# Patient Record
Sex: MALE | Race: WHITE | NOT HISPANIC OR LATINO | URBAN - METROPOLITAN AREA
[De-identification: names, ages, dates, MRNs, and addresses within clinical notes are randomized per-mention and may not be internally consistent; named-entity substitution may affect disease eponyms.]

---

## 2017-01-19 ENCOUNTER — INPATIENT (INPATIENT)
Facility: HOSPITAL | Age: 82
LOS: 0 days | Discharge: HOME | DRG: 304 | End: 2017-01-20
Attending: INTERNAL MEDICINE | Admitting: INTERNAL MEDICINE
Payer: MEDICARE

## 2017-01-19 ENCOUNTER — APPOINTMENT (OUTPATIENT)
Dept: GASTROENTEROLOGY | Facility: HOSPITAL | Age: 82
End: 2017-01-19

## 2017-01-19 VITALS
SYSTOLIC BLOOD PRESSURE: 199 MMHG | OXYGEN SATURATION: 95 % | DIASTOLIC BLOOD PRESSURE: 86 MMHG | TEMPERATURE: 97 F | HEART RATE: 78 BPM | RESPIRATION RATE: 16 BRPM

## 2017-01-19 DIAGNOSIS — I25.10 ATHEROSCLEROTIC HEART DISEASE OF NATIVE CORONARY ARTERY WITHOUT ANGINA PECTORIS: ICD-10-CM

## 2017-01-19 DIAGNOSIS — K85.90 ACUTE PANCREATITIS WITHOUT NECROSIS OR INFECTION, UNSPECIFIED: ICD-10-CM

## 2017-01-19 DIAGNOSIS — N18.6 END STAGE RENAL DISEASE: ICD-10-CM

## 2017-01-19 DIAGNOSIS — R63.8 OTHER SYMPTOMS AND SIGNS CONCERNING FOOD AND FLUID INTAKE: ICD-10-CM

## 2017-01-19 DIAGNOSIS — I16.0 HYPERTENSIVE URGENCY: ICD-10-CM

## 2017-01-19 DIAGNOSIS — Z95.2 PRESENCE OF PROSTHETIC HEART VALVE: Chronic | ICD-10-CM

## 2017-01-19 DIAGNOSIS — Z29.9 ENCOUNTER FOR PROPHYLACTIC MEASURES, UNSPECIFIED: ICD-10-CM

## 2017-01-19 DIAGNOSIS — I50.9 HEART FAILURE, UNSPECIFIED: ICD-10-CM

## 2017-01-19 DIAGNOSIS — N18.9 CHRONIC KIDNEY DISEASE, UNSPECIFIED: ICD-10-CM

## 2017-01-19 LAB
ALBUMIN SERPL ELPH-MCNC: 3.9 G/DL — SIGNIFICANT CHANGE UP (ref 3.4–5)
ALP SERPL-CCNC: 110 U/L — SIGNIFICANT CHANGE UP (ref 40–120)
ALT FLD-CCNC: 41 U/L — SIGNIFICANT CHANGE UP (ref 12–42)
ANION GAP SERPL CALC-SCNC: 12 MMOL/L — SIGNIFICANT CHANGE UP (ref 9–16)
APTT BLD: 30.8 SEC — SIGNIFICANT CHANGE UP (ref 27.5–37.4)
AST SERPL-CCNC: 50 U/L — HIGH (ref 15–37)
BASE EXCESS BLDV CALC-SCNC: 6.8 MMOL/L — SIGNIFICANT CHANGE UP
BILIRUB SERPL-MCNC: 1.2 MG/DL — SIGNIFICANT CHANGE UP (ref 0.2–1.2)
BUN SERPL-MCNC: 38 MG/DL — HIGH (ref 7–23)
CA-I SERPL-SCNC: 1.09 MMOL/L — LOW (ref 1.1–1.3)
CALCIUM SERPL-MCNC: 8.9 MG/DL — SIGNIFICANT CHANGE UP (ref 8.5–10.5)
CHLORIDE SERPL-SCNC: 100 MMOL/L — SIGNIFICANT CHANGE UP (ref 96–108)
CK MB CFR SERPL CALC: 1.9 NG/ML — SIGNIFICANT CHANGE UP (ref 0.5–3.6)
CK SERPL-CCNC: 35 U/L — LOW (ref 39–308)
CO2 SERPL-SCNC: 29 MMOL/L — SIGNIFICANT CHANGE UP (ref 22–31)
CREAT SERPL-MCNC: 4.7 MG/DL — HIGH (ref 0.5–1.3)
GAS PNL BLDV: 138 MMOL/L — SIGNIFICANT CHANGE UP (ref 138–146)
GAS PNL BLDV: SIGNIFICANT CHANGE UP
GAS PNL BLDV: SIGNIFICANT CHANGE UP
GLUCOSE SERPL-MCNC: 106 MG/DL — HIGH (ref 70–99)
HCO3 BLDV-SCNC: 31 MMOL/L — HIGH (ref 20–27)
HCT VFR BLD CALC: 43 % — SIGNIFICANT CHANGE UP (ref 39–50)
HGB BLD-MCNC: 13.8 G/DL — SIGNIFICANT CHANGE UP (ref 13–17)
INR BLD: 1.17 — HIGH (ref 0.88–1.16)
LDH SERPL L TO P-CCNC: 345 U/L — HIGH (ref 87–241)
LIDOCAIN IGE QN: 3516 U/L — HIGH (ref 73–393)
MAGNESIUM SERPL-MCNC: 2.4 MG/DL — SIGNIFICANT CHANGE UP (ref 1.6–2.4)
MCHC RBC-ENTMCNC: 30.7 PG — SIGNIFICANT CHANGE UP (ref 27–34)
MCHC RBC-ENTMCNC: 32.1 G/DL — SIGNIFICANT CHANGE UP (ref 32–36)
MCV RBC AUTO: 95.8 FL — SIGNIFICANT CHANGE UP (ref 80–100)
PCO2 BLDV: 44 MMHG — SIGNIFICANT CHANGE UP (ref 41–51)
PH BLDV: 7.47 — HIGH (ref 7.32–7.43)
PHOSPHATE SERPL-MCNC: 3.3 MG/DL — SIGNIFICANT CHANGE UP (ref 2.5–4.5)
PLATELET # BLD AUTO: 161 K/UL — SIGNIFICANT CHANGE UP (ref 150–400)
PO2 BLDV: 31 MMHG — SIGNIFICANT CHANGE UP
POTASSIUM BLDV-SCNC: 4.3 MMOL/L — SIGNIFICANT CHANGE UP (ref 3.5–4.9)
POTASSIUM SERPL-MCNC: 4.2 MMOL/L — SIGNIFICANT CHANGE UP (ref 3.5–5.3)
POTASSIUM SERPL-SCNC: 4.2 MMOL/L — SIGNIFICANT CHANGE UP (ref 3.5–5.3)
PROT SERPL-MCNC: 7.3 G/DL — SIGNIFICANT CHANGE UP (ref 6.4–8.2)
PROTHROM AB SERPL-ACNC: 13 SEC — SIGNIFICANT CHANGE UP (ref 10–13.1)
RBC # BLD: 4.49 M/UL — SIGNIFICANT CHANGE UP (ref 4.2–5.8)
RBC # FLD: 16.8 % — SIGNIFICANT CHANGE UP (ref 10.3–16.9)
SAO2 % BLDV: 60 % — SIGNIFICANT CHANGE UP
SODIUM SERPL-SCNC: 141 MMOL/L — SIGNIFICANT CHANGE UP (ref 135–145)
TROPONIN I SERPL-MCNC: 0.03 NG/ML — SIGNIFICANT CHANGE UP (ref 0.01–0.04)
WBC # BLD: 10.7 K/UL — HIGH (ref 3.8–10.5)
WBC # FLD AUTO: 10.7 K/UL — HIGH (ref 3.8–10.5)

## 2017-01-19 PROCEDURE — 74020: CPT | Mod: 26

## 2017-01-19 PROCEDURE — 43274 ERCP DUCT STENT PLACEMENT: CPT

## 2017-01-19 PROCEDURE — 99223 1ST HOSP IP/OBS HIGH 75: CPT | Mod: GC

## 2017-01-19 PROCEDURE — 93010 ELECTROCARDIOGRAM REPORT: CPT

## 2017-01-19 RX ORDER — MORPHINE SULFATE 50 MG/1
4 CAPSULE, EXTENDED RELEASE ORAL
Qty: 0 | Refills: 0 | Status: DISCONTINUED | OUTPATIENT
Start: 2017-01-19 | End: 2017-01-19

## 2017-01-19 RX ORDER — SODIUM CHLORIDE 9 MG/ML
1000 INJECTION INTRAMUSCULAR; INTRAVENOUS; SUBCUTANEOUS
Qty: 0 | Refills: 0 | Status: DISCONTINUED | OUTPATIENT
Start: 2017-01-19 | End: 2017-01-20

## 2017-01-19 RX ORDER — SEVELAMER CARBONATE 2400 MG/1
800 POWDER, FOR SUSPENSION ORAL
Qty: 0 | Refills: 0 | Status: DISCONTINUED | OUTPATIENT
Start: 2017-01-19 | End: 2017-01-20

## 2017-01-19 RX ORDER — MORPHINE SULFATE 50 MG/1
2 CAPSULE, EXTENDED RELEASE ORAL
Qty: 0 | Refills: 0 | Status: DISCONTINUED | OUTPATIENT
Start: 2017-01-19 | End: 2017-01-19

## 2017-01-19 RX ORDER — HYDROMORPHONE HYDROCHLORIDE 2 MG/ML
2 INJECTION INTRAMUSCULAR; INTRAVENOUS; SUBCUTANEOUS
Qty: 0 | Refills: 0 | Status: DISCONTINUED | OUTPATIENT
Start: 2017-01-19 | End: 2017-01-20

## 2017-01-19 RX ORDER — TICAGRELOR 90 MG/1
90 TABLET ORAL
Qty: 0 | Refills: 0 | Status: DISCONTINUED | OUTPATIENT
Start: 2017-01-20 | End: 2017-01-20

## 2017-01-19 RX ORDER — ONDANSETRON 8 MG/1
4 TABLET, FILM COATED ORAL ONCE
Qty: 0 | Refills: 0 | Status: COMPLETED | OUTPATIENT
Start: 2017-01-19 | End: 2017-01-19

## 2017-01-19 RX ORDER — ASPIRIN/CALCIUM CARB/MAGNESIUM 324 MG
81 TABLET ORAL DAILY
Qty: 0 | Refills: 0 | Status: DISCONTINUED | OUTPATIENT
Start: 2017-01-19 | End: 2017-01-20

## 2017-01-19 RX ORDER — HEPARIN SODIUM 5000 [USP'U]/ML
5000 INJECTION INTRAVENOUS; SUBCUTANEOUS EVERY 8 HOURS
Qty: 0 | Refills: 0 | Status: DISCONTINUED | OUTPATIENT
Start: 2017-01-19 | End: 2017-01-20

## 2017-01-19 RX ORDER — FAMOTIDINE 10 MG/ML
20 INJECTION INTRAVENOUS ONCE
Qty: 0 | Refills: 0 | Status: DISCONTINUED | OUTPATIENT
Start: 2017-01-19 | End: 2017-01-20

## 2017-01-19 RX ORDER — SIMVASTATIN 20 MG/1
20 TABLET, FILM COATED ORAL AT BEDTIME
Qty: 0 | Refills: 0 | Status: DISCONTINUED | OUTPATIENT
Start: 2017-01-19 | End: 2017-01-19

## 2017-01-19 RX ORDER — SIMVASTATIN 20 MG/1
20 TABLET, FILM COATED ORAL AT BEDTIME
Qty: 0 | Refills: 0 | Status: DISCONTINUED | OUTPATIENT
Start: 2017-01-20 | End: 2017-01-20

## 2017-01-19 RX ORDER — PANTOPRAZOLE SODIUM 20 MG/1
20 TABLET, DELAYED RELEASE ORAL DAILY
Qty: 0 | Refills: 0 | Status: DISCONTINUED | OUTPATIENT
Start: 2017-01-19 | End: 2017-01-20

## 2017-01-19 RX ORDER — LOSARTAN POTASSIUM 100 MG/1
50 TABLET, FILM COATED ORAL DAILY
Qty: 0 | Refills: 0 | Status: DISCONTINUED | OUTPATIENT
Start: 2017-01-19 | End: 2017-01-20

## 2017-01-19 RX ORDER — TICAGRELOR 90 MG/1
90 TABLET ORAL
Qty: 0 | Refills: 0 | Status: DISCONTINUED | OUTPATIENT
Start: 2017-01-19 | End: 2017-01-19

## 2017-01-19 RX ADMIN — PANTOPRAZOLE SODIUM 20 MILLIGRAM(S): 20 TABLET, DELAYED RELEASE ORAL at 18:58

## 2017-01-19 RX ADMIN — ONDANSETRON 4 MILLIGRAM(S): 8 TABLET, FILM COATED ORAL at 18:56

## 2017-01-19 RX ADMIN — HEPARIN SODIUM 5000 UNIT(S): 5000 INJECTION INTRAVENOUS; SUBCUTANEOUS at 22:59

## 2017-01-19 RX ADMIN — HYDROMORPHONE HYDROCHLORIDE 2 MILLIGRAM(S): 2 INJECTION INTRAMUSCULAR; INTRAVENOUS; SUBCUTANEOUS at 18:56

## 2017-01-19 RX ADMIN — HYDROMORPHONE HYDROCHLORIDE 2 MILLIGRAM(S): 2 INJECTION INTRAMUSCULAR; INTRAVENOUS; SUBCUTANEOUS at 19:40

## 2017-01-19 RX ADMIN — SODIUM CHLORIDE 40 MILLILITER(S): 9 INJECTION INTRAMUSCULAR; INTRAVENOUS; SUBCUTANEOUS at 19:01

## 2017-01-19 NOTE — H&P ADULT. - PMH
Aortic stenosis, severe    Chronic renal failure  on HD  Congestive heart failure    Gout    Hyperlipidemia    Hypertension    Myocardial infarction

## 2017-01-19 NOTE — CONSULT NOTE ADULT - SUBJECTIVE AND OBJECTIVE BOX
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  Renal consulted to resume hemodialysis schedule.  Patient was given 300cc of IVF during procedure.  Last dialysis was on 1/18/17.  Post procedure pt awoke with sharp intense, non-radiating abdominal pain located primarily in RUQ and accompanied with nausea, no vomiting. Nursing noted elevated BP to 217/85, HR 65, RR 12, POx 99 RA. Pt denies CP, palpitations, new SOB, blurry vision, HA, change in mental status, fevers or chills. Pt was given zofran, famotidine and labetalol 20mg IVP and morphine 4mg IVP. Stat CXR and abdominal xray were ordered and reviewed by GI fellow with no free air in peritoneum or mediastinum and no pulmonary congestion. Repeat BP was 191/78 and pt was given ativan 0.5mg and hydralazine 10mg IVP with improvement of pressure to 171/69, HR 69, RR 10, Pox 98 RA. (19 Jan 2017 17:06)    Patient seen and examined by me.  He denied cp.  C/o minimal sob.  (+) diffuse abdominal sharp pain.  No n/v/d.  Stated he gets his dialysis [facility] ([address] [place] [address])      PAST MEDICAL & SURGICAL HISTORY:  Gout  Chronic renal failure: on HD  Aortic stenosis, severe  Myocardial infarction  Hypertension  Hyperlipidemia  Congestive heart failure  S/P TAVR (transcatheter aortic valve replacement)      MEDICATIONS:  famotidine Injectable 20milliGRAM(s) IV Push Once  heparin  Injectable 5000Unit(s) SubCutaneous every 8 hours  aspirin enteric coated 81milliGRAM(s) Oral daily  losartan 50milliGRAM(s) Oral daily  sevelamer hydrochloride 800milliGRAM(s) Oral three times a day with meals  morphine  - Injectable 4milliGRAM(s) IV Push every 3 hours PRN  ondansetron Injectable 4milliGRAM(s) IV Push once PRN  pantoprazole  Injectable 20milliGRAM(s) IV Push daily  sodium chloride 0.9%. 1000milliLiter(s) IV Continuous <Continuous>      No pertinent family history in first degree relatives      REVIEW OF SYSTEMS:  As above      PHYSICAL EXAM:    Constitutional: T(C): 36.2, Max: 36.2 (01-19 @ 18:10)  HR: 78 (78 - 78)  BP: 199/86 (199/86 - 199/86)  RR: 16 (16 - 16)  SpO2: 95% (95% - 95%)  Wt(kg): --  General: AAO x 3. NAD but c/o abd pain.  HEENT - Dry MM. No cyanosis  CV - S1, S2. RRR. (+) systolic murmur  Lungs - CTA b/l  Abdomen - soft. (+) epigastric and RUQ tenderness.  No rebound, no guarding.  Ext - No LE edema  Access: RUE AVF (+) bruit    DATA:  141    |  100    |  38<H>  ----------------------------<  106<H>  Ca:8.9   (19 Jan 2017 17:04)  4.2     |  29     |  4.70<H>      eGFR if Non : 10 <L>  eGFR if : 12 <L>    TPro  7.3 g/dL  /  Alb  3.9 g/dL  /  TBili  1.2 mg/dL  /  DBili  x      /  AST  50 U/L<H>  /  ALT  41 U/L  /  AlkPhos  110 U/L  19 Jan 2017 17:04                        13.8   10.7<H> )-----------( 161      ( 19 Jan 2017 17:08 )             43.0

## 2017-01-19 NOTE — H&P ADULT. - PROBLEM SELECTOR PLAN 4
no CP. neg trops. c/w aspirin, plavix, statin and BB. no CP. neg trops. c/w aspirin, plavix, statin and ?BB.

## 2017-01-19 NOTE — H&P ADULT. - PROBLEM SELECTOR PLAN 5
echo done 12/20/2016 showing EF of 55-60% with mild concentric hypertrophy, moderately elevated LA pressure. On exam pt with mildly elevated RH pressure. will c/w home losartan and beta blocker.

## 2017-01-19 NOTE — H&P ADULT. - PROBLEM SELECTOR PLAN 2
Likely 2/2 to pain so should improve with pain control. No signs or symptoms of end organ damage. On home losartan 50mg hs. Will treat BP overnight with pushes of hydralazine 10mg. Goal SBP < 150 overnight. Likely 2/2 to pain so should improve with pain control. No signs or symptoms of end organ damage. On home losartan 50mg hs. Renal recommends not being overly aggressive with BP management overnight. SBP < 180 acceptable. First try treating pain.

## 2017-01-19 NOTE — H&P ADULT. - GASTROINTESTINAL COMMENTS
See HPI hypoactive bs. soft abdomen with guarding. tenderness in upper quadrants R > L. No hepatomegaly. No splenomegaly.

## 2017-01-19 NOTE — H&P ADULT. - ATTENDING COMMENTS
S/p ERCP today now w/ hypertensive urgency likely due to pain from acute pancreatitis post ERCP. Allow for higher SBP readings at this time, keep NPO, dilaudid prn-adjust as tolerated, gentle hydration given ESRD status, c/w home BP meds, f/up GI recs. Apprec Renal recs, plan for HD tomorrow.  Rest as above, d/w family at bedside. PT eval.

## 2017-01-19 NOTE — CONSULT NOTE ADULT - PROBLEM SELECTOR RECOMMENDATION 9
- electrolytes acceptable  - volume status acceptable  - BP elevated currently - suspect likely 2/2 pain. Patient reports much lower bp on off dialysis days.  May have small volume expansion component, though unclear as patient with Hb of 13.8 and 3 weeks ago was 9.6. Possibly heme concentrated.    - Recommend pain control at this time.    - No indication for HD at the moment.  - If patient is going to be NPO, can give gentle hydration with NS @ 40-50 cc/hr.  - Plan for HD on 1/20/17.

## 2017-01-19 NOTE — H&P ADULT. - PROBLEM SELECTOR PLAN 1
post ERCP pancreatitis with epigastric pain and lipase 3500. Halle's criteria <= 2. Consulting renal for recs on fluid resuscitation. Will treat pain with morphine 4mg q2 hrs prn. Keep NPO except meds for overnight. Will advance diet in morning post ERCP pancreatitis with epigastric pain and lipase 3500. Halle's criteria <= 2. Consulting renal for recs on fluid resuscitation. They recommend gentle hydration. Will treat pain with dilaudid 2mg q3 hrs prn. Keep NPO except meds for overnight. Will advance diet in morning

## 2017-01-19 NOTE — H&P ADULT. - RADIOLOGY RESULTS AND INTERPRETATION
CXR (wet read): No pulmonary congestion. No free air under diaphragm or in mediastinum.   Abdominal xray (wet read): CBD stent visible and appearing in correction location.

## 2017-01-19 NOTE — PROCEDURE NOTE - ADDITIONAL PROCEDURE DETAILS
ERCP with stent placement into the CHD.  [id number] pigtail stent left inplace to decompress dilated GD and obviate [id number]

## 2017-01-19 NOTE — H&P ADULT. - HISTORY OF PRESENT ILLNESS
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 The ERCP went without complications. He was given 300cc of IVF. Last dialysis was yesterday. Post procedure pt awoke with sharp intense, non-radiating abdominal pain located primarily in RUQ and accompanied with nausea, no vomiting. Nursing noted elevated BP to 217/85, HR 65, RR 12, POx 99 RA. Pt denies CP, palpitations, new SOB, blurry vision, HA, change in mental status, fevers or chills. Pt was given zofran, famotidine and labetalol 20mg IVP and morphine 4mg IVP. Stat CXR and abdominal xray were ordered and reviewed by GI fellow with no free air in peritoneum or mediastinum and no pulmonary congestion. Repeat BP was 191/78 and pt was given ativan 0.5mg and hydralazine 10mg IVP with improvement of pressure to 171/69, HR 69, RR 10, Pox 98 RA.

## 2017-01-19 NOTE — H&P ADULT. - ASSESSMENT
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

## 2017-01-19 NOTE — H&P ADULT. - PROBLEM SELECTOR PLAN 3
On HD M-W-F. Last dialysis yesterday. Electrolytes WNL. Pt relatively euvolemic on exam. Consulted renal for recs. Will continue home phosphate binder.

## 2017-01-20 ENCOUNTER — TRANSCRIPTION ENCOUNTER (OUTPATIENT)
Age: 82
End: 2017-01-20

## 2017-01-20 VITALS — DIASTOLIC BLOOD PRESSURE: 59 MMHG | SYSTOLIC BLOOD PRESSURE: 125 MMHG

## 2017-01-20 DIAGNOSIS — I10 ESSENTIAL (PRIMARY) HYPERTENSION: ICD-10-CM

## 2017-01-20 DIAGNOSIS — D72.829 ELEVATED WHITE BLOOD CELL COUNT, UNSPECIFIED: ICD-10-CM

## 2017-01-20 DIAGNOSIS — N18.9 CHRONIC KIDNEY DISEASE, UNSPECIFIED: ICD-10-CM

## 2017-01-20 LAB
ALBUMIN SERPL ELPH-MCNC: 3.4 G/DL — SIGNIFICANT CHANGE UP (ref 3.4–5)
ALP SERPL-CCNC: 168 U/L — HIGH (ref 40–120)
ALT FLD-CCNC: 138 U/L — HIGH (ref 12–42)
ANION GAP SERPL CALC-SCNC: 12 MMOL/L — SIGNIFICANT CHANGE UP (ref 9–16)
AST SERPL-CCNC: 149 U/L — HIGH (ref 15–37)
BASOPHILS NFR BLD AUTO: 1 % — SIGNIFICANT CHANGE UP (ref 0–2)
BILIRUB SERPL-MCNC: 1.9 MG/DL — HIGH (ref 0.2–1.2)
BUN SERPL-MCNC: 44 MG/DL — HIGH (ref 7–23)
CALCIUM SERPL-MCNC: 8.4 MG/DL — LOW (ref 8.5–10.5)
CHLORIDE SERPL-SCNC: 105 MMOL/L — SIGNIFICANT CHANGE UP (ref 96–108)
CO2 SERPL-SCNC: 26 MMOL/L — SIGNIFICANT CHANGE UP (ref 22–31)
CREAT SERPL-MCNC: 5.3 MG/DL — HIGH (ref 0.5–1.3)
GLUCOSE SERPL-MCNC: 137 MG/DL — HIGH (ref 70–99)
HBV SURFACE AG SER-ACNC: SIGNIFICANT CHANGE UP
HCT VFR BLD CALC: 42 % — SIGNIFICANT CHANGE UP (ref 39–50)
HCV AB S/CO SERPL IA: 0.13 S/CO — SIGNIFICANT CHANGE UP
HCV AB SERPL-IMP: SIGNIFICANT CHANGE UP
HGB BLD-MCNC: 13.3 G/DL — SIGNIFICANT CHANGE UP (ref 13–17)
LIDOCAIN IGE QN: 345 U/L — SIGNIFICANT CHANGE UP (ref 73–393)
LYMPHOCYTES # BLD AUTO: 5 % — LOW (ref 13–44)
MAGNESIUM SERPL-MCNC: 2.1 MG/DL — SIGNIFICANT CHANGE UP (ref 1.6–2.4)
MANUAL DIF COMMENT BLD-IMP: SIGNIFICANT CHANGE UP
MANUAL SMEAR VERIFICATION: SIGNIFICANT CHANGE UP
MCHC RBC-ENTMCNC: 30.4 PG — SIGNIFICANT CHANGE UP (ref 27–34)
MCHC RBC-ENTMCNC: 31.7 G/DL — LOW (ref 32–36)
MCV RBC AUTO: 96.1 FL — SIGNIFICANT CHANGE UP (ref 80–100)
MONOCYTES NFR BLD AUTO: 5 % — SIGNIFICANT CHANGE UP (ref 2–14)
NEUTROPHILS NFR BLD AUTO: 81 % — HIGH (ref 43–77)
NEUTS BAND # BLD: 8 % — SIGNIFICANT CHANGE UP
NEUTS HYPERSEG # BLD: PRESENT — SIGNIFICANT CHANGE UP
OVALOCYTES BLD QL SMEAR: SLIGHT — SIGNIFICANT CHANGE UP
PHOSPHATE SERPL-MCNC: 3.1 MG/DL — SIGNIFICANT CHANGE UP (ref 2.5–4.5)
PLAT MORPH BLD: (no result)
PLATELET # BLD AUTO: 149 K/UL — LOW (ref 150–400)
PLATELET CLUMP BLD QL SMEAR: PRESENT
POIKILOCYTOSIS BLD QL AUTO: SLIGHT — SIGNIFICANT CHANGE UP
POLYCHROMASIA BLD QL SMEAR: SLIGHT — SIGNIFICANT CHANGE UP
POTASSIUM SERPL-MCNC: 4.1 MMOL/L — SIGNIFICANT CHANGE UP (ref 3.5–5.3)
POTASSIUM SERPL-SCNC: 4.1 MMOL/L — SIGNIFICANT CHANGE UP (ref 3.5–5.3)
PROT SERPL-MCNC: 6.5 G/DL — SIGNIFICANT CHANGE UP (ref 6.4–8.2)
RBC # BLD: 4.37 M/UL — SIGNIFICANT CHANGE UP (ref 4.2–5.8)
RBC # FLD: 17.3 % — HIGH (ref 10.3–16.9)
RBC BLD AUTO: (no result)
SODIUM SERPL-SCNC: 143 MMOL/L — SIGNIFICANT CHANGE UP (ref 135–145)
WBC # BLD: 20.4 K/UL — HIGH (ref 3.8–10.5)
WBC # FLD AUTO: 20.4 K/UL — HIGH (ref 3.8–10.5)

## 2017-01-20 PROCEDURE — 85730 THROMBOPLASTIN TIME PARTIAL: CPT

## 2017-01-20 PROCEDURE — 82553 CREATINE MB FRACTION: CPT

## 2017-01-20 PROCEDURE — 82550 ASSAY OF CK (CPK): CPT

## 2017-01-20 PROCEDURE — 83615 LACTATE (LD) (LDH) ENZYME: CPT

## 2017-01-20 PROCEDURE — 71045 X-RAY EXAM CHEST 1 VIEW: CPT

## 2017-01-20 PROCEDURE — 84132 ASSAY OF SERUM POTASSIUM: CPT

## 2017-01-20 PROCEDURE — C1876: CPT

## 2017-01-20 PROCEDURE — 86803 HEPATITIS C AB TEST: CPT

## 2017-01-20 PROCEDURE — 85610 PROTHROMBIN TIME: CPT

## 2017-01-20 PROCEDURE — 80053 COMPREHEN METABOLIC PANEL: CPT

## 2017-01-20 PROCEDURE — 84295 ASSAY OF SERUM SODIUM: CPT

## 2017-01-20 PROCEDURE — 84100 ASSAY OF PHOSPHORUS: CPT

## 2017-01-20 PROCEDURE — 87340 HEPATITIS B SURFACE AG IA: CPT

## 2017-01-20 PROCEDURE — 90935 HEMODIALYSIS ONE EVALUATION: CPT

## 2017-01-20 PROCEDURE — C1769: CPT

## 2017-01-20 PROCEDURE — 83690 ASSAY OF LIPASE: CPT

## 2017-01-20 PROCEDURE — 85027 COMPLETE CBC AUTOMATED: CPT

## 2017-01-20 PROCEDURE — 90935 HEMODIALYSIS ONE EVALUATION: CPT | Mod: GC

## 2017-01-20 PROCEDURE — 84484 ASSAY OF TROPONIN QUANT: CPT

## 2017-01-20 PROCEDURE — 82330 ASSAY OF CALCIUM: CPT

## 2017-01-20 PROCEDURE — 74019 RADEX ABDOMEN 2 VIEWS: CPT

## 2017-01-20 PROCEDURE — 99238 HOSP IP/OBS DSCHRG MGMT 30/<: CPT

## 2017-01-20 PROCEDURE — 93005 ELECTROCARDIOGRAM TRACING: CPT

## 2017-01-20 PROCEDURE — 82803 BLOOD GASES ANY COMBINATION: CPT

## 2017-01-20 PROCEDURE — 36415 COLL VENOUS BLD VENIPUNCTURE: CPT

## 2017-01-20 PROCEDURE — 83735 ASSAY OF MAGNESIUM: CPT

## 2017-01-20 PROCEDURE — 71010: CPT | Mod: 26

## 2017-01-20 PROCEDURE — 85025 COMPLETE CBC W/AUTO DIFF WBC: CPT

## 2017-01-20 PROCEDURE — 86706 HEP B SURFACE ANTIBODY: CPT

## 2017-01-20 RX ORDER — PANTOPRAZOLE SODIUM 20 MG/1
40 TABLET, DELAYED RELEASE ORAL
Qty: 0 | Refills: 0 | Status: DISCONTINUED | OUTPATIENT
Start: 2017-01-20 | End: 2017-01-20

## 2017-01-20 RX ORDER — OXYCODONE HYDROCHLORIDE 5 MG/1
1 TABLET ORAL
Qty: 20 | Refills: 0 | OUTPATIENT
Start: 2017-01-20 | End: 2017-01-25

## 2017-01-20 RX ORDER — ONDANSETRON 8 MG/1
1 TABLET, FILM COATED ORAL
Qty: 6 | Refills: 0 | OUTPATIENT
Start: 2017-01-20 | End: 2017-01-22

## 2017-01-20 RX ADMIN — PANTOPRAZOLE SODIUM 40 MILLIGRAM(S): 20 TABLET, DELAYED RELEASE ORAL at 10:32

## 2017-01-20 RX ADMIN — SEVELAMER CARBONATE 800 MILLIGRAM(S): 2400 POWDER, FOR SUSPENSION ORAL at 15:46

## 2017-01-20 RX ADMIN — HEPARIN SODIUM 5000 UNIT(S): 5000 INJECTION INTRAVENOUS; SUBCUTANEOUS at 15:46

## 2017-01-20 RX ADMIN — SEVELAMER CARBONATE 800 MILLIGRAM(S): 2400 POWDER, FOR SUSPENSION ORAL at 10:32

## 2017-01-20 RX ADMIN — HEPARIN SODIUM 5000 UNIT(S): 5000 INJECTION INTRAVENOUS; SUBCUTANEOUS at 06:47

## 2017-01-20 RX ADMIN — Medication 81 MILLIGRAM(S): at 15:46

## 2017-01-20 NOTE — PROGRESS NOTE ADULT - SUBJECTIVE AND OBJECTIVE BOX
INTERVAL HPI/OVERNIGHT EVENTS: RACHEL. Pt spiked low grade temp overnight with tachycardia that quickly resolved after pain meds. No Bcx drawn. Pt pain improving. Pt with appetite this morning.     VITAL SIGNS:  Vital Signs Last 24 Hrs  T(C): 37, Max: 38.2 (01-20 @ 01:02)  T(F): 98.6, Max: 100.7 (01-20 @ 01:02)  HR: 91 (78 - 110)  BP: 124/58 (124/58 - 199/86)  BP(mean): --  RR: 18 (16 - 20)  SpO2: 95% (93% - 98%) RA    PHYSICAL EXAM:    Constitutional: elderly man, well built, resting in bed, non toxic appearing, NAD, A&O x3  ENMT: MMM  Neck: JVP to mandibular angle  Respiratory: bibasilar crackles  Cardiovascular: RRR. +S1, S2. Systolic murmur loudest of RUSB  Gastrointestinal: +BS. Soft. Tender in R upper quadrants.   Extremities: No edema. WWP    MEDICATIONS  (STANDING):  famotidine Injectable 20milliGRAM(s) IV Push Once  heparin  Injectable 5000Unit(s) SubCutaneous every 8 hours  aspirin enteric coated 81milliGRAM(s) Oral daily  losartan 50milliGRAM(s) Oral daily  sevelamer hydrochloride 800milliGRAM(s) Oral three times a day with meals  simvastatin 20milliGRAM(s) Oral at bedtime  ticagrelor 90milliGRAM(s) Oral two times a day  pantoprazole  Injectable 20milliGRAM(s) IV Push daily  sodium chloride 0.9%. 1000milliLiter(s) IV Continuous <Continuous>    MEDICATIONS  (PRN):  HYDROmorphone  Injectable 2milliGRAM(s) IV Push every 3 hours PRN Severe Pain (7 - 10)      Allergies    Biaxin (Unknown)  Ceftin (Unknown)  Plavix (Unknown)  Vitamin D (Unknown)    Intolerances        LABS:                        13.3   20.4  )-----------( 149      ( 20 Jan 2017 06:46 )             42.0     20 Jan 2017 06:46    143    |  105    |  44     ----------------------------<  137    4.1     |  26     |  5.30     Ca    8.4        20 Jan 2017 06:46  Phos  3.1       20 Jan 2017 06:46  Mg     2.1       20 Jan 2017 06:46    TPro  6.5    /  Alb  3.4    /  TBili  1.9    /  DBili  x      /  AST  149    /  ALT  138    /  AlkPhos  168    20 Jan 2017 06:46    PT/INR - ( 19 Jan 2017 17:04 )   PT: 13.0 sec;   INR: 1.17          PTT - ( 19 Jan 2017 17:04 )  PTT:30.8 sec      RADIOLOGY & ADDITIONAL TESTS: CXR (wet read) - no significant pulmonary congestion or infiltrates.

## 2017-01-20 NOTE — DISCHARGE NOTE ADULT - PLAN OF CARE
you were admitted to the hospital for treatment of pancreatitis after ERCP we treated your pain and nausea with medication. we also gave you gentle IV fluids. your symptoms improved by the next day. we are sending you home with prescriptions for medicines to be used when you have pain or nausea. please make an appointment to follow up with Dr. Jones in 1 week. If your symptoms worsen (e.g. abdominal pain, nausea, vomiting) then please see your doctor as soon as possible or go to an emergency room. we consulted renal doctors while you were in the hospital. you are receiving dialysis today and should continue your regularly scheduled dialysis sessions [id number]. Also please follow up with your kidney doctor for your regularly scheduled appointment. You have a history of having a stent placed in your coronary artery. In order to prevent a future heart attack, please continue to take your home medications. Also please follow up with your cardiologist for your regularly scheduled appointment. please follow up with your cardiologist for your regularly scheduled appointemnt. You had a period of elevated blood pressures yesterday because of abdominal pain. You blood pressures improved after we controlled your pain. Please continue to take your home medications and follow up with your doctors for your regularly scheduled appointments. You had an echocardiogram done recently. This is an imaging study of your heart function. Your heart has good pumping action. However, you do have higher than normal pressures in the left side of your heart which can result in water in your legs. Taking your medications and going to dialysis will help prevent this from happening. Please follow up with your cardiologist and kidney doctor for your regularly scheduled appointments.

## 2017-01-20 NOTE — PROGRESS NOTE ADULT - PROBLEM SELECTOR PLAN 6
NPO overnight. Advance diet as tolerated. echo done 12/20/2016 showing EF of 55-60% with mild concentric hypertrophy, moderately elevated LA pressure. On exam pt with mildly elevated RH pressure. will c/w home losartan and beta blocker.

## 2017-01-20 NOTE — PROGRESS NOTE ADULT - SUBJECTIVE AND OBJECTIVE BOX
Patient seen and examined at bedside.   Patient's last HD was on 1/18/2017     His HD history     (note incomplete)    famotidine Injectable 20milliGRAM(s) Once  heparin  Injectable 5000Unit(s) every 8 hours  aspirin enteric coated 81milliGRAM(s) daily  losartan 50milliGRAM(s) daily  sevelamer hydrochloride 800milliGRAM(s) three times a day with meals  simvastatin 20milliGRAM(s) at bedtime  ticagrelor 90milliGRAM(s) two times a day  pantoprazole  Injectable 20milliGRAM(s) daily  sodium chloride 0.9%. 1000milliLiter(s) <Continuous>  HYDROmorphone  Injectable 2milliGRAM(s) every 3 hours PRN      Allergies    Biaxin (Unknown)  Ceftin (Unknown)  Plavix (Unknown)  Vitamin D (Unknown)    Intolerances        T(C): , Max: 38.2 (01-20 @ 01:02)  T(F): , Max: 100.7 (01-20 @ 01:02)  HR: 91  BP: 124/58  BP(mean): --  RR: 18  SpO2: 95%  Wt(kg): --    I & Os for current day (as of 01-20 @ 09:12)  =============================================  IN:    sodium chloride 0.9%.: 490 ml    Total IN: 490 ml  ---------------------------------------------  OUT:    Total OUT: 0 ml  ---------------------------------------------  Total NET: 490 ml          LABS:                        13.3   20.4  )-----------( 149      ( 20 Jan 2017 06:46 )             42.0     20 Jan 2017 06:46    143    |  105    |  44     ----------------------------<  137    4.1     |  26     |  5.30     Ca    8.4        20 Jan 2017 06:46  Phos  3.1       20 Jan 2017 06:46  Mg     2.1       20 Jan 2017 06:46    TPro  6.5    /  Alb  3.4    /  TBili  1.9    /  DBili  x      /  AST  149    /  ALT  138    /  AlkPhos  168    20 Jan 2017 06:46      PT/INR - ( 19 Jan 2017 17:04 )   PT: 13.0 sec;   INR: 1.17          PTT - ( 19 Jan 2017 17:04 )  PTT:30.8 sec          RADIOLOGY & ADDITIONAL STUDIES: Patient seen and examined at bedside.     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 Started on gentle fluid hydration NS @40cc/hr since the previous evening.     Outputs not documented  Has received     Patient's last HD was on 1/18/2017     (incomplete)     His HD history:  Center: [facility] center [address] [place] [address] in [place]  Access: Right AVF (placed 5/2016  Vintage: 5/2016  Etiology: presumed chronic cardiorenal syndrome  EDW: Patient not certain; will verify with flowsheets       famotidine Injectable 20milliGRAM(s) Once  heparin  Injectable 5000Unit(s) every 8 hours  aspirin enteric coated 81milliGRAM(s) daily  losartan 50milliGRAM(s) daily  sevelamer hydrochloride 800milliGRAM(s) three times a day with meals  simvastatin 20milliGRAM(s) at bedtime  ticagrelor 90milliGRAM(s) two times a day  pantoprazole  Injectable 20milliGRAM(s) daily  sodium chloride 0.9%. 1000milliLiter(s) <Continuous>  HYDROmorphone  Injectable 2milliGRAM(s) every 3 hours PRN      Allergies    Biaxin (Unknown)  Ceftin (Unknown)  Plavix (Unknown)  Vitamin D (Unknown)    Intolerances        T(C): , Max: 38.2 (01-20 @ 01:02)  T(F): , Max: 100.7 (01-20 @ 01:02)  HR: 91  BP: 124/58  BP(mean): --  RR: 18  SpO2: 95%  Wt(kg): --    I & Os for current day (as of 01-20 @ 09:12)  =============================================  IN:    sodium chloride 0.9%.: 490 ml    Total IN: 490 ml  ---------------------------------------------  OUT:    Total OUT: 0 ml  ---------------------------------------------  Total NET: 490 ml          LABS:                        13.3   20.4  )-----------( 149      ( 20 Jan 2017 06:46 )             42.0     20 Jan 2017 06:46    143    |  105    |  44     ----------------------------<  137    4.1     |  26     |  5.30     Ca    8.4        20 Jan 2017 06:46  Phos  3.1       20 Jan 2017 06:46  Mg     2.1       20 Jan 2017 06:46    TPro  6.5    /  Alb  3.4    /  TBili  1.9    /  DBili  x      /  AST  149    /  ALT  138    /  AlkPhos  168    20 Jan 2017 06:46      PT/INR - ( 19 Jan 2017 17:04 )   PT: 13.0 sec;   INR: 1.17          PTT - ( 19 Jan 2017 17:04 )  PTT:30.8 sec          RADIOLOGY & ADDITIONAL STUDIES: Patient seen and examined at bedside.     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 Started on gentle fluid hydration NS @40cc/hr since the previous evening.    He feels well since last evening. His abdominal pain is reduced.   Despite being placed onto NS @40cc/hr, he has not developed any new leg edema or dyspnea or orthopnea at present  He reports he has residual renal function.   He does not know his prescription or his dry weight.      Outputs not documented  Has received about 500cc of NS up until 7AM time period     His HD history:  Center: [facility] Clinic center [address] [place] [address] in [place]  Access: Right AVF (placed 5/2016  Vintage: 5/2016  Etiology: presumed chronic cardiorenal syndrome  EDW: Patient not certain; will verify with flowsheets       famotidine Injectable 20milliGRAM(s) Once  heparin  Injectable 5000Unit(s) every 8 hours  aspirin enteric coated 81milliGRAM(s) daily  losartan 50milliGRAM(s) daily  sevelamer hydrochloride 800milliGRAM(s) three times a day with meals  simvastatin 20milliGRAM(s) at bedtime  ticagrelor 90milliGRAM(s) two times a day  pantoprazole  Injectable 20milliGRAM(s) daily  sodium chloride 0.9%. 1000milliLiter(s) <Continuous>  HYDROmorphone  Injectable 2milliGRAM(s) every 3 hours PRN      Allergies    Biaxin (Unknown)  Ceftin (Unknown)  Plavix (Unknown)  Vitamin D (Unknown)    Intolerances        T(C): , Max: 38.2 (01-20 @ 01:02)  T(F): , Max: 100.7 (01-20 @ 01:02)  HR: 91  BP: 124/58  BP(mean): --  RR: 18  SpO2: 95%  Wt(kg): --    I & Os for current day (as of 01-20 @ 09:12)  =============================================  IN:    sodium chloride 0.9%.: 490 ml    Total IN: 490 ml  ---------------------------------------------  OUT:    Total OUT: 0 ml  ---------------------------------------------  Total NET: 490 ml          LABS:                        13.3   20.4  )-----------( 149      ( 20 Jan 2017 06:46 )             42.0     20 Jan 2017 06:46    143    |  105    |  44     ----------------------------<  137    4.1     |  26     |  5.30     Ca    8.4        20 Jan 2017 06:46  Phos  3.1       20 Jan 2017 06:46  Mg     2.1       20 Jan 2017 06:46    TPro  6.5    /  Alb  3.4    /  TBili  1.9    /  DBili  x      /  AST  149    /  ALT  138    /  AlkPhos  168    20 Jan 2017 06:46      PT/INR - ( 19 Jan 2017 17:04 )   PT: 13.0 sec;   INR: 1.17          PTT - ( 19 Jan 2017 17:04 )  PTT:30.8 sec          RADIOLOGY & ADDITIONAL STUDIES:

## 2017-01-20 NOTE — PROGRESS NOTE ADULT - ASSESSMENT
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    #choledocholithiasis - s/p ERCP removing trapped stone, and placing stent in CHD with stent left in GB as well.   Patient is clinically well with resolving pain and no nausea. Bili and AP and transaminase elevation likely from intra-procedure manipulation and should resolve without further intervention. Patient is scheduled for HD today and will likely be discharged after as BP is well controlled  -advance diet  -F/u Dr. Jones outpatient for evaluation of symptoms and stent removal planning
87 y/o male with ESRD on HD s/p GI procedure.

## 2017-01-20 NOTE — PROGRESS NOTE ADULT - SUBJECTIVE AND OBJECTIVE BOX
Patient was seen and evaluated on dialysis.   Patient is tolerating the procedure well.   HR: 79  BP: 155/76  Wt(kg): 66.1kg bedscale  Continue dialysis:   Dialyzer: F180         QB:  400      QD: 500  Goal UF 1kg over 3 Hours

## 2017-01-20 NOTE — PROGRESS NOTE ADULT - PROBLEM SELECTOR PLAN 3
Currently above his previous known hemoglobin possbily as hemoconcentration from pancreatitis  Regardless, no indication for Epogen at present

## 2017-01-20 NOTE — PROGRESS NOTE ADULT - VASCULAR DETAILS
right AVF positive thrill and bruit   small pseudoaneurysm and site of stenoses noted but does not appear significant to compromise dialysis access

## 2017-01-20 NOTE — PROGRESS NOTE ADULT - PROBLEM SELECTOR PLAN 5
echo done 12/20/2016 showing EF of 55-60% with mild concentric hypertrophy, moderately elevated LA pressure. On exam pt with mildly elevated RH pressure. will c/w home losartan and beta blocker. no CP. neg trops. c/w aspirin, plavix, statin, BB.

## 2017-01-20 NOTE — DISCHARGE NOTE ADULT - MEDICATION SUMMARY - MEDICATIONS TO TAKE
I will START or STAY ON the medications listed below when I get home from the hospital:    aspirin 81 mg oral delayed release tablet  -- 1 tab(s) by mouth once a day  -- Indication: For Heart attack prevention    Percocet 10/325 oral tablet  -- 1 tab(s) by mouth every 6 hours, As Needed  -- Indication: For For temporary pain relief from pancreatitis    losartan 50 mg oral tablet  -- 1 tab(s) by mouth once a day  -- Indication: For For heart health and blood pressure control    ondansetron 4 mg oral tablet  -- 1 tab(s) by mouth every 8 hours  -- Indication: For For temporary treatment of nausea    simvastatin 20 mg oral tablet  -- 1 tab(s) by mouth once a day (at bedtime)  -- Indication: For For heart attack prevention    ticagrelor 90 mg oral tablet  -- 1 tab(s) by mouth 2 times a day  -- Indication: For For heart attack prevention    sevelamer hydrochloride 800 mg oral tablet  -- 1 tab(s) by mouth 3 times a day (with meals)  -- Indication: For To keep your phosphate level from being elevated    pantoprazole 40 mg oral delayed release tablet  -- 1 tab(s) by mouth 2 times a day (before meals)  -- Indication: For To reduce stomach acid secretion

## 2017-01-20 NOTE — DISCHARGE NOTE ADULT - SECONDARY DIAGNOSIS.
ESRD (end stage renal disease) CAD (coronary artery disease) Aortic stenosis, severe Hypertension Congestive heart failure

## 2017-01-20 NOTE — PROGRESS NOTE ADULT - PROBLEM SELECTOR PLAN 3
On HD M-W-F. Last dialysis yesterday. Electrolytes WNL. Pt mildly hypervolemic on exam. scheduled for HD today. Will continue to follow renal for recs. Will continue home phosphate binder. likely 2/2 to stress response. infection possible given transient low grade fever, man diff with 8% bands, but lower suspicion for infection so holding abx for now. Will continue to monitor. If pt condition worsens then will start abx for enteric coverage.

## 2017-01-20 NOTE — PROGRESS NOTE ADULT - SUBJECTIVE AND OBJECTIVE BOX
INTERVAL HPI/OVERNIGHT EVENTS:    VITAL SIGNS:  Vital Signs Last 24 Hrs  T(C): 37, Max: 38.2 (01-20 @ 01:02)  T(F): 98.6, Max: 100.7 (01-20 @ 01:02)  HR: 91 (78 - 110)  BP: 124/58 (124/58 - 199/86)  BP(mean): --  RR: 18 (16 - 20)  SpO2: 95% (93% - 98%)    PHYSICAL EXAM:    Constitutional:  Eyes:  ENMT:  Neck:  Respiratory:  Cardiovascular:  Gastrointestinal:  Extremities:  Vascular:  Neurological:  Musculoskeletal:    MEDICATIONS  (STANDING):  famotidine Injectable 20milliGRAM(s) IV Push Once  heparin  Injectable 5000Unit(s) SubCutaneous every 8 hours  aspirin enteric coated 81milliGRAM(s) Oral daily  losartan 50milliGRAM(s) Oral daily  sevelamer hydrochloride 800milliGRAM(s) Oral three times a day with meals  simvastatin 20milliGRAM(s) Oral at bedtime  ticagrelor 90milliGRAM(s) Oral two times a day  pantoprazole  Injectable 20milliGRAM(s) IV Push daily  sodium chloride 0.9%. 1000milliLiter(s) IV Continuous <Continuous>    MEDICATIONS  (PRN):  HYDROmorphone  Injectable 2milliGRAM(s) IV Push every 3 hours PRN Severe Pain (7 - 10)      Allergies    Biaxin (Unknown)  Ceftin (Unknown)  Plavix (Unknown)  Vitamin D (Unknown)    Intolerances        LABS:                        13.3   20.4  )-----------( 149      ( 20 Jan 2017 06:46 )             42.0     20 Jan 2017 06:46    143    |  105    |  44     ----------------------------<  137    4.1     |  26     |  5.30     Ca    8.4        20 Jan 2017 06:46  Phos  3.1       20 Jan 2017 06:46  Mg     2.1       20 Jan 2017 06:46    TPro  6.5    /  Alb  3.4    /  TBili  1.9    /  DBili  x      /  AST  149    /  ALT  138    /  AlkPhos  168    20 Jan 2017 06:46    PT/INR - ( 19 Jan 2017 17:04 )   PT: 13.0 sec;   INR: 1.17          PTT - ( 19 Jan 2017 17:04 )  PTT:30.8 sec      RADIOLOGY & ADDITIONAL TESTS:

## 2017-01-20 NOTE — PROGRESS NOTE ADULT - PROBLEM SELECTOR PLAN 2
2/2 to pain and improved with analgesia. Renal recommends not being overly aggressive with BP management. SBP < 180 acceptable in case of worsening third spacing. Will continue to closely monitor. Held morning BP meds. May consider restarting later today.
c/w Losartan 50mg POQD as dosed

## 2017-01-20 NOTE — DISCHARGE NOTE ADULT - MEDICATION SUMMARY - MEDICATIONS TO STOP TAKING
I will STOP taking the medications listed below when I get home from the hospital:    atenolol 25 mg oral tablet  -- 0.5 tab(s) by mouth once a day  -- Do not take this drug if you are pregnant.  It is very important that you take or use this exactly as directed.  Do not skip doses or discontinue unless directed by your doctor.  May cause drowsiness.  Alcohol may intensify this effect.  Use care when operating dangerous machinery.  Some non-prescription drugs may aggravate your condition.  Read all labels carefully.  If a warning appears, check with your doctor before taking.    Dialyvite 800 oral tablet  -- 1 tab(s) by mouth once a day

## 2017-01-20 NOTE — PROGRESS NOTE ADULT - ATTENDING COMMENTS
for dialysis today for UF and clearances
Seen and examined by me this morning prior to HD. Clinically improved, abdominal pain is better 4/10, no nausea and vomiting, tolerated clear liquid diet this morning, advanced diet in afternoon. BP improved as well. GI f/up appreciated, slight elevation on LFT's today, likely due to recent manipulation and expected to come down. Lipase has normalized today. Renal follow up apprec, tolerated HD well. Rest as above. Medically stable for discharge home today with PMD and GI follow up as outpatient (in 1 week). C/w Losartan for HTN and rest of home meds. Leukocytosis likely reactive to recent intervention. D/w patient and wife at bedside.

## 2017-01-20 NOTE — DISCHARGE NOTE ADULT - PATIENT PORTAL LINK FT
“You can access the [facility] Patient Portal, offered by [facility], by registering with the following website: [email or web address]”

## 2017-01-20 NOTE — DISCHARGE NOTE ADULT - CARE PLAN
Principal Discharge DX:	Pancreatitis  Secondary Diagnosis:	ESRD (end stage renal disease)  Secondary Diagnosis:	CAD (coronary artery disease)  Secondary Diagnosis:	Aortic stenosis, severe  Secondary Diagnosis:	Hypertension  Secondary Diagnosis:	Congestive heart failure Principal Discharge DX:	Pancreatitis  Goal:	you were admitted to the hospital for treatment of pancreatitis after ERCP  Instructions for follow-up, activity and diet:	we treated your pain and nausea with medication. we also gave you gentle IV fluids. your symptoms improved by the next day. we are sending you home with prescriptions for medicines to be used when you have pain or nausea. please make an appointment to follow up with Dr. Jones in 1 week. If your symptoms worsen (e.g. abdominal pain, nausea, vomiting) then please see your doctor as soon as possible or go to an emergency room.  Secondary Diagnosis:	ESRD (end stage renal disease)  Instructions for follow-up, activity and diet:	we consulted renal doctors while you were in the hospital. you are receiving dialysis today and should continue your regularly scheduled dialysis sessions [id number]. Also please follow up with your kidney doctor for your regularly scheduled appointment.  Secondary Diagnosis:	CAD (coronary artery disease)  Instructions for follow-up, activity and diet:	You have a history of having a stent placed in your coronary artery. In order to prevent a future heart attack, please continue to take your home medications. Also please follow up with your cardiologist for your regularly scheduled appointment.  Secondary Diagnosis:	Aortic stenosis, severe  Instructions for follow-up, activity and diet:	please follow up with your cardiologist for your regularly scheduled appointemnt.  Secondary Diagnosis:	Hypertension  Instructions for follow-up, activity and diet:	You had a period of elevated blood pressures yesterday because of abdominal pain. You blood pressures improved after we controlled your pain. Please continue to take your home medications and follow up with your doctors for your regularly scheduled appointments.  Secondary Diagnosis:	Congestive heart failure  Instructions for follow-up, activity and diet:	You had an echocardiogram done recently. This is an imaging study of your heart function. Your heart has good pumping action. However, you do have higher than normal pressures in the left side of your heart which can result in water in your legs. Taking your medications and going to dialysis will help prevent this from happening. Please follow up with your cardiologist and kidney doctor for your regularly scheduled appointments. Principal Discharge DX:	Pancreatitis  Goal:	you were admitted to the hospital for treatment of pancreatitis after ERCP  Instructions for follow-up, activity and diet:	we treated your pain and nausea with medication. we also gave you gentle IV fluids. your symptoms improved by the next day. we are sending you home with prescriptions for medicines to be used when you have pain or nausea. please make an appointment to follow up with Dr. Jones in 1 week. If your symptoms worsen (e.g. abdominal pain, nausea, vomiting) then please see your doctor as soon as possible or go to an emergency room.  Secondary Diagnosis:	ESRD (end stage renal disease)  Instructions for follow-up, activity and diet:	we consulted renal doctors while you were in the hospital. you are receiving dialysis today and should continue your regularly scheduled dialysis sessions [id number]. Also please follow up with your kidney doctor for your regularly scheduled appointment.  Secondary Diagnosis:	CAD (coronary artery disease)  Instructions for follow-up, activity and diet:	You have a history of having a stent placed in your coronary artery. In order to prevent a future heart attack, please continue to take your home medications. Also please follow up with your cardiologist for your regularly scheduled appointment.  Secondary Diagnosis:	Aortic stenosis, severe  Instructions for follow-up, activity and diet:	please follow up with your cardiologist for your regularly scheduled appointemnt.  Secondary Diagnosis:	Hypertension  Instructions for follow-up, activity and diet:	You had a period of elevated blood pressures yesterday because of abdominal pain. You blood pressures improved after we controlled your pain. Please continue to take your home medications and follow up with your doctors for your regularly scheduled appointments.  Secondary Diagnosis:	Congestive heart failure  Instructions for follow-up, activity and diet:	You had an echocardiogram done recently. This is an imaging study of your heart function. Your heart has good pumping action. However, you do have higher than normal pressures in the left side of your heart which can result in water in your legs. Taking your medications and going to dialysis will help prevent this from happening. Please follow up with your cardiologist and kidney doctor for your regularly scheduled appointments. Principal Discharge DX:	Pancreatitis  Goal:	you were admitted to the hospital for treatment of pancreatitis after ERCP  Instructions for follow-up, activity and diet:	we treated your pain and nausea with medication. we also gave you gentle IV fluids. your symptoms improved by the next day. we are sending you home with prescriptions for medicines to be used when you have pain or nausea. please make an appointment to follow up with Dr. Jones in 1 week. If your symptoms worsen (e.g. abdominal pain, nausea, vomiting) then please see your doctor as soon as possible or go to an emergency room.  Secondary Diagnosis:	ESRD (end stage renal disease)  Instructions for follow-up, activity and diet:	we consulted renal doctors while you were in the hospital. you are receiving dialysis today and should continue your regularly scheduled dialysis sessions [id number]. Also please follow up with your kidney doctor for your regularly scheduled appointment.  Secondary Diagnosis:	CAD (coronary artery disease)  Instructions for follow-up, activity and diet:	You have a history of having a stent placed in your coronary artery. In order to prevent a future heart attack, please continue to take your home medications. Also please follow up with your cardiologist for your regularly scheduled appointment.  Secondary Diagnosis:	Aortic stenosis, severe  Instructions for follow-up, activity and diet:	please follow up with your cardiologist for your regularly scheduled appointemnt.  Secondary Diagnosis:	Hypertension  Instructions for follow-up, activity and diet:	You had a period of elevated blood pressures yesterday because of abdominal pain. You blood pressures improved after we controlled your pain. Please continue to take your home medications and follow up with your doctors for your regularly scheduled appointments.  Secondary Diagnosis:	Congestive heart failure  Instructions for follow-up, activity and diet:	You had an echocardiogram done recently. This is an imaging study of your heart function. Your heart has good pumping action. However, you do have higher than normal pressures in the left side of your heart which can result in water in your legs. Taking your medications and going to dialysis will help prevent this from happening. Please follow up with your cardiologist and kidney doctor for your regularly scheduled appointments. Principal Discharge DX:	Pancreatitis  Goal:	you were admitted to the hospital for treatment of pancreatitis after ERCP  Instructions for follow-up, activity and diet:	we treated your pain and nausea with medication. we also gave you gentle IV fluids. your symptoms improved by the next day. we are sending you home with prescriptions for medicines to be used when you have pain or nausea. please make an appointment to follow up with Dr. Jones in 1 week. If your symptoms worsen (e.g. abdominal pain, nausea, vomiting) then please see your doctor as soon as possible or go to an emergency room.  Secondary Diagnosis:	ESRD (end stage renal disease)  Instructions for follow-up, activity and diet:	we consulted renal doctors while you were in the hospital. you are receiving dialysis today and should continue your regularly scheduled dialysis sessions [id number]. Also please follow up with your kidney doctor for your regularly scheduled appointment.  Secondary Diagnosis:	CAD (coronary artery disease)  Instructions for follow-up, activity and diet:	You have a history of having a stent placed in your coronary artery. In order to prevent a future heart attack, please continue to take your home medications. Also please follow up with your cardiologist for your regularly scheduled appointment.  Secondary Diagnosis:	Aortic stenosis, severe  Instructions for follow-up, activity and diet:	please follow up with your cardiologist for your regularly scheduled appointemnt.  Secondary Diagnosis:	Hypertension  Instructions for follow-up, activity and diet:	You had a period of elevated blood pressures yesterday because of abdominal pain. You blood pressures improved after we controlled your pain. Please continue to take your home medications and follow up with your doctors for your regularly scheduled appointments.  Secondary Diagnosis:	Congestive heart failure  Instructions for follow-up, activity and diet:	You had an echocardiogram done recently. This is an imaging study of your heart function. Your heart has good pumping action. However, you do have higher than normal pressures in the left side of your heart which can result in water in your legs. Taking your medications and going to dialysis will help prevent this from happening. Please follow up with your cardiologist and kidney doctor for your regularly scheduled appointments.

## 2017-01-20 NOTE — PROGRESS NOTE ADULT - SUBJECTIVE AND OBJECTIVE BOX
Pt seen and examined. pt feeling well, nausea resolved since last night. Some mild abdominal pain that is much improved.   Overnight BP well controlled    REVIEW OF SYSTEMS:  Constitutional: No fever, weight loss or fatigue  Cardiovascular: No chest pain, palpitations, dizziness or leg swelling  Gastrointestinal: . No nausea, vomiting or hematemesis; No diarrhea or constipation. No melena or hematochezia.  Skin: No itching, burning, rashes or lesions       MEDICATIONS:  MEDICATIONS  (STANDING):  heparin  Injectable 5000Unit(s) SubCutaneous every 8 hours  aspirin enteric coated 81milliGRAM(s) Oral daily  losartan 50milliGRAM(s) Oral daily  sevelamer hydrochloride 800milliGRAM(s) Oral three times a day with meals  ticagrelor 90milliGRAM(s) Oral two times a day  pantoprazole    Tablet 40milliGRAM(s) Oral before breakfast    MEDICATIONS  (PRN):  HYDROmorphone  Injectable 2milliGRAM(s) IV Push every 3 hours PRN Severe Pain (7 - 10)      Allergies    Biaxin (Unknown)  Ceftin (Unknown)  Plavix (Unknown)  Vitamin D (Unknown)    Intolerances        Vital Signs Last 24 Hrs  T(C): 35.6, Max: 38.2 (01-20 @ 01:02)  T(F): 96.1, Max: 100.7 (01-20 @ 01:02)  HR: 91 (78 - 110)  BP: 162/72 (124/58 - 199/86)  BP(mean): --  RR: 19 (16 - 20)  SpO2: 95% (93% - 98%)    I & Os for current day (as of 01-20 @ 11:25)  =============================================  IN: 490 ml / OUT: 0 ml / NET: 490 ml      PHYSICAL EXAM:    General: Well developed; well nourished; in no acute distress  HEENT: MMM, conjunctiva and sclera clear  Gastrointestinal: Soft mild epigastric and RUQ tenderness non-distended; Normal bowel sounds; No hepatosplenomegaly. No rebound or guarding    Skin: Warm and dry. No obvious rash    LABS:  CBC Full  -  ( 20 Jan 2017 06:46 )  WBC Count : 20.4 K/uL  Hemoglobin : 13.3 g/dL  Hematocrit : 42.0 %  Platelet Count - Automated : 149 K/uL  Mean Cell Volume : 96.1 fL  Mean Cell Hemoglobin : 30.4 pg  Mean Cell Hemoglobin Concentration : 31.7 g/dL  Auto Neutrophil # : x  Auto Lymphocyte # : x  Auto Monocyte # : x  Auto Eosinophil # : x  Auto Basophil # : x  Auto Neutrophil % : 81.0 %  Auto Lymphocyte % : 5.0 %  Auto Monocyte % : 5.0 %  Auto Eosinophil % : x  Auto Basophil % : 1.0 %    20 Jan 2017 06:46    143    |  105    |  44     ----------------------------<  137    4.1     |  26     |  5.30     Ca    8.4        20 Jan 2017 06:46  Phos  3.1       20 Jan 2017 06:46  Mg     2.1       20 Jan 2017 06:46    TPro  6.5    /  Alb  3.4    /  TBili  1.9    /  DBili  x      /  AST  149    /  ALT  138    /  AlkPhos  168    20 Jan 2017 06:46    PT/INR - ( 19 Jan 2017 17:04 )   PT: 13.0 sec;   INR: 1.17          PTT - ( 19 Jan 2017 17:04 )  PTT:30.8 sec                RADIOLOGY & ADDITIONAL STUDIES:

## 2017-01-20 NOTE — DISCHARGE NOTE ADULT - CARE PROVIDER_API CALL
Ernesto Jones), Gastroenterology  [address] [address]  Phone: [phone]  Fax: [phone]    Julius Dodge), Cardiovascular Disease; Internal Medicine  [address] [place] [address]  Phone: [phone]  Fax: [phone]    Yahir Garland), Internal Medicine; Nephrology  [address] [place] [address]  Phone: [phone]  Fax: [phone]

## 2017-01-20 NOTE — PROGRESS NOTE ADULT - PROBLEM SELECTOR PLAN 4
no CP. neg trops. c/w aspirin, plavix, statin, BB. On HD M-W-F. Last dialysis yesterday. Electrolytes WNL. Pt mildly hypervolemic on exam. scheduled for HD today. Will continue to follow renal for recs. Will continue home phosphate binder.

## 2017-01-20 NOTE — PROGRESS NOTE ADULT - PROBLEM SELECTOR PLAN 1
post ERCP pancreatitis. clinically improving (reduced pain and nausea). No significant fluid sequestration. No significant drop in HCT or Ca. Good sat on RA. Will continue to gentle hydration and pain management. Advancing diet as tolerated. Scheduled for dialysis today. Will continue to monitor and f/u
Will perform HD today   Will attain net ultrafiltration of 500cc to 1000cc per his hemodynamics to keep him net neutral from the administration of the NS     Patient to resume dialysis at his outpatient center next Monday upon discharge

## 2017-01-20 NOTE — DISCHARGE NOTE ADULT - HOSPITAL COURSE
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 Post procedure pt awoke with sharp intense, non-radiating abdominal pain located primarily in RUQ and accompanied with nausea, no vomiting. Nursing noted elevated BP to 217/85, HR 65, RR 12, POx 99 RA. Pt denies CP, palpitations, new SOB, blurry vision, HA, change in mental status, fevers or chills. Pt was given zofran, famotidine and labetalol 20mg IVP and morphine 4mg IVP. Stat CXR and abdominal xray were ordered and no free air in peritoneum or mediastinum and no pulmonary congestion. Repeat BP was 191/78 and pt was given ativan 0.5mg and hydralazine 10mg IVP with improvement of pressure to 171/69, HR 69, RR 10, Pox 98 RA.  Stat labs ordered and pt with lipase of 3000. Pt was admitted to general medicine service. Nephro was consulted for recs on treating pancreatitis in ESRD pt. Pt was started on ns 40cc/hr. Pain was controlled with prn dilaudid IVP. BP was WNL after pain was controlled. By the following day the pt was clinically improved and was tolerating PO. His morning lipase was 300, but labs showed a leukocytosis (likely reactive) and mild transaminitis (likely 2/2 ERCP with stent). He received his regularly scheduled HD as inpatient. He is being discharged in stable condition with Rx for analgesics and anti-emetics and with instructions to follow up with GI in 1 week, dialysis on Monday, and with his nephrologist and cardiologist for his regularly scheduled appointments.

## 2017-01-20 NOTE — PROGRESS NOTE ADULT - SUBJECTIVE AND OBJECTIVE BOX
Patient was seen and evaluated on dialysis.   Patient is tolerating the procedure well.   HR: 79  BP: 155/76  Wt(kg): --  Qb- 400  target UF 1-1.5  tolerating treatment well.  c/w full treatment as prescibed

## 2017-01-20 NOTE — PHYSICAL THERAPY INITIAL EVALUATION ADULT - PERTINENT HX OF CURRENT PROBLEM, REHAB EVAL
86 year old male returned for elective ERCP to remove floating stone identified in CBD during admission in 12/2016 presenting with pain and hypertensive urgency. The ERCP went without complications. He was given 300cc of IVF. Last dialysis was yesterday. Post procedure pt awoke with sharp intense, non-radiating abdominal pain located primarily in RUQ and accompanied with nausea. Patient with elevated BP post-op.

## 2017-01-21 ENCOUNTER — INPATIENT (INPATIENT)
Facility: HOSPITAL | Age: 82
LOS: 2 days | Discharge: HOME | DRG: 871 | End: 2017-01-24
Attending: INTERNAL MEDICINE | Admitting: INTERNAL MEDICINE
Payer: MEDICARE

## 2017-01-21 VITALS
OXYGEN SATURATION: 93 % | RESPIRATION RATE: 18 BRPM | DIASTOLIC BLOOD PRESSURE: 74 MMHG | TEMPERATURE: 101 F | WEIGHT: 149.25 LBS | SYSTOLIC BLOOD PRESSURE: 135 MMHG | HEART RATE: 107 BPM

## 2017-01-21 DIAGNOSIS — Z95.2 PRESENCE OF PROSTHETIC HEART VALVE: Chronic | ICD-10-CM

## 2017-01-21 DIAGNOSIS — K82.9 DISEASE OF GALLBLADDER, UNSPECIFIED: Chronic | ICD-10-CM

## 2017-01-21 LAB
ALBUMIN SERPL ELPH-MCNC: 3.6 G/DL — SIGNIFICANT CHANGE UP (ref 3.4–5)
ALP SERPL-CCNC: 163 U/L — HIGH (ref 40–120)
ALT FLD-CCNC: 94 U/L — HIGH (ref 12–42)
ANION GAP SERPL CALC-SCNC: 12 MMOL/L — SIGNIFICANT CHANGE UP (ref 9–16)
APTT BLD: 28.3 SEC — SIGNIFICANT CHANGE UP (ref 27.5–37.4)
AST SERPL-CCNC: 54 U/L — HIGH (ref 15–37)
BASE EXCESS BLDV CALC-SCNC: 2 MMOL/L — SIGNIFICANT CHANGE UP
BASOPHILS NFR BLD AUTO: 0.1 % — SIGNIFICANT CHANGE UP (ref 0–2)
BILIRUB SERPL-MCNC: 2.1 MG/DL — HIGH (ref 0.2–1.2)
BUN SERPL-MCNC: 43 MG/DL — HIGH (ref 7–23)
CA-I SERPL-SCNC: 1.06 MMOL/L — LOW (ref 1.1–1.3)
CALCIUM SERPL-MCNC: 8.8 MG/DL — SIGNIFICANT CHANGE UP (ref 8.5–10.5)
CHLORIDE SERPL-SCNC: 94 MMOL/L — LOW (ref 96–108)
CO2 SERPL-SCNC: 28 MMOL/L — SIGNIFICANT CHANGE UP (ref 22–31)
CREAT SERPL-MCNC: 5.53 MG/DL — HIGH (ref 0.5–1.3)
EOSINOPHIL NFR BLD AUTO: 0.1 % — SIGNIFICANT CHANGE UP (ref 0–6)
EXTRA SST TUBE: SIGNIFICANT CHANGE UP
GAS PNL BLDV: 132 MMOL/L — LOW (ref 138–146)
GAS PNL BLDV: SIGNIFICANT CHANGE UP
GAS PNL BLDV: SIGNIFICANT CHANGE UP
GLUCOSE SERPL-MCNC: 133 MG/DL — HIGH (ref 70–99)
HBV SURFACE AB SER-ACNC: SIGNIFICANT CHANGE UP
HCO3 BLDV-SCNC: 26 MMOL/L — SIGNIFICANT CHANGE UP (ref 20–27)
HCT VFR BLD CALC: 42.6 % — SIGNIFICANT CHANGE UP (ref 39–50)
HGB BLD-MCNC: 14 G/DL — SIGNIFICANT CHANGE UP (ref 13–17)
INR BLD: 1.26 — HIGH (ref 0.88–1.16)
LACTATE SERPL-SCNC: 2.7 MMOL/L — HIGH (ref 0.5–2)
LIDOCAIN IGE QN: 105 U/L — SIGNIFICANT CHANGE UP (ref 73–393)
LYMPHOCYTES # BLD AUTO: 1.9 % — LOW (ref 13–44)
MCHC RBC-ENTMCNC: 30.4 PG — SIGNIFICANT CHANGE UP (ref 27–34)
MCHC RBC-ENTMCNC: 32.9 G/DL — SIGNIFICANT CHANGE UP (ref 32–36)
MCV RBC AUTO: 92.6 FL — SIGNIFICANT CHANGE UP (ref 80–100)
MONOCYTES NFR BLD AUTO: 6.7 % — SIGNIFICANT CHANGE UP (ref 2–14)
NEUTROPHILS NFR BLD AUTO: 91.2 % — HIGH (ref 43–77)
PCO2 BLDV: 40 MMHG — LOW (ref 41–51)
PH BLDV: 7.44 — HIGH (ref 7.32–7.43)
PLATELET # BLD AUTO: 85 K/UL — LOW (ref 150–400)
PO2 BLDV: 48 MMHG — SIGNIFICANT CHANGE UP
POTASSIUM BLDV-SCNC: 4.1 MMOL/L — SIGNIFICANT CHANGE UP (ref 3.5–4.9)
POTASSIUM SERPL-MCNC: 4 MMOL/L — SIGNIFICANT CHANGE UP (ref 3.5–5.3)
POTASSIUM SERPL-SCNC: 4 MMOL/L — SIGNIFICANT CHANGE UP (ref 3.5–5.3)
PROT SERPL-MCNC: 7.3 G/DL — SIGNIFICANT CHANGE UP (ref 6.4–8.2)
PROTHROM AB SERPL-ACNC: 14 SEC — HIGH (ref 10–13.1)
RBC # BLD: 4.6 M/UL — SIGNIFICANT CHANGE UP (ref 4.2–5.8)
RBC # FLD: 17 % — HIGH (ref 10.3–16.9)
SAO2 % BLDV: 81 % — SIGNIFICANT CHANGE UP
SODIUM SERPL-SCNC: 134 MMOL/L — LOW (ref 135–145)
WBC # BLD: 14.6 K/UL — HIGH (ref 3.8–10.5)
WBC # FLD AUTO: 14.6 K/UL — HIGH (ref 3.8–10.5)

## 2017-01-21 PROCEDURE — 99291 CRITICAL CARE FIRST HOUR: CPT | Mod: 25

## 2017-01-21 PROCEDURE — 93010 ELECTROCARDIOGRAM REPORT: CPT

## 2017-01-21 RX ORDER — CIPROFLOXACIN LACTATE 400MG/40ML
200 VIAL (ML) INTRAVENOUS ONCE
Qty: 0 | Refills: 0 | Status: DISCONTINUED | OUTPATIENT
Start: 2017-01-21 | End: 2017-01-21

## 2017-01-21 RX ORDER — VANCOMYCIN HCL 1 G
1000 VIAL (EA) INTRAVENOUS ONCE
Qty: 0 | Refills: 0 | Status: COMPLETED | OUTPATIENT
Start: 2017-01-21 | End: 2017-01-21

## 2017-01-21 RX ORDER — METRONIDAZOLE 500 MG
500 TABLET ORAL ONCE
Qty: 0 | Refills: 0 | Status: COMPLETED | OUTPATIENT
Start: 2017-01-21 | End: 2017-01-21

## 2017-01-21 RX ORDER — SODIUM CHLORIDE 9 MG/ML
2100 INJECTION INTRAMUSCULAR; INTRAVENOUS; SUBCUTANEOUS ONCE
Qty: 0 | Refills: 0 | Status: COMPLETED | OUTPATIENT
Start: 2017-01-21 | End: 2017-01-21

## 2017-01-21 RX ORDER — ACETAMINOPHEN 500 MG
975 TABLET ORAL ONCE
Qty: 0 | Refills: 0 | Status: COMPLETED | OUTPATIENT
Start: 2017-01-21 | End: 2017-01-21

## 2017-01-21 RX ADMIN — Medication 975 MILLIGRAM(S): at 23:25

## 2017-01-21 RX ADMIN — Medication 250 MILLIGRAM(S): at 23:15

## 2017-01-21 RX ADMIN — Medication 975 MILLIGRAM(S): at 22:55

## 2017-01-21 RX ADMIN — SODIUM CHLORIDE 2100 MILLILITER(S): 9 INJECTION INTRAMUSCULAR; INTRAVENOUS; SUBCUTANEOUS at 22:39

## 2017-01-21 RX ADMIN — Medication 100 MILLIGRAM(S): at 22:55

## 2017-01-21 NOTE — ED ADULT NURSE NOTE - OBJECTIVE STATEMENT
patient received to ED A+Ox3 with equal/unlabored breathing and Temp 100.9 upon triage. patient is S/P Gallbladder stent placement x2 days ago at [facility] and after procedure developed Pancreatitis and was admitted to hospital overnight and discharged yesterday. patient C/O Abd Pain and developed fever/chills today. patient denies any N/V/D. patient has allergy to Biaxin, Ceftin, Plavix  and Vit D. patient has Hx of HF, HTN, Kidney Failure on Dialysis (last done yesterday), and TIA. patient has Shunt to RUE. patient attached to cardiac monitor and EKG Done.

## 2017-01-21 NOTE — ED ADULT NURSE NOTE - CHIEF COMPLAINT QUOTE
Discharged yesterday from [facility] where he was being treated for pancreatitis.  Now has developed fever and chills.

## 2017-01-22 DIAGNOSIS — N18.6 END STAGE RENAL DISEASE: ICD-10-CM

## 2017-01-22 DIAGNOSIS — I35.0 NONRHEUMATIC AORTIC (VALVE) STENOSIS: ICD-10-CM

## 2017-01-22 DIAGNOSIS — J18.9 PNEUMONIA, UNSPECIFIED ORGANISM: ICD-10-CM

## 2017-01-22 DIAGNOSIS — K66.1 HEMOPERITONEUM: ICD-10-CM

## 2017-01-22 DIAGNOSIS — I25.10 ATHEROSCLEROTIC HEART DISEASE OF NATIVE CORONARY ARTERY WITHOUT ANGINA PECTORIS: ICD-10-CM

## 2017-01-22 DIAGNOSIS — Z41.8 ENCOUNTER FOR OTHER PROCEDURES FOR PURPOSES OTHER THAN REMEDYING HEALTH STATE: ICD-10-CM

## 2017-01-22 DIAGNOSIS — R63.8 OTHER SYMPTOMS AND SIGNS CONCERNING FOOD AND FLUID INTAKE: ICD-10-CM

## 2017-01-22 DIAGNOSIS — I10 ESSENTIAL (PRIMARY) HYPERTENSION: ICD-10-CM

## 2017-01-22 DIAGNOSIS — K85.90 ACUTE PANCREATITIS WITHOUT NECROSIS OR INFECTION, UNSPECIFIED: ICD-10-CM

## 2017-01-22 DIAGNOSIS — I50.9 HEART FAILURE, UNSPECIFIED: ICD-10-CM

## 2017-01-22 LAB
ALBUMIN SERPL ELPH-MCNC: 3 G/DL — LOW (ref 3.4–5)
ALP SERPL-CCNC: 134 U/L — HIGH (ref 40–120)
ALT FLD-CCNC: 71 U/L — HIGH (ref 12–42)
ANION GAP SERPL CALC-SCNC: 11 MMOL/L — SIGNIFICANT CHANGE UP (ref 9–16)
AST SERPL-CCNC: 44 U/L — HIGH (ref 15–37)
BILIRUB SERPL-MCNC: 1.7 MG/DL — HIGH (ref 0.2–1.2)
BUN SERPL-MCNC: 48 MG/DL — HIGH (ref 7–23)
CALCIUM SERPL-MCNC: 8.2 MG/DL — LOW (ref 8.5–10.5)
CHLORIDE SERPL-SCNC: 96 MMOL/L — SIGNIFICANT CHANGE UP (ref 96–108)
CO2 SERPL-SCNC: 26 MMOL/L — SIGNIFICANT CHANGE UP (ref 22–31)
CREAT SERPL-MCNC: 5.47 MG/DL — HIGH (ref 0.5–1.3)
GLUCOSE SERPL-MCNC: 106 MG/DL — HIGH (ref 70–99)
HCT VFR BLD CALC: 40.1 % — SIGNIFICANT CHANGE UP (ref 39–50)
HGB BLD-MCNC: 12.8 G/DL — LOW (ref 13–17)
LACTATE SERPL-SCNC: 1.1 MMOL/L — SIGNIFICANT CHANGE UP (ref 0.5–2)
MAGNESIUM SERPL-MCNC: 2.2 MG/DL — SIGNIFICANT CHANGE UP (ref 1.6–2.4)
MCHC RBC-ENTMCNC: 30.3 PG — SIGNIFICANT CHANGE UP (ref 27–34)
MCHC RBC-ENTMCNC: 31.9 G/DL — LOW (ref 32–36)
MCV RBC AUTO: 95 FL — SIGNIFICANT CHANGE UP (ref 80–100)
PHOSPHATE SERPL-MCNC: 3.3 MG/DL — SIGNIFICANT CHANGE UP (ref 2.5–4.5)
PLATELET # BLD AUTO: 71 K/UL — LOW (ref 150–400)
POTASSIUM SERPL-MCNC: 4.4 MMOL/L — SIGNIFICANT CHANGE UP (ref 3.5–5.3)
POTASSIUM SERPL-SCNC: 4.4 MMOL/L — SIGNIFICANT CHANGE UP (ref 3.5–5.3)
PROT SERPL-MCNC: 6.2 G/DL — LOW (ref 6.4–8.2)
RAPID RVP RESULT: SIGNIFICANT CHANGE UP
RBC # BLD: 4.22 M/UL — SIGNIFICANT CHANGE UP (ref 4.2–5.8)
RBC # FLD: 17.3 % — HIGH (ref 10.3–16.9)
SODIUM SERPL-SCNC: 133 MMOL/L — LOW (ref 135–145)
WBC # BLD: 10.8 K/UL — HIGH (ref 3.8–10.5)
WBC # FLD AUTO: 10.8 K/UL — HIGH (ref 3.8–10.5)

## 2017-01-22 PROCEDURE — 99223 1ST HOSP IP/OBS HIGH 75: CPT | Mod: GC

## 2017-01-22 PROCEDURE — 71250 CT THORAX DX C-: CPT | Mod: 26

## 2017-01-22 PROCEDURE — 74176 CT ABD & PELVIS W/O CONTRAST: CPT | Mod: 26

## 2017-01-22 RX ORDER — PIPERACILLIN AND TAZOBACTAM 4; .5 G/20ML; G/20ML
2.25 INJECTION, POWDER, LYOPHILIZED, FOR SOLUTION INTRAVENOUS EVERY 8 HOURS
Qty: 0 | Refills: 0 | Status: DISCONTINUED | OUTPATIENT
Start: 2017-01-22 | End: 2017-01-24

## 2017-01-22 RX ORDER — SEVELAMER CARBONATE 2400 MG/1
800 POWDER, FOR SUSPENSION ORAL
Qty: 0 | Refills: 0 | Status: DISCONTINUED | OUTPATIENT
Start: 2017-01-22 | End: 2017-01-24

## 2017-01-22 RX ORDER — SIMVASTATIN 20 MG/1
20 TABLET, FILM COATED ORAL AT BEDTIME
Qty: 0 | Refills: 0 | Status: DISCONTINUED | OUTPATIENT
Start: 2017-01-22 | End: 2017-01-24

## 2017-01-22 RX ORDER — IOHEXOL 300 MG/ML
50 INJECTION, SOLUTION INTRAVENOUS ONCE
Qty: 0 | Refills: 0 | Status: COMPLETED | OUTPATIENT
Start: 2017-01-22 | End: 2017-01-22

## 2017-01-22 RX ORDER — PIPERACILLIN AND TAZOBACTAM 4; .5 G/20ML; G/20ML
2.25 INJECTION, POWDER, LYOPHILIZED, FOR SOLUTION INTRAVENOUS ONCE
Qty: 0 | Refills: 0 | Status: COMPLETED | OUTPATIENT
Start: 2017-01-22 | End: 2017-01-22

## 2017-01-22 RX ORDER — TICAGRELOR 90 MG/1
90 TABLET ORAL
Qty: 0 | Refills: 0 | Status: DISCONTINUED | OUTPATIENT
Start: 2017-01-22 | End: 2017-01-24

## 2017-01-22 RX ORDER — PIPERACILLIN AND TAZOBACTAM 4; .5 G/20ML; G/20ML
INJECTION, POWDER, LYOPHILIZED, FOR SOLUTION INTRAVENOUS
Qty: 0 | Refills: 0 | Status: DISCONTINUED | OUTPATIENT
Start: 2017-01-22 | End: 2017-01-24

## 2017-01-22 RX ORDER — ONDANSETRON 8 MG/1
4 TABLET, FILM COATED ORAL EVERY 8 HOURS
Qty: 0 | Refills: 0 | Status: DISCONTINUED | OUTPATIENT
Start: 2017-01-22 | End: 2017-01-24

## 2017-01-22 RX ORDER — DOCUSATE SODIUM 100 MG
100 CAPSULE ORAL THREE TIMES A DAY
Qty: 0 | Refills: 0 | Status: DISCONTINUED | OUTPATIENT
Start: 2017-01-22 | End: 2017-01-24

## 2017-01-22 RX ORDER — HEPARIN SODIUM 5000 [USP'U]/ML
5000 INJECTION INTRAVENOUS; SUBCUTANEOUS EVERY 12 HOURS
Qty: 0 | Refills: 0 | Status: DISCONTINUED | OUTPATIENT
Start: 2017-01-22 | End: 2017-01-22

## 2017-01-22 RX ORDER — POLYETHYLENE GLYCOL 3350 17 G/17G
17 POWDER, FOR SOLUTION ORAL DAILY
Qty: 0 | Refills: 0 | Status: DISCONTINUED | OUTPATIENT
Start: 2017-01-22 | End: 2017-01-24

## 2017-01-22 RX ORDER — ASPIRIN/CALCIUM CARB/MAGNESIUM 324 MG
81 TABLET ORAL DAILY
Qty: 0 | Refills: 0 | Status: DISCONTINUED | OUTPATIENT
Start: 2017-01-22 | End: 2017-01-24

## 2017-01-22 RX ORDER — PANTOPRAZOLE SODIUM 20 MG/1
40 TABLET, DELAYED RELEASE ORAL
Qty: 0 | Refills: 0 | Status: DISCONTINUED | OUTPATIENT
Start: 2017-01-22 | End: 2017-01-24

## 2017-01-22 RX ADMIN — SIMVASTATIN 20 MILLIGRAM(S): 20 TABLET, FILM COATED ORAL at 22:00

## 2017-01-22 RX ADMIN — SEVELAMER CARBONATE 800 MILLIGRAM(S): 2400 POWDER, FOR SUSPENSION ORAL at 11:30

## 2017-01-22 RX ADMIN — Medication 100 MILLIGRAM(S): at 22:00

## 2017-01-22 RX ADMIN — SEVELAMER CARBONATE 800 MILLIGRAM(S): 2400 POWDER, FOR SUSPENSION ORAL at 17:37

## 2017-01-22 RX ADMIN — TICAGRELOR 90 MILLIGRAM(S): 90 TABLET ORAL at 17:37

## 2017-01-22 RX ADMIN — TICAGRELOR 90 MILLIGRAM(S): 90 TABLET ORAL at 07:18

## 2017-01-22 RX ADMIN — PANTOPRAZOLE SODIUM 40 MILLIGRAM(S): 20 TABLET, DELAYED RELEASE ORAL at 17:37

## 2017-01-22 RX ADMIN — PIPERACILLIN AND TAZOBACTAM 200 GRAM(S): 4; .5 INJECTION, POWDER, LYOPHILIZED, FOR SOLUTION INTRAVENOUS at 14:05

## 2017-01-22 RX ADMIN — PIPERACILLIN AND TAZOBACTAM 200 GRAM(S): 4; .5 INJECTION, POWDER, LYOPHILIZED, FOR SOLUTION INTRAVENOUS at 22:56

## 2017-01-22 RX ADMIN — SEVELAMER CARBONATE 800 MILLIGRAM(S): 2400 POWDER, FOR SUSPENSION ORAL at 09:41

## 2017-01-22 RX ADMIN — PANTOPRAZOLE SODIUM 40 MILLIGRAM(S): 20 TABLET, DELAYED RELEASE ORAL at 07:17

## 2017-01-22 RX ADMIN — POLYETHYLENE GLYCOL 3350 17 GRAM(S): 17 POWDER, FOR SOLUTION ORAL at 17:37

## 2017-01-22 RX ADMIN — IOHEXOL 50 MILLILITER(S): 300 INJECTION, SOLUTION INTRAVENOUS at 01:10

## 2017-01-22 RX ADMIN — Medication 81 MILLIGRAM(S): at 11:13

## 2017-01-22 RX ADMIN — PIPERACILLIN AND TAZOBACTAM 200 GRAM(S): 4; .5 INJECTION, POWDER, LYOPHILIZED, FOR SOLUTION INTRAVENOUS at 07:46

## 2017-01-22 NOTE — PATIENT PROFILE ADULT. - TEACHING/LEARNING LEARNING PREFERENCES
pictorial/skill demonstration/group instruction/written material/video/audio/verbal instruction/individual instruction

## 2017-01-22 NOTE — H&P ADULT. - NEUROLOGICAL DETAILS
responds to pain/sensation intact/alert and oriented x 3/responds to verbal commands/normal strength

## 2017-01-22 NOTE — H&P ADULT. - PROBLEM SELECTOR PLAN 5
diastolic CHF (EF 55-60%, elevated LA pressure), satting well on RA, no JVD or pulmonary edema. atenolol DC'ed on prior admission.   - Cont' to monitor fluid status, holding ACE-I

## 2017-01-22 NOTE — CONSULT NOTE ADULT - ASSESSMENT
86 year old male with history above presenting with fever, rigors, SOB found to have new L lobe consolidation on CT consistent with pneumonia.  Post-ERCP pancreatitis resolved with no further abdominal pain, nausea, vomiting and tolerating full diet.  LFTs continue to downtrend since ERCP.  - pneumonia mgmt as per primary team  - trend LFTs  - reconsult if any acute GI issues arise    Brigitte Oliva  GI Fellow
ESRD on HD

## 2017-01-22 NOTE — H&P ADULT. - PROBLEM SELECTOR PLAN 4
recent hypertensive urgency on prior admission, though secondary to pain. DC'ed on Lisinopril.   - Hold home lisinopril while septic, will add as BP improves

## 2017-01-22 NOTE — H&P ADULT. - ASSESSMENT
86 year old male with history above presenting with fever, rigors, SOB and new left upper lobe consolidation on CT chest

## 2017-01-22 NOTE — H&P ADULT. - PROBLEM SELECTOR PLAN 2
Patient on HD MWF, still making urine; clinically euvolemic, without evidence of pulmonary edema; satting well on RA  - Renal consult for dialysis, appreciate recs  - Con't sevelamer  - strict Is and Os

## 2017-01-22 NOTE — H&P ADULT. - PROBLEM SELECTOR PLAN 8
Pt with hemoperitoneum as a complication of ERCP with sphincterotomy on 12/22/16. Hb stable without evidence of anemia, with resolving hematoma on CT abdomen/pelvis.    - Con't to monitor, will hold HSQ in setting of prior bleed; patient must continue to take Brilinta and aspirin

## 2017-01-22 NOTE — ED PROVIDER NOTE - MEDICAL DECISION MAKING DETAILS
85 yo with new FEVER after recent d/c w endoscopy w biliary stent and pancreatitis, Source unclear, no abd tenderness although abd pain complaint, cough w no apparent pna on xray, UA pending however pt rarely urinates due to ESRD, no severe C.P. doubt esophageal rupture. Pt w significant improvement after fluids and initial antibiotics, CT ordered to further evaluate for fever of unknown origin, also would consider endocarditis if no source identified on CT. if pna would broaden pseudomonas coverage, will readmit to medicine unless surgical pathology identified.

## 2017-01-22 NOTE — CONSULT NOTE ADULT - SUBJECTIVE AND OBJECTIVE BOX
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 He went home on 1/20 and felt well. Jan 21 he began feeling shaky and developed a fever. +SOB and new cough.   No abdominal pain, nausea, vomiting. Tolerating full diet, just had chicken for dinner.     Allergies/Medications:   Allergies:        Allergies:  	Plavix: Drug, Unknown  	Biaxin: Drug, Unknown  	Ceftin: Drug, Unknown  	Vitamin D: Drug, Unknown    Home Medications:   * Patient Currently Takes Medications as of 22-Jan-2017 05:59 documented in Prescription Writer  · 	acetaminophen-oxyCODONE 325 mg-5 mg oral tablet: Last Dose Taken:  , 1 tab(s) orally every 6 hours, As Needed MDD:4  · 	ondansetron 4 mg oral tablet: Last Dose Taken:  , 1 tab(s) orally every 8 hours  · 	ticagrelor 90 mg oral tablet: 1 tab(s) orally 2 times a day  · 	pantoprazole 40 mg oral delayed release tablet: Last Dose Taken:  , 1 tab(s) orally 2 times a day (before meals)  · 	simvastatin 20 mg oral tablet: Last Dose Taken:  , 1 tab(s) orally once a day (at bedtime)  · 	aspirin 81 mg oral delayed release tablet: Last Dose Taken:  , 1 tab(s) orally once a day  · 	sevelamer hydrochloride 800 mg oral tablet: Last Dose Taken:  , 1 tab(s) orally 3 times a day (with meals)  · 	losartan 50 mg oral tablet: Last Dose Taken:  , 1 tab(s) orally once a day    . .    PMH/PSH/FH/SH:   Past Medical History:  Aortic stenosis, severe    Chronic renal failure  on HD  Congestive heart failure    Gout    Hyperlipidemia    Hypertension    Myocardial infarction.    Past Surgical History:  Gallbladder disorder  stent placed on 1/19/17  S/P TAVR (transcatheter aortic valve replacement).    Family History:  No pertinent family history in first degree relatives.    Vitals: 97.7 HR 72, 129/60, RR 18  GEN: Well appearing  M NAD  HEENT: Anicteric  CV/RESP: no murmurs; congestion in L upper lobe  ABD: Soft NT/ND +BS  EXT: no edema    Labs  wbc 10.8  hgb 12.8  plt 71  creat 5.47  TB 1.7    AST 44  ALT 71    CT chest/abd/pelvis 1/22/17:  NTERPRETATION:  CLINICAL STATEMENT: Fever of unknown origin. Cough,   rigors, and abdominal pain. History of biliary stent with resulting   pancreatitis.    TECHNIQUE: CT of the chest, abdomen, and pelvis with oral contrast.   Intravenous contrast was not administered. Axial, sagittal, and coronal   images were obtained and reviewed.    COMPARISON: CT abdomen/pelvis from December 23, 2016.   Cardiac/abdomen/pelvis CTA from September 8, 2016.    FINDINGS:    ---CHEST---    Lower Neck: Visualized thyroid unremarkable. No lower cervical   lymphadenopathy.    Lungs/Pleura/Airways: Patchy subpleural opacity in the posterior inferior   left upper lobe. Right lower lobe linear atelectasis. No pulmonary mass.   Stable tiny scattered nodules. Stable mild bilateral centrilobular   emphysema. Stable biapical pleural/pedicle calcifications. No pleural   effusion or pneumothorax. Stable mild bilateral bronchiectasis. Patent   central airways.    Mediastinum: Heart is normal in size. No pericardial effusion. Aortic   valve replacement. Extensive coronary arterial and mitral annular   calcifications. No aortic aneurysm. Moderate to severe aortic   calcifications. Stable enlargement of main pulmonary artery measuring   approximately 3.3 cm in diameter which can be seen with pulmonary   hypertension.    Lymph nodes: No lymphadenopathy, although evaluation of the bilateral   mamadou is limited without intravenous contrast.    Bones/Soft tissues: Stable degenerative changes of the spine.      ---ABDOMEN and PELVIS---    Evaluation of the abdominopelvic viscera is limited without intravenous   contrast.    Liver: Smooth in contour. No focal mass.     Biliary system: New self-expanding common bile duct stent. Pigtail common   duct stent is within the new stent. New pneumobilia reflecting stent   patency. No intrahepatic biliary ductal dilatation. Gallbladder is   contracted around calcified gallstones.     Pancreas: Diffusely atrophic as before. No main duct dilatation.    Spleen: Unremarkable.    Adrenal glands: Unremarkable.    Kidneys: No hydronephrosis. No renal or ureteral calculus.   Redemonstration of multiple bilateral hypodense and hyperdense cysts.  Urinary Bladder: Unremarkable.    Reproductive organs: Stable prostatomegaly.     Bowel/Peritoneum/Retroperitoneum: Decrease in size of right   retroperitoneal hematoma adjacent to the medial aspect of the second   duodenal segment which currently measures approximately 4 x 5 x 7 cm (ap   x trv x cc), previously 5 x 8 x 9 cm. Resolution of hemorrhage around the   liver, spleen, kidneys, and in the pelvis. Normal bowel caliber without   evidence of obstruction. No appreciable wall thickening. Extensive   descending and sigmoid diverticulosis as before. No ascites or   extraluminal gas.     Lymph nodes: No lymphadenopathy.    Aorta/IVC: Normal caliber. Severe aortoiliac calcifications as before.    Abdominal wall: Stable small fluid containing right inguinal hernia.   Stable small fat-containing umbilical hernia.    Bones/Soft tissues: Redemonstration of left total hip arthroplasty.   Stable degenerative osseous changes.      IMPRESSION:     Patchy subpleural opacity in the posterior left upper lobe is suspicious   for infection or inflammation.    New self-expanding common bile duct stent. Redemonstration of pigtail   biliary stent.    Mild decrease in size of right retroperitoneal hematoma since December 23, 2016 which is likely from the duodenum. Resolution of hemorrhage   around the liver, spleen, kidneys, and in the pelvis.            "Thank you for the opportunity to participate in the care of this   patient."        LINDA GARCÍA M.D., ATTENDING RADIOLOGIST  This document has been electronically signed. Jan 22 2017  4:22AM
86 year old male, esrd mwf, d/c on 1/20 after undergoing ERCP and stent placement for post- ERCP pancereastis by GI, presents to the ED today due  fever, rigors, SOB and new left upper lobe consolidation on CT chest. pt being treated for a pneumoonia.     [facility] [address] [place] [address] in [place], via  Right AVF, since 5./2016 likely  secondary to chronic cardiorenal syndrome    pt reports feeling better after abx treatment this am.   denies sob or chest pain  aware scheduled for hd on 1/23             PAST MEDICAL & SURGICAL HISTORY:  Gout  Chronic renal failure: on HD  Aortic stenosis, severe  Myocardial infarction  Hypertension  Hyperlipidemia  Congestive heart failure  Gallbladder disorder: stent placed on 1/19/17  S/P TAVR (transcatheter aortic valve replacement)      MEDICATIONS  (STANDING):  aspirin enteric coated 81milliGRAM(s) Oral daily  simvastatin 20milliGRAM(s) Oral at bedtime  ticagrelor 90milliGRAM(s) Oral two times a day  sevelamer hydrochloride 800milliGRAM(s) Oral three times a day with meals  pantoprazole    Tablet 40milliGRAM(s) Oral two times a day before meals  piperacillin/tazobactam IVPB.  IV Intermittent   piperacillin/tazobactam IVPB. 2.25Gram(s) IV Intermittent every 8 hours    MEDICATIONS  (PRN):  oxyCODONE  5 mG/acetaminophen 325 mG 1Tablet(s) Oral every 6 hours PRN Severe Pain (7 - 10)  ondansetron    Tablet 4milliGRAM(s) Oral every 8 hours PRN Nausea and/or Vomiting      Allergies    Biaxin (Unknown)  Ceftin (Unknown)  Plavix (Unknown)  Vitamin D (Unknown)    Intolerances        SOCIAL HISTORY:    FAMILY HISTORY:  No pertinent family history in first degree relatives      T(C): , Max: 38.3 (01-21 @ 22:11)  T(F): , Max: 100.9 (01-21 @ 22:11)  HR: 79  BP: 150/69  BP(mean): --  RR: 17  SpO2: 97%  Wt(kg): --      Weight (kg): 67.7 (01-21 @ 22:14)    PHYSICAL EXAM:  Constitutional: Well appearning.  No acute distress  ENMT: Moist mucous membrane.  No cyanosis.  Neck: Supple. No JVD.  Back: No CVA tenderness  Respiratory: decreased breath sounds b/p  Cardiovascular: S1, S2.  Regular rate and rhythm.    Gastrointestinal: soft, non-tender, non-distended, normal bowel sounds  Extremities: Warm.  No lower extremity edema.    Neurological: No focal deficits.  Skin: Warm. Dry.    Lymph Nodes:  No cervical lymph nodes.    Psychiatric: Normal affect.    ACCESS: r avf    LABS:                        12.8   10.8  )-----------( 71       ( 22 Jan 2017 10:49 )             40.1     22 Jan 2017 10:49    133    |  96     |  48     ----------------------------<  106    4.4     |  26     |  5.47     Ca    8.2        22 Jan 2017 10:49  Phos  3.3       22 Jan 2017 10:49  Mg     2.2       22 Jan 2017 10:49    TPro  6.2    /  Alb  3.0    /  TBili  1.7    /  DBili  x      /  AST  44     /  ALT  71     /  AlkPhos  134    22 Jan 2017 10:49      PT/INR - ( 21 Jan 2017 22:41 )   PT: 14.0 sec;   INR: 1.26          PTT - ( 21 Jan 2017 22:41 )  PTT:28.3 sec          RADIOLOGY & ADDITIONAL STUDIES:

## 2017-01-22 NOTE — ED PROVIDER NOTE - OBJECTIVE STATEMENT
87 yo, one day of fever , chills and rigors, started this afternoon. mild dry cough new today, Patient discharged from the hospital recently after endoscopy w biliary stent procedure which was complicated by post procedure pancreatitis. pt reports he has been having abdominal pain mostly across upper abdomen but no worse than when in hospital, no exaccerbating factors and no radiation of pain, no diarrhea, + constipation, no urinary symptoms. on Dialysis, last dialysis yesterday, ho of prior bovine valve replacement. denies ho of endocarditis,

## 2017-01-22 NOTE — H&P ADULT. - GASTROINTESTINAL DETAILS
no rigidity/no rebound tenderness/no organomegaly/soft/bowel sounds normal/nontender/no distention/no guarding/no masses palpable

## 2017-01-22 NOTE — H&P ADULT. - LYMPHATIC
anterior cervical R/supraclavicular R/posterior cervical L/posterior cervical R/anterior cervical L/axillary L/supraclavicular L

## 2017-01-22 NOTE — H&P ADULT. - PROBLEM SELECTOR PLAN 6
s/p MARBIN prior to TAVR, on ASA and Brilinta. Patient with ST .   - Continue aspirin, brilinta, and simvastatin

## 2017-01-22 NOTE — H&P ADULT. - PROBLEM SELECTOR PLAN 1
nosocomial, based on recent admission and ESRD. Initially meeting sepsis criteria based on fever, WBC, tachycardia now improved after Vancomycin, Levaquin, and 2L NS. Lactate initially 2.7 now downtrended to 1.1.    - Admit to [facility]   - Start Zosyn, renally dosed   - Dose Vancomycin by level prior to HD   - will hold additional fluids given ESRD status, with small boluses for clinical dehydration   - hold home antihypertensives nosocomial, based on recent admission and ESRD. Initially meeting sepsis criteria based on fever, WBC, tachycardia now improved after Vancomycin, Levaquin, and 2L NS. Lactate initially 2.7 now downtrended to 1.1.    - Admit to RMF   - Start Zosyn, renally dosed   - Dose Vancomycin by level prior to HD   - will hold additional fluids given ESRD status, with small boluses for clinical dehydration   - hold home antihypertensives   - f/u RVP, blood cultures, urine cultures

## 2017-01-22 NOTE — CONSULT NOTE ADULT - PROBLEM SELECTOR RECOMMENDATION 9
ESRD on hd - pt volume status / electrolytes acceptable.   nezxt hd on 1/23  dose abx for ESRD  c/w sevelamer to prevent hyperphosphatemia

## 2017-01-22 NOTE — H&P ADULT. - PROBLEM SELECTOR PLAN 3
AS s/p TAVR (10/27/16) by Dr. Luque; most recent Echo on 12/20 with  The peak pressure gradient is 32 mmHg. The mean pressure gradient is 18mmHg   - Con't to monitor, f/u blood cultures

## 2017-01-22 NOTE — H&P ADULT. - PROBLEM SELECTOR PLAN 7
Patient with resolving pancreatitis as a complication of ERCP on 01/19. Denies any additional pain. LFTs continuing to downtrend.   - Cont' to monitor, with PRN Zofran and percocet

## 2017-01-22 NOTE — ED PROVIDER NOTE - CRITICAL CARE PROVIDED
additional history taking/interpretation of diagnostic studies/documentation/consult w/ pt's family directly relating to pts condition/direct patient care (not related to procedure)

## 2017-01-22 NOTE — ED PROVIDER NOTE - RESPIRATORY, MLM
Breath sounds w minimal rhonchi heart at right mid lung, faint , no wheezes or crackles, no tachypnea

## 2017-01-22 NOTE — H&P ADULT. - HISTORY OF PRESENT ILLNESS
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 He was DC'ed home on 01/20 after HD and felt well. The following evening, he states he began "shaking like a leaf", which has happened before when he has developed fevers. 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 He was NC'ed home on 01/20 after HD and felt well. The following evening, he states he began "shaking like a leaf", which has happened before when he has developed fevers. He reports feeling SOB during this episode, and has developed a cough since arriving in the ED. He reports his abdominal pain has completely resolved. He denies nausea, vomiting, HA, confusion, dizziness, chest pain, dysuria, diarrhea, or rash.

## 2017-01-22 NOTE — H&P ADULT. - ATTENDING COMMENTS
Pt seen later in the afternoon.  Clinically appearing much better than reported.  Pt feels remarkably better.  Denies SOB at the moment.  Minimal cough.  Good po intake.  No abd pain.   C/w Abx for HAP as above.

## 2017-01-23 ENCOUNTER — TRANSCRIPTION ENCOUNTER (OUTPATIENT)
Age: 82
End: 2017-01-23

## 2017-01-23 DIAGNOSIS — D69.6 THROMBOCYTOPENIA, UNSPECIFIED: ICD-10-CM

## 2017-01-23 DIAGNOSIS — Z29.9 ENCOUNTER FOR PROPHYLACTIC MEASURES, UNSPECIFIED: ICD-10-CM

## 2017-01-23 DIAGNOSIS — K82.9 DISEASE OF GALLBLADDER, UNSPECIFIED: ICD-10-CM

## 2017-01-23 LAB
24R-OH-CALCIDIOL SERPL-MCNC: 16.6 NG/ML — LOW (ref 30–100)
ALBUMIN SERPL ELPH-MCNC: 2.6 G/DL — LOW (ref 3.4–5)
ALP SERPL-CCNC: 120 U/L — SIGNIFICANT CHANGE UP (ref 40–120)
ALT FLD-CCNC: 46 U/L — HIGH (ref 12–42)
ANION GAP SERPL CALC-SCNC: 11 MMOL/L — SIGNIFICANT CHANGE UP (ref 9–16)
AST SERPL-CCNC: 25 U/L — SIGNIFICANT CHANGE UP (ref 15–37)
BASOPHILS NFR BLD AUTO: 0.3 % — SIGNIFICANT CHANGE UP (ref 0–2)
BILIRUB SERPL-MCNC: 1.4 MG/DL — HIGH (ref 0.2–1.2)
BUN SERPL-MCNC: 55 MG/DL — HIGH (ref 7–23)
CALCIUM SERPL-MCNC: 8.1 MG/DL — LOW (ref 8.4–10.5)
CALCIUM SERPL-MCNC: 8.3 MG/DL — LOW (ref 8.5–10.5)
CHLORIDE SERPL-SCNC: 96 MMOL/L — SIGNIFICANT CHANGE UP (ref 96–108)
CLOSURE TME COLL+EPINEP BLD: 78 K/UL — LOW (ref 150–400)
CO2 SERPL-SCNC: 25 MMOL/L — SIGNIFICANT CHANGE UP (ref 22–31)
CREAT SERPL-MCNC: 5.61 MG/DL — HIGH (ref 0.5–1.3)
EOSINOPHIL NFR BLD AUTO: 1.1 % — SIGNIFICANT CHANGE UP (ref 0–6)
FERRITIN SERPL-MCNC: 2525.1 NG/ML — HIGH (ref 26–388)
GLUCOSE SERPL-MCNC: 105 MG/DL — HIGH (ref 70–99)
HCT VFR BLD CALC: 35.4 % — LOW (ref 39–50)
HGB BLD-MCNC: 11.5 G/DL — LOW (ref 13–17)
IRON SATN MFR SERPL: 15 % — LOW (ref 26–39)
IRON SATN MFR SERPL: 17 UG/DL — LOW (ref 65–175)
LYMPHOCYTES # BLD AUTO: 6 % — LOW (ref 13–44)
MAGNESIUM SERPL-MCNC: 2.4 MG/DL — SIGNIFICANT CHANGE UP (ref 1.6–2.4)
MCHC RBC-ENTMCNC: 30.3 PG — SIGNIFICANT CHANGE UP (ref 27–34)
MCHC RBC-ENTMCNC: 32.5 G/DL — SIGNIFICANT CHANGE UP (ref 32–36)
MCV RBC AUTO: 93.2 FL — SIGNIFICANT CHANGE UP (ref 80–100)
MONOCYTES NFR BLD AUTO: 12.7 % — SIGNIFICANT CHANGE UP (ref 2–14)
NEUTROPHILS NFR BLD AUTO: 79.9 % — HIGH (ref 43–77)
PHOSPHATE SERPL-MCNC: 3.2 MG/DL — SIGNIFICANT CHANGE UP (ref 2.5–4.5)
PLATELET # BLD AUTO: 92 K/UL — LOW (ref 150–400)
POTASSIUM SERPL-MCNC: 4.3 MMOL/L — SIGNIFICANT CHANGE UP (ref 3.5–5.3)
POTASSIUM SERPL-SCNC: 4.3 MMOL/L — SIGNIFICANT CHANGE UP (ref 3.5–5.3)
PROT SERPL-MCNC: 5.8 G/DL — LOW (ref 6.4–8.2)
PTH-INTACT FLD-MCNC: 171 PG/ML — HIGH (ref 15–65)
RBC # BLD: 3.8 M/UL — LOW (ref 4.2–5.8)
RBC # FLD: 17 % — HIGH (ref 10.3–16.9)
SODIUM SERPL-SCNC: 132 MMOL/L — LOW (ref 135–145)
TIBC SERPL-MCNC: 116 UG/DL — LOW (ref 250–450)
VANCOMYCIN TROUGH SERPL-MCNC: 8.6 UG/ML — LOW (ref 10–20)
VIT D25+D1,25 OH+D1,25 PNL SERPL-MCNC: 9.6 PG/ML — LOW (ref 19.9–79.3)
WBC # BLD: 10.8 K/UL — HIGH (ref 3.8–10.5)
WBC # FLD AUTO: 10.8 K/UL — HIGH (ref 3.8–10.5)

## 2017-01-23 PROCEDURE — 90935 HEMODIALYSIS ONE EVALUATION: CPT | Mod: GC

## 2017-01-23 PROCEDURE — 71010: CPT | Mod: 26

## 2017-01-23 PROCEDURE — 99233 SBSQ HOSP IP/OBS HIGH 50: CPT | Mod: GC

## 2017-01-23 RX ORDER — LOSARTAN POTASSIUM 100 MG/1
50 TABLET, FILM COATED ORAL DAILY
Qty: 0 | Refills: 0 | Status: DISCONTINUED | OUTPATIENT
Start: 2017-01-23 | End: 2017-01-23

## 2017-01-23 RX ORDER — IRON SUCROSE 20 MG/ML
100 INJECTION, SOLUTION INTRAVENOUS ONCE
Qty: 0 | Refills: 0 | Status: COMPLETED | OUTPATIENT
Start: 2017-01-23 | End: 2017-01-23

## 2017-01-23 RX ORDER — LOSARTAN POTASSIUM 100 MG/1
50 TABLET, FILM COATED ORAL DAILY
Qty: 0 | Refills: 0 | Status: DISCONTINUED | OUTPATIENT
Start: 2017-01-23 | End: 2017-01-24

## 2017-01-23 RX ADMIN — Medication 100 MILLIGRAM(S): at 05:37

## 2017-01-23 RX ADMIN — SEVELAMER CARBONATE 800 MILLIGRAM(S): 2400 POWDER, FOR SUSPENSION ORAL at 14:40

## 2017-01-23 RX ADMIN — PIPERACILLIN AND TAZOBACTAM 200 GRAM(S): 4; .5 INJECTION, POWDER, LYOPHILIZED, FOR SOLUTION INTRAVENOUS at 21:23

## 2017-01-23 RX ADMIN — Medication 100 MILLIGRAM(S): at 21:23

## 2017-01-23 RX ADMIN — PANTOPRAZOLE SODIUM 40 MILLIGRAM(S): 20 TABLET, DELAYED RELEASE ORAL at 16:41

## 2017-01-23 RX ADMIN — Medication 100 MILLIGRAM(S): at 18:00

## 2017-01-23 RX ADMIN — IRON SUCROSE 210 MILLIGRAM(S): 20 INJECTION, SOLUTION INTRAVENOUS at 13:07

## 2017-01-23 RX ADMIN — SEVELAMER CARBONATE 800 MILLIGRAM(S): 2400 POWDER, FOR SUSPENSION ORAL at 07:32

## 2017-01-23 RX ADMIN — PIPERACILLIN AND TAZOBACTAM 200 GRAM(S): 4; .5 INJECTION, POWDER, LYOPHILIZED, FOR SOLUTION INTRAVENOUS at 05:37

## 2017-01-23 RX ADMIN — Medication 81 MILLIGRAM(S): at 14:40

## 2017-01-23 RX ADMIN — LOSARTAN POTASSIUM 50 MILLIGRAM(S): 100 TABLET, FILM COATED ORAL at 14:40

## 2017-01-23 RX ADMIN — TICAGRELOR 90 MILLIGRAM(S): 90 TABLET ORAL at 18:00

## 2017-01-23 RX ADMIN — Medication 100 MILLIGRAM(S): at 14:40

## 2017-01-23 RX ADMIN — SIMVASTATIN 20 MILLIGRAM(S): 20 TABLET, FILM COATED ORAL at 21:23

## 2017-01-23 RX ADMIN — SEVELAMER CARBONATE 800 MILLIGRAM(S): 2400 POWDER, FOR SUSPENSION ORAL at 18:00

## 2017-01-23 RX ADMIN — PANTOPRAZOLE SODIUM 40 MILLIGRAM(S): 20 TABLET, DELAYED RELEASE ORAL at 06:34

## 2017-01-23 RX ADMIN — TICAGRELOR 90 MILLIGRAM(S): 90 TABLET ORAL at 05:37

## 2017-01-23 RX ADMIN — PIPERACILLIN AND TAZOBACTAM 200 GRAM(S): 4; .5 INJECTION, POWDER, LYOPHILIZED, FOR SOLUTION INTRAVENOUS at 14:40

## 2017-01-23 NOTE — DISCHARGE NOTE ADULT - SECONDARY DIAGNOSIS.
ESRD (end stage renal disease) on dialysis CAD (coronary artery disease) Congestive heart failure Gallbladder disorder Hypertension Aortic stenosis, severe

## 2017-01-23 NOTE — DIETITIAN INITIAL EVALUATION ADULT. - OTHER INFO
Pt admitted w/ PNA; was able to tolerated solids w/ good appetite last night, this am po intake 100%; denies N/V/D/C;  w/NKFA. Pt follows renal restrictions; wife cooks accordingly at home; skin intact w/ no edema

## 2017-01-23 NOTE — DISCHARGE NOTE ADULT - HOSPITAL COURSE
86 M pmh ESRD (HD MWF), CAD s/p PCI, TAVR (10/2016), TIA (2008), HTN, HLD, dCHF (EF 55-60%), pancreatitis 2/2 ERCP x 2 presented with fever, chills and cough x 1 day and admitted for pna. 86 M pmh ESRD (HD MWF), CAD s/p PCI, TAVR (10/2016), TIA (2008), HTN, HLD, dCHF (EF 55-60%), biliary tract disease 2/2 ERCP x 2 with stents in gallblader, cystic duct, and CBD, presented with fever, chills and cough x 1 day and admitted for pna 2 days after discharge for post ERCP pancreatitis. ED vitals T 97.7, HR 76, /59, RR 18, POX 98 RA. WBC 14.6, lactate 2.7. Given pt's recent GI hx, pt was pan scanned and a JACINTA infiltrate was found on CT. He was given 2L IV ns bolus and started on Vanc Zosyn for hospital acquired pna. Pt clinically improved over course of day. Vanc was dc'd due to mild presentation of pna and low suspicion for MSSA/MRSA. GI and renal were consulted. Pt received HD while inpatient. He is being discharged in stable condition with Rx for levaquin to complete 5 day course and instructions to follow up with PMD, HD, GI, renal, and cardiology. 86 M pmh ESRD (HD MWF), CAD s/p PCI, TAVR (10/2016), TIA (2008), HTN, HLD, dCHF (EF 55-60%), biliary tract disease 2/2 ERCP x 2 with stents in gallblader, cystic duct, and CBD, presented with fever, chills and cough x 1 day and admitted for pna 2 days after discharge for post ERCP pancreatitis. ED vitals T 97.7, HR 76, /59, RR 18, POX 98 RA. WBC 14.6, lactate 2.7. Given pt's recent GI hx, pt was pan scanned and a JACINTA infiltrate was found on CT. He was given 2L IV ns bolus and started on Vanc Zosyn for hospital acquired pna. Pt clinically improved over course of day. Vanc was dc'd due to mild presentation of pna and low suspicion for MSSA/MRSA. GI and renal were consulted. Of note pt had drop in platelets (min 78k). tanner < 50% drop, 4 days from starting heparin, no thrombosis. Low suspicion for HIT. Previous h/o of similar drops and no h/o of thrombosis. Pt received HD while inpatient. He is being discharged in stable condition with Rx for levaquin dosed for ESRD to complete 5 day course and instructions to follow up with PMD, HD, GI, renal, and cardiology.

## 2017-01-23 NOTE — PHYSICAL THERAPY INITIAL EVALUATION ADULT - ADDITIONAL COMMENTS
Patient lives in one story house with 5 stairs to enter with bilateral railings. Patient claims not to ambulate with AD and has a tolerance of 5 city blocks. Patient was recommended a rollator at the end of the session and agreed.

## 2017-01-23 NOTE — PHYSICAL THERAPY INITIAL EVALUATION ADULT - PERTINENT HX OF CURRENT PROBLEM, REHAB EVAL
recently admitted for elective repeat ERCP for choledocholithiasis with CBD stent placement complicated by post ERCP pancreatitis with HTN urgency 2/2 pain. He was DC'ed home on 01/20 after HD and felt well. The following evening, he states he began "shaking like a leaf", which has happened before when he has developed fevers. He reports feeling SOB during this episode, and has developed a cough since arriving in the ED. He reports his abdominal pain has completely resolved.

## 2017-01-23 NOTE — DISCHARGE NOTE ADULT - CARE PLAN
Principal Discharge DX:	Pneumonia  Secondary Diagnosis:	ESRD (end stage renal disease) on dialysis  Secondary Diagnosis:	CAD (coronary artery disease)  Secondary Diagnosis:	Congestive heart failure  Secondary Diagnosis:	Gallbladder disorder  Secondary Diagnosis:	Hypertension  Secondary Diagnosis:	Aortic stenosis, severe Principal Discharge DX:	Pneumonia  Goal:	to treat your pneumonia  Instructions for follow-up, activity and diet:	you came to hospital with a pneumonia. we believe you first got the pneumonia while you were in the hospital for the ERCP and then treatment of post ERCP pancreatitis. we treated you with antibiotics and your symptoms improved quickly. we are sending you home with a prescription for oral antibiotics. please take according to the instructions. please follow up with your primary care doctor within 1 week. if your symptoms worsen (cough, fever, difficulty breathing) then please see your primary care doctor as soon as possible or go to an emergency room.  Secondary Diagnosis:	ESRD (end stage renal disease) on dialysis  Instructions for follow-up, activity and diet:	you received dialysis while admitted. please follow up with your regularly scheduled dialysis on Wednesday. please follow up with your kidney doctor for your regularly scheduled appointment.  Secondary Diagnosis:	CAD (coronary artery disease)  Instructions for follow-up, activity and diet:	please continue to take your home medications and to follow up with your cardiologist for your regularly scheduled appointment.  Secondary Diagnosis:	Congestive heart failure  Instructions for follow-up, activity and diet:	you had an echocardiogram (heart ultrasound) that showed good pump action but elevated pressures inside the heart. this means that you are at risk for fluid to back up into your lungs. please follow up regularly for dialysis and with your cardiologist in order to prevent any exacerbations.  Secondary Diagnosis:	Gallbladder disorder  Instructions for follow-up, activity and diet:	you have had a number of issues with your gallbladder and bile duct system requiring multiple ERCPs. you now have a stent placed in the common bile duct which is a large pipe that brings bile from the liver to the bowel. please follow up with your regularly scheduled appointment with your GI doctor.  Secondary Diagnosis:	Hypertension  Instructions for follow-up, activity and diet:	please continue to take your home medications and follow up regularly with your kidney doctor and cardiologist.  Secondary Diagnosis:	Aortic stenosis, severe  Instructions for follow-up, activity and diet:	you had severe aortic stenosis that required a new valve to be put in. please follow up regularly with your cardiologist.

## 2017-01-23 NOTE — DISCHARGE NOTE ADULT - MEDICATION SUMMARY - MEDICATIONS TO STOP TAKING
I will STOP taking the medications listed below when I get home from the hospital:    ondansetron 4 mg oral tablet  -- 1 tab(s) by mouth every 8 hours    acetaminophen-oxyCODONE 325 mg-5 mg oral tablet  -- 1 tab(s) by mouth every 6 hours, As Needed MDD:4  -- Caution federal law prohibits the transfer of this drug to any person other  than the person for whom it was prescribed.  May cause drowsiness.  Alcohol may intensify this effect.  Use care when operating dangerous machinery.  This prescription cannot be refilled.  This product contains acetaminophen.  Do not use  with any other product containing acetaminophen to prevent possible liver damage.  Using more of this medication than prescribed may cause serious breathing problems.

## 2017-01-23 NOTE — PROGRESS NOTE ADULT - SUBJECTIVE AND OBJECTIVE BOX
INTERVAL HPI/OVERNIGHT EVENTS:    VITAL SIGNS:  Vital Signs Last 24 Hrs  T(C): 36.8, Max: 37 (01-22 @ 14:01)  T(F): 98.3, Max: 98.6 (01-22 @ 14:01)  HR: 80 (73 - 82)  BP: 147/72 (134/64 - 186/84)  BP(mean): --  RR: 17 (16 - 20)  SpO2: 96% (96% - 99%)    PHYSICAL EXAM:    Constitutional:  Eyes:  ENMT:  Neck:  Respiratory:  Cardiovascular:  Gastrointestinal:  Extremities:  Vascular:  Neurological:  Musculoskeletal:    MEDICATIONS  (STANDING):  aspirin enteric coated 81milliGRAM(s) Oral daily  simvastatin 20milliGRAM(s) Oral at bedtime  ticagrelor 90milliGRAM(s) Oral two times a day  sevelamer hydrochloride 800milliGRAM(s) Oral three times a day with meals  pantoprazole    Tablet 40milliGRAM(s) Oral two times a day before meals  piperacillin/tazobactam IVPB.  IV Intermittent   piperacillin/tazobactam IVPB. 2.25Gram(s) IV Intermittent every 8 hours  docusate sodium 100milliGRAM(s) Oral three times a day    MEDICATIONS  (PRN):  oxyCODONE  5 mG/acetaminophen 325 mG 1Tablet(s) Oral every 6 hours PRN Severe Pain (7 - 10)  ondansetron    Tablet 4milliGRAM(s) Oral every 8 hours PRN Nausea and/or Vomiting  polyethylene glycol 3350 17Gram(s) Oral daily PRN Constipation      Allergies    Biaxin (Unknown)  Ceftin (Unknown)  Plavix (Unknown)  Vitamin D (Unknown)    Intolerances        LABS:                        11.5   10.8  )-----------( 92       ( 23 Jan 2017 06:38 )             35.4     23 Jan 2017 06:38    132    |  96     |  55     ----------------------------<  105    4.3     |  25     |  5.61     Ca    8.3        23 Jan 2017 06:38  Phos  3.2       23 Jan 2017 06:38  Mg     2.4       23 Jan 2017 06:38    TPro  5.8    /  Alb  2.6    /  TBili  1.4    /  DBili  x      /  AST  25     /  ALT  46     /  AlkPhos  120    23 Jan 2017 06:38    PT/INR - ( 21 Jan 2017 22:41 )   PT: 14.0 sec;   INR: 1.26          PTT - ( 21 Jan 2017 22:41 )  PTT:28.3 sec      RADIOLOGY & ADDITIONAL TESTS: INTERVAL HPI/OVERNIGHT EVENTS: RACHEL. Pt feeling better. Still with productive cough. No fevers/chills/SOB/CP    VITAL SIGNS:  Vital Signs Last 24 Hrs  T(C): 36.8, Max: 37 (01-22 @ 14:01)  T(F): 98.3, Max: 98.6 (01-22 @ 14:01)  HR: 80 (73 - 82)  BP: 147/72 (134/64 - 186/84)  BP(mean): --  RR: 17 (16 - 20)  SpO2: 96% (96% - 99%) RA    PHYSICAL EXAM:    Constitutional: Well appearing, older man, A&O x3, comfortable.   Neck: No JVD while sitting in chair.   Respiratory: mildly reduced bs in JACINTA.   Cardiovascular: RRR. +S1, S2. No murmurs or gallops.   Gastrointestinal: soft, NTND  Extremities: no edema. WWP  :    MEDICATIONS  (STANDING):  aspirin enteric coated 81milliGRAM(s) Oral daily  simvastatin 20milliGRAM(s) Oral at bedtime  ticagrelor 90milliGRAM(s) Oral two times a day  sevelamer hydrochloride 800milliGRAM(s) Oral three times a day with meals  pantoprazole    Tablet 40milliGRAM(s) Oral two times a day before meals  piperacillin/tazobactam IVPB.  IV Intermittent   piperacillin/tazobactam IVPB. 2.25Gram(s) IV Intermittent every 8 hours  docusate sodium 100milliGRAM(s) Oral three times a day    MEDICATIONS  (PRN):  oxyCODONE  5 mG/acetaminophen 325 mG 1Tablet(s) Oral every 6 hours PRN Severe Pain (7 - 10)  ondansetron    Tablet 4milliGRAM(s) Oral every 8 hours PRN Nausea and/or Vomiting  polyethylene glycol 3350 17Gram(s) Oral daily PRN Constipation      Allergies    Biaxin (Unknown)  Ceftin (Unknown)  Plavix (Unknown)  Vitamin D (Unknown)    Intolerances        LABS:                        11.5   10.8  )-----------( 92       ( 23 Jan 2017 06:38 )             35.4     23 Jan 2017 06:38    132    |  96     |  55     ----------------------------<  105    4.3     |  25     |  5.61     Ca    8.3        23 Jan 2017 06:38  Phos  3.2       23 Jan 2017 06:38  Mg     2.4       23 Jan 2017 06:38    TPro  5.8    /  Alb  2.6    /  TBili  1.4    /  DBili  x      /  AST  25     /  ALT  46     /  AlkPhos  120    23 Jan 2017 06:38    PT/INR - ( 21 Jan 2017 22:41 )   PT: 14.0 sec;   INR: 1.26          PTT - ( 21 Jan 2017 22:41 )  PTT:28.3 sec      RADIOLOGY & ADDITIONAL TESTS:  CXR AP: No gross infiltrates or consolidations.

## 2017-01-23 NOTE — DISCHARGE NOTE ADULT - MEDICATION SUMMARY - MEDICATIONS TO TAKE
I will START or STAY ON the medications listed below when I get home from the hospital:    aspirin 81 mg oral delayed release tablet  -- 1 tab(s) by mouth once a day  -- Indication: For To prevent future heart attack and stroke    losartan 50 mg oral tablet  -- 1 tab(s) by mouth once a day  -- Indication: For To keep your heart healthy and help to reduce high blood pressure    simvastatin 20 mg oral tablet  -- 1 tab(s) by mouth once a day (at bedtime)  -- Indication: For To prevent future heart attack and stroke    ticagrelor 90 mg oral tablet  -- 1 tab(s) by mouth 2 times a day  -- Indication: For To prevent future heart attack and stroke     sevelamer hydrochloride 800 mg oral tablet  -- 1 tab(s) by mouth 3 times a day (with meals)  -- Indication: For To prevent high levels of phosphorus in your blood    pantoprazole 40 mg oral delayed release tablet  -- 1 tab(s) by mouth 2 times a day (before meals)  -- Indication: For To prevent elevated stomach acid secretions    Levaquin 500 mg oral tablet  -- Take 1 tab the morning of January 26th.   -- Avoid prolonged or excessive exposure to direct and/or artificial sunlight while taking this medication.  Do not take dairy products, antacids, or iron preparations within one hour of this medication.  Finish all this medication unless otherwise directed by prescriber.  May cause drowsiness or dizziness.  Medication should be taken with plenty of water.    -- Indication: For Pneumonia

## 2017-01-23 NOTE — PROGRESS NOTE ADULT - PROBLEM SELECTOR PLAN 3
likely 2/2 lab artifact from platelet clumping. manual diff shows platelet clumping. will f/u on blue top platelet results. likely 2/2 lab artifact from platelet clumping. manual diff shows platelet clumping. will f/u on blue top platelet results. low probability of HIT (not > 50% drop, not more than 5 days since started, no new thrombosis)

## 2017-01-23 NOTE — DISCHARGE NOTE ADULT - PLAN OF CARE
to treat your pneumonia you came to hospital with a pneumonia. we believe you first got the pneumonia while you were in the hospital for the ERCP and then treatment of post ERCP pancreatitis. we treated you with antibiotics and your symptoms improved quickly. we are sending you home with a prescription for oral antibiotics. please take according to the instructions. please follow up with your primary care doctor within 1 week. if your symptoms worsen (cough, fever, difficulty breathing) then please see your primary care doctor as soon as possible or go to an emergency room. you received dialysis while admitted. please follow up with your regularly scheduled dialysis on Wednesday. please follow up with your kidney doctor for your regularly scheduled appointment. please continue to take your home medications and to follow up with your cardiologist for your regularly scheduled appointment. you had an echocardiogram (heart ultrasound) that showed good pump action but elevated pressures inside the heart. this means that you are at risk for fluid to back up into your lungs. please follow up regularly for dialysis and with your cardiologist in order to prevent any exacerbations. you have had a number of issues with your gallbladder and bile duct system requiring multiple ERCPs. you now have a stent placed in the common bile duct which is a large pipe that brings bile from the liver to the bowel. please follow up with your regularly scheduled appointment with your GI doctor. please continue to take your home medications and follow up regularly with your kidney doctor and cardiologist. you had severe aortic stenosis that required a new valve to be put in. please follow up regularly with your cardiologist.

## 2017-01-23 NOTE — PHYSICAL THERAPY INITIAL EVALUATION ADULT - IMPAIRMENTS FOUND, PT EVAL
muscle strength/gait, locomotion, and balance/ergonomics and body mechanics/aerobic capacity/endurance

## 2017-01-23 NOTE — PROGRESS NOTE ADULT - SUBJECTIVE AND OBJECTIVE BOX
Patient was seen and evaluated on dialysis.   Patient is tolerating the procedure well.   HR: 68  BP: 129/65  Wt(kg): Pre HD weight is 69.1kg standing    Continue dialysis:   Dialyzer:  F180        QB:   400     QD: 500  Goal UF 2.5 kg over 3 Hours

## 2017-01-23 NOTE — DIETITIAN INITIAL EVALUATION ADULT. - PROBLEM SELECTOR PLAN 1
nosocomial, based on recent admission and ESRD. Initially meeting sepsis criteria based on fever, WBC, tachycardia now improved after Vancomycin, Levaquin, and 2L NS. Lactate initially 2.7 now downtrended to 1.1.    - Admit to [facility]   - Start Zosyn, renally dosed   - Dose Vancomycin by level prior to HD   - will hold additional fluids given ESRD status, with small boluses for clinical dehydration   - hold home antihypertensives   - f/u RVP, blood cultures, urine cultures

## 2017-01-23 NOTE — DISCHARGE NOTE ADULT - CARE PROVIDER_API CALL
Julius Dodge), Cardiovascular Disease; Internal Medicine  [address]  [place] [address]  Phone: [phone]  Fax: [phone]    Yahir Garland), Internal Medicine; Nephrology  [address] [place] [address]  Phone: [phone]  Fax: [phone]    Ernesto Jones), Gastroenterology  [address] [place] [address]  Phone: [phone]  Fax: [phone]

## 2017-01-23 NOTE — PROGRESS NOTE ADULT - PROBLEM SELECTOR PLAN 3
Currently above his previous known hemoglobin possibly as hemoconcentration from pancreatitis that may be resolving  No indication for IV fluids at present   Regardless, no indication for Epogen at present

## 2017-01-23 NOTE — DIETITIAN INITIAL EVALUATION ADULT. - ENERGY NEEDS
IBW: 67.3kg %IBW: 101% BMI: 23.5; ABW utilized for needs 2/2 pt within % of IBW; Needs per age and HD; fluid needs: 1000ml  +UOP

## 2017-01-23 NOTE — PROGRESS NOTE ADULT - SUBJECTIVE AND OBJECTIVE BOX
Patient seen and examined at bedside.   Reviewed his admission note  Patient was last dialyzed here on 1/20/2017   Was dialyzed from 66.1kg standing to 64.7kg standing   BP was 155/76 to 152/68    Presently states that since his last discharge, he had been feeling warm with occassional diaphoresis.  Had been having coughing, but no pleuritic chest pain, no sputum production, no hemoptysis.   Overall feeling somewhat improved since his admissino here for IV antibiotics    Currently no dyspnea, no leg swelling, no metallic taste at present     Currently medical plan is to receive further IV antibiotics for a presumptive pneumonia  CXR appears equivocal for pneumonia and appears clear at present     oxyCODONE  5 mG/acetaminophen 325 mG 1Tablet(s) every 6 hours PRN  aspirin enteric coated 81milliGRAM(s) daily  ondansetron    Tablet 4milliGRAM(s) every 8 hours PRN  simvastatin 20milliGRAM(s) at bedtime  ticagrelor 90milliGRAM(s) two times a day  sevelamer hydrochloride 800milliGRAM(s) three times a day with meals  pantoprazole    Tablet 40milliGRAM(s) two times a day before meals  piperacillin/tazobactam IVPB.    piperacillin/tazobactam IVPB. 2.25Gram(s) every 8 hours  polyethylene glycol 3350 17Gram(s) daily PRN  docusate sodium 100milliGRAM(s) three times a day      Allergies    Biaxin (Unknown)  Ceftin (Unknown)  Plavix (Unknown)  Vitamin D (Unknown)    Intolerances        T(C): , Max: 37 (01-22 @ 14:01)  T(F): , Max: 98.6 (01-22 @ 14:01)  HR: 80  BP: 184/74  BP(mean): --  RR: 16  SpO2: 97%  Wt(kg): --    I & Os for current day (as of 01-23 @ 09:22)  =============================================  IN:    Oral Fluid: 600 ml    Solution: 100 ml    Total IN: 700 ml  ---------------------------------------------  OUT:    Voided: 1300 ml    Total OUT: 1300 ml  ---------------------------------------------  Total NET: -600 ml          LABS:                        11.5   10.8  )-----------( 92       ( 23 Jan 2017 06:38 )             35.4     23 Jan 2017 06:38    132    |  96     |  55     ----------------------------<  105    4.3     |  25     |  5.61     Ca    8.3        23 Jan 2017 06:38  Phos  3.2       23 Jan 2017 06:38  Mg     2.4       23 Jan 2017 06:38    TPro  5.8    /  Alb  2.6    /  TBili  1.4    /  DBili  x      /  AST  25     /  ALT  46     /  AlkPhos  120    23 Jan 2017 06:38      PT/INR - ( 21 Jan 2017 22:41 )   PT: 14.0 sec;   INR: 1.26          PTT - ( 21 Jan 2017 22:41 )  PTT:28.3 sec          RADIOLOGY & ADDITIONAL STUDIES:

## 2017-01-23 NOTE — DISCHARGE NOTE ADULT - PATIENT PORTAL LINK FT
“You can access the [facility] Patient Portal, offered by [facility], by registering with the following website: [email or web address]”

## 2017-01-24 VITALS
SYSTOLIC BLOOD PRESSURE: 156 MMHG | HEART RATE: 73 BPM | DIASTOLIC BLOOD PRESSURE: 69 MMHG | RESPIRATION RATE: 18 BRPM | TEMPERATURE: 99 F | OXYGEN SATURATION: 94 %

## 2017-01-24 DIAGNOSIS — J18.9 PNEUMONIA, UNSPECIFIED ORGANISM: ICD-10-CM

## 2017-01-24 DIAGNOSIS — N18.9 CHRONIC KIDNEY DISEASE, UNSPECIFIED: ICD-10-CM

## 2017-01-24 DIAGNOSIS — D63.1 ANEMIA IN CHRONIC KIDNEY DISEASE: ICD-10-CM

## 2017-01-24 DIAGNOSIS — I10 ESSENTIAL (PRIMARY) HYPERTENSION: ICD-10-CM

## 2017-01-24 DIAGNOSIS — I25.10 ATHEROSCLEROTIC HEART DISEASE OF NATIVE CORONARY ARTERY WITHOUT ANGINA PECTORIS: ICD-10-CM

## 2017-01-24 DIAGNOSIS — I50.32 CHRONIC DIASTOLIC (CONGESTIVE) HEART FAILURE: ICD-10-CM

## 2017-01-24 LAB
ALBUMIN SERPL ELPH-MCNC: 2.5 G/DL — LOW (ref 3.4–5)
ALP SERPL-CCNC: 155 U/L — HIGH (ref 40–120)
ALT FLD-CCNC: 35 U/L — SIGNIFICANT CHANGE UP (ref 12–42)
ANION GAP SERPL CALC-SCNC: 8 MMOL/L — LOW (ref 9–16)
AST SERPL-CCNC: 22 U/L — SIGNIFICANT CHANGE UP (ref 15–37)
BILIRUB SERPL-MCNC: 1.4 MG/DL — HIGH (ref 0.2–1.2)
BUN SERPL-MCNC: 35 MG/DL — HIGH (ref 7–23)
CALCIUM SERPL-MCNC: 8.3 MG/DL — LOW (ref 8.5–10.5)
CHLORIDE SERPL-SCNC: 100 MMOL/L — SIGNIFICANT CHANGE UP (ref 96–108)
CO2 SERPL-SCNC: 30 MMOL/L — SIGNIFICANT CHANGE UP (ref 22–31)
CREAT SERPL-MCNC: 4.64 MG/DL — HIGH (ref 0.5–1.3)
GLUCOSE SERPL-MCNC: 124 MG/DL — HIGH (ref 70–99)
HCT VFR BLD CALC: 35.7 % — LOW (ref 39–50)
HGB BLD-MCNC: 11.7 G/DL — LOW (ref 13–17)
MAGNESIUM SERPL-MCNC: 2.3 MG/DL — SIGNIFICANT CHANGE UP (ref 1.6–2.4)
MCHC RBC-ENTMCNC: 30.6 PG — SIGNIFICANT CHANGE UP (ref 27–34)
MCHC RBC-ENTMCNC: 32.8 G/DL — SIGNIFICANT CHANGE UP (ref 32–36)
MCV RBC AUTO: 93.5 FL — SIGNIFICANT CHANGE UP (ref 80–100)
PHOSPHATE SERPL-MCNC: 2.5 MG/DL — SIGNIFICANT CHANGE UP (ref 2.5–4.5)
PLATELET # BLD AUTO: 92 K/UL — LOW (ref 150–400)
POTASSIUM SERPL-MCNC: 3.8 MMOL/L — SIGNIFICANT CHANGE UP (ref 3.5–5.3)
POTASSIUM SERPL-SCNC: 3.8 MMOL/L — SIGNIFICANT CHANGE UP (ref 3.5–5.3)
PROT SERPL-MCNC: 5.8 G/DL — LOW (ref 6.4–8.2)
RBC # BLD: 3.82 M/UL — LOW (ref 4.2–5.8)
RBC # FLD: 16.8 % — SIGNIFICANT CHANGE UP (ref 10.3–16.9)
SODIUM SERPL-SCNC: 138 MMOL/L — SIGNIFICANT CHANGE UP (ref 135–145)
WBC # BLD: 9.2 K/UL — SIGNIFICANT CHANGE UP (ref 3.8–10.5)
WBC # FLD AUTO: 9.2 K/UL — SIGNIFICANT CHANGE UP (ref 3.8–10.5)

## 2017-01-24 PROCEDURE — 90935 HEMODIALYSIS ONE EVALUATION: CPT

## 2017-01-24 PROCEDURE — 82728 ASSAY OF FERRITIN: CPT

## 2017-01-24 PROCEDURE — 82652 VIT D 1 25-DIHYDROXY: CPT

## 2017-01-24 PROCEDURE — 83735 ASSAY OF MAGNESIUM: CPT

## 2017-01-24 PROCEDURE — 93005 ELECTROCARDIOGRAM TRACING: CPT

## 2017-01-24 PROCEDURE — 87581 M.PNEUMON DNA AMP PROBE: CPT

## 2017-01-24 PROCEDURE — 84100 ASSAY OF PHOSPHORUS: CPT

## 2017-01-24 PROCEDURE — 85610 PROTHROMBIN TIME: CPT

## 2017-01-24 PROCEDURE — 74176 CT ABD & PELVIS W/O CONTRAST: CPT

## 2017-01-24 PROCEDURE — 99231 SBSQ HOSP IP/OBS SF/LOW 25: CPT | Mod: GC

## 2017-01-24 PROCEDURE — 84295 ASSAY OF SERUM SODIUM: CPT

## 2017-01-24 PROCEDURE — 87040 BLOOD CULTURE FOR BACTERIA: CPT

## 2017-01-24 PROCEDURE — 80202 ASSAY OF VANCOMYCIN: CPT

## 2017-01-24 PROCEDURE — 83550 IRON BINDING TEST: CPT

## 2017-01-24 PROCEDURE — 97001: CPT

## 2017-01-24 PROCEDURE — 87486 CHLMYD PNEUM DNA AMP PROBE: CPT

## 2017-01-24 PROCEDURE — 71045 X-RAY EXAM CHEST 1 VIEW: CPT

## 2017-01-24 PROCEDURE — 83970 ASSAY OF PARATHORMONE: CPT

## 2017-01-24 PROCEDURE — 82310 ASSAY OF CALCIUM: CPT

## 2017-01-24 PROCEDURE — 96375 TX/PRO/DX INJ NEW DRUG ADDON: CPT

## 2017-01-24 PROCEDURE — 87798 DETECT AGENT NOS DNA AMP: CPT

## 2017-01-24 PROCEDURE — 87633 RESP VIRUS 12-25 TARGETS: CPT

## 2017-01-24 PROCEDURE — 83605 ASSAY OF LACTIC ACID: CPT

## 2017-01-24 PROCEDURE — 96374 THER/PROPH/DIAG INJ IV PUSH: CPT

## 2017-01-24 PROCEDURE — 85730 THROMBOPLASTIN TIME PARTIAL: CPT

## 2017-01-24 PROCEDURE — 99285 EMERGENCY DEPT VISIT HI MDM: CPT | Mod: 25

## 2017-01-24 PROCEDURE — 84132 ASSAY OF SERUM POTASSIUM: CPT

## 2017-01-24 PROCEDURE — 36415 COLL VENOUS BLD VENIPUNCTURE: CPT

## 2017-01-24 PROCEDURE — 82306 VITAMIN D 25 HYDROXY: CPT

## 2017-01-24 PROCEDURE — 83690 ASSAY OF LIPASE: CPT

## 2017-01-24 PROCEDURE — 82330 ASSAY OF CALCIUM: CPT

## 2017-01-24 PROCEDURE — 82803 BLOOD GASES ANY COMBINATION: CPT

## 2017-01-24 PROCEDURE — 71250 CT THORAX DX C-: CPT

## 2017-01-24 PROCEDURE — 99238 HOSP IP/OBS DSCHRG MGMT 30/<: CPT

## 2017-01-24 PROCEDURE — 85027 COMPLETE CBC AUTOMATED: CPT

## 2017-01-24 PROCEDURE — 85025 COMPLETE CBC W/AUTO DIFF WBC: CPT

## 2017-01-24 PROCEDURE — 80053 COMPREHEN METABOLIC PANEL: CPT

## 2017-01-24 RX ORDER — CIPROFLOXACIN LACTATE 400MG/40ML
1 VIAL (ML) INTRAVENOUS
Qty: 1 | Refills: 0 | OUTPATIENT
Start: 2017-01-24 | End: 2017-01-25

## 2017-01-24 RX ADMIN — SEVELAMER CARBONATE 800 MILLIGRAM(S): 2400 POWDER, FOR SUSPENSION ORAL at 07:38

## 2017-01-24 RX ADMIN — LOSARTAN POTASSIUM 50 MILLIGRAM(S): 100 TABLET, FILM COATED ORAL at 06:04

## 2017-01-24 RX ADMIN — SEVELAMER CARBONATE 800 MILLIGRAM(S): 2400 POWDER, FOR SUSPENSION ORAL at 11:57

## 2017-01-24 RX ADMIN — PANTOPRAZOLE SODIUM 40 MILLIGRAM(S): 20 TABLET, DELAYED RELEASE ORAL at 07:37

## 2017-01-24 RX ADMIN — PIPERACILLIN AND TAZOBACTAM 200 GRAM(S): 4; .5 INJECTION, POWDER, LYOPHILIZED, FOR SOLUTION INTRAVENOUS at 06:04

## 2017-01-24 RX ADMIN — TICAGRELOR 90 MILLIGRAM(S): 90 TABLET ORAL at 06:04

## 2017-01-24 RX ADMIN — Medication 81 MILLIGRAM(S): at 11:57

## 2017-01-24 RX ADMIN — Medication 100 MILLIGRAM(S): at 06:04

## 2017-01-24 NOTE — PROGRESS NOTE ADULT - RS GEN PE MLT RESP DETAILS PC
clear to auscultation bilaterally/no rhonchi/airway patent/normal/no rales/no wheezes
clear to auscultation bilaterally/no rales/normal/airway patent/no rhonchi/no wheezes

## 2017-01-24 NOTE — PROGRESS NOTE ADULT - SUBJECTIVE AND OBJECTIVE BOX
Patient seen and examined at bedside.     Patient's last HD was on 1/23    Patient feels well at present  Did not have cramping, dyspnea, orthopnea, leg cramping at HD  Presently still has a cough but no other symptoms of fluid overload at present     Last Dialysis:  UF attained:  3.1kg       EDW: attained 66kg standing; set as EDW at present   Dialyzer:    F180         QB:  400        QD: 500          Bath: 2K  Epogen: no  Venofer: no   Access: Right AVF           Weight (kg): 67.7 (01-21 @ 22:14)      T(C): , Max: 36.7 (01-24 @ 06:06)  T(F): , Max: 98.1 (01-24 @ 06:06)  HR: 81  BP: 117/67  BP(mean): --  RR: 18  SpO2: 98%  Wt(kg): --    oxyCODONE  5 mG/acetaminophen 325 mG 1Tablet(s) every 6 hours PRN  aspirin enteric coated 81milliGRAM(s) daily  ondansetron    Tablet 4milliGRAM(s) every 8 hours PRN  simvastatin 20milliGRAM(s) at bedtime  ticagrelor 90milliGRAM(s) two times a day  sevelamer hydrochloride 800milliGRAM(s) three times a day with meals  pantoprazole    Tablet 40milliGRAM(s) two times a day before meals  piperacillin/tazobactam IVPB.    piperacillin/tazobactam IVPB. 2.25Gram(s) every 8 hours  polyethylene glycol 3350 17Gram(s) daily PRN  docusate sodium 100milliGRAM(s) three times a day  guaiFENesin    Syrup 100milliGRAM(s) every 6 hours PRN  losartan 50milliGRAM(s) daily      Allergies    Biaxin (Unknown)  Ceftin (Unknown)  Plavix (Unknown)  Vitamin D (Unknown)    Intolerances        T(C): , Max: 36.7 (01-24 @ 06:06)  T(F): , Max: 98.1 (01-24 @ 06:06)  HR: 81  BP: 117/67  BP(mean): --  RR: 18  SpO2: 98%  Wt(kg): --    I & Os for current day (as of 01-24 @ 08:55)  =============================================  IN:    Oral Fluid: 480 ml    Solution: 100 ml    Total IN: 580 ml  ---------------------------------------------  OUT:    Other: 2900 ml    Voided: 250 ml    Total OUT: 3150 ml  ---------------------------------------------  Total NET: -2570 ml          LABS:                        11.7   9.2   )-----------( 92       ( 24 Jan 2017 07:30 )             35.7     24 Jan 2017 07:30    138    |  100    |  35     ----------------------------<  124    3.8     |  30     |  4.64     Ca    8.3        24 Jan 2017 07:30  Phos  2.5       24 Jan 2017 07:30  Mg     2.3       24 Jan 2017 07:30    TPro  5.8    /  Alb  2.5    /  TBili  1.4    /  DBili  x      /  AST  22     /  ALT  35     /  AlkPhos  155    24 Jan 2017 07:30                RADIOLOGY & ADDITIONAL STUDIES:

## 2017-01-24 NOTE — PROGRESS NOTE ADULT - SUBJECTIVE AND OBJECTIVE BOX
Patient is a 86y old  Male who presents with a chief complaint of fever (2017 14:04)      INTERVAL HPI/OVERNIGHT EVENTS:    Pt. c/o improving cough  No F/C or SOB    Review of Systems: 12 point review of systems otherwise negative    MEDICATIONS  (STANDING):  aspirin enteric coated 81milliGRAM(s) Oral daily  simvastatin 20milliGRAM(s) Oral at bedtime  ticagrelor 90milliGRAM(s) Oral two times a day  sevelamer hydrochloride 800milliGRAM(s) Oral three times a day with meals  pantoprazole    Tablet 40milliGRAM(s) Oral two times a day before meals  docusate sodium 100milliGRAM(s) Oral three times a day  losartan 50milliGRAM(s) Oral daily    MEDICATIONS  (PRN):  oxyCODONE  5 mG/acetaminophen 325 mG 1Tablet(s) Oral every 6 hours PRN Severe Pain (7 - 10)  ondansetron    Tablet 4milliGRAM(s) Oral every 8 hours PRN Nausea and/or Vomiting  polyethylene glycol 3350 17Gram(s) Oral daily PRN Constipation  guaiFENesin    Syrup 100milliGRAM(s) Oral every 6 hours PRN Cough      Allergies    Biaxin (Unknown)  Ceftin (Unknown)  Plavix (Unknown)  Vitamin D (Unknown)    Intolerances          Vital Signs Last 24 Hrs  T(C): 37.2, Max: 37.2 ( @ 09:34)  T(F): 98.9, Max: 98.9 ( @ 09:34)  HR: 73 (73 - 82)  BP: 156/69 (103/52 - 156/69)  BP(mean): --  RR: 18 (17 - 18)  SpO2: 94% (94% - 99%)  CAPILLARY BLOOD GLUCOSE  122 (2017 20:25)    I & Os for 24h ending  @ 07:00  =============================================  IN: 580 ml / OUT: 3150 ml / NET: -2570 ml    I & Os for current day (as of  @ 12:52)  =============================================  IN: 360 ml / OUT: 200 ml / NET: 160 ml      Physical Exam:    Daily     Daily Weight in k.7 (2017 14:28)  General:  Well appearing, non-toxic  HEENT:  MMM  Skin:  Warm and dry  Neuro:  AAOx3    LABS:                        11.7   9.2   )-----------( 92       ( 2017 07:30 )             35.7     2017 07:30    138    |  100    |  35     ----------------------------<  124    3.8     |  30     |  4.64     Ca    8.3        2017 07:30  Phos  2.5       2017 07:30  Mg     2.3       2017 07:30    TPro  5.8    /  Alb  2.5    /  TBili  1.4    /  DBili  x      /  AST  22     /  ALT  35     /  AlkPhos  155    2017 07:30            RADIOLOGY & ADDITIONAL TESTS:    ---------------------------------------------------------------------------  I personally reviewed: [  ]EKG   [  ]CXR    [  ] CT    [  ]Other  ---------------------------------------------------------------------------  PLEASE CHECK WHEN PRESENT:     [  ]Heart Failure     [  ] Acute     [  ] Acute on Chronic     [  ] Chronic  -------------------------------------------------------------------     [  ]Diastolic [HFpEF]     [  ]Systolic [HFrEF]     [  ]Combined [HFpEF & HFrEF]     [  ]Other:  -------------------------------------------------------------------  [  ]ALEXA     [  ]ATN     [  ]Reneal Medullary Necrosis     [  ]Renal Cortical Necrosis     [  ]Other Pathological Lesions:    [  ]CKD 1  [  ]CKD 2  [  ]CKD 3  [  ]CKD 4  [  ]CKD 5  [  ]Other  -------------------------------------------------------------------  [  ]Other/Unspecified:    --------------------------------------------------------------------    Abdominal Nutritional Status  [  ]Malnutrition: See Nutrition Note  [  ]Cachexia  [  ]Other:   [  ]Supplement Ordered:  [  ]Morbid Obesity (BMI >=40]

## 2017-01-24 NOTE — PROGRESS NOTE ADULT - ASSESSMENT
85 y/o M w/
86 M pmh ESRD (HD MWF), CAD s/p PCI, TAVR (10/2016), TIA (2008), HTN, HLD, dCHF (EF 55-60%), pancreatitis 2/2 ERCP x 2 presented with fever, chills and cough x 1 day and admitted for pna.
85 y/o male with ESRD on HD MWF with recent admission for GI procedure for biliary stenting for biliary sepsis c/c/b postERCP pancreatitis resolved. Presently returned with fever undergoing infectious workup. Requiring maintenance HD
87 y/o male with ESRD on HD MWF with recent admission for GI procedure for biliary stenting for biliary sepsis c/c/b postERCP pancreatitis resolved. Presently returned with fever undergoing infectious workup. Requiring maintenance HD

## 2017-01-24 NOTE — PROGRESS NOTE ADULT - PROBLEM SELECTOR PROBLEM 3
Coronary artery disease, angina presence unspecified, unspecified vessel or lesion type, unspecified whether native or transplanted heart
Anemia due to chronic kidney disease
Anemia due to chronic kidney disease
Thrombocytopenia

## 2017-01-24 NOTE — PROGRESS NOTE ADULT - PROBLEM SELECTOR PLAN 2
Renal following; cont. HD, Renagel, renal diet, renally-dose abx
As BP is mostly appropriate, agree with holding all agents now.  Resumed onto losartan as a home medication.   This will likely preclude patient from attaining previous dry weight of 64.7kg  Will montior at next HD if still admitted    Otherwise continue with home losartan at present
As BP is mostly appropriate, agree with holding all agents now.
h/o gall stones and biliary stones s/p ERCP stenting. currently stable. GI following and pt will follow up with GI as outpatient.

## 2017-01-24 NOTE — PROGRESS NOTE ADULT - PROBLEM SELECTOR PLAN 4
monitor volume status on dialysis
, Vit D 25 16.6, 1,25 Vit D 9.6  Hold oral agents  Will use IV calcitriol at HD
recent h/o PCI. will c/w aspirin, Brilinta, statin, and ARB

## 2017-01-24 NOTE — PROGRESS NOTE ADULT - PROBLEM SELECTOR PROBLEM 4
Chronic diastolic (congestive) heart failure
Chronic kidney disease-mineral and bone disorder
CAD (coronary artery disease)

## 2017-01-24 NOTE — PROGRESS NOTE ADULT - ATTENDING COMMENTS
seen and examined while on dialysis.  tolerating procedure well  continue with present prescription
Case discussed with med team  for dialysis this AM
Dispo: d/c home today
tolerated dialysis well yesterday.  Nephrologically stable  Next HD 1/25
Seen this morning in HD, feels well, some minimal cough productive of yellow sputum, no SOB, minimal epigastric pain. No other complaints, tolerated HD well. VSS, afebrile. Clinically better. Lungs w/ overall decreased BS. C/w IV zosyn for now for HAP, d/c vancomycin, BCx neg to date, leukocytosis improving. Transition to PO levofloxacin tomorrow to complete total of 5 days, add robitussin prn. Resume losartan for HTN given recent episode of hypertensive urgency on last admission and recent elevated BP's. Transaminitis improving, c/w percocet prn. F/up hyponatremia and thrombocytopenia. C/w rest as above. PT --> HPT. Anticipate discharge home tmrw w/ HPT, GI and PMD follow up.

## 2017-01-24 NOTE — PROGRESS NOTE ADULT - PROBLEM SELECTOR PLAN 5
cont. ARB, low-salt diet
followed by renal. received HD today. will continue to monitor electrolytes. c/w phos binder.

## 2017-01-26 DIAGNOSIS — I97.89 OTHER POSTPROCEDURAL COMPLICATIONS AND DISORDERS OF THE CIRCULATORY SYSTEM, NOT ELSEWHERE CLASSIFIED: ICD-10-CM

## 2017-01-26 DIAGNOSIS — K85.90 ACUTE PANCREATITIS WITHOUT NECROSIS OR INFECTION, UNSPECIFIED: ICD-10-CM

## 2017-01-26 DIAGNOSIS — K82.8 OTHER SPECIFIED DISEASES OF GALLBLADDER: ICD-10-CM

## 2017-01-26 DIAGNOSIS — N18.6 END STAGE RENAL DISEASE: ICD-10-CM

## 2017-01-26 DIAGNOSIS — D63.1 ANEMIA IN CHRONIC KIDNEY DISEASE: ICD-10-CM

## 2017-01-26 DIAGNOSIS — Z95.3 PRESENCE OF XENOGENIC HEART VALVE: ICD-10-CM
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
## 2017-01-26 DIAGNOSIS — I16.0 HYPERTENSIVE URGENCY: ICD-10-CM

## 2017-01-26 DIAGNOSIS — I35.0 NONRHEUMATIC AORTIC (VALVE) STENOSIS: ICD-10-CM

## 2017-01-26 DIAGNOSIS — I13.2 HYPERTENSIVE HEART AND CHRONIC KIDNEY DISEASE WITH HEART FAILURE AND WITH STAGE 5 CHRONIC KIDNEY DISEASE, OR END STAGE RENAL DISEASE: ICD-10-CM

## 2017-01-26 DIAGNOSIS — E78.5 HYPERLIPIDEMIA, UNSPECIFIED: ICD-10-CM

## 2017-01-26 DIAGNOSIS — Z87.891 PERSONAL HISTORY OF NICOTINE DEPENDENCE: ICD-10-CM

## 2017-01-26 DIAGNOSIS — Z98.61 CORONARY ANGIOPLASTY STATUS: ICD-10-CM

## 2017-01-26 DIAGNOSIS — Z88.8 ALLERGY STATUS TO OTHER DRUGS, MEDICAMENTS AND BIOLOGICAL SUBSTANCES STATUS: ICD-10-CM
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
## 2017-01-26 DIAGNOSIS — Z88.3 ALLERGY STATUS TO OTHER ANTI-INFECTIVE AGENTS: ICD-10-CM

## 2017-01-26 DIAGNOSIS — Z99.2 DEPENDENCE ON RENAL DIALYSIS: ICD-10-CM

## 2017-01-26 DIAGNOSIS — I25.10 ATHEROSCLEROTIC HEART DISEASE OF NATIVE CORONARY ARTERY WITHOUT ANGINA PECTORIS: ICD-10-CM

## 2017-01-26 DIAGNOSIS — Z88.1 ALLERGY STATUS TO OTHER ANTIBIOTIC AGENTS STATUS: ICD-10-CM

## 2017-01-26 DIAGNOSIS — Z86.73 PERSONAL HISTORY OF TRANSIENT ISCHEMIC ATTACK (TIA), AND CEREBRAL INFARCTION WITHOUT RESIDUAL DEFICITS: ICD-10-CM

## 2017-01-26 DIAGNOSIS — R00.0 TACHYCARDIA, UNSPECIFIED: ICD-10-CM

## 2017-01-26 DIAGNOSIS — I50.32 CHRONIC DIASTOLIC (CONGESTIVE) HEART FAILURE: ICD-10-CM

## 2017-01-26 DIAGNOSIS — Z96.642 PRESENCE OF LEFT ARTIFICIAL HIP JOINT: ICD-10-CM

## 2017-01-26 DIAGNOSIS — M10.9 GOUT, UNSPECIFIED: ICD-10-CM

## 2017-01-26 DIAGNOSIS — D72.829 ELEVATED WHITE BLOOD CELL COUNT, UNSPECIFIED: ICD-10-CM

## 2017-01-26 DIAGNOSIS — I25.2 OLD MYOCARDIAL INFARCTION: ICD-10-CM

## 2017-01-26 DIAGNOSIS — Z79.82 LONG TERM (CURRENT) USE OF ASPIRIN: ICD-10-CM

## 2017-01-26 DIAGNOSIS — R50.9 FEVER, UNSPECIFIED: ICD-10-CM

## 2017-01-27 DIAGNOSIS — E87.1 HYPO-OSMOLALITY AND HYPONATREMIA: ICD-10-CM
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
## 2017-01-27 DIAGNOSIS — A41.9 SEPSIS, UNSPECIFIED ORGANISM: ICD-10-CM

## 2017-01-27 DIAGNOSIS — Z86.73 PERSONAL HISTORY OF TRANSIENT ISCHEMIC ATTACK (TIA), AND CEREBRAL INFARCTION WITHOUT RESIDUAL DEFICITS: ICD-10-CM

## 2017-01-27 DIAGNOSIS — D63.1 ANEMIA IN CHRONIC KIDNEY DISEASE: ICD-10-CM

## 2017-01-27 DIAGNOSIS — Z95.2 PRESENCE OF PROSTHETIC HEART VALVE: ICD-10-CM

## 2017-01-27 DIAGNOSIS — I50.32 CHRONIC DIASTOLIC (CONGESTIVE) HEART FAILURE: ICD-10-CM

## 2017-01-27 DIAGNOSIS — I25.2 OLD MYOCARDIAL INFARCTION: ICD-10-CM

## 2017-01-27 DIAGNOSIS — E78.5 HYPERLIPIDEMIA, UNSPECIFIED: ICD-10-CM
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
## 2017-01-27 DIAGNOSIS — Z99.2 DEPENDENCE ON RENAL DIALYSIS: ICD-10-CM

## 2017-01-27 DIAGNOSIS — K91.870 POSTPROCEDURAL HEMATOMA OF A DIGESTIVE SYSTEM ORGAN OR STRUCTURE FOLLOWING A DIGESTIVE SYSTEM PROCEDURE: ICD-10-CM

## 2017-01-27 DIAGNOSIS — M10.9 GOUT, UNSPECIFIED: ICD-10-CM

## 2017-01-27 DIAGNOSIS — R50.9 FEVER, UNSPECIFIED: ICD-10-CM

## 2017-01-27 DIAGNOSIS — I25.10 ATHEROSCLEROTIC HEART DISEASE OF NATIVE CORONARY ARTERY WITHOUT ANGINA PECTORIS: ICD-10-CM

## 2017-01-27 DIAGNOSIS — D69.6 THROMBOCYTOPENIA, UNSPECIFIED: ICD-10-CM

## 2017-01-27 DIAGNOSIS — N18.6 END STAGE RENAL DISEASE: ICD-10-CM

## 2017-01-27 DIAGNOSIS — J18.9 PNEUMONIA, UNSPECIFIED ORGANISM: ICD-10-CM

## 2017-01-27 DIAGNOSIS — I13.2 HYPERTENSIVE HEART AND CHRONIC KIDNEY DISEASE WITH HEART FAILURE AND WITH STAGE 5 CHRONIC KIDNEY DISEASE, OR END STAGE RENAL DISEASE: ICD-10-CM

## 2017-01-27 DIAGNOSIS — Z79.82 LONG TERM (CURRENT) USE OF ASPIRIN: ICD-10-CM

## 2017-01-27 LAB
CULTURE RESULTS: SIGNIFICANT CHANGE UP
CULTURE RESULTS: SIGNIFICANT CHANGE UP
SPECIMEN SOURCE: SIGNIFICANT CHANGE UP
SPECIMEN SOURCE: SIGNIFICANT CHANGE UP

## 2017-02-14 ENCOUNTER — APPOINTMENT (OUTPATIENT)
Dept: GASTROENTEROLOGY | Facility: CLINIC | Age: 82
End: 2017-02-14

## 2017-03-14 ENCOUNTER — APPOINTMENT (OUTPATIENT)
Dept: GASTROENTEROLOGY | Facility: CLINIC | Age: 82
End: 2017-03-14

## 2017-03-21 ENCOUNTER — APPOINTMENT (OUTPATIENT)
Dept: GASTROENTEROLOGY | Facility: CLINIC | Age: 82
End: 2017-03-21

## 2017-03-21 VITALS
BODY MASS INDEX: 23.07 KG/M2 | DIASTOLIC BLOOD PRESSURE: 60 MMHG | HEIGHT: 67 IN | WEIGHT: 147 LBS | SYSTOLIC BLOOD PRESSURE: 160 MMHG

## 2017-03-21 DIAGNOSIS — Z86.39 PERSONAL HISTORY OF OTHER ENDOCRINE, NUTRITIONAL AND METABOLIC DISEASE: ICD-10-CM

## 2017-03-21 DIAGNOSIS — I35.0 NONRHEUMATIC AORTIC (VALVE) STENOSIS: ICD-10-CM

## 2017-03-21 DIAGNOSIS — K21.9 GASTRO-ESOPHAGEAL REFLUX DISEASE W/OUT ESOPHAGITIS: ICD-10-CM

## 2017-03-21 DIAGNOSIS — Z86.79 PERSONAL HISTORY OF OTHER DISEASES OF THE CIRCULATORY SYSTEM: ICD-10-CM

## 2017-03-21 DIAGNOSIS — I48.91 UNSPECIFIED ATRIAL FIBRILLATION: ICD-10-CM

## 2017-03-21 DIAGNOSIS — Z82.3 FAMILY HISTORY OF STROKE: ICD-10-CM

## 2017-08-31 ENCOUNTER — CLINICAL ADVICE (OUTPATIENT)
Age: 82
End: 2017-08-31

## 2017-08-31 RX ORDER — PANTOPRAZOLE 40 MG/1
40 TABLET, DELAYED RELEASE ORAL DAILY
Qty: 30 | Refills: 3 | Status: ACTIVE | COMMUNITY
Start: 2017-01-31 | End: 1900-01-01

## 2017-09-07 ENCOUNTER — EMERGENCY (EMERGENCY)
Facility: HOSPITAL | Age: 82
LOS: 0 days | Discharge: HOME | End: 2017-09-07
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
## 2017-09-07 DIAGNOSIS — Z79.82 LONG TERM (CURRENT) USE OF ASPIRIN: ICD-10-CM

## 2017-09-07 DIAGNOSIS — Z96.649 PRESENCE OF UNSPECIFIED ARTIFICIAL HIP JOINT: ICD-10-CM

## 2017-09-07 DIAGNOSIS — K82.9 DISEASE OF GALLBLADDER, UNSPECIFIED: Chronic | ICD-10-CM

## 2017-09-07 DIAGNOSIS — N18.9 CHRONIC KIDNEY DISEASE, UNSPECIFIED: ICD-10-CM

## 2017-09-07 DIAGNOSIS — Z79.899 OTHER LONG TERM (CURRENT) DRUG THERAPY: ICD-10-CM

## 2017-09-07 DIAGNOSIS — N18.6 END STAGE RENAL DISEASE: ICD-10-CM

## 2017-09-07 DIAGNOSIS — Z99.2 DEPENDENCE ON RENAL DIALYSIS: ICD-10-CM

## 2017-09-07 DIAGNOSIS — T82.838A HEMORRHAGE DUE TO VASCULAR PROSTHETIC DEVICES, IMPLANTS AND GRAFTS, INITIAL ENCOUNTER: ICD-10-CM

## 2017-09-07 DIAGNOSIS — Z88.8 ALLERGY STATUS TO OTHER DRUGS, MEDICAMENTS AND BIOLOGICAL SUBSTANCES: ICD-10-CM

## 2017-09-07 DIAGNOSIS — Z95.2 PRESENCE OF PROSTHETIC HEART VALVE: ICD-10-CM

## 2017-09-07 DIAGNOSIS — I12.0 HYPERTENSIVE CHRONIC KIDNEY DISEASE WITH STAGE 5 CHRONIC KIDNEY DISEASE OR END STAGE RENAL DISEASE: ICD-10-CM

## 2017-09-07 DIAGNOSIS — Z95.2 PRESENCE OF PROSTHETIC HEART VALVE: Chronic | ICD-10-CM

## 2017-09-07 DIAGNOSIS — I50.9 HEART FAILURE, UNSPECIFIED: ICD-10-CM

## 2017-12-26 RX ORDER — LOSARTAN POTASSIUM 100 MG/1
1 TABLET, FILM COATED ORAL
Qty: 0 | Refills: 0 | COMMUNITY

## 2017-12-27 ENCOUNTER — APPOINTMENT (OUTPATIENT)
Dept: GASTROENTEROLOGY | Facility: HOSPITAL | Age: 82
End: 2017-12-27

## 2017-12-27 ENCOUNTER — INPATIENT (INPATIENT)
Facility: HOSPITAL | Age: 82
LOS: 1 days | Discharge: HOME | DRG: 408 | End: 2017-12-29
Attending: SURGERY | Admitting: SURGERY
Payer: MEDICARE

## 2017-12-27 ENCOUNTER — RESULT REVIEW (OUTPATIENT)
Age: 82
End: 2017-12-27

## 2017-12-27 ENCOUNTER — OUTPATIENT (OUTPATIENT)
Dept: OUTPATIENT SERVICES | Facility: HOSPITAL | Age: 82
LOS: 1 days | Discharge: HOME | End: 2017-12-27
Payer: MEDICARE

## 2017-12-27 VITALS
RESPIRATION RATE: 16 BRPM | DIASTOLIC BLOOD PRESSURE: 79 MMHG | OXYGEN SATURATION: 100 % | HEIGHT: 67 IN | HEART RATE: 64 BPM | WEIGHT: 149.91 LBS | TEMPERATURE: 97 F | SYSTOLIC BLOOD PRESSURE: 178 MMHG

## 2017-12-27 DIAGNOSIS — H26.9 UNSPECIFIED CATARACT: Chronic | ICD-10-CM

## 2017-12-27 DIAGNOSIS — Z46.6 ENCOUNTER FOR FITTING AND ADJUSTMENT OF URINARY DEVICE: ICD-10-CM

## 2017-12-27 DIAGNOSIS — Z95.2 PRESENCE OF PROSTHETIC HEART VALVE: ICD-10-CM

## 2017-12-27 DIAGNOSIS — K80.50 CALCULUS OF BILE DUCT WITHOUT CHOLANGITIS OR CHOLECYSTITIS WITHOUT OBSTRUCTION: ICD-10-CM

## 2017-12-27 DIAGNOSIS — K80.12 CALCULUS OF GALLBLADDER WITH ACUTE AND CHRONIC CHOLECYSTITIS WITHOUT OBSTRUCTION: ICD-10-CM

## 2017-12-27 DIAGNOSIS — E78.5 HYPERLIPIDEMIA, UNSPECIFIED: ICD-10-CM

## 2017-12-27 DIAGNOSIS — I35.0 NONRHEUMATIC AORTIC (VALVE) STENOSIS: ICD-10-CM

## 2017-12-27 DIAGNOSIS — Z98.41 CATARACT EXTRACTION STATUS, RIGHT EYE: ICD-10-CM

## 2017-12-27 DIAGNOSIS — Z98.42 CATARACT EXTRACTION STATUS, LEFT EYE: ICD-10-CM

## 2017-12-27 DIAGNOSIS — Z95.2 PRESENCE OF PROSTHETIC HEART VALVE: Chronic | ICD-10-CM

## 2017-12-27 DIAGNOSIS — Z86.73 PERSONAL HISTORY OF TRANSIENT ISCHEMIC ATTACK (TIA), AND CEREBRAL INFARCTION WITHOUT RESIDUAL DEFICITS: ICD-10-CM

## 2017-12-27 DIAGNOSIS — I10 ESSENTIAL (PRIMARY) HYPERTENSION: ICD-10-CM

## 2017-12-27 DIAGNOSIS — K82.9 DISEASE OF GALLBLADDER, UNSPECIFIED: Chronic | ICD-10-CM

## 2017-12-27 DIAGNOSIS — N18.6 END STAGE RENAL DISEASE: ICD-10-CM

## 2017-12-27 DIAGNOSIS — M10.9 GOUT, UNSPECIFIED: ICD-10-CM

## 2017-12-27 DIAGNOSIS — I25.10 ATHEROSCLEROTIC HEART DISEASE OF NATIVE CORONARY ARTERY WITHOUT ANGINA PECTORIS: ICD-10-CM

## 2017-12-27 DIAGNOSIS — I50.9 HEART FAILURE, UNSPECIFIED: ICD-10-CM

## 2017-12-27 DIAGNOSIS — K81.1 CHRONIC CHOLECYSTITIS: ICD-10-CM

## 2017-12-27 DIAGNOSIS — Z88.1 ALLERGY STATUS TO OTHER ANTIBIOTIC AGENTS STATUS: ICD-10-CM

## 2017-12-27 DIAGNOSIS — Z90.49 ACQUIRED ABSENCE OF OTHER SPECIFIED PARTS OF DIGESTIVE TRACT: Chronic | ICD-10-CM
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
## 2017-12-27 DIAGNOSIS — I77.0 ARTERIOVENOUS FISTULA, ACQUIRED: Chronic | ICD-10-CM

## 2017-12-27 DIAGNOSIS — I13.2 HYPERTENSIVE HEART AND CHRONIC KIDNEY DISEASE WITH HEART FAILURE AND WITH STAGE 5 CHRONIC KIDNEY DISEASE, OR END STAGE RENAL DISEASE: ICD-10-CM

## 2017-12-27 DIAGNOSIS — Z90.49 ACQUIRED ABSENCE OF OTHER SPECIFIED PARTS OF DIGESTIVE TRACT: ICD-10-CM

## 2017-12-27 DIAGNOSIS — Z88.8 ALLERGY STATUS TO OTHER DRUGS, MEDICAMENTS AND BIOLOGICAL SUBSTANCES STATUS: ICD-10-CM

## 2017-12-27 DIAGNOSIS — Z96.642 PRESENCE OF LEFT ARTIFICIAL HIP JOINT: ICD-10-CM

## 2017-12-27 DIAGNOSIS — Z99.2 DEPENDENCE ON RENAL DIALYSIS: ICD-10-CM

## 2017-12-27 DIAGNOSIS — Z96.642 PRESENCE OF LEFT ARTIFICIAL HIP JOINT: Chronic | ICD-10-CM

## 2017-12-27 DIAGNOSIS — Z79.82 LONG TERM (CURRENT) USE OF ASPIRIN: ICD-10-CM

## 2017-12-27 DIAGNOSIS — N18.9 CHRONIC KIDNEY DISEASE, UNSPECIFIED: ICD-10-CM

## 2017-12-27 LAB
ALBUMIN SERPL ELPH-MCNC: 3.4 G/DL — SIGNIFICANT CHANGE UP (ref 3.3–5)
ALP SERPL-CCNC: 54 U/L — SIGNIFICANT CHANGE UP (ref 40–120)
ALT FLD-CCNC: 42 U/L — SIGNIFICANT CHANGE UP (ref 10–45)
ANION GAP SERPL CALC-SCNC: 16 MMOL/L — SIGNIFICANT CHANGE UP (ref 5–17)
AST SERPL-CCNC: 53 U/L — HIGH (ref 10–40)
BILIRUB SERPL-MCNC: 0.6 MG/DL — SIGNIFICANT CHANGE UP (ref 0.2–1.2)
BUN SERPL-MCNC: 84 MG/DL — HIGH (ref 7–23)
CALCIUM SERPL-MCNC: 8.4 MG/DL — SIGNIFICANT CHANGE UP (ref 8.4–10.5)
CHLORIDE SERPL-SCNC: 102 MMOL/L — SIGNIFICANT CHANGE UP (ref 96–108)
CO2 SERPL-SCNC: 24 MMOL/L — SIGNIFICANT CHANGE UP (ref 22–31)
CREAT SERPL-MCNC: 5.96 MG/DL — HIGH (ref 0.5–1.3)
GLUCOSE SERPL-MCNC: 136 MG/DL — HIGH (ref 70–99)
HBV SURFACE AG SER-ACNC: SIGNIFICANT CHANGE UP
HCT VFR BLD CALC: 27.3 % — LOW (ref 39–50)
HCV AB S/CO SERPL IA: 0.16 S/CO — SIGNIFICANT CHANGE UP
HCV AB SERPL-IMP: SIGNIFICANT CHANGE UP
HGB BLD-MCNC: 9.3 G/DL — LOW (ref 13–17)
MAGNESIUM SERPL-MCNC: 2.2 MG/DL — SIGNIFICANT CHANGE UP (ref 1.6–2.6)
MCHC RBC-ENTMCNC: 32 PG — SIGNIFICANT CHANGE UP (ref 27–34)
MCHC RBC-ENTMCNC: 34.1 G/DL — SIGNIFICANT CHANGE UP (ref 32–36)
MCV RBC AUTO: 93.8 FL — SIGNIFICANT CHANGE UP (ref 80–100)
PHOSPHATE SERPL-MCNC: 5.6 MG/DL — HIGH (ref 2.5–4.5)
PLATELET # BLD AUTO: 122 K/UL — LOW (ref 150–400)
POTASSIUM BLDV-SCNC: 4.3 MMOL/L — SIGNIFICANT CHANGE UP (ref 3.5–4.9)
POTASSIUM SERPL-MCNC: 4.2 MMOL/L — SIGNIFICANT CHANGE UP (ref 3.5–5.3)
POTASSIUM SERPL-SCNC: 4.2 MMOL/L — SIGNIFICANT CHANGE UP (ref 3.5–5.3)
PROT SERPL-MCNC: 5.6 G/DL — LOW (ref 6–8.3)
RBC # BLD: 2.91 M/UL — LOW (ref 4.2–5.8)
RBC # FLD: 14.1 % — SIGNIFICANT CHANGE UP (ref 10.3–16.9)
SODIUM SERPL-SCNC: 142 MMOL/L — SIGNIFICANT CHANGE UP (ref 135–145)
WBC # BLD: 6.9 K/UL — SIGNIFICANT CHANGE UP (ref 3.8–10.5)
WBC # FLD AUTO: 6.9 K/UL — SIGNIFICANT CHANGE UP (ref 3.8–10.5)

## 2017-12-27 PROCEDURE — 43264 ERCP REMOVE DUCT CALCULI: CPT

## 2017-12-27 PROCEDURE — 99223 1ST HOSP IP/OBS HIGH 75: CPT | Mod: 25,GC

## 2017-12-27 PROCEDURE — 90935 HEMODIALYSIS ONE EVALUATION: CPT | Mod: GC

## 2017-12-27 PROCEDURE — 43275 ERCP REMOVE FORGN BODY DUCT: CPT

## 2017-12-27 PROCEDURE — 76000 FLUOROSCOPY <1 HR PHYS/QHP: CPT

## 2017-12-27 RX ORDER — METOCLOPRAMIDE HCL 10 MG
10 TABLET ORAL ONCE
Qty: 0 | Refills: 0 | Status: DISCONTINUED | OUTPATIENT
Start: 2017-12-27 | End: 2017-12-29

## 2017-12-27 RX ORDER — OXYCODONE AND ACETAMINOPHEN 5; 325 MG/1; MG/1
1 TABLET ORAL EVERY 4 HOURS
Qty: 0 | Refills: 0 | Status: DISCONTINUED | OUTPATIENT
Start: 2017-12-27 | End: 2017-12-29

## 2017-12-27 RX ORDER — DOCUSATE SODIUM 100 MG
100 CAPSULE ORAL
Qty: 0 | Refills: 0 | Status: DISCONTINUED | OUTPATIENT
Start: 2017-12-27 | End: 2017-12-29

## 2017-12-27 RX ORDER — HYDROMORPHONE HYDROCHLORIDE 2 MG/ML
0.5 INJECTION INTRAMUSCULAR; INTRAVENOUS; SUBCUTANEOUS EVERY 4 HOURS
Qty: 0 | Refills: 0 | Status: DISCONTINUED | OUTPATIENT
Start: 2017-12-27 | End: 2017-12-29

## 2017-12-27 RX ORDER — OXYCODONE AND ACETAMINOPHEN 5; 325 MG/1; MG/1
2 TABLET ORAL EVERY 6 HOURS
Qty: 0 | Refills: 0 | Status: DISCONTINUED | OUTPATIENT
Start: 2017-12-27 | End: 2017-12-29

## 2017-12-27 RX ORDER — HYDROMORPHONE HYDROCHLORIDE 2 MG/ML
1 INJECTION INTRAMUSCULAR; INTRAVENOUS; SUBCUTANEOUS ONCE
Qty: 0 | Refills: 0 | Status: DISCONTINUED | OUTPATIENT
Start: 2017-12-27 | End: 2017-12-27

## 2017-12-27 RX ORDER — ONDANSETRON 8 MG/1
4 TABLET, FILM COATED ORAL EVERY 6 HOURS
Qty: 0 | Refills: 0 | Status: DISCONTINUED | OUTPATIENT
Start: 2017-12-27 | End: 2017-12-29

## 2017-12-27 RX ORDER — SEVELAMER CARBONATE 2400 MG/1
800 POWDER, FOR SUSPENSION ORAL
Qty: 0 | Refills: 0 | Status: DISCONTINUED | OUTPATIENT
Start: 2017-12-27 | End: 2017-12-29

## 2017-12-27 RX ORDER — SIMVASTATIN 20 MG/1
20 TABLET, FILM COATED ORAL AT BEDTIME
Qty: 0 | Refills: 0 | Status: DISCONTINUED | OUTPATIENT
Start: 2017-12-27 | End: 2017-12-29

## 2017-12-27 RX ORDER — LOSARTAN POTASSIUM 100 MG/1
25 TABLET, FILM COATED ORAL DAILY
Qty: 0 | Refills: 0 | Status: DISCONTINUED | OUTPATIENT
Start: 2017-12-27 | End: 2017-12-29

## 2017-12-27 RX ORDER — HEPARIN SODIUM 5000 [USP'U]/ML
5000 INJECTION INTRAVENOUS; SUBCUTANEOUS EVERY 8 HOURS
Qty: 0 | Refills: 0 | Status: DISCONTINUED | OUTPATIENT
Start: 2017-12-27 | End: 2017-12-29

## 2017-12-27 RX ORDER — ERYTHROPOIETIN 10000 [IU]/ML
7000 INJECTION, SOLUTION INTRAVENOUS; SUBCUTANEOUS ONCE
Qty: 0 | Refills: 0 | Status: COMPLETED | OUTPATIENT
Start: 2017-12-27 | End: 2017-12-27

## 2017-12-27 RX ORDER — PANTOPRAZOLE SODIUM 20 MG/1
40 TABLET, DELAYED RELEASE ORAL
Qty: 0 | Refills: 0 | Status: DISCONTINUED | OUTPATIENT
Start: 2017-12-27 | End: 2017-12-29

## 2017-12-27 RX ADMIN — SIMVASTATIN 20 MILLIGRAM(S): 20 TABLET, FILM COATED ORAL at 23:19

## 2017-12-27 RX ADMIN — Medication 100 MILLIGRAM(S): at 23:19

## 2017-12-27 RX ADMIN — HYDROMORPHONE HYDROCHLORIDE 1 MILLIGRAM(S): 2 INJECTION INTRAMUSCULAR; INTRAVENOUS; SUBCUTANEOUS at 17:30

## 2017-12-27 RX ADMIN — HEPARIN SODIUM 5000 UNIT(S): 5000 INJECTION INTRAVENOUS; SUBCUTANEOUS at 23:20

## 2017-12-27 RX ADMIN — OXYCODONE AND ACETAMINOPHEN 2 TABLET(S): 5; 325 TABLET ORAL at 21:10

## 2017-12-27 RX ADMIN — HYDROMORPHONE HYDROCHLORIDE 0.5 MILLIGRAM(S): 2 INJECTION INTRAMUSCULAR; INTRAVENOUS; SUBCUTANEOUS at 21:35

## 2017-12-27 RX ADMIN — HYDROMORPHONE HYDROCHLORIDE 0.5 MILLIGRAM(S): 2 INJECTION INTRAMUSCULAR; INTRAVENOUS; SUBCUTANEOUS at 21:50

## 2017-12-27 RX ADMIN — ERYTHROPOIETIN 7000 UNIT(S): 10000 INJECTION, SOLUTION INTRAVENOUS; SUBCUTANEOUS at 20:26

## 2017-12-27 RX ADMIN — HYDROMORPHONE HYDROCHLORIDE 1 MILLIGRAM(S): 2 INJECTION INTRAMUSCULAR; INTRAVENOUS; SUBCUTANEOUS at 17:05

## 2017-12-27 RX ADMIN — OXYCODONE AND ACETAMINOPHEN 2 TABLET(S): 5; 325 TABLET ORAL at 20:07

## 2017-12-27 NOTE — H&P ADULT - ASSESSMENT
87M w/ chronic cholecystitis s/p CBD stent placement presents for elective stent removal (by Dr. Jones, performed earlier today, 12/27) and laparoscopic cholecystectomy.     Plan    1. s/p cholecystectomy  - low fat diet / HLIV  - pain control: percocet  - OOB/A    2. ESRD  - HD MWF but will get session 12/28 prior to discharge  - continue home sevelamer    3. CAD s/p MARBIN 12/27/16 and two additional stents (unknown type)   - holding ticagrelor until 12/29  - holding ASA 81 until 12/29    4. s/p TIA 7/2012  - stable    5. mod/severe AS s/p TAVR 10/27/16  - stable    6. HLD  - on home simvastatin    7. HTN   - on home losartan    8. dispo  - home 12/28 w/ home HD resumption on 12/29

## 2017-12-27 NOTE — CONSULT NOTE ADULT - NEGATIVE CARDIOVASCULAR SYMPTOMS
no chest pain/no paroxysmal nocturnal dyspnea/no peripheral edema/no orthopnea/no dyspnea on exertion/no palpitations

## 2017-12-27 NOTE — PROGRESS NOTE ADULT - SUBJECTIVE AND OBJECTIVE BOX
Patient was seen and evaluated on dialysis.   Patient is tolerating the procedure well.   HR: 70 (12-27-17 @ 19:05)  BP: 192/84 (12-27-17 @ 19:05) pusle 65, /67  Continue dialysis:   Dialyzer:  Revaclear 300        QB:   300     QD: 400  Goal UF 2.5 over 3 Hours

## 2017-12-27 NOTE — PROGRESS NOTE ADULT - SUBJECTIVE AND OBJECTIVE BOX
Procedure: laparoscopic cholecystectomy  Surgeon: Dr. Marianne Toney    S: Mr. Landers reports good pain control with medication. He denies CP, SOB or dyspnea. Surgical incisions are clean, dry, and intact.       O:  T(C): 36.3 (12-27-17 @ 17:46), Max: 36.3 (12-27-17 @ 17:46)  T(F): 97.4 (12-27-17 @ 17:46), Max: 97.4 (12-27-17 @ 17:46)  HR: 54 (12-27-17 @ 18:47) (54 - 62)  BP: 166/65 (12-27-17 @ 18:47) (153/58 - 183/69)  RR: 15 (12-27-17 @ 18:47) (14 - 19)  SpO2: 100% (12-27-17 @ 18:47) (98% - 100%)  Wt(kg): --                        9.3    6.9   )-----------( 122      ( 27 Dec 2017 16:44 )             27.3     12-27    142  |  102  |  84<H>  ----------------------------<  136<H>  4.2   |  24  |  5.96<H>    Ca    8.4      27 Dec 2017 16:44  Phos  5.6     12-27  Mg     2.2     12-27    TPro  5.6<L>  /  Alb  3.4  /  TBili  0.6  /  DBili  x   /  AST  53<H>  /  ALT  42  /  AlkPhos  54  12-27      Gen: NAD, resting comfortably in bed  C/V: NSR  Pulm: Nonlabored breathing, no respiratory distress  Abd: soft, NT/ND Incisions are clean, dry, and intact.  Extrem: WWP, no calf edema, SCDs in place      A/P: 87yMale s/p above procedure  Diet: Renal and low fat  IVF: none  Pain/nausea control: percocet and zofran  DVT ppx: SQH  Dispo plan: hemodialysis and home in AM

## 2017-12-27 NOTE — CONSULT NOTE ADULT - ASSESSMENT
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 He successfully underwent cholecystectomy earlier today. Renal consulted for maintenance HD.

## 2017-12-27 NOTE — ASU PATIENT PROFILE, ADULT - PSH
AV fistula  right upper arm  Bilateral cataracts    Gallbladder disorder  stent placed on 1/19/17  History of colon resection  secondary to colon perforation  History of hip replacement, total, left    S/P TAVR (transcatheter aortic valve replacement)

## 2017-12-27 NOTE — CONSULT NOTE ADULT - SUBJECTIVE AND OBJECTIVE BOX
Chief Complaint: Need for elective cholecystectomy, need for dialysis.    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 He successfully underwent cholecystectomy earlier today. Review of the operative note shows no intraoperative hypotension and also had NS infusion of 700mL.     HD History:  Center: [address]  Access: Right brachiocephalic AVF   Vintage: 5/2016  Etiology: presumed chronic cardiorenal syndrome; he still reports urine output  EDW: 68.0kg (per the flowsheets received from his HD unit)    His last HD was this past Sunday 12/24/2017 (done in lieu of the Monday session which was Tonya)  The flowsheets for that date showe preHD: /66 and 70.2kg to postHD: /73 and 68.7kg.   His outpatient prescription is Dialyzer: Elisio-15H, Duration 3 hours, 2K bath, Temperature 35.5C, BFR 470BFR, .  His recent monthly labs outpatient on 12/6/2017 show: .9, Ca 9, Pohos 4.8, KT/V 1.82 , URR 79.4%, albumin 4.1, glucose 101, ferritin 1320, TSat 42%.     Seen in PACU.  The patient and wife were at bedside. He reports he feels fatigued and has pain at the incision site. Otherwise no dyspnea, orthopnea, leg swelling, complications with his right AVF. Since his TAVR, he reports he has not been experiencing any significant issues with leg edema or pulmonary edema. However, there have been no significant inmproved changes in his urine output.     PAST MEDICAL & SURGICAL HISTORY:  Colon perforation: 2011  TIA (transient ischemic attack): 2008  Gout  Chronic renal failure: on HD  Aortic stenosis, severe  Myocardial infarction  Hypertension  Hyperlipidemia  Congestive heart failure  Bilateral cataracts  History of hip replacement, total, left  History of colon resection: secondary to colon perforation  AV fistula: right upper arm  Gallbladder disorder: stent placed on 1/19/17  S/P TAVR (transcatheter aortic valve replacement)      MEDICATIONS  (STANDING):  docusate sodium 100 milliGRAM(s) Oral two times a day  heparin  Injectable 5000 Unit(s) SubCutaneous every 8 hours  losartan 25 milliGRAM(s) Oral daily  multivitamin 1 Tablet(s) Oral daily  pantoprazole    Tablet 40 milliGRAM(s) Oral before breakfast  sevelamer hydrochloride 800 milliGRAM(s) Oral three times a day with meals  simvastatin 20 milliGRAM(s) Oral at bedtime    MEDICATIONS  (PRN):  metoclopramide Injectable 10 milliGRAM(s) IV Push once PRN Nausea and/or Vomiting  ondansetron Injectable 4 milliGRAM(s) IV Push every 6 hours PRN Nausea  oxyCODONE    5 mG/acetaminophen 325 mG 1 Tablet(s) Oral every 4 hours PRN Moderate Pain  oxyCODONE    5 mG/acetaminophen 325 mG 2 Tablet(s) Oral every 6 hours PRN Severe Pain      Allergies    Biaxin (Unknown)  Ceftin (Unknown)  Plavix (Unknown)  Vitamin D (Unknown)    Intolerances        SOCIAL HISTORY:    FAMILY HISTORY:  No pertinent family history in first degree relatives      T(C): , Max: 36.2 (12-27-17 @ 11:25)  T(F): , Max: 97.1 (12-27-17 @ 11:25)  HR: 54 (12-27-17 @ 17:29)  BP: 156/55 (12-27-17 @ 17:29)  BP(mean): 95 (12-27-17 @ 17:16)  RR: 14 (12-27-17 @ 17:29)  SpO2: 98% (12-27-17 @ 17:29)  Wt(kg): --    Height (cm): 170.18 (12-27 @ 11:43)  Weight (kg): 68 (12-27 @ 11:43)  BMI (kg/m2): 23.5 (12-27 @ 11:43)  BSA (m2): 1.79 (12-27 @ 11:43)      LABS:                        9.3    6.9   )-----------( 122      ( 27 Dec 2017 16:44 )             27.3     12-27    142  |  102  |  84<H>  ----------------------------<  136<H>  4.2   |  24  |  5.96<H>    Ca    8.4      27 Dec 2017 16:44  Phos  5.6     12-27  Mg     2.2     12-27    TPro  5.6<L>  /  Alb  3.4  /  TBili  0.6  /  DBili  x   /  AST  53<H>  /  ALT  42  /  AlkPhos  54  12-27                RADIOLOGY & ADDITIONAL STUDIES:

## 2017-12-27 NOTE — CONSULT NOTE ADULT - PROBLEM SELECTOR RECOMMENDATION 5
Postsurgical care per primary team  Will not use heparin on dialysis in the postoperative time period.

## 2017-12-27 NOTE — BRIEF OPERATIVE NOTE - OPERATION/FINDINGS
Tata Brown presents today at the clinic for     Chief Complaint   Patient presents with   [employer] Establish Care    Hiatal Hernia    Back Pain        Wt Readings from Last 3 Encounters:   07/17/17 205 lb (93 kg)     Temp Readings from Last 3 Encounters:   07/17/17 97.2 °F (36.2 °C) (Oral)     BP Readings from Last 3 Encounters:   07/17/17 129/74     Pulse Readings from Last 3 Encounters:   07/17/17 (!) 47       There are no preventive care reminders to display for this patient. Preoperative Diagnosis: chronic cholecystitis  Postoperative Diagnosis: chronic cholecystitis  Procedure: laparoscopic cholecystectomy  Findings: chronic cholecystitis; nonperforated gallbladder

## 2017-12-27 NOTE — CONSULT NOTE ADULT - PROBLEM SELECTOR RECOMMENDATION 2
Due for HD and will improve his BP postHD  As for his antihypertensives, continue with losartan 25mg POQD

## 2017-12-27 NOTE — H&P ADULT - HISTORY OF PRESENT ILLNESS
87M w/ chronic cholecystitis s/p CBD stent placement presents for elective stent removal (by Dr. Jones, performed earlier today, 12/27) and laparoscopic cholecystectomy.  He has multiple comorbidities and is an intermediate risk patient undergoing a low risk procedure.  His last HD was 12/24 and he will get HD on 12/28 prior to discharge.    PHYSICAL EXAM:    Constitutional: no acute distress, NC/AT  Respiratory: CTA/B  Cardiovascular: RRR, no M/R/G  Gastrointestinal: soft, NT/ND  Genitourinary: no CVA TTP

## 2017-12-27 NOTE — CONSULT NOTE ADULT - PROBLEM SELECTOR RECOMMENDATION 9
M/W/F schedule  Chemistries and volume appropriate right now.  Will proceed with HD  Revaclear 300 /2K bath /  / / 3 hour duration / Target UF to 68.0kg EDW    However, if the patient is unable to get a standing weight due to pain/fatigue, will re-estimate at the time of HD based on a bed scale weight and BP.

## 2017-12-27 NOTE — ASU PATIENT PROFILE, ADULT - PMH
Aortic stenosis, severe    Chronic renal failure  on HD  Colon perforation  2011  Congestive heart failure    Gout    Hyperlipidemia    Hypertension    Myocardial infarction    TIA (transient ischemic attack)  2008

## 2017-12-28 ENCOUNTER — TRANSCRIPTION ENCOUNTER (OUTPATIENT)
Age: 82
End: 2017-12-28

## 2017-12-28 LAB
ALBUMIN SERPL ELPH-MCNC: 3.4 G/DL — SIGNIFICANT CHANGE UP (ref 3.3–5)
ALP SERPL-CCNC: 55 U/L — SIGNIFICANT CHANGE UP (ref 40–120)
ALT FLD-CCNC: 48 U/L — HIGH (ref 10–45)
ANION GAP SERPL CALC-SCNC: 14 MMOL/L — SIGNIFICANT CHANGE UP (ref 5–17)
AST SERPL-CCNC: 54 U/L — HIGH (ref 10–40)
BILIRUB SERPL-MCNC: 1 MG/DL — SIGNIFICANT CHANGE UP (ref 0.2–1.2)
BUN SERPL-MCNC: 41 MG/DL — HIGH (ref 7–23)
CALCIUM SERPL-MCNC: 8.6 MG/DL — SIGNIFICANT CHANGE UP (ref 8.4–10.5)
CHLORIDE SERPL-SCNC: 96 MMOL/L — SIGNIFICANT CHANGE UP (ref 96–108)
CO2 SERPL-SCNC: 27 MMOL/L — SIGNIFICANT CHANGE UP (ref 22–31)
CREAT SERPL-MCNC: 3.72 MG/DL — HIGH (ref 0.5–1.3)
GLUCOSE SERPL-MCNC: 117 MG/DL — HIGH (ref 70–99)
HBV CORE IGM SER-ACNC: SIGNIFICANT CHANGE UP
HBV SURFACE AB SER-ACNC: REACTIVE — SIGNIFICANT CHANGE UP
HCT VFR BLD CALC: 29.6 % — LOW (ref 39–50)
HGB BLD-MCNC: 9.8 G/DL — LOW (ref 13–17)
MAGNESIUM SERPL-MCNC: 2.1 MG/DL — SIGNIFICANT CHANGE UP (ref 1.6–2.6)
MCHC RBC-ENTMCNC: 31.5 PG — SIGNIFICANT CHANGE UP (ref 27–34)
MCHC RBC-ENTMCNC: 33.1 G/DL — SIGNIFICANT CHANGE UP (ref 32–36)
MCV RBC AUTO: 95.2 FL — SIGNIFICANT CHANGE UP (ref 80–100)
PHOSPHATE SERPL-MCNC: 5.3 MG/DL — HIGH (ref 2.5–4.5)
PLATELET # BLD AUTO: 115 K/UL — LOW (ref 150–400)
POTASSIUM SERPL-MCNC: 3.8 MMOL/L — SIGNIFICANT CHANGE UP (ref 3.5–5.3)
POTASSIUM SERPL-SCNC: 3.8 MMOL/L — SIGNIFICANT CHANGE UP (ref 3.5–5.3)
PROT SERPL-MCNC: 5.9 G/DL — LOW (ref 6–8.3)
RBC # BLD: 3.11 M/UL — LOW (ref 4.2–5.8)
RBC # FLD: 14.2 % — SIGNIFICANT CHANGE UP (ref 10.3–16.9)
SODIUM SERPL-SCNC: 137 MMOL/L — SIGNIFICANT CHANGE UP (ref 135–145)
WBC # BLD: 13 K/UL — HIGH (ref 3.8–10.5)
WBC # FLD AUTO: 13 K/UL — HIGH (ref 3.8–10.5)

## 2017-12-28 PROCEDURE — 99232 SBSQ HOSP IP/OBS MODERATE 35: CPT | Mod: GC

## 2017-12-28 RX ORDER — ERYTHROPOIETIN 10000 [IU]/ML
7000 INJECTION, SOLUTION INTRAVENOUS; SUBCUTANEOUS ONCE
Qty: 0 | Refills: 0 | Status: COMPLETED | OUTPATIENT
Start: 2017-12-29 | End: 2017-12-29

## 2017-12-28 RX ADMIN — HEPARIN SODIUM 5000 UNIT(S): 5000 INJECTION INTRAVENOUS; SUBCUTANEOUS at 14:06

## 2017-12-28 RX ADMIN — HEPARIN SODIUM 5000 UNIT(S): 5000 INJECTION INTRAVENOUS; SUBCUTANEOUS at 21:12

## 2017-12-28 RX ADMIN — OXYCODONE AND ACETAMINOPHEN 2 TABLET(S): 5; 325 TABLET ORAL at 13:20

## 2017-12-28 RX ADMIN — SEVELAMER CARBONATE 800 MILLIGRAM(S): 2400 POWDER, FOR SUSPENSION ORAL at 17:21

## 2017-12-28 RX ADMIN — SEVELAMER CARBONATE 800 MILLIGRAM(S): 2400 POWDER, FOR SUSPENSION ORAL at 12:40

## 2017-12-28 RX ADMIN — Medication 1 TABLET(S): at 12:40

## 2017-12-28 RX ADMIN — OXYCODONE AND ACETAMINOPHEN 2 TABLET(S): 5; 325 TABLET ORAL at 12:49

## 2017-12-28 RX ADMIN — SIMVASTATIN 20 MILLIGRAM(S): 20 TABLET, FILM COATED ORAL at 21:12

## 2017-12-28 RX ADMIN — Medication 100 MILLIGRAM(S): at 06:09

## 2017-12-28 RX ADMIN — HEPARIN SODIUM 5000 UNIT(S): 5000 INJECTION INTRAVENOUS; SUBCUTANEOUS at 06:10

## 2017-12-28 RX ADMIN — Medication 100 MILLIGRAM(S): at 17:21

## 2017-12-28 RX ADMIN — SEVELAMER CARBONATE 800 MILLIGRAM(S): 2400 POWDER, FOR SUSPENSION ORAL at 08:34

## 2017-12-28 RX ADMIN — PANTOPRAZOLE SODIUM 40 MILLIGRAM(S): 20 TABLET, DELAYED RELEASE ORAL at 06:09

## 2017-12-28 RX ADMIN — LOSARTAN POTASSIUM 25 MILLIGRAM(S): 100 TABLET, FILM COATED ORAL at 06:09

## 2017-12-28 NOTE — PATIENT PROFILE ADULT. - SPIRITUAL CULTURAL, RELIGIOUS PRACTICES/VALUES, PROFILE
Surgery Progress Note    Subjective/Interval History:  Adequate UOP. Still on dopamine. Paralytic is discontinued. Bladder pressures in the teens. Stool recorded out of the ostomy.     Objective:  Temp:  [97  F (36.1  C)-99.5  F (37.5  C)] 99.5  F (37.5  C)  Heart Rate:  [118-172] 144  Resp:  [45-50] 45  MAP:  [45 mmHg-63 mmHg] 52 mmHg  Arterial Line BP: (77-98)/(31-41) 88/35  FiO2 (%):  [35 %] 35 %  SpO2:  [94 %-100 %] 98 %    General appearance: Anasarca, sedated, and intubated.   CV: Requires pressor support.    Pulm: intubated and mechanically ventilated.  Abd: distended, incision intact, stomas viable covered with Xeroform.  Extremities: moderate edema  Skin: warm and well-perfused.     Assessment/Plan:   Cliff Bradley is a 2 month old male with a past medical history of duodenal atresia as well as right inguinal hernia and left hydrocele s/p repair on 1/20 who is admitted for bilious vomiting that started 3/7. Now s/p exploratory laparotomy, SBR, end-ileostomy and mucus fistula creation (end ileostomy to the left; mucus fistula to the right) 3/12.      - Monitoring abdominal distention.   - Please avoid diuresis  - NG to LIS  - Keep xeroform on ostomies. Expect drainage from incision and dusky appearance to ostomies.   - Continue abx   - Appreciate critical cares by PICU.    Staff: Dr. Elizabeth covering for Dr. Rafa Brown, PGY2  Pediatric Surgery  [phone]    -----    Attending Attestation:  March 15, 2017    Cliff Bradley was seen and examined with team. I agree with note and plan as discussed.    Doing ok, critical in PICU.      Jeremi Elizabeth MD, PhD  Division of Pediatric Surgery, [facility]  pgr [phone]   no

## 2017-12-28 NOTE — DISCHARGE NOTE ADULT - MEDICATION SUMMARY - MEDICATIONS TO TAKE
I will START or STAY ON the medications listed below when I get home from the hospital:    aspirin 81 mg oral delayed release tablet  -- 1 tab(s) by mouth once a day  -- Indication: For Home medication    Percocet 5/325 oral tablet  -- 1 tab(s) by mouth every 6 hours MDD:4  -- Caution federal law prohibits the transfer of this drug to any person other  than the person for whom it was prescribed.  May cause drowsiness.  Alcohol may intensify this effect.  Use care when operating dangerous machinery.  This prescription cannot be refilled.  This product contains acetaminophen.  Do not use  with any other product containing acetaminophen to prevent possible liver damage.  Using more of this medication than prescribed may cause serious breathing problems.    -- Indication: For Post operative pain     losartan  -- 12.5 milligram(s) by mouth 2 times a day  -- Indication: For Home medication    simvastatin 20 mg oral tablet  -- 1 tab(s) by mouth once a day (at bedtime)  -- Indication: For Home medication    Brilinta (ticagrelor) 60 mg oral tablet  -- 1 tab(s) by mouth 2 times a day  -- Indication: For Home medication    Colace  -- 200 milligram(s) by mouth once a day, As Needed  -- Indication: For Home medication    Renvela 800 mg oral tablet  -- 1 tab(s) by mouth 3 times a day (with meals)  -- Indication: For Home medication    pantoprazole 40 mg oral delayed release tablet  -- 1 tab(s) by mouth 2 times a day (before meals)  -- Indication: For Home medication    Dialyvite 800 oral tablet  -- 1 tab(s) by mouth once a day  -- Indication: For Home medication

## 2017-12-28 NOTE — PROGRESS NOTE ADULT - SUBJECTIVE AND OBJECTIVE BOX
Patient seen and examined at bedside.     Tolerated HD well last night  Net UF 1.5kg  Bedscale weight 73kg to 71.5kg. The patient was having too much postsurgical discomfort to stand for a weight.  The patient had no cramping intradialysis  Review of HD flowsheet shows that his tanner BP was 101/52 and he the UF goal was decreased to 1.5kg by Dr. Kenyon over night.     This morning he is eating breakfast. He reports he has no dyspnea, no leg swelling, no nausea. No new issues with his right brachiocephalic AVF. He also reports he feels some mild incision pain but is improving.     docusate sodium 100 milliGRAM(s) two times a day  heparin  Injectable 5000 Unit(s) every 8 hours  HYDROmorphone  Injectable 0.5 milliGRAM(s) every 4 hours PRN  losartan 25 milliGRAM(s) daily  metoclopramide Injectable 10 milliGRAM(s) once PRN  multivitamin 1 Tablet(s) daily  ondansetron Injectable 4 milliGRAM(s) every 6 hours PRN  oxyCODONE    5 mG/acetaminophen 325 mG 1 Tablet(s) every 4 hours PRN  oxyCODONE    5 mG/acetaminophen 325 mG 2 Tablet(s) every 6 hours PRN  pantoprazole    Tablet 40 milliGRAM(s) before breakfast  sevelamer hydrochloride 800 milliGRAM(s) three times a day with meals  simvastatin 20 milliGRAM(s) at bedtime      Allergies    Biaxin (Unknown)  Ceftin (Unknown)  Plavix (Unknown)  Vitamin D (Unknown)    Intolerances        T(C): , Max: 36.7 (12-27-17 @ 21:17)  T(F): , Max: 98.1 (12-27-17 @ 21:17)  HR: 63 (12-28-17 @ 04:43)  BP: 121/61 (12-28-17 @ 04:43)  BP(mean): 95 (12-27-17 @ 18:47)  RR: 16 (12-28-17 @ 04:43)  SpO2: 100% (12-28-17 @ 04:43)  Wt(kg): --    12-27 @ 07:01  -  12-28 @ 07:00  --------------------------------------------------------  IN:  Total IN: 0 mL    OUT:    Other: 1500 mL  Total OUT: 1500 mL    Total NET: -1500 mL        Height (cm): 170.18 (12-27 @ 11:43)  Weight (kg): 68 (12-27 @ 11:43)  BMI (kg/m2): 23.5 (12-27 @ 11:43)  BSA (m2): 1.79 (12-27 @ 11:43)      LABS:                        9.8    13.0  )-----------( 115      ( 28 Dec 2017 05:49 )             29.6     12-28    137  |  96  |  41<H>  ----------------------------<  117<H>  3.8   |  27  |  3.72<H>    Ca    8.6      28 Dec 2017 05:49  Phos  5.3     12-28  Mg     2.1     12-28    TPro  5.9<L>  /  Alb  3.4  /  TBili  1.0  /  DBili  x   /  AST  54<H>  /  ALT  48<H>  /  AlkPhos  55  12-28                RADIOLOGY & ADDITIONAL STUDIES:

## 2017-12-28 NOTE — PATIENT PROFILE ADULT. - PSH
Gallbladder disorder  stent placed on 1/19/17  H/O colonoscopy  colon perforated with colonoscopy 2011  S/P TAVR (transcatheter aortic valve replacement)

## 2017-12-28 NOTE — DISCHARGE NOTE ADULT - PLAN OF CARE
Recovery -Continue low fat diet  -Activity: no heavy lifting or strenuous exercise for one month.  -You may shower but no soaking baths, no swimming pools.  -Notify physician for fever greater than 101, worsening abdominal pain, bleeding or drainage from incision sites.   -Follow-up with Dr. Toney in 1 week. Call the office to make an appointment. -Continue low fat diet  -Activity: no heavy lifting or strenuous exercise for one month.  -You may shower but no soaking baths, no swimming pools.  -Notify physician for fever greater than 101, worsening abdominal pain, bleeding or drainage from incision sites.   -Follow-up with Dr. Toney in 1 week. Call the office to make an appointment.    Contact your doctor or go to the ER for fever > 101.5, chills, nausea, vomiting, chest pain, shortness of breath, pain not controlled by medication or excessive bleeding. pain control For pain control, you may take Percocet for severe pain not helped by Tylenol or Advil. Take Percocet 1-2 tabs, every 4-6 hours, as needed. Do not take Tylenol and Percocet together. While taking Percocet, you may want to take Colace (a stool softener), as the narcotics may cause constipation.

## 2017-12-28 NOTE — DISCHARGE NOTE ADULT - CARE PLAN
Principal Discharge DX:	Cholecystitis, chronic  Goal:	Recovery  Instructions for follow-up, activity and diet:	-Continue low fat diet  -Activity: no heavy lifting or strenuous exercise for one month.  -You may shower but no soaking baths, no swimming pools.  -Notify physician for fever greater than 101, worsening abdominal pain, bleeding or drainage from incision sites.   -Follow-up with Dr. Toney in 1 week. Call the office to make an appointment. Principal Discharge DX:	Cholecystitis, chronic  Goal:	Recovery  Instructions for follow-up, activity and diet:	-Continue low fat diet  -Activity: no heavy lifting or strenuous exercise for one month.  -You may shower but no soaking baths, no swimming pools.  -Notify physician for fever greater than 101, worsening abdominal pain, bleeding or drainage from incision sites.   -Follow-up with Dr. Toney in 1 week. Call the office to make an appointment.    Contact your doctor or go to the ER for fever > 101.5, chills, nausea, vomiting, chest pain, shortness of breath, pain not controlled by medication or excessive bleeding.  Goal:	pain control  Instructions for follow-up, activity and diet:	For pain control, you may take Percocet for severe pain not helped by Tylenol or Advil. Take Percocet 1-2 tabs, every 4-6 hours, as needed. Do not take Tylenol and Percocet together. While taking Percocet, you may want to take Colace (a stool softener), as the narcotics may cause constipation.

## 2017-12-28 NOTE — PHYSICAL THERAPY INITIAL EVALUATION ADULT - PERTINENT HX OF CURRENT PROBLEM, REHAB EVAL
87M w/ chronic cholecystitis s/p CBD stent placement presents for elective stent removal (by Dr. Jones, performed earlier today, 12/27) and laparoscopic cholecystectomy.  He has multiple comorbidities and is an intermediate risk patient undergoing a low risk procedure.  His last HD was 12/24 and he will get HD on 12/28 prior to discharge. Please refer to H&P on [place] for remaining.

## 2017-12-28 NOTE — PHYSICAL THERAPY INITIAL EVALUATION ADULT - GAIT DEVIATIONS NOTED, PT EVAL
decreased step length/decreased weight-shifting ability/decreased bertrand/fairly steady gait, no LOB noted, no complaints of dizziness/SOB; increased time required with turning, **VCs to ambulate closer to rolling walker/decreased velocity of limb motion

## 2017-12-28 NOTE — PHYSICAL THERAPY INITIAL EVALUATION ADULT - PHYSICAL ASSIST/NONPHYSICAL ASSIST: SUPINE/SIT, REHAB EVAL
able to bring B/L LEs to edge of bed with VCs, increased assist for trunk mobility/1 person assist/verbal cues

## 2017-12-28 NOTE — DISCHARGE NOTE ADULT - NS AS DC AMI ACE CONTRA
other reasons documented by Physician / Nurse Practitioner / Physician Assistant  or Pharmacist for not prescribing an ACEI AND not prescibing an ARB at discharge/Patient currently taking Losartan

## 2017-12-28 NOTE — PHYSICAL THERAPY INITIAL EVALUATION ADULT - PHYSICAL ASSIST/NONPHYSICAL ASSIST, REHAB EVAL
verbal cues/1 person assist/rolling onto (R) side for abdominal comfort (left lower abdominal incision most painful)

## 2017-12-28 NOTE — DISCHARGE NOTE ADULT - HOSPITAL COURSE
87M with past medical hx of CKD (HD  M/W/F), TIA, Severe AS, CHF, HTN, HLD, Gout, colon perforation s/p colon resection, w/ chronic cholecystitis s/p CBD stent placement presents for elective stent removal (by Dr. Jones, performed earlier today, 12/27) and laparoscopic cholecystectomy.  He has multiple comorbidities and is an intermediate risk patient undergoing a low risk procedure.12/27 s/p laparoscopic cholecystectomy .Pt tolerated the procedure well. At time of discharge, pt was tolerating low fat diet, and pt's pain was controlled. Patient received HD on 12/28 prior to discharge. Plan is to follow up with Aries_ in the office. 87M with past medical hx of CKD (HD  M/W/F), TIA, Severe AS, CHF, HTN, HLD, Gout, colon perforation s/p colon resection, w/ chronic cholecystitis s/p CBD stent placement presents for elective stent removal (by Dr. Jones, performed earlier today, 12/27) and laparoscopic cholecystectomy.  He has multiple comorbidities and is an intermediate risk patient undergoing a low risk procedure.12/27 s/p laparoscopic cholecystectomy .Pt tolerated the procedure well. At time of discharge, pt was tolerating low fat diet, and pt's pain was controlled. Patient received HD on 12/28 prior to discharge. Plan is to follow up with Dr. Toney in the office. Pt was discharged in stable condition with vitals within normal limits.

## 2017-12-28 NOTE — DISCHARGE NOTE ADULT - PATIENT PORTAL LINK FT
“You can access the Follow[facility] Patient Portal, offered by [facility], by registering with the following website: [email or web address]”

## 2017-12-28 NOTE — PHYSICAL THERAPY INITIAL EVALUATION ADULT - ADDITIONAL COMMENTS
Patient reports independence with all ADLs/IADLs prior to admission. No HHA. Denies history of mechanical falls within the past 6 months. Patient/patient's wife reports wife is home to assist as needed upon discharge from [facility].

## 2017-12-28 NOTE — DISCHARGE NOTE ADULT - CARE PROVIDER_API CALL
Marianne Toney), Surgery; Surgical Oncology  [address]  [address] [place] [address]  Phone: [phone]  Fax: [phone]

## 2017-12-28 NOTE — PROGRESS NOTE ADULT - SUBJECTIVE AND OBJECTIVE BOX
ON: POC wnl. Underwent HD tonight - 1.5 L removed. JEFF, RACHEL  12/27 : Underwent lap cholecystectomy. nephro consulted, will do HD tomorrow before dc ON: POC wnl. Underwent HD tonight - 1.5 L removed. JEFF RACHEL  12/27 : Underwent lap cholecystectomy. nephro consulted, will do HD tomorrow before dc    STATUS POST:  laparoscopic cholecystectomy POD1     SUBJECTIVE: Patient seen and examined bedside by chief resident. Had HD last night, tolerating well with his diet, no nausea/vomiting, pain controlled    heparin  Injectable 5000 Unit(s) SubCutaneous every 8 hours  losartan 25 milliGRAM(s) Oral daily      Vital Signs Last 24 Hrs  T(C): 35.7 (28 Dec 2017 04:43), Max: 36.7 (27 Dec 2017 21:17)  T(F): 96.3 (28 Dec 2017 04:43), Max: 98.1 (27 Dec 2017 21:17)  HR: 63 (28 Dec 2017 04:43) (54 - 70)  BP: 121/61 (28 Dec 2017 04:43) (121/61 - 196/82)  BP(mean): 95 (27 Dec 2017 18:47) (88 - 120)  RR: 16 (28 Dec 2017 04:43) (14 - 20)  SpO2: 100% (28 Dec 2017 04:43) (97% - 100%)  I&O's Detail    27 Dec 2017 07:01  -  28 Dec 2017 07:00  --------------------------------------------------------  IN:  Total IN: 0 mL    OUT:    Other: 1500 mL  Total OUT: 1500 mL    Total NET: -1500 mL          General: NAD, resting comfortably in bed  C/V: NSR  Pulm: Nonlabored breathing, no respiratory distress  Abd: soft, NT/ND.incisions C/D/I  Extrem: WWP, no edema, SCDs in place        LABS:                        9.8    13.0  )-----------( 115      ( 28 Dec 2017 05:49 )             29.6     12-28    137  |  96  |  41<H>  ----------------------------<  117<H>  3.8   |  27  |  3.72<H>    Ca    8.6      28 Dec 2017 05:49  Phos  5.3     12-28  Mg     2.1     12-28    TPro  5.9<L>  /  Alb  3.4  /  TBili  1.0  /  DBili  x   /  AST  54<H>  /  ALT  48<H>  /  AlkPhos  55  12-28          RADIOLOGY & ADDITIONAL STUDIES:

## 2017-12-29 VITALS
HEART RATE: 103 BPM | RESPIRATION RATE: 17 BRPM | DIASTOLIC BLOOD PRESSURE: 59 MMHG | SYSTOLIC BLOOD PRESSURE: 116 MMHG | WEIGHT: 154.32 LBS | OXYGEN SATURATION: 100 % | TEMPERATURE: 98 F

## 2017-12-29 LAB
ALBUMIN SERPL ELPH-MCNC: 3.8 G/DL — SIGNIFICANT CHANGE UP (ref 3.3–5)
ALP SERPL-CCNC: 63 U/L — SIGNIFICANT CHANGE UP (ref 40–120)
ALT FLD-CCNC: 42 U/L — SIGNIFICANT CHANGE UP (ref 10–45)
ANION GAP SERPL CALC-SCNC: 17 MMOL/L — SIGNIFICANT CHANGE UP (ref 5–17)
AST SERPL-CCNC: 34 U/L — SIGNIFICANT CHANGE UP (ref 10–40)
BILIRUB SERPL-MCNC: 1 MG/DL — SIGNIFICANT CHANGE UP (ref 0.2–1.2)
BUN SERPL-MCNC: 57 MG/DL — HIGH (ref 7–23)
CALCIUM SERPL-MCNC: 8.8 MG/DL — SIGNIFICANT CHANGE UP (ref 8.4–10.5)
CHLORIDE SERPL-SCNC: 92 MMOL/L — LOW (ref 96–108)
CO2 SERPL-SCNC: 27 MMOL/L — SIGNIFICANT CHANGE UP (ref 22–31)
CREAT SERPL-MCNC: 5.66 MG/DL — HIGH (ref 0.5–1.3)
GLUCOSE SERPL-MCNC: 112 MG/DL — HIGH (ref 70–99)
HCT VFR BLD CALC: 30.6 % — LOW (ref 39–50)
HGB BLD-MCNC: 10.1 G/DL — LOW (ref 13–17)
MAGNESIUM SERPL-MCNC: 2.4 MG/DL — SIGNIFICANT CHANGE UP (ref 1.6–2.6)
MCHC RBC-ENTMCNC: 31.3 PG — SIGNIFICANT CHANGE UP (ref 27–34)
MCHC RBC-ENTMCNC: 33 G/DL — SIGNIFICANT CHANGE UP (ref 32–36)
MCV RBC AUTO: 94.7 FL — SIGNIFICANT CHANGE UP (ref 80–100)
PHOSPHATE SERPL-MCNC: 5.7 MG/DL — HIGH (ref 2.5–4.5)
PLATELET # BLD AUTO: 122 K/UL — LOW (ref 150–400)
POTASSIUM SERPL-MCNC: 4.4 MMOL/L — SIGNIFICANT CHANGE UP (ref 3.5–5.3)
POTASSIUM SERPL-SCNC: 4.4 MMOL/L — SIGNIFICANT CHANGE UP (ref 3.5–5.3)
PROT SERPL-MCNC: 6.4 G/DL — SIGNIFICANT CHANGE UP (ref 6–8.3)
RBC # BLD: 3.23 M/UL — LOW (ref 4.2–5.8)
RBC # FLD: 14.3 % — SIGNIFICANT CHANGE UP (ref 10.3–16.9)
SODIUM SERPL-SCNC: 136 MMOL/L — SIGNIFICANT CHANGE UP (ref 135–145)
SURGICAL PATHOLOGY STUDY: SIGNIFICANT CHANGE UP
WBC # BLD: 11.3 K/UL — HIGH (ref 3.8–10.5)
WBC # FLD AUTO: 11.3 K/UL — HIGH (ref 3.8–10.5)

## 2017-12-29 PROCEDURE — 76000 FLUOROSCOPY <1 HR PHYS/QHP: CPT

## 2017-12-29 PROCEDURE — 84132 ASSAY OF SERUM POTASSIUM: CPT

## 2017-12-29 PROCEDURE — 87340 HEPATITIS B SURFACE AG IA: CPT

## 2017-12-29 PROCEDURE — 85027 COMPLETE CBC AUTOMATED: CPT

## 2017-12-29 PROCEDURE — 86705 HEP B CORE ANTIBODY IGM: CPT

## 2017-12-29 PROCEDURE — 90935 HEMODIALYSIS ONE EVALUATION: CPT

## 2017-12-29 PROCEDURE — 88304 TISSUE EXAM BY PATHOLOGIST: CPT

## 2017-12-29 PROCEDURE — 86803 HEPATITIS C AB TEST: CPT

## 2017-12-29 PROCEDURE — 83735 ASSAY OF MAGNESIUM: CPT

## 2017-12-29 PROCEDURE — 86706 HEP B SURFACE ANTIBODY: CPT

## 2017-12-29 PROCEDURE — 36415 COLL VENOUS BLD VENIPUNCTURE: CPT

## 2017-12-29 PROCEDURE — 84100 ASSAY OF PHOSPHORUS: CPT

## 2017-12-29 PROCEDURE — 97161 PT EVAL LOW COMPLEX 20 MIN: CPT

## 2017-12-29 PROCEDURE — 90935 HEMODIALYSIS ONE EVALUATION: CPT | Mod: GC

## 2017-12-29 PROCEDURE — 80053 COMPREHEN METABOLIC PANEL: CPT

## 2017-12-29 RX ADMIN — ERYTHROPOIETIN 7000 UNIT(S): 10000 INJECTION, SOLUTION INTRAVENOUS; SUBCUTANEOUS at 10:28

## 2017-12-29 RX ADMIN — Medication 1 TABLET(S): at 13:08

## 2017-12-29 RX ADMIN — HEPARIN SODIUM 5000 UNIT(S): 5000 INJECTION INTRAVENOUS; SUBCUTANEOUS at 05:00

## 2017-12-29 RX ADMIN — OXYCODONE AND ACETAMINOPHEN 2 TABLET(S): 5; 325 TABLET ORAL at 04:51

## 2017-12-29 RX ADMIN — SEVELAMER CARBONATE 800 MILLIGRAM(S): 2400 POWDER, FOR SUSPENSION ORAL at 13:08

## 2017-12-29 RX ADMIN — SEVELAMER CARBONATE 800 MILLIGRAM(S): 2400 POWDER, FOR SUSPENSION ORAL at 08:53

## 2017-12-29 RX ADMIN — LOSARTAN POTASSIUM 25 MILLIGRAM(S): 100 TABLET, FILM COATED ORAL at 05:01

## 2017-12-29 RX ADMIN — Medication 100 MILLIGRAM(S): at 05:00

## 2017-12-29 RX ADMIN — PANTOPRAZOLE SODIUM 40 MILLIGRAM(S): 20 TABLET, DELAYED RELEASE ORAL at 06:25

## 2017-12-29 RX ADMIN — OXYCODONE AND ACETAMINOPHEN 2 TABLET(S): 5; 325 TABLET ORAL at 05:30

## 2017-12-29 RX ADMIN — HEPARIN SODIUM 5000 UNIT(S): 5000 INJECTION INTRAVENOUS; SUBCUTANEOUS at 13:08

## 2017-12-29 NOTE — PROGRESS NOTE ADULT - ATTENDING COMMENTS
seen and evaluated while on dialysis  Tolerating the procedure well  limiting UF to 1 Kg  continue full treatment as prescribed
tolerated dialysis well yesterday  post op pain improving  Bp, volume status and electrolytes stable and acceptable   next HD in AM, 12/29
pain a bit less  For dialysis today

## 2017-12-29 NOTE — PROGRESS NOTE ADULT - PROBLEM SELECTOR PLAN 2
Better controlled with ultrafiltration as well as reinstitution of his losartan 25mg POQD.   Low sodium diet
Better controlled with ultrafiltration as well as reinstitution of his losartan 25mg POQD.   Low sodium diet

## 2017-12-29 NOTE — PROGRESS NOTE ADULT - SUBJECTIVE AND OBJECTIVE BOX
Patient was seen and evaluated on dialysis.   Patient is tolerating the procedure well.   HR: 89 (12-29-17 @ 09:20)  BP: 143/57 (12-29-17 @ 09:20)  Continue dialysis:   Dialyzer:  Revaclear 300        QB:  400      QD: 500 2K bath   Goal UF 1kg over 3 Hours

## 2017-12-29 NOTE — PROGRESS NOTE ADULT - PROBLEM SELECTOR PLAN 5
Postoperative care per primary team  Will continue to hold heparin on HD while inpatient
Postoperative care per primary team  Will continue to hold heparin on HD while inpatient

## 2017-12-29 NOTE — PROGRESS NOTE ADULT - SUBJECTIVE AND OBJECTIVE BOX
Patient seen and examined at bedside.     No overnight events developed  The patient reports no dyspnea, no leg swelling, and no issues with his right AV fistula.   He reports his appetite is appropriate but he is not fond of the hospital food. He also reports he has been ambulating and has less abdominal incisional pain.  I have requested to him to stand for a weight in the HD unit later to better target his dry weight on the HD prescription.     docusate sodium 100 milliGRAM(s) two times a day  epoetin leon Injectable 7000 Unit(s) once  heparin  Injectable 5000 Unit(s) every 8 hours  HYDROmorphone  Injectable 0.5 milliGRAM(s) every 4 hours PRN  losartan 25 milliGRAM(s) daily  metoclopramide Injectable 10 milliGRAM(s) once PRN  multivitamin 1 Tablet(s) daily  ondansetron Injectable 4 milliGRAM(s) every 6 hours PRN  oxyCODONE    5 mG/acetaminophen 325 mG 1 Tablet(s) every 4 hours PRN  oxyCODONE    5 mG/acetaminophen 325 mG 2 Tablet(s) every 6 hours PRN  pantoprazole    Tablet 40 milliGRAM(s) before breakfast  sevelamer hydrochloride 800 milliGRAM(s) three times a day with meals  simvastatin 20 milliGRAM(s) at bedtime      Allergies    Biaxin (Unknown)  Ceftin (Unknown)  Plavix (Unknown)  Vitamin D (Unknown)    Intolerances        T(C): , Max: 37.4 (12-29-17 @ 00:26)  T(F): , Max: 99.3 (12-29-17 @ 00:26)  HR: 90 (12-29-17 @ 04:40)  BP: 162/65 (12-29-17 @ 04:40)  BP(mean): --  RR: 16 (12-29-17 @ 04:40)  SpO2: 97% (12-29-17 @ 04:40)  Wt(kg): --          LABS:                        10.1   11.3  )-----------( 122      ( 29 Dec 2017 07:11 )             30.6     12-29    136  |  92<L>  |  57<H>  ----------------------------<  112<H>  4.4   |  27  |  5.66<H>    Ca    8.8      29 Dec 2017 07:11  Phos  5.7     12-29  Mg     2.4     12-29    TPro  6.4  /  Alb  3.8  /  TBili  1.0  /  DBili  x   /  AST  34  /  ALT  42  /  AlkPhos  63  12-29                RADIOLOGY & ADDITIONAL STUDIES:

## 2017-12-29 NOTE — PROGRESS NOTE ADULT - ASSESSMENT
87M w/ chronic cholecystitis s/p CBD stent placement presents for elective stent removal (by Dr. Jones, performed earlier today, 12/27) and laparoscopic cholecystectomy.     1. s/p cholecystectomy  - low fat diet / HLIV  - pain control: percocet  - OOB/A    2. ESRD  - HD MWF but will get session 12/28 prior to discharge  - continue home sevelamer    3. CAD s/p MARBIN 12/27/16 and two additional stents (unknown type)   - holding ticagrelor until 12/29  - holding ASA 81 until 12/29    4. s/p TIA 7/2012  - stable    5. mod/severe AS s/p TAVR 10/27/16  - stable    6. HLD  - on home simvastatin    7. HTN   - on home losartan    8. dispo  - home 12/28 w/ home HD resumption on 12/29
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 He successfully underwent cholecystectomy and is POD#1. Renal consulted for maintenance HD.
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 He successfully underwent cholecystectomy and is POD#2. Renal consulted for maintenance HD.

## 2017-12-29 NOTE — PROGRESS NOTE ADULT - PROBLEM SELECTOR PLAN 1
M/W/F schedule.   Chemistries and volume are acceptable right now.  No issues with AVF on examination.  Next HD on Friday.  If he is still inpatient on Friday, then will arrange for early morning first shift HD.
M/W/F schedule.   Proceed with HD today  Revaclear 300 / 2K bath /  /  / Duration 3 hours / UF goal to attain EDW 68.0kg standing  No issues with AVF on examination.    Next HD after today SUNDAY at his HD unit (in lieu of Monday which is New Year's Day)

## 2017-12-29 NOTE — PROGRESS NOTE ADULT - SUBJECTIVE AND OBJECTIVE BOX
Patient was seen and evaluated on dialysis for a second time.   Patient is remains asymptomatic but vital signs have changed.   HR: 121 (12-29-17 @ 011:00)  BP:  84/60 (12-29-17 @ 011:00)  UF is shut off. Patient received a 200mL saline flush  Continue dialysis:   Dialyzer: Revaclear 300         QB:  400      QD: 500 2K bath   Goal UF 0kg over 3 Hours     Will re-evaluate

## 2017-12-29 NOTE — PROGRESS NOTE ADULT - RS GEN PE MLT RESP DETAILS PC
normal/airway patent/clear to auscultation bilaterally/no rhonchi/no wheezes/no rales/respirations non-labored

## 2018-01-02 ENCOUNTER — EMERGENCY (EMERGENCY)
Facility: HOSPITAL | Age: 83
LOS: 0 days | Discharge: HOME | End: 2018-01-02

## 2018-01-02 DIAGNOSIS — Z95.2 PRESENCE OF PROSTHETIC HEART VALVE: ICD-10-CM

## 2018-01-02 DIAGNOSIS — A41.9 SEPSIS, UNSPECIFIED ORGANISM: ICD-10-CM

## 2018-01-02 DIAGNOSIS — Z96.649 PRESENCE OF UNSPECIFIED ARTIFICIAL HIP JOINT: ICD-10-CM

## 2018-01-02 DIAGNOSIS — Z95.2 PRESENCE OF PROSTHETIC HEART VALVE: Chronic | ICD-10-CM

## 2018-01-02 DIAGNOSIS — D63.1 ANEMIA IN CHRONIC KIDNEY DISEASE: ICD-10-CM

## 2018-01-02 DIAGNOSIS — K81.1 CHRONIC CHOLECYSTITIS: ICD-10-CM

## 2018-01-02 DIAGNOSIS — K82.9 DISEASE OF GALLBLADDER, UNSPECIFIED: Chronic | ICD-10-CM

## 2018-01-02 DIAGNOSIS — Z90.49 ACQUIRED ABSENCE OF OTHER SPECIFIED PARTS OF DIGESTIVE TRACT: Chronic | ICD-10-CM

## 2018-01-02 DIAGNOSIS — Z99.2 DEPENDENCE ON RENAL DIALYSIS: ICD-10-CM

## 2018-01-02 DIAGNOSIS — Z90.49 ACQUIRED ABSENCE OF OTHER SPECIFIED PARTS OF DIGESTIVE TRACT: ICD-10-CM

## 2018-01-02 DIAGNOSIS — M10.9 GOUT, UNSPECIFIED: ICD-10-CM

## 2018-01-02 DIAGNOSIS — Z79.82 LONG TERM (CURRENT) USE OF ASPIRIN: ICD-10-CM

## 2018-01-02 DIAGNOSIS — I25.10 ATHEROSCLEROTIC HEART DISEASE OF NATIVE CORONARY ARTERY WITHOUT ANGINA PECTORIS: ICD-10-CM

## 2018-01-02 DIAGNOSIS — R10.30 LOWER ABDOMINAL PAIN, UNSPECIFIED: ICD-10-CM

## 2018-01-02 DIAGNOSIS — E78.5 HYPERLIPIDEMIA, UNSPECIFIED: ICD-10-CM

## 2018-01-02 DIAGNOSIS — I50.9 HEART FAILURE, UNSPECIFIED: ICD-10-CM

## 2018-01-02 DIAGNOSIS — Z86.73 PERSONAL HISTORY OF TRANSIENT ISCHEMIC ATTACK (TIA), AND CEREBRAL INFARCTION WITHOUT RESIDUAL DEFICITS: ICD-10-CM

## 2018-01-02 DIAGNOSIS — I25.2 OLD MYOCARDIAL INFARCTION: ICD-10-CM

## 2018-01-02 DIAGNOSIS — Z79.899 OTHER LONG TERM (CURRENT) DRUG THERAPY: ICD-10-CM

## 2018-01-02 DIAGNOSIS — Z95.5 PRESENCE OF CORONARY ANGIOPLASTY IMPLANT AND GRAFT: ICD-10-CM

## 2018-01-02 DIAGNOSIS — I35.0 NONRHEUMATIC AORTIC (VALVE) STENOSIS: ICD-10-CM

## 2018-01-02 DIAGNOSIS — K80.10 CALCULUS OF GALLBLADDER WITH CHRONIC CHOLECYSTITIS WITHOUT OBSTRUCTION: ICD-10-CM

## 2018-01-02 DIAGNOSIS — Z88.1 ALLERGY STATUS TO OTHER ANTIBIOTIC AGENTS STATUS: ICD-10-CM

## 2018-01-02 DIAGNOSIS — H26.9 UNSPECIFIED CATARACT: Chronic | ICD-10-CM

## 2018-01-02 DIAGNOSIS — N18.6 END STAGE RENAL DISEASE: ICD-10-CM

## 2018-01-02 DIAGNOSIS — Z98.890 OTHER SPECIFIED POSTPROCEDURAL STATES: ICD-10-CM

## 2018-01-02 DIAGNOSIS — R74.8 ABNORMAL LEVELS OF OTHER SERUM ENZYMES: ICD-10-CM

## 2018-01-02 DIAGNOSIS — N18.9 CHRONIC KIDNEY DISEASE, UNSPECIFIED: ICD-10-CM

## 2018-01-02 DIAGNOSIS — Z88.8 ALLERGY STATUS TO OTHER DRUGS, MEDICAMENTS AND BIOLOGICAL SUBSTANCES STATUS: ICD-10-CM

## 2018-01-02 DIAGNOSIS — R33.9 RETENTION OF URINE, UNSPECIFIED: ICD-10-CM

## 2018-01-02 DIAGNOSIS — I13.2 HYPERTENSIVE HEART AND CHRONIC KIDNEY DISEASE WITH HEART FAILURE AND WITH STAGE 5 CHRONIC KIDNEY DISEASE, OR END STAGE RENAL DISEASE: ICD-10-CM

## 2018-01-02 DIAGNOSIS — I77.0 ARTERIOVENOUS FISTULA, ACQUIRED: Chronic | ICD-10-CM

## 2018-01-02 DIAGNOSIS — N25.0 RENAL OSTEODYSTROPHY: ICD-10-CM

## 2018-01-02 DIAGNOSIS — Z96.642 PRESENCE OF LEFT ARTIFICIAL HIP JOINT: Chronic | ICD-10-CM

## 2018-01-02 PROBLEM — G45.9 TRANSIENT CEREBRAL ISCHEMIC ATTACK, UNSPECIFIED: Chronic | Status: ACTIVE | Noted: 2017-12-26

## 2018-01-02 PROBLEM — K63.1 PERFORATION OF INTESTINE (NONTRAUMATIC): Chronic | Status: ACTIVE | Noted: 2017-12-26

## 2018-01-04 ENCOUNTER — INPATIENT (INPATIENT)
Facility: HOSPITAL | Age: 83
LOS: 7 days | Discharge: HOME HEALTH | End: 2018-01-12
Attending: INTERNAL MEDICINE

## 2018-01-04 DIAGNOSIS — R74.8 ABNORMAL LEVELS OF OTHER SERUM ENZYMES: ICD-10-CM

## 2018-01-04 DIAGNOSIS — Z96.642 PRESENCE OF LEFT ARTIFICIAL HIP JOINT: Chronic | ICD-10-CM

## 2018-01-04 DIAGNOSIS — N18.9 CHRONIC KIDNEY DISEASE, UNSPECIFIED: ICD-10-CM

## 2018-01-04 DIAGNOSIS — K83.0 CHOLANGITIS: ICD-10-CM

## 2018-01-04 DIAGNOSIS — N18.6 END STAGE RENAL DISEASE: ICD-10-CM

## 2018-01-04 DIAGNOSIS — H26.9 UNSPECIFIED CATARACT: Chronic | ICD-10-CM

## 2018-01-04 DIAGNOSIS — K82.9 DISEASE OF GALLBLADDER, UNSPECIFIED: Chronic | ICD-10-CM

## 2018-01-04 DIAGNOSIS — I77.0 ARTERIOVENOUS FISTULA, ACQUIRED: Chronic | ICD-10-CM

## 2018-01-04 DIAGNOSIS — A41.9 SEPSIS, UNSPECIFIED ORGANISM: ICD-10-CM

## 2018-01-04 DIAGNOSIS — Z90.49 ACQUIRED ABSENCE OF OTHER SPECIFIED PARTS OF DIGESTIVE TRACT: Chronic | ICD-10-CM

## 2018-01-04 DIAGNOSIS — Z95.2 PRESENCE OF PROSTHETIC HEART VALVE: Chronic | ICD-10-CM

## 2018-01-11 ENCOUNTER — APPOINTMENT (OUTPATIENT)
Dept: UROLOGY | Facility: CLINIC | Age: 83
End: 2018-01-11

## 2018-01-17 DIAGNOSIS — Z88.8 ALLERGY STATUS TO OTHER DRUGS, MEDICAMENTS AND BIOLOGICAL SUBSTANCES STATUS: ICD-10-CM

## 2018-01-17 DIAGNOSIS — Z96.649 PRESENCE OF UNSPECIFIED ARTIFICIAL HIP JOINT: ICD-10-CM

## 2018-01-17 DIAGNOSIS — I21.4 NON-ST ELEVATION (NSTEMI) MYOCARDIAL INFARCTION: ICD-10-CM

## 2018-01-17 DIAGNOSIS — Z95.2 PRESENCE OF PROSTHETIC HEART VALVE: ICD-10-CM

## 2018-01-17 DIAGNOSIS — A41.9 SEPSIS, UNSPECIFIED ORGANISM: ICD-10-CM

## 2018-01-17 DIAGNOSIS — Z87.891 PERSONAL HISTORY OF NICOTINE DEPENDENCE: ICD-10-CM

## 2018-01-17 DIAGNOSIS — Z88.1 ALLERGY STATUS TO OTHER ANTIBIOTIC AGENTS STATUS: ICD-10-CM

## 2018-01-17 DIAGNOSIS — Z99.2 DEPENDENCE ON RENAL DIALYSIS: ICD-10-CM

## 2018-01-17 DIAGNOSIS — K83.0 CHOLANGITIS: ICD-10-CM

## 2018-01-17 DIAGNOSIS — I50.9 HEART FAILURE, UNSPECIFIED: ICD-10-CM

## 2018-01-17 DIAGNOSIS — N18.6 END STAGE RENAL DISEASE: ICD-10-CM

## 2018-01-17 DIAGNOSIS — Z95.5 PRESENCE OF CORONARY ANGIOPLASTY IMPLANT AND GRAFT: ICD-10-CM

## 2018-01-17 DIAGNOSIS — N39.0 URINARY TRACT INFECTION, SITE NOT SPECIFIED: ICD-10-CM

## 2018-01-17 DIAGNOSIS — B96.29 OTHER ESCHERICHIA COLI [E. COLI] AS THE CAUSE OF DISEASES CLASSIFIED ELSEWHERE: ICD-10-CM

## 2018-01-17 DIAGNOSIS — I25.10 ATHEROSCLEROTIC HEART DISEASE OF NATIVE CORONARY ARTERY WITHOUT ANGINA PECTORIS: ICD-10-CM

## 2018-02-05 ENCOUNTER — INPATIENT (INPATIENT)
Facility: HOSPITAL | Age: 83
LOS: 6 days | Discharge: HOME | End: 2018-02-12
Attending: INTERNAL MEDICINE

## 2018-02-05 VITALS
DIASTOLIC BLOOD PRESSURE: 65 MMHG | TEMPERATURE: 96 F | RESPIRATION RATE: 20 BRPM | OXYGEN SATURATION: 98 % | HEART RATE: 68 BPM | SYSTOLIC BLOOD PRESSURE: 157 MMHG

## 2018-02-05 DIAGNOSIS — H26.9 UNSPECIFIED CATARACT: Chronic | ICD-10-CM

## 2018-02-05 DIAGNOSIS — Z90.49 ACQUIRED ABSENCE OF OTHER SPECIFIED PARTS OF DIGESTIVE TRACT: Chronic | ICD-10-CM

## 2018-02-05 DIAGNOSIS — I77.0 ARTERIOVENOUS FISTULA, ACQUIRED: Chronic | ICD-10-CM

## 2018-02-05 DIAGNOSIS — K82.9 DISEASE OF GALLBLADDER, UNSPECIFIED: Chronic | ICD-10-CM

## 2018-02-05 DIAGNOSIS — Z95.2 PRESENCE OF PROSTHETIC HEART VALVE: Chronic | ICD-10-CM

## 2018-02-05 DIAGNOSIS — Z96.642 PRESENCE OF LEFT ARTIFICIAL HIP JOINT: Chronic | ICD-10-CM

## 2018-02-05 LAB
ALBUMIN SERPL ELPH-MCNC: 3.1 G/DL — SIGNIFICANT CHANGE UP (ref 3–5.5)
ALP SERPL-CCNC: 72 U/L — SIGNIFICANT CHANGE UP (ref 30–115)
ALT FLD-CCNC: 32 U/L — SIGNIFICANT CHANGE UP (ref 0–41)
ANION GAP SERPL CALC-SCNC: 9 MMOL/L — SIGNIFICANT CHANGE UP (ref 7–14)
APTT BLD: 21.6 SEC — CRITICAL LOW (ref 27–39.2)
AST SERPL-CCNC: 33 U/L — SIGNIFICANT CHANGE UP (ref 0–41)
BILIRUB SERPL-MCNC: 0.7 MG/DL — SIGNIFICANT CHANGE UP (ref 0.2–1.2)
BUN SERPL-MCNC: 26 MG/DL — HIGH (ref 10–20)
CALCIUM SERPL-MCNC: 9 MG/DL — SIGNIFICANT CHANGE UP (ref 8.5–10.1)
CHLORIDE SERPL-SCNC: 104 MMOL/L — SIGNIFICANT CHANGE UP (ref 98–110)
CO2 SERPL-SCNC: 32 MMOL/L — SIGNIFICANT CHANGE UP (ref 17–32)
CREAT SERPL-MCNC: 3.7 MG/DL — HIGH (ref 0.7–1.5)
GLUCOSE SERPL-MCNC: 75 MG/DL — SIGNIFICANT CHANGE UP (ref 70–110)
INR BLD: 1.3 RATIO — SIGNIFICANT CHANGE UP (ref 0.65–1.3)
POTASSIUM SERPL-MCNC: 3.2 MMOL/L — LOW (ref 3.5–5)
POTASSIUM SERPL-SCNC: 3.2 MMOL/L — LOW (ref 3.5–5)
PROT SERPL-MCNC: 5.7 G/DL — LOW (ref 6–8)
PROTHROM AB SERPL-ACNC: 14.1 SEC — HIGH (ref 9.95–12.87)
SODIUM SERPL-SCNC: 145 MMOL/L — SIGNIFICANT CHANGE UP (ref 135–146)

## 2018-02-05 NOTE — ED ADULT NURSE NOTE - OBJECTIVE STATEMENT
Pt is an A&OX4 87 YOM who is here for shortness of breath. Pt states that he has had this worsening of symptoms for the last few weeks. Pt states that he was in the hospital back in December for the removal of his Gall Bladder and a stent. In January, following the surgery he had been diagnosed with sepsis and he states that his breathing has not been the same since. Pt states that his breathing has been getting progressively worse. Pt is currently on Dialysis M,W,F and on Saturday, the patient went to see his Cardiologist where an Ultrasound was performed. The pt states that he was contacted by his Cardiologist today and told to go into the ER for evaluation of "increased pressure as seen on Ultrasound and make sure he doesn't have a Pulmonary Embolism". Pt airway is clear, no obstructions, rate is normal, lungs are clear, chest rise equal and unlabored, no accessory muscle use, no JVD, no tracheal deviation, pt has no adventitious lung sounds. Pt has no noticeable edema X 4 extremities, Pt states no noticeable swelling and none on exam. Pt does not appear to be in any distress at rest, but states he has dyspnea on exertion, when performing morning ADL's and when ambulating. Pt states over the last 2 days he has developed a cough that is dry in nature, denies any fevers, denies chills. Pt denies any congestion.

## 2018-02-05 NOTE — ED PROVIDER NOTE - PHYSICAL EXAMINATION
CON: ao x 3, HENMT: clear oropharynx, soft neck, HEAD: atraumatic, CV: rrr, equal pulses b/l, RESP: cta b/l, GI:  soft, nontender, no rebound, no guarding, SKIN: no rash, MSK: no deformities, right av fistual with thrill NEURO: no gross motor or sensory deficit

## 2018-02-05 NOTE — ED ADULT NURSE NOTE - CHPI ED SYMPTOMS NEG
no body aches/no hemoptysis/no headache/no chills/no fever/no chest pain/no diaphoresis/no edema/no wheezing

## 2018-02-05 NOTE — ED PROVIDER NOTE - PROGRESS NOTE DETAILS
guiac negative. last hemoglobin in our system was 10. consent placed in chart. nurse instructed to admin 1 unit [facility] will admit

## 2018-02-05 NOTE — ED PROVIDER NOTE - OBJECTIVE STATEMENT
pt c/o 2 weeks of sob. progressive, wallis. had echo by Dr. Mendoza recently showed "increased pressure on lungs" was sent in to r/o pe. pt had recent surgery in december where gb was removed and previous gb stent was removed. hx of valve replacment on brillinta for this.

## 2018-02-06 ENCOUNTER — TRANSCRIPTION ENCOUNTER (OUTPATIENT)
Age: 83
End: 2018-02-06

## 2018-02-06 DIAGNOSIS — Z98.890 OTHER SPECIFIED POSTPROCEDURAL STATES: Chronic | ICD-10-CM

## 2018-02-06 DIAGNOSIS — I25.118 ATHEROSCLEROTIC HEART DISEASE OF NATIVE CORONARY ARTERY WITH OTHER FORMS OF ANGINA PECTORIS: ICD-10-CM

## 2018-02-06 DIAGNOSIS — I10 ESSENTIAL (PRIMARY) HYPERTENSION: ICD-10-CM

## 2018-02-06 DIAGNOSIS — D64.9 ANEMIA, UNSPECIFIED: ICD-10-CM

## 2018-02-06 DIAGNOSIS — D69.6 THROMBOCYTOPENIA, UNSPECIFIED: ICD-10-CM

## 2018-02-06 DIAGNOSIS — N18.6 END STAGE RENAL DISEASE: ICD-10-CM

## 2018-02-06 DIAGNOSIS — I35.0 NONRHEUMATIC AORTIC (VALVE) STENOSIS: ICD-10-CM

## 2018-02-06 DIAGNOSIS — E78.5 HYPERLIPIDEMIA, UNSPECIFIED: ICD-10-CM

## 2018-02-06 LAB
ALBUMIN SERPL ELPH-MCNC: 3.2 G/DL — SIGNIFICANT CHANGE UP (ref 3–5.5)
ALP SERPL-CCNC: 72 U/L — SIGNIFICANT CHANGE UP (ref 30–115)
ALT FLD-CCNC: 35 U/L — SIGNIFICANT CHANGE UP (ref 0–41)
ANION GAP SERPL CALC-SCNC: 16 MMOL/L — HIGH (ref 7–14)
ANISOCYTOSIS BLD QL: SLIGHT — SIGNIFICANT CHANGE UP
AST SERPL-CCNC: 35 U/L — SIGNIFICANT CHANGE UP (ref 0–41)
BASOPHILS # BLD AUTO: 0 K/UL — SIGNIFICANT CHANGE UP (ref 0–0.2)
BASOPHILS NFR BLD AUTO: 0 % — SIGNIFICANT CHANGE UP (ref 0–1)
BILIRUB DIRECT SERPL-MCNC: 0.2 MG/DL — SIGNIFICANT CHANGE UP (ref 0–0.2)
BILIRUB INDIRECT FLD-MCNC: 0.7 MG/DL — SIGNIFICANT CHANGE UP
BILIRUB SERPL-MCNC: 0.9 MG/DL — SIGNIFICANT CHANGE UP (ref 0.2–1.2)
BLD GP AB SCN SERPL QL: SIGNIFICANT CHANGE UP
BUN SERPL-MCNC: 34 MG/DL — HIGH (ref 10–20)
CALCIUM SERPL-MCNC: 8.9 MG/DL — SIGNIFICANT CHANGE UP (ref 8.5–10.1)
CHLORIDE SERPL-SCNC: 105 MMOL/L — SIGNIFICANT CHANGE UP (ref 98–110)
CK MB CFR SERPL CALC: 4.3 NG/ML — SIGNIFICANT CHANGE UP (ref 0.6–6.3)
CK MB CFR SERPL CALC: 4.5 NG/ML — SIGNIFICANT CHANGE UP (ref 0.6–6.3)
CK SERPL-CCNC: 53 U/L — SIGNIFICANT CHANGE UP (ref 0–225)
CO2 SERPL-SCNC: 22 MMOL/L — SIGNIFICANT CHANGE UP (ref 17–32)
CREAT SERPL-MCNC: 4.6 MG/DL — CRITICAL HIGH (ref 0.7–1.5)
D DIMER BLD IA.RAPID-MCNC: 172 NG/ML DDU — SIGNIFICANT CHANGE UP (ref 0–230)
EOSINOPHIL # BLD AUTO: 0.15 K/UL — SIGNIFICANT CHANGE UP (ref 0–0.7)
EOSINOPHIL NFR BLD AUTO: 2.7 % — SIGNIFICANT CHANGE UP (ref 0–8)
GIANT PLATELETS BLD QL SMEAR: PRESENT — SIGNIFICANT CHANGE UP
GLUCOSE SERPL-MCNC: 134 MG/DL — HIGH (ref 70–110)
HCT VFR BLD CALC: 18.3 % — LOW (ref 42–52)
HGB BLD-MCNC: 6.1 G/DL — CRITICAL LOW (ref 14–18)
HYPOCHROMIA BLD QL: SLIGHT — SIGNIFICANT CHANGE UP
LDH SERPL L TO P-CCNC: 257 U/L — HIGH (ref 60–200)
LYMPHOCYTES # BLD AUTO: 1.27 K/UL — SIGNIFICANT CHANGE UP (ref 1.2–3.4)
LYMPHOCYTES # BLD AUTO: 22.1 % — SIGNIFICANT CHANGE UP (ref 20.5–51.1)
MANUAL SMEAR VERIFICATION: SIGNIFICANT CHANGE UP
MCHC RBC-ENTMCNC: 29.9 PG — SIGNIFICANT CHANGE UP (ref 27–31)
MCHC RBC-ENTMCNC: 33.3 G/DL — SIGNIFICANT CHANGE UP (ref 32–37)
MCV RBC AUTO: 89.7 FL — SIGNIFICANT CHANGE UP (ref 80–94)
MICROCYTES BLD QL: SLIGHT — SIGNIFICANT CHANGE UP
MONOCYTES # BLD AUTO: 0.3 K/UL — SIGNIFICANT CHANGE UP (ref 0.1–0.6)
MONOCYTES NFR BLD AUTO: 5.3 % — SIGNIFICANT CHANGE UP (ref 1.7–9.3)
NEUTROPHILS # BLD AUTO: 4.01 K/UL — SIGNIFICANT CHANGE UP (ref 1.4–6.5)
NEUTROPHILS NFR BLD AUTO: 69 % — SIGNIFICANT CHANGE UP (ref 42.2–75.2)
NEUTS BAND # BLD: 0.9 % — SIGNIFICANT CHANGE UP (ref 0–6)
NRBC # BLD: 0 /100 WBCS — SIGNIFICANT CHANGE UP (ref 0–0)
PLAT MORPH BLD: NORMAL — SIGNIFICANT CHANGE UP
PLATELET # BLD AUTO: 63 K/UL — LOW (ref 130–400)
POIKILOCYTOSIS BLD QL AUTO: SLIGHT — SIGNIFICANT CHANGE UP
POTASSIUM SERPL-MCNC: 3.2 MMOL/L — LOW (ref 3.5–5)
POTASSIUM SERPL-SCNC: 3.2 MMOL/L — LOW (ref 3.5–5)
PROT SERPL-MCNC: 5.6 G/DL — LOW (ref 6–8)
RBC # BLD: 2.04 M/UL — LOW (ref 4.7–6.1)
RBC # FLD: 14 % — SIGNIFICANT CHANGE UP (ref 11.5–14.5)
RBC BLD AUTO: NORMAL — SIGNIFICANT CHANGE UP
SODIUM SERPL-SCNC: 143 MMOL/L — SIGNIFICANT CHANGE UP (ref 135–146)
TROPONIN I SERPL-MCNC: 0.09 NG/ML — HIGH (ref 0–0.05)
TROPONIN I SERPL-MCNC: 0.11 NG/ML — HIGH (ref 0–0.05)
TYPE + AB SCN PNL BLD: SIGNIFICANT CHANGE UP
WBC # BLD: 5.73 K/UL — SIGNIFICANT CHANGE UP (ref 4.8–10.8)
WBC # FLD AUTO: 5.73 K/UL — SIGNIFICANT CHANGE UP (ref 4.8–10.8)

## 2018-02-06 RX ORDER — SEVELAMER CARBONATE 2400 MG/1
800 POWDER, FOR SUSPENSION ORAL
Qty: 0 | Refills: 0 | Status: DISCONTINUED | OUTPATIENT
Start: 2018-02-06 | End: 2018-02-12

## 2018-02-06 RX ORDER — TAMSULOSIN HYDROCHLORIDE 0.4 MG/1
0.4 CAPSULE ORAL DAILY
Qty: 0 | Refills: 0 | Status: DISCONTINUED | OUTPATIENT
Start: 2018-02-06 | End: 2018-02-12

## 2018-02-06 RX ORDER — SIMVASTATIN 20 MG/1
20 TABLET, FILM COATED ORAL AT BEDTIME
Qty: 0 | Refills: 0 | Status: DISCONTINUED | OUTPATIENT
Start: 2018-02-06 | End: 2018-02-12

## 2018-02-06 RX ORDER — POTASSIUM CHLORIDE 20 MEQ
40 PACKET (EA) ORAL ONCE
Qty: 0 | Refills: 0 | Status: COMPLETED | OUTPATIENT
Start: 2018-02-06 | End: 2018-02-06

## 2018-02-06 RX ORDER — PANTOPRAZOLE SODIUM 20 MG/1
40 TABLET, DELAYED RELEASE ORAL
Qty: 0 | Refills: 0 | Status: DISCONTINUED | OUTPATIENT
Start: 2018-02-06 | End: 2018-02-12

## 2018-02-06 RX ORDER — METOPROLOL TARTRATE 50 MG
12.5 TABLET ORAL EVERY 12 HOURS
Qty: 0 | Refills: 0 | Status: DISCONTINUED | OUTPATIENT
Start: 2018-02-06 | End: 2018-02-06

## 2018-02-06 RX ORDER — LOSARTAN POTASSIUM 100 MG/1
12.5 TABLET, FILM COATED ORAL ONCE
Qty: 0 | Refills: 0 | Status: COMPLETED | OUTPATIENT
Start: 2018-02-06 | End: 2018-02-06

## 2018-02-06 RX ORDER — LACTOBACILLUS ACIDOPHILUS 100MM CELL
1 CAPSULE ORAL DAILY
Qty: 0 | Refills: 0 | Status: DISCONTINUED | OUTPATIENT
Start: 2018-02-06 | End: 2018-02-12

## 2018-02-06 RX ORDER — DOCUSATE SODIUM 100 MG
100 CAPSULE ORAL DAILY
Qty: 0 | Refills: 0 | Status: DISCONTINUED | OUTPATIENT
Start: 2018-02-06 | End: 2018-02-12

## 2018-02-06 RX ORDER — FUROSEMIDE 40 MG
20 TABLET ORAL ONCE
Qty: 0 | Refills: 0 | Status: DISCONTINUED | OUTPATIENT
Start: 2018-02-06 | End: 2018-02-06

## 2018-02-06 RX ORDER — ASPIRIN/CALCIUM CARB/MAGNESIUM 324 MG
81 TABLET ORAL DAILY
Qty: 0 | Refills: 0 | Status: DISCONTINUED | OUTPATIENT
Start: 2018-02-06 | End: 2018-02-12

## 2018-02-06 RX ORDER — METOPROLOL TARTRATE 50 MG
12.5 TABLET ORAL ONCE
Qty: 0 | Refills: 0 | Status: COMPLETED | OUTPATIENT
Start: 2018-02-06 | End: 2018-02-06

## 2018-02-06 RX ORDER — METOPROLOL TARTRATE 50 MG
12.5 TABLET ORAL EVERY 12 HOURS
Qty: 0 | Refills: 0 | Status: DISCONTINUED | OUTPATIENT
Start: 2018-02-06 | End: 2018-02-12

## 2018-02-06 RX ORDER — LOSARTAN POTASSIUM 100 MG/1
12.5 TABLET, FILM COATED ORAL
Qty: 0 | Refills: 0 | Status: DISCONTINUED | OUTPATIENT
Start: 2018-02-06 | End: 2018-02-12

## 2018-02-06 RX ORDER — FUROSEMIDE 40 MG
80 TABLET ORAL ONCE
Qty: 0 | Refills: 0 | Status: COMPLETED | OUTPATIENT
Start: 2018-02-06 | End: 2018-02-06

## 2018-02-06 RX ORDER — ASPIRIN/CALCIUM CARB/MAGNESIUM 324 MG
81 TABLET ORAL DAILY
Qty: 0 | Refills: 0 | Status: DISCONTINUED | OUTPATIENT
Start: 2018-02-06 | End: 2018-02-06

## 2018-02-06 RX ADMIN — Medication 12.5 MILLIGRAM(S): at 14:11

## 2018-02-06 RX ADMIN — Medication 1 TABLET(S): at 11:49

## 2018-02-06 RX ADMIN — Medication 1 TABLET(S): at 13:29

## 2018-02-06 RX ADMIN — SEVELAMER CARBONATE 800 MILLIGRAM(S): 2400 POWDER, FOR SUSPENSION ORAL at 17:53

## 2018-02-06 RX ADMIN — LOSARTAN POTASSIUM 12.5 MILLIGRAM(S): 100 TABLET, FILM COATED ORAL at 17:53

## 2018-02-06 RX ADMIN — Medication 40 MILLIEQUIVALENT(S): at 14:11

## 2018-02-06 RX ADMIN — SIMVASTATIN 20 MILLIGRAM(S): 20 TABLET, FILM COATED ORAL at 22:27

## 2018-02-06 RX ADMIN — Medication 12.5 MILLIGRAM(S): at 17:52

## 2018-02-06 RX ADMIN — Medication 81 MILLIGRAM(S): at 17:53

## 2018-02-06 RX ADMIN — TAMSULOSIN HYDROCHLORIDE 0.4 MILLIGRAM(S): 0.4 CAPSULE ORAL at 11:49

## 2018-02-06 RX ADMIN — Medication 80 MILLIGRAM(S): at 13:16

## 2018-02-06 RX ADMIN — PANTOPRAZOLE SODIUM 40 MILLIGRAM(S): 20 TABLET, DELAYED RELEASE ORAL at 11:50

## 2018-02-06 RX ADMIN — SEVELAMER CARBONATE 800 MILLIGRAM(S): 2400 POWDER, FOR SUSPENSION ORAL at 11:49

## 2018-02-06 RX ADMIN — LOSARTAN POTASSIUM 12.5 MILLIGRAM(S): 100 TABLET, FILM COATED ORAL at 14:11

## 2018-02-06 NOTE — H&P ADULT - ATTENDING COMMENTS
Reviewed not and agree with above. Spoke with pt and Wife I the room. Stable in bed with np shortness of breath as long as he is not exurting himself. Still did not receive blood transfusions which will be done today.

## 2018-02-06 NOTE — H&P ADULT - HISTORY OF PRESENT ILLNESS
86 yo M with PMHx of ESRD on HD (M, W, F), CAD s/p PCI and stent, severe AS s/p TAVR, urinary retention with chronic ross, CCY, biliary stent removal presents to the ED for SOB on minimal exertion X 2 weeks. Per pt, he would have SOB with simply putting a shirt on, no other symptoms besides SOB. Pt denies orthopnea, PND, CP, palpitations, dizziness, recent sickness, fever/chills, N/V/D.   Pt had an 2 D echo done at Dr. Mendoza's office last Saturday (2/3/18) and was told that there is "increased pressure" in his lungs and advised pt to come to ED to r/o PE. CT chest shows no PE but lab works reveal acute anemia (Hb 6.1 and last Hb 8.2 on 1/12/18) and thrombocytopenia (platelet 63). Guaiac negative in ED and pt denies melena or hematuria.   Pt was recently admitted for sepsis 2/2 E. Coli and enterococcus bacteriemia on 1/5/18 and was discharged on Cefepime 2 gram post HD and Linezolid 600mg q12 for three weeks (ABx last dose 2/2/18).    Renal: Dr. Garland  Cardiology: Dr. Mendoza  PMD: Dr. Velez 86 yo M with PMHx of ESRD on HD (M, W, F), CAD s/p PCI and stent, severe AS s/p TAVR, urinary retention with chronic ross, CCY, biliary stent removal presents to the ED for SOB on minimal exertion X 2 weeks. Per pt, he would have SOB with simply putting a shirt on, no other symptoms besides SOB. Pt denies orthopnea, PND, CP, palpitations, dizziness, recent sickness, fever/chills, N/V/D.   Pt had an 2 D echo done at Dr. Mendoza's office last Saturday (2/3/18) and was told that there is "increased pressure" in his lung and pt was advised to come to ED to r/o PE. CT chest did not show PE but lab works reveal acute anemia (Hb 6.1 and last Hb 8.2 on 1/12/18) and thrombocytopenia (platelet 63). Guaiac negative in ED and pt denies melena or hematuria.   Pt was recently admitted for sepsis 2/2 E. Coli and VRE bacteriemia on 1/5/18 and was discharged on Cefepime 2 gram post HD and Linezolid 600mg q12 for three weeks (ABx last dose 2/2/18).    Renal: Dr. Garland  Cardiology: Dr. Mendoza  PMD: Dr. Velez

## 2018-02-06 NOTE — H&P ADULT - FAMILY HISTORY
Mother  Still living? Unknown  Family history of stroke, Age at diagnosis: Age Unknown     Father  Still living? Unknown  Family history of emphysema, Age at diagnosis: Age Unknown

## 2018-02-06 NOTE — PROGRESS NOTE ADULT - SUBJECTIVE AND OBJECTIVE BOX
86 yo M with PMHx listed below p/w SOB on minimal exertion of 2 weeks duration. Denies any other symptoms (F/C/C/hematuria/melena). CBC on admission showed Hg=6.1 and Plt=66. Guaiac negative in ED.   Patient was recently admitted for sepsis 2/2 to [facility] on 1/5/18 and was discharged on Cefepime 2g post-HD and Linezolid 600mg q12 for 3 weeks (finished course 2/2/18).    Renal: Dr. Garland  Cardiology: Dr. Mendoza  PMD: Dr. 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    PSH:  - CCY    Allergies:  Biaxin (Unknown)  Ceftin (Unknown)  Plavix (Unknown)  Vitamin D (Unknown)  Vitamin D3 (Unknown)    MEDICATIONS  (STANDING):  aspirin  chewable 81 milliGRAM(s) Oral daily  lactobacillus acidophilus 1 Tablet(s) Oral daily  losartan 12.5 milliGRAM(s) Oral two times a day  metoprolol     tartrate 12.5 milliGRAM(s) Oral every 12 hours  multivitamin 1 Tablet(s) Oral daily  pantoprazole    Tablet 40 milliGRAM(s) Oral before breakfast  sevelamer hydrochloride 800 milliGRAM(s) Oral three times a day with meals  simvastatin 20 milliGRAM(s) Oral at bedtime  tamsulosin 0.4 milliGRAM(s) Oral daily    MEDICATIONS  (PRN):  docusate sodium 100 milliGRAM(s) Oral daily PRN Constipation      Labs:                        6.1    5.73  )-----------( 63       ( 05 Feb 2018 20:31 )             18.3     CBC Full  -  ( 05 Feb 2018 20:31 )  WBC Count : 5.73 K/uL  Hemoglobin : 6.1 g/dL  Hematocrit : 18.3 %  Platelet Count - Automated : 63 K/uL  Mean Cell Volume : 89.7 fL  Mean Cell Hemoglobin : 29.9 pg  Mean Cell Hemoglobin Concentration : 33.3 g/dL  Auto Neutrophil # : 4.01 K/uL  Auto Lymphocyte # : 1.27 K/uL  Auto Monocyte # : 0.30 K/uL  Auto Eosinophil # : 0.15 K/uL  Auto Basophil # : 0.00 K/uL  Auto Neutrophil % : 69.0 %  Auto Lymphocyte % : 22.1 %  Auto Monocyte % : 5.3 %  Auto Eosinophil % : 2.7 %  Auto Basophil % : 0.0 %      02-06    143  |  105  |  34<H>  ----------------------------<  134<H>  3.2<L>   |  22  |  4.6<HH>    Ca    8.9      06 Feb 2018 11:22    TPro  5.6<L>  /  Alb  3.2  /  TBili  0.9  /  DBili  0.2  /  AST  35  /  ALT  35  /  AlkPhos  72  02-06      Microbiology:      Vital Signs Last 24 Hrs  T(C): 36.9 (06 Feb 2018 16:34), Max: 36.9 (06 Feb 2018 16:34)  T(F): 98.4 (06 Feb 2018 16:34), Max: 98.4 (06 Feb 2018 16:34)  HR: 71 (06 Feb 2018 16:34) (70 - 84)  BP: 160/74 (06 Feb 2018 16:34) (142/65 - 182/79)  BP(mean): --  RR: 16 (06 Feb 2018 16:34) (16 - 18)  SpO2: 97% (06 Feb 2018 09:45) (97% - 98%)    I&O's Summary    06 Feb 2018 07:01  -  06 Feb 2018 16:50  --------------------------------------------------------  IN: 560 mL / OUT: 0 mL / NET: 560 mL      PHYSICAL EXAM:  Constitutional: comfortable, NAD, pale  Respiratory: CTAB, no w/r/r  Cardiovascular: RRR, no m/r/g  Gastrointestinal: soft, NT/ND  Genitourinary: ross in place    IMAGING:    CT CHEST W/ IV CONTRAST (2/6/18):  No pulmonary embolism is visualized.  Bilateral pleural effusions with adjacent atelectasis.  Peribronchiolar inflammatory changes, nonspecific. Differential includes infectious and inflammatory etiologies.  Centrilobular emphysema.  Pneumobilia. Correlate for recent instrumentation.

## 2018-02-06 NOTE — DISCHARGE NOTE ADULT - CARE PROVIDERS DIRECT ADDRESSES
,[email or web address]

## 2018-02-06 NOTE — CONSULT NOTE ADULT - ASSESSMENT
87/M with ESRD , HTN, AS-s/p TAVR, presents with severe anemia of 6.1 g/dl and thrombocytopenia.    Anemia - likely due to slow GIB (possible AVMs) vs BM suppression from recent ABx  Most recent Fe Sat was 14% in Jan 2018  Rx: r/o GIB  stool guiac  repeat iron studies  Serial Hcts  Cont Aranesp and Venofer with HD  receiving 2nd Unit of RBC    Thrombocytopenia - pt does NOT get HEparin with HD                             - possibly drug induced                             - follow hem/onc rx    ESRD - HD due tomorrow           - 3 K bath (K 3.2 today-noted, repleted with KCL 40 x1)  HTN on Metoprolol, if still high after HD, may need adjustment of meds

## 2018-02-06 NOTE — PROVIDER CONTACT NOTE (OTHER) - SITUATION
Pt BP is 171/77. Pt asymptomatic. Pt has 2nd unit of blood ordered. MD Cole made aware of pt BP. Instructed to give two blood pressure medications before administering 2nd unit of blood.

## 2018-02-06 NOTE — DISCHARGE NOTE ADULT - MEDICATION SUMMARY - MEDICATIONS TO TAKE
I will START or STAY ON the medications listed below when I get home from the hospital:    aspirin 81 mg oral delayed release tablet  -- 1 tab(s) by mouth once a day  -- Indication: For CAD    losartan  -- 12.5 milligram(s) by mouth 2 times a day  -- Indication: For HTN    tamsulosin 0.4 mg oral capsule  -- 1 cap(s) by mouth once a day  -- Indication: For BPH    simvastatin 20 mg oral tablet  -- 1 tab(s) by mouth once a day (at bedtime)  -- Indication: For CAD    Metoprolol Tartrate  -- 12.5 milligram(s) by mouth every 12 hours  -- Indication: For HTN    darbepoetin leon 25 mcg/mL injectable solution  --  injectable   -- Indication: For ACD    Colace  -- 100 milligram(s) by mouth once a day, As Needed  -- Indication: For Constipation    Renvela 800 mg oral tablet  -- 1 tab(s) by mouth 3 times a day (with meals)  -- Indication: For ESRD (end stage renal disease)    Bacid (LAC) oral tablet  -- 1 tab(s) by mouth once a day  -- Indication: For probiotic    Protonix 40 mg oral delayed release tablet  -- 1 tab(s) by mouth once a day  -- Indication: For GERD    Dialyvite 800 oral tablet  -- 1 tab(s) by mouth once a day  -- Indication: For vitamin

## 2018-02-06 NOTE — CONSULT NOTE ADULT - SUBJECTIVE AND OBJECTIVE BOX
Patient is a 87y old  Male who presents with a chief complaint of shortness of breath on minimal exertion (06 Feb 2018 04:20)      HPI:  86 yo M with PMHx of ESRD on HD (M, W, F), CAD s/p PCI and stent and  s/p TAVR and s/p TAVR for severe as 2016, urinary retention with chronic ross, CCY, biliary stent removal and cholecystectomy 12/2017 presents to the ED for SOB on minimal exertion X 2 weeks. Per pt, he would have SOB with simply putting a shirt on, no other symptoms besides SOB. Pt denies orthopnea, PND, CP, palpitations, dizziness, recent sickness, fever/chills, N/V/D.   Pt had an 2 D echo done at my office  (2/3/18) and has nl ef , nl fxn avr , mod mr and increased rvsp of 60 mmhg which was higher than baseline.  pt was advised to come to ED to r/o PE. CT chest did not show PE but did show inflammation and   lab works revealed acute anemia (Hb 6.1 and last Hb 8.2 on 1/12/18) and thrombocytopenia (platelet 63). Guaiac negative in ED and pt denies melena or hematuria.   Pt was recently admitted for sepsis 2/2 E. Coli and VRE bacteriemia on 1/5/18 and was discharged on Cefepime 2 gram post HD and Linezolid 600mg q12 for three weeks (ABx last dose 2/2/18).   pt also had a peak troponin of 35 at that time but continued to have nl ef and felt to be a type 2 nstemi.  pt had been on asa and brillinta and brilinta was d/c last week.      Renal: Dr. Garland  PMD: Dr. Velez (06 Feb 2018 04:20)      PAST MEDICAL & SURGICAL HISTORY:  Urinary retention  Colon perforation: 2011  TIA (transient ischemic attack): 2008  Gout  Chronic renal failure: on HD  Aortic stenosis, severe  Myocardial infarction  Hypertension  Hyperlipidemia  Congestive heart failure  History of cholecystectomy  Bilateral cataracts  History of hip replacement, total, left  History of colon resection: secondary to colon perforation  AV fistula: right upper arm  Gallbladder disorder: stent removal 10/2017, cholecystectomy 12/2017 at [facility]  S/P TAVR (transcatheter aortic valve replacement)      PREVIOUS DIAGNOSTIC TESTING:      ECHO  FINDINGS:    STRESS  FINDINGS:    CATHETERIZATION  FINDINGS:    MEDICATIONS  (STANDING):  aspirin enteric coated 81 milliGRAM(s) Oral daily  furosemide   Injectable 20 milliGRAM(s) IV Push once  lactobacillus acidophilus 1 Tablet(s) Oral daily  losartan 12.5 milliGRAM(s) Oral two times a day  losartan 12.5 milliGRAM(s) Oral once  metoprolol      tartrate Suspension 12.5 milliGRAM(s) Oral every 12 hours  multivitamin 1 Tablet(s) Oral daily  pantoprazole    Tablet 40 milliGRAM(s) Oral before breakfast  potassium chloride    Tablet ER 40 milliEquivalent(s) Oral once  sevelamer hydrochloride 800 milliGRAM(s) Oral three times a day with meals  simvastatin 20 milliGRAM(s) Oral at bedtime  tamsulosin 0.4 milliGRAM(s) Oral daily    MEDICATIONS  (PRN):  docusate sodium 100 milliGRAM(s) Oral daily PRN Constipation      FAMILY HISTORY:  Family history of emphysema (Father)  Family history of stroke (Mother)      SOCIAL HISTORY:  stop smoking 1968  no etoh    REVIEW OF SYSTEMS      General: pt is no distress	    Skin/Breast:  	  Ophthalmologic:  	  ENMT:	    Respiratory and Thorax:  	  Cardiovascular:	    Gastrointestinal:	    Genitourinary:	    Musculoskeletal:	    Neurological:	    Psychiatric:	    Hematology/Lymphatics:	    Endocrine:	    Allergic/Immunologic:	    Vital Signs Last 24 Hrs  T(C): 35.8 (06 Feb 2018 05:54), Max: 36.8 (06 Feb 2018 00:00)  T(F): 96.4 (06 Feb 2018 05:54), Max: 98.3 (06 Feb 2018 00:00)  HR: 80 (06 Feb 2018 06:38) (68 - 84)  BP: 145/66 (06 Feb 2018 06:38) (142/65 - 182/79)  BP(mean): --  RR: 18 (06 Feb 2018 05:54) (18 - 20)  SpO2: 97% (06 Feb 2018 00:00) (97% - 98%)    PHYSICAL EXAM:      Constitutional:    Eyes:    ENMT:    Neck:    Breasts:    Back:    Respiratory:    Cardiovascular:    Gastrointestinal:    Genitourinary:    Rectal:    Extremities:    Vascular:    Neurological:    Skin:    Lymph Nodes:    Musculoskeletal:    Psychiatric:            INTERPRETATION OF TELEMETRY:    ECG:    I&O's Detail      LABS:                        6.1    5.73  )-----------( 63       ( 05 Feb 2018 20:31 )             18.3     02-05    145  |  104  |  26<H>  ----------------------------<  75  3.2<L>   |  32  |  3.7<H>    Ca    9.0      05 Feb 2018 20:31    TPro  5.7<L>  /  Alb  3.1  /  TBili  0.7  /  DBili  x   /  AST  33  /  ALT  32  /  AlkPhos  72  02-05    CARDIAC MARKERS ( 05 Feb 2018 20:31 )  0.09 ng/mL / x     / x     / x     / 4.3 ng/mL      PT/INR - ( 05 Feb 2018 20:31 )   PT: 14.10 sec;   INR: 1.30 ratio         PTT - ( 05 Feb 2018 20:31 )  PTT:21.6 sec    I&O's Summary      RADIOLOGY & ADDITIONAL STUDIES: Patient is a 87y old  Male who presents with a chief complaint of shortness of breath on minimal exertion (06 Feb 2018 04:20)      HPI:  86 yo M with PMHx of ESRD on HD (M, W, F), CAD s/p PCI and stent and  s/p TAVR and s/p TAVR for severe as 2016, urinary retention with chronic ross, CCY, biliary stent removal and cholecystectomy 12/2017 presents to the ED for SOB on minimal exertion X 2 weeks. Per pt, he would have SOB with simply putting a shirt on, no other symptoms besides SOB. Pt denies orthopnea, PND, CP, palpitations, dizziness, recent sickness, fever/chills, N/V/D.   Pt had an 2 D echo done at my office  (2/3/18) and has nl ef , nl fxn avr , mod mr and increased rvsp of 60 mmhg which was higher than baseline.  pt was advised to come to ED to r/o PE. CT chest did not show PE but did show inflammation and   lab works revealed acute anemia (Hb 6.1 and last Hb 8.2 on 1/12/18) and thrombocytopenia (platelet 63). Guaiac negative in ED and pt denies melena or hematuria.   Pt was recently admitted for sepsis 2/2 E. Coli and VRE bacteriemia on 1/5/18 and was discharged on Cefepime 2 gram post HD and Linezolid 600mg q12 for three weeks (ABx last dose 2/2/18).   pt also had a peak troponin of 35 at that time but continued to have nl ef and felt to be a type 2 nstemi.  pt had been on asa and brillinta and brilinta was d/c last week.      Renal: Dr. Garland  PMD: Dr. Velez (06 Feb 2018 04:20)      PAST MEDICAL & SURGICAL HISTORY:  Urinary retention  Colon perforation: 2011  TIA (transient ischemic attack): 2008  Gout  Chronic renal failure: on HD  Aortic stenosis, severe  Myocardial infarction  Hypertension  Hyperlipidemia  Congestive heart failure  History of cholecystectomy  Bilateral cataracts  History of hip replacement, total, left  History of colon resection: secondary to colon perforation  AV fistula: right upper arm  Gallbladder disorder: stent removal 10/2017, cholecystectomy 12/2017 at [facility]  S/P TAVR (transcatheter aortic valve replacement)      PREVIOUS DIAGNOSTIC TESTING:    EKG NSR, LVH, prolong qtc 522    ECHO  FINDINGS:    STRESS  FINDINGS:    CATHETERIZATION  FINDINGS:    MEDICATIONS  (STANDING):  aspirin enteric coated 81 milliGRAM(s) Oral daily  furosemide   Injectable 20 milliGRAM(s) IV Push once  lactobacillus acidophilus 1 Tablet(s) Oral daily  losartan 12.5 milliGRAM(s) Oral two times a day  losartan 12.5 milliGRAM(s) Oral once  metoprolol      tartrate Suspension 12.5 milliGRAM(s) Oral every 12 hours  multivitamin 1 Tablet(s) Oral daily  pantoprazole    Tablet 40 milliGRAM(s) Oral before breakfast  potassium chloride    Tablet ER 40 milliEquivalent(s) Oral once  sevelamer hydrochloride 800 milliGRAM(s) Oral three times a day with meals  simvastatin 20 milliGRAM(s) Oral at bedtime  tamsulosin 0.4 milliGRAM(s) Oral daily    MEDICATIONS  (PRN):  docusate sodium 100 milliGRAM(s) Oral daily PRN Constipation      FAMILY HISTORY:  Family history of emphysema (Father)  Family history of stroke (Mother)      SOCIAL HISTORY:  stop smoking 1968  no etoh    REVIEW OF SYSTEMS      General: pt is no distress	    Skin/Breast: chronic lesions  	  Ophthalmologic:  	  ENMT:	    Respiratory and Thorax:doext  	  Cardiovascular:	no chest pain, no palpitations,     Gastrointestinal:	no diarrhea, no vomiting,     Genitourinary:	urinary retention    Musculoskeletal:	negative    Neurological:	negative    Psychiatric:	negative    Hematology/Lymphatics:	anemai,    Endocrine:negative	    Allergic/Immunologic:	    Vital Signs Last 24 Hrs  T(C): 35.8 (06 Feb 2018 05:54), Max: 36.8 (06 Feb 2018 00:00)  T(F): 96.4 (06 Feb 2018 05:54), Max: 98.3 (06 Feb 2018 00:00)  HR: 80 (06 Feb 2018 06:38) (68 - 84)  BP: 145/66 (06 Feb 2018 06:38) (142/65 - 182/79)  BP(mean): --  RR: 18 (06 Feb 2018 05:54) (18 - 20)  SpO2: 97% (06 Feb 2018 00:00) (97% - 98%)    PHYSICAL EXAM:      Constitutional:  pt is resting comfortably in bed    Eyes:negative    ENMT:    Neck:no jvd    Breasts:deferred    Back: negative    Respiratory: diffuse fine rales    Cardiovascular: rrr, ns, ns2, 2/6 hsm apex    Gastrointestinal: (+) bs soft nt no hsm    Genitourinary:    Rectal: reported guiac neg    Extremities: no C/C/E    Vascular:  2 plus bilat    Neurological: A/O   Skin:    Lymph Nodes:    Musculoskeletal:    Psychiatric:            INTERPRETATION OF TELEMETRY:    ECG: nsr, lvh, prolong qtc    I&O's Detail      LABS:                        6.1    5.73  )-----------( 63       ( 05 Feb 2018 20:31 )             18.3     02-05    145  |  104  |  26<H>  ----------------------------<  75  3.2<L>   |  32  |  3.7<H>    Ca    9.0      05 Feb 2018 20:31    TPro  5.7<L>  /  Alb  3.1  /  TBili  0.7  /  DBili  x   /  AST  33  /  ALT  32  /  AlkPhos  72  02-05    CARDIAC MARKERS ( 05 Feb 2018 20:31 )  0.09 ng/mL / x     / x     / x     / 4.3 ng/mL      PT/INR - ( 05 Feb 2018 20:31 )   PT: 14.10 sec;   INR: 1.30 ratio         PTT - ( 05 Feb 2018 20:31 )  PTT:21.6 sec    I&O's Summary      RADIOLOGY & ADDITIONAL STUDIES: Patient is a 87y old  Male who presents with a chief complaint of shortness of breath on minimal exertion (06 Feb 2018 04:20)      HPI:  86 yo M with PMHx of ESRD on HD (M, W, F), CAD s/p PCI and stent and  s/p TAVR and s/p TAVR for severe as 2016, urinary retention with chronic ross, CCY, biliary stent removal and cholecystectomy 12/2017 presents to the ED for SOB on minimal exertion X 2 weeks. Per pt, he would have SOB with simply putting a shirt on, no other symptoms besides SOB. Pt denies orthopnea, PND, CP, palpitations, dizziness, recent sickness, fever/chills, N/V/D.   Pt had an 2 D echo done at my office  (2/3/18) and has nl ef , nl fxn avr , mod mr and increased rvsp of 60 mmhg which was higher than baseline.  pt was advised to come to ED to r/o PE. CT chest did not show PE but did show inflammation and   lab works revealed acute anemia (Hb 6.1 and last Hb 8.2 on 1/12/18) and thrombocytopenia (platelet 63). Guaiac negative in ED and pt denies melena or hematuria.   Pt was recently admitted for sepsis 2/2 E. Coli and VRE bacteriemia on 1/5/18 and was discharged on Cefepime 2 gram post HD and Linezolid 600mg q12 for three weeks (ABx last dose 2/2/18).   pt also had a peak troponin of 35 at that time but continued to have nl ef and felt to be a type 2 nstemi.  pt had been on asa and brillinta and brilinta was d/c last week.      Renal: Dr. Garland  PMD: Dr. Velez (06 Feb 2018 04:20)      PAST MEDICAL & SURGICAL HISTORY:  Urinary retention  Colon perforation: 2011  TIA (transient ischemic attack): 2008  Gout  Chronic renal failure: on HD  Aortic stenosis, severe  Myocardial infarction  Hypertension  Hyperlipidemia  Congestive heart failure  History of cholecystectomy  Bilateral cataracts  History of hip replacement, total, left  History of colon resection: secondary to colon perforation  AV fistula: right upper arm  Gallbladder disorder: stent removal 10/2017, cholecystectomy 12/2017 at [facility]  S/P TAVR (transcatheter aortic valve replacement)      PREVIOUS DIAGNOSTIC TESTING:    EKG NSR, LVH, prolong qtc 522    ECHO  FINDINGS:    STRESS  FINDINGS:    CATHETERIZATION  FINDINGS:    MEDICATIONS  (STANDING):  aspirin enteric coated 81 milliGRAM(s) Oral daily  furosemide   Injectable 20 milliGRAM(s) IV Push once  lactobacillus acidophilus 1 Tablet(s) Oral daily  losartan 12.5 milliGRAM(s) Oral two times a day  losartan 12.5 milliGRAM(s) Oral once  metoprolol      tartrate Suspension 12.5 milliGRAM(s) Oral every 12 hours  multivitamin 1 Tablet(s) Oral daily  pantoprazole    Tablet 40 milliGRAM(s) Oral before breakfast  potassium chloride    Tablet ER 40 milliEquivalent(s) Oral once  sevelamer hydrochloride 800 milliGRAM(s) Oral three times a day with meals  simvastatin 20 milliGRAM(s) Oral at bedtime  tamsulosin 0.4 milliGRAM(s) Oral daily    MEDICATIONS  (PRN):  docusate sodium 100 milliGRAM(s) Oral daily PRN Constipation      FAMILY HISTORY:  Family history of emphysema (Father)  Family history of stroke (Mother)      SOCIAL HISTORY:  stop smoking 1968  no etoh	    REVIEW OF SYSTEMS:    CONSTITUTIONAL: No weakness, fevers or chills  EYES/ENT: No visual changes;  No vertigo or throat pain   NECK: No pain or stiffness  RESPIRATORY: No cough, wheezing, hemoptysis; doext  CARDIOVASCULAR: No chest pain or palpitations  GASTROINTESTINAL: No abdominal or epigastric pain. No nausea, vomiting, or hematemesis; No diarrhea or constipation. No melena or hematochezia.  GENITOURINARY: urinary retention   NEUROLOGICAL: No numbness or weakness  SKIN: skin lesions	    Vital Signs Last 24 Hrs  T(C): 35.8 (06 Feb 2018 05:54), Max: 36.8 (06 Feb 2018 00:00)  T(F): 96.4 (06 Feb 2018 05:54), Max: 98.3 (06 Feb 2018 00:00)  HR: 80 (06 Feb 2018 06:38) (68 - 84)  BP: 145/66 (06 Feb 2018 06:38) (142/65 - 182/79)  BP(mean): --  RR: 18 (06 Feb 2018 05:54) (18 - 20)  SpO2: 97% (06 Feb 2018 00:00) (97% - 98%)    PHYSICAL EXAM:      Constitutional:  pt is resting comfortably in bed    Eyes:negative    ENMT:    Neck:no jvd    Breasts:deferred    Back: negative    Respiratory: diffuse fine rales    Cardiovascular: rrr, ns, ns2, 2/6 hsm apex    Gastrointestinal: (+) bs soft nt no hsm    Genitourinary:    Rectal: reported guiac neg    Extremities: no C/C/E    Vascular:  2 plus bilat    Neurological: A/O   Skin:    Lymph Nodes:    Musculoskeletal:    Psychiatric:            INTERPRETATION OF TELEMETRY:    ECG: nsr, lvh, prolong qtc    I&O's Detail      LABS:                        6.1    5.73  )-----------( 63       ( 05 Feb 2018 20:31 )             18.3     02-05    145  |  104  |  26<H>  ----------------------------<  75  3.2<L>   |  32  |  3.7<H>    Ca    9.0      05 Feb 2018 20:31    TPro  5.7<L>  /  Alb  3.1  /  TBili  0.7  /  DBili  x   /  AST  33  /  ALT  32  /  AlkPhos  72  02-05    CARDIAC MARKERS ( 05 Feb 2018 20:31 )  0.09 ng/mL / x     / x     / x     / 4.3 ng/mL      PT/INR - ( 05 Feb 2018 20:31 )   PT: 14.10 sec;   INR: 1.30 ratio         PTT - ( 05 Feb 2018 20:31 )  PTT:21.6 sec    I&O's Summary      RADIOLOGY & ADDITIONAL STUDIES:

## 2018-02-06 NOTE — DISCHARGE NOTE ADULT - PLAN OF CARE
supportive care Likely secondary to linezolid therapy. Follow up with Heme/onc in one week. Control BP Continue prescribed medications and follow up with PMD Continue MWF HD andfollow up with your nephrologist. Continue prescribed medicationsand follow up with your PMD

## 2018-02-06 NOTE — H&P ADULT - NSHPLABSRESULTS_GEN_ALL_CORE
Vital Signs Last 24 Hrs  T(C): 36.8 (06 Feb 2018 00:00), Max: 36.8 (06 Feb 2018 00:00)  T(F): 98.3 (06 Feb 2018 00:00), Max: 98.3 (06 Feb 2018 00:00)  HR: 78 (06 Feb 2018 00:00) (68 - 78)  BP: 142/65 (06 Feb 2018 00:00) (142/65 - 157/65)  BP(mean): --  RR: 18 (06 Feb 2018 00:00) (18 - 20)  SpO2: 97% (06 Feb 2018 00:00) (97% - 98%)    (02-05 @ 20:31)                       6.1  5.73 )-----------( 63                 18.3    Neutrophils = 4.01 (69.0%)  Lymphocytes = 1.27 (22.1%)  Eosinophils = 0.15 (2.7%)  Basophils = 0.00 (0.0%)  Monocytes = 0.30 (5.3%)  Bands = 0.9%    02-05    145  |  104  |  26<H>  ----------------------------<  75  3.2<L>   |  32  |  3.7<H>    Ca    9.0      05 Feb 2018 20:31    TPro  5.7<L>  /  Alb  3.1  /  TBili  0.7  /  DBili  x   /  AST  33  /  ALT  32  /  AlkPhos  72  02-05    ( 05 Feb 2018 20:31 )   PT: 14.10 sec;   INR: 1.30 ratio;       PTT:21.6 sec  CARDIAC MARKERS ( 05 Feb 2018 20:31 )  x     / x     / x     / x     / 4.3 ng/mL    Imaging:  CT Chest w/ IV Cont (02.06.18 @ 01:39)  IMPRESSION:  No pulmonary embolism is visualized.  Bilateral pleural effusions with adjacent atelectasis.  Peribronchiolar inflammatory changes, nonspecific. Differential includes infectious and inflammatory etiologies.  Centrilobular emphysema.  Pneumobilia. Correlate for recent instrumentation.

## 2018-02-06 NOTE — DISCHARGE NOTE ADULT - CARE PROVIDER_API CALL
Belem Venegas), HematologyOncology; Internal Medicine; Medical Oncology  [address] [address]  Phone: [phone]  Fax: [phone]    Pablo Gallardo), Gastroenterology; Internal Medicine  [address] [place] [address]  Phone: [phone]  Fax: [phone]    Pramod Pascual), Internal Medicine  [address] [place] [address]  Phone: [phone]  Fax: [phone]

## 2018-02-06 NOTE — CONSULT NOTE ADULT - PROBLEM SELECTOR RECOMMENDATION 2
pt with anemia with thrombocytopenia  ? etiology and needs w/u for possible iron def vs drug induced
Probably related to ESRD. On darbopoeitin.

## 2018-02-06 NOTE — H&P ADULT - PSH
AV fistula  right upper arm  Bilateral cataracts    Gallbladder disorder  stent removal 10/2017  History of cholecystectomy    History of colon resection  secondary to colon perforation  History of hip replacement, total, left    S/P TAVR (transcatheter aortic valve replacement)

## 2018-02-06 NOTE — CONSULT NOTE ADULT - PROBLEM SELECTOR PROBLEM 1
Coronary artery disease involving native coronary artery with other forms of angina pectoris, unspecified whether native or transplanted heart
Thrombocytopenia

## 2018-02-06 NOTE — H&P ADULT - PMH
Aortic stenosis, severe    Chronic renal failure  on HD  Colon perforation  2011  Congestive heart failure    Gout    Hyperlipidemia    Hypertension    Myocardial infarction    TIA (transient ischemic attack)  2008  Urinary retention

## 2018-02-06 NOTE — DISCHARGE NOTE ADULT - HOSPITAL COURSE
Pt was admitted to the hospital for SOB and weakness. Was found in the ED to have a hemoglobin of 6 which is much lower then his baseline and also a platelet count in the 60's. He was transfused 2 U of pRBC's and admitted to medicine. Heme/onc was consulted, it is suspected that the acute bicytopenia was caused by recent treatment with linezolid for sepsis which was completed on 2/2. After several days of continued anemia and thrombocytopenia heme/onc decided to preform a bone marrow biopsy to rule out MDS. For the last 3 days the patients hemoglobin has been stable at 8.3 and the platelet count has climbed considerably from 59 -> >100. He will be discharged to home today and will follow up with Heme/onc and GI as an outpatient.

## 2018-02-06 NOTE — CONSULT NOTE ADULT - NEGATIVE CARDIOVASCULAR SYMPTOMS
no claudication/no peripheral edema/no palpitations/no orthopnea/no chest pain/no paroxysmal nocturnal dyspnea

## 2018-02-06 NOTE — PATIENT PROFILE ADULT. - FUNCTIONAL LEVEL PRIOR: DRESSING
[facility]    NICU Consult and Admit History and Physical    Girl  Young Patient Status:      2017 MRN [id number]   [facility] 2NW-A Attending Kira Mckeon MD   [facility] Day # 0 PCP    Consultant No primary care provider on kaylah 05/03/17 1636    Platelets 919.2 79(3)VR 05/03/17 1636    Urine Culture No Growth 1 Day  05/03/17 1700    Chlamydia with Pap       GC with Pap       Chlamydia       GC       Pap reflex to HPV       Sickel Cell Solubility HGB             2nd Trimester Labs Per MFM note, normal level II US and fetal ECHO done at /[facility]      as reported by mother. Induction of labor because of IUGR, with slowing of growth and worsening hypertension.   Received 3 doses of Clindamycin PTD because of GBS carrier status, no ma SGA, with HC <3rd percentile,  IUGR  Infant asymptomatic, initial exam otherwise unremarkable   for failure to progress in labor and some spontaneous decelerations  Cord around the neck X2  PROM X19 hrs, GBS positive, received Clindaycin X3 PTD, n (0) independent

## 2018-02-06 NOTE — CONSULT NOTE ADULT - SUBJECTIVE AND OBJECTIVE BOX
HPI:  86 yo M with PMHx of ESRD on HD (M, W, F), CAD s/p PCI and stent, severe AS s/p TAVR, urinary retention with chronic ross, CCY, biliary stent removal presents to the ED for SOB on minimal exertion X 2 weeks. Per pt, he would have SOB with simply putting a shirt on, no other symptoms besides SOB. Pt denies orthopnea, PND, CP, palpitations, dizziness, recent sickness, fever/chills, N/V/D.   Pt had an 2 D echo done at Dr. Mendoza's office last Saturday (2/3/18) and was told that there is "increased pressure" in his lung and pt was advised to come to ED to r/o PE. CT chest did not show PE but lab works reveal acute anemia (Hb 6.1 and last Hb 8.2 on 1/12/18) and thrombocytopenia (platelet 63). Guaiac negative in ED and pt denies melena or hematuria.   Pt was recently admitted for sepsis 2/2 E. Coli and VRE bacteriemia on 1/5/18 and was discharged on Cefepime 2 gram post HD and Linezolid 600mg q12 for three weeks (ABx last dose 2/2/18).    Renal: Dr. Garland  Cardiology: Dr. Mendoza  PMD: Dr. Velez (06 Feb 2018 04:20)          PAST MEDICAL & SURGICAL HISTORY:  Urinary retention  Colon perforation: 2011  TIA (transient ischemic attack): 2008  Gout  Chronic renal failure: on HD  Aortic stenosis, severe  Myocardial infarction  Hypertension  Hyperlipidemia  Congestive heart failure  History of cholecystectomy  Bilateral cataracts  History of hip replacement, total, left  History of colon resection: secondary to colon perforation  AV fistula: right upper arm  Gallbladder disorder: stent removal 10/2017  S/P TAVR (transcatheter aortic valve replacement)    FAMILY HISTORY:  Family history of emphysema (Father)  Family history of stroke (Mother)        MEDICATIONS  (STANDING):  aspirin enteric coated 81 milliGRAM(s) Oral daily  furosemide   Injectable 20 milliGRAM(s) IV Push once  furosemide   Injectable 80 milliGRAM(s) IV Push once  lactobacillus acidophilus 1 Tablet(s) Oral daily  losartan 12.5 milliGRAM(s) Oral two times a day  losartan 12.5 milliGRAM(s) Oral once  metoprolol     tartrate 12.5 milliGRAM(s) Oral every 12 hours  multivitamin 1 Tablet(s) Oral daily  pantoprazole    Tablet 40 milliGRAM(s) Oral before breakfast  potassium chloride    Tablet ER 40 milliEquivalent(s) Oral once  sevelamer hydrochloride 800 milliGRAM(s) Oral three times a day with meals  simvastatin 20 milliGRAM(s) Oral at bedtime  tamsulosin 0.4 milliGRAM(s) Oral daily    MEDICATIONS  (PRN):  docusate sodium 100 milliGRAM(s) Oral daily PRN Constipation      Vital Signs Last 24 Hrs  T(C): 35.8 (06 Feb 2018 10:00), Max: 36.8 (06 Feb 2018 00:00)  T(F): 96.5 (06 Feb 2018 10:00), Max: 98.3 (06 Feb 2018 00:00)  HR: 83 (06 Feb 2018 10:00) (68 - 84)  BP: 152/66 (06 Feb 2018 10:00) (142/65 - 182/79)  BP(mean): --  RR: 16 (06 Feb 2018 10:00) (16 - 20)  SpO2: 97% (06 Feb 2018 09:45) (97% - 98%)  CAPILLARY BLOOD GLUCOSE        I&O's Summary    06 Feb 2018 07:01  -  06 Feb 2018 11:33  --------------------------------------------------------  IN: 280 mL / OUT: 0 mL / NET: 280 mL      Labs:                        6.1    5.73  )-----------( 63       ( 05 Feb 2018 20:31 )             18.3             02-05    145  |  104  |  26<H>  ----------------------------<  75  3.2<L>   |  32  |  3.7<H>    Ca    9.0      05 Feb 2018 20:31    TPro  5.7<L>  /  Alb  3.1  /  TBili  0.7  /  DBili  x   /  AST  33  /  ALT  32  /  AlkPhos  72  02-05    LIVER FUNCTIONS - ( 05 Feb 2018 20:31 )  Alb: 3.1 g/dL / Pro: 5.7 g/dL / ALK PHOS: 72 U/L / ALT: 32 U/L / AST: 33 U/L / GGT: x                 PT/INR - ( 05 Feb 2018 20:31 )   PT: 14.10 sec;   INR: 1.30 ratio         PTT - ( 05 Feb 2018 20:31 )  PTT:21.6 sec  CARDIAC MARKERS ( 05 Feb 2018 20:31 )  0.09 ng/mL / x     / x     / x     / 4.3 ng/mL        < from: CT Chest w/ IV Cont (02.06.18 @ 01:39) >  IMPRESSION:  No pulmonary embolism is visualized.    Bilateral pleural effusions with adjacent atelectasis.    Peribronchiolar inflammatory changes, nonspecific. Differential includes   infectious and inflammatory etiologies.    Centrilobular emphysema.     Pneumobilia. Correlate for recent instrumentation.    < end of copied text > HPI:  86 yo M with PMHx of ESRD on HD (M, W, F), CAD s/p PCI and stent, severe AS s/p TAVR, urinary retention with chronic ross, CCY, biliary stent removal presents to the ED for SOB on minimal exertion X 2 weeks. Per pt, he would have SOB with simply putting a shirt on, no other symptoms besides SOB. No other complaints.  Pt had an 2 D echo done at Dr. Mendoza's office last Saturday (2/3/18) and was told that there is "increased pressure" in his lung and pt was advised to come to ED to r/o PE. CT chest did not show PE but lab works reveal acute anemia (Hb 6.1 and last Hb 8.2 on 1/12/18) and thrombocytopenia (platelet 63). Guaiac negative in ED and pt denies melena or hematuria.   Pt was recently admitted for sepsis 2/2 E. Coli and VRE bacteriemia on 1/5/18 and was discharged on Cefepime 2 gram post HD and Linezolid 600mg q12 for three weeks (ABx last dose 2/2/18).    Renal: Dr. Garland  Cardiology: Dr. Mendoza  PMD: Dr. Velez (06 Feb 2018 04:20)          PAST MEDICAL & SURGICAL HISTORY:  Urinary retention  Colon perforation: 2011  TIA (transient ischemic attack): 2008  Gout  Chronic renal failure: on HD  Aortic stenosis, severe  Myocardial infarction  Hypertension  Hyperlipidemia  Congestive heart failure  History of cholecystectomy  Bilateral cataracts  History of hip replacement, total, left  History of colon resection: secondary to colon perforation  AV fistula: right upper arm  Gallbladder disorder: stent removal 10/2017  S/P TAVR (transcatheter aortic valve replacement)    FAMILY HISTORY:  Family history of emphysema (Father)  Family history of stroke (Mother)        MEDICATIONS  (STANDING):  aspirin enteric coated 81 milliGRAM(s) Oral daily  furosemide   Injectable 20 milliGRAM(s) IV Push once  furosemide   Injectable 80 milliGRAM(s) IV Push once  lactobacillus acidophilus 1 Tablet(s) Oral daily  losartan 12.5 milliGRAM(s) Oral two times a day  losartan 12.5 milliGRAM(s) Oral once  metoprolol     tartrate 12.5 milliGRAM(s) Oral every 12 hours  multivitamin 1 Tablet(s) Oral daily  pantoprazole    Tablet 40 milliGRAM(s) Oral before breakfast  potassium chloride    Tablet ER 40 milliEquivalent(s) Oral once  sevelamer hydrochloride 800 milliGRAM(s) Oral three times a day with meals  simvastatin 20 milliGRAM(s) Oral at bedtime  tamsulosin 0.4 milliGRAM(s) Oral daily    MEDICATIONS  (PRN):  docusate sodium 100 milliGRAM(s) Oral daily PRN Constipation      Vital Signs Last 24 Hrs  T(C): 35.8 (06 Feb 2018 10:00), Max: 36.8 (06 Feb 2018 00:00)  T(F): 96.5 (06 Feb 2018 10:00), Max: 98.3 (06 Feb 2018 00:00)  HR: 83 (06 Feb 2018 10:00) (68 - 84)  BP: 152/66 (06 Feb 2018 10:00) (142/65 - 182/79)  BP(mean): --  RR: 16 (06 Feb 2018 10:00) (16 - 20)  SpO2: 97% (06 Feb 2018 09:45) (97% - 98%)  CAPILLARY BLOOD GLUCOSE        I&O's Summary    06 Feb 2018 07:01  -  06 Feb 2018 11:33  --------------------------------------------------------  IN: 280 mL / OUT: 0 mL / NET: 280 mL      Labs:                        6.1    5.73  )-----------( 63       ( 05 Feb 2018 20:31 ) Platelet count from 1/12/18: 249             18.3             02-05    145  |  104  |  26<H>  ----------------------------<  75  3.2<L>   |  32  |  3.7<H>    Ca    9.0      05 Feb 2018 20:31    TPro  5.7<L>  /  Alb  3.1  /  TBili  0.7  /  DBili  x   /  AST  33  /  ALT  32  /  AlkPhos  72  02-05    LIVER FUNCTIONS - ( 05 Feb 2018 20:31 )  Alb: 3.1 g/dL / Pro: 5.7 g/dL / ALK PHOS: 72 U/L / ALT: 32 U/L / AST: 33 U/L / GGT: x                 PT/INR - ( 05 Feb 2018 20:31 )   PT: 14.10 sec;   INR: 1.30 ratio         PTT - ( 05 Feb 2018 20:31 )  PTT:21.6 sec  CARDIAC MARKERS ( 05 Feb 2018 20:31 )  0.09 ng/mL / x     / x     / x     / 4.3 ng/mL          < from: CT Chest w/ IV Cont (02.06.18 @ 01:39) >  IMPRESSION:  No pulmonary embolism is visualized.    Bilateral pleural effusions with adjacent atelectasis.    Peribronchiolar inflammatory changes, nonspecific. Differential includes   infectious and inflammatory etiologies.    Centrilobular emphysema.     Pneumobilia. Correlate for recent instrumentation.    < end of copied text >

## 2018-02-06 NOTE — DISCHARGE NOTE ADULT - CARE PLAN
Principal Discharge DX:	Bicytopenia  Goal:	supportive care  Assessment and plan of treatment:	Likely secondary to linezolid therapy. Follow up with Heme/onc in one week.  Secondary Diagnosis:	Hypertension  Goal:	Control BP  Assessment and plan of treatment:	Continue prescribed medications and follow up with PMD  Secondary Diagnosis:	Hyperlipidemia  Assessment and plan of treatment:	Continue prescribed medications and follow up with PMD  Secondary Diagnosis:	ESRD (end stage renal disease)  Assessment and plan of treatment:	Continue MWF HD andfollow up with your nephrologist.  Secondary Diagnosis:	Coronary artery disease involving native coronary artery with other forms of angina pectoris, unspecified whether native or transplanted heart  Assessment and plan of treatment:	Continue prescribed medicationsand follow up with your PMD

## 2018-02-06 NOTE — PROVIDER CONTACT NOTE (OTHER) - SITUATION
Pt BP is 169/77, HR 70 post PRBC transfusion. MD Merida made aware. No intervention at this time as per MD. Instructed to recheck BP in an hour.

## 2018-02-06 NOTE — H&P ADULT - ASSESSMENT
83 yo M with PMHx as listed p/w dyspnea on exertion for two weeks and admitted for symptomatic anemia. 2 unit of pRBC ordered to be transfused.     # symptomatic anemia  - r/o adverse effects of Zyvox  - 2 units pRBC transfusion ordered  - IV lasix 20mg post transfusion (pt still makes some urine)  - follow up iron studies, retic count, LDL, haptaglobin, LFT, b12, folate and trend cbc    # thrombocytopenia  - r/o adverse effect from Zyvox  - f/u hematology c/s    # ESRD on HD ( M, W, F)  - c/w HD as routine scheduled  - r/u renal     # CAD s/p stents  - per pt, Dr. Gray stopped Brilinta 2/2 dyspnea  - please call Dr. Gray in AM about resuming Brilinta since dyspnea is most likely 2/2 anemia  - c/w ASA, metoprolol and simvastatins    # aortic stenosis s/p TAVR  - c/w current medication  - Echo in 2016: EF 55%  - f/u card outpatient    # urinary retention on chronic ross  - c/w Flomax and follow up with Urology Dr. Jackson as outpatient    DVT ppx: SCD 85 yo M with PMHx as listed p/w dyspnea on exertion for two weeks and admitted for symptomatic anemia. 2 unit of pRBC ordered to be transfused.     # symptomatic anemia  - r/o adverse effects of Zyvox  - 2 units pRBC transfusion ordered  - IV lasix 20mg post transfusion (pt still makes some urine)  - follow up iron studies, retic count, LDL, haptaglobin, LFT, b12, folate and trend cbc    # thrombocytopenia  - r/o adverse effect from Zyvox  - f/u hematology c/s    # ESRD on HD ( M, W, F)  - c/w HD as routine scheduled  - r/u renal     # CAD s/p stents  - per pt, Dr. Gray stopped Brilinta 2/2 dyspnea  - please call Dr. Gray in AM about resuming Brilinta (dyspnea is likely 2/2 anemia however pt now has thrombocytopenia)  - c/w ASA, metoprolol and simvastatins    # aortic stenosis s/p TAVR  - c/w current medication  - Echo in 2016: EF 55%  - f/u card outpatient    # urinary retention on chronic ross  - c/w Flomax and follow up with Urology Dr. Jackson as outpatient    DVT ppx: SCD 85 yo M with PMHx as listed p/w dyspnea on exertion for two weeks and admitted for symptomatic anemia. 2 unit of pRBC ordered to be transfused.     # symptomatic anemia  - r/o adverse effects of Zyvox  - 2 units pRBC transfusion ordered  - IV lasix 20mg post transfusion (pt still makes some urine)  - follow up iron studies, retic count, LDL, haptaglobin, LFT, b12, folate and trend cbc    # thrombocytopenia  - r/o adverse effect from Zyvox  - f/u hematology c/s    # ESRD on HD ( M, W, F)  - c/w HD as routine scheduled  - r/u renal     # CAD s/p stents  - per pt, Dr. Gray stopped Brilinta 2/2 dyspnea  - please call Dr. Gray in AM about resuming Brilinta (dyspnea is likely 2/2 anemia however pt now has thrombocytopenia)  - c/w ASA, metoprolol and simvastatins    # aortic stenosis s/p TAVR  - c/w current medication  - Echo in 2016: EF 55%  - f/u card outpatient    # urinary retention on chronic ross  - c/w Flomax and follow up with Urology Dr. Jackson as outpatient    # hyperkalemia   - repleted.    DVT ppx: SCD

## 2018-02-06 NOTE — DISCHARGE NOTE ADULT - PATIENT PORTAL LINK FT
You can access the [facility] Patient Portal, offered by [facility], by registering with the following website: [email or web address]

## 2018-02-06 NOTE — CONSULT NOTE ADULT - SUBJECTIVE AND OBJECTIVE BOX
[facility]  INITIAL CONSULT NOTE  --------------------------------------------------------------------------------  HPI: Pt with ESRD on HD MWF, Severe AS, HTN, recently discharged from [facility] where he was treated for sepsis/cholangitis and sent home on Cefipime, completed last week at the HD center.  Pt noted to have gradually dropping Hb for the month of january.  His Hb was 10 in Dec, 8.0 on 1/23/18, 7.3 on 1/31/18.  Pt is currently on Aranesp 60 mcg weekly (raised from 40 mcg before)  Saw Dr. Dodge, who found that pt have H 6.1 and was sent in for blood tx.          PAST HISTORY  --------------------------------------------------------------------------------  PAST MEDICAL & SURGICAL HISTORY:  Urinary retention  Colon perforation: 2011  TIA (transient ischemic attack): 2008  Gout  Chronic renal failure: on HD  Aortic stenosis, severe  Myocardial infarction  Hypertension  Hyperlipidemia  Congestive heart failure  History of cholecystectomy  Bilateral cataracts  History of hip replacement, total, left  History of colon resection: secondary to colon perforation  AV fistula: right upper arm  Gallbladder disorder: stent removal 10/2017  S/P TAVR (transcatheter aortic valve replacement)    FAMILY HISTORY:  Family history of emphysema (Father)  Family history of stroke (Mother)    PAST SOCIAL HISTORY:    ALLERGIES & MEDICATIONS  --------------------------------------------------------------------------------  Allergies    Biaxin (Unknown)  Ceftin (Unknown)  Plavix (Unknown)  Vitamin D (Unknown)  Vitamin D3 (Unknown)    Intolerances      Standing Inpatient Medications  aspirin  chewable 81 milliGRAM(s) Oral daily  lactobacillus acidophilus 1 Tablet(s) Oral daily  losartan 12.5 milliGRAM(s) Oral two times a day  metoprolol     tartrate 12.5 milliGRAM(s) Oral every 12 hours  multivitamin 1 Tablet(s) Oral daily  pantoprazole    Tablet 40 milliGRAM(s) Oral before breakfast  sevelamer hydrochloride 800 milliGRAM(s) Oral three times a day with meals  simvastatin 20 milliGRAM(s) Oral at bedtime  tamsulosin 0.4 milliGRAM(s) Oral daily    PRN Inpatient Medications  docusate sodium 100 milliGRAM(s) Oral daily PRN      REVIEW OF SYSTEMS  --------------------------------------------------------------------------------  Gen: No weight changes, fatigue, fevers/chills, weakness  Skin: No rashes  Respiratory: No dyspnea, cough, wheezing, hemoptysis  CV: No chest pain, PND, orthopnea  GI: No abdominal pain, diarrhea, constipation, nausea, vomiting, melena, hematochezia  MSK: No joint pain/swelling; no back pain; no edema  Neuro: No dizziness/lightheadedness, weakness, seizures, numbness, tingling  Heme: No easy bruising or bleeding  Endo: No heat/cold intolerance  Psych: No significant nervousness, anxiety, stress, depression    All other systems were reviewed and are negative, except as noted.    VITALS/PHYSICAL EXAM  --------------------------------------------------------------------------------  T(C): 36.9 (02-06-18 @ 16:34), Max: 36.9 (02-06-18 @ 16:34)  HR: 71 (02-06-18 @ 16:34) (70 - 84)  BP: 160/74 (02-06-18 @ 16:34) (142/65 - 182/79)  RR: 16 (02-06-18 @ 16:34) (16 - 18)  SpO2: 97% (02-06-18 @ 09:45) (97% - 98%)  Wt(kg): --  Height (cm): 170.18 (02-06-18 @ 05:54)  Weight (kg): 68 (02-06-18 @ 05:12)  BMI (kg/m2): 23.5 (02-06-18 @ 05:54)  BSA (m2): 1.79 (02-06-18 @ 05:54)      02-06-18 @ 07:01  -  02-06-18 @ 18:08  --------------------------------------------------------  IN: 560 mL / OUT: 0 mL / NET: 560 mL      Physical Exam:  	Gen: NAD, well-appearing  	HEENT: PERRL, supple neck, clear oropharynx  	Pulm: CTA B/L  	CV: RRR, S1S2; no rub  	Back: No spinal or CVA tenderness; no sacral edema  	Abd: +BS, soft, nontender/nondistended  	: No suprapubic tenderness  	UE: Warm, FROM, no clubbing, intact strength; no edema; no asterixis  	LE: Warm, FROM, no clubbing, intact strength; no edema  	Neuro: No focal deficits, intact gait  	Psych: Normal affect and mood  	Skin: Warm, without rashes  	Vascular access: Rt AVF with bruit    LABS/STUDIES  --------------------------------------------------------------------------------              6.1    5.73  >-----------<  63       [02-05-18 @ 20:31]              18.3     143  |  105  |  34  ----------------------------<  134      [02-06-18 @ 11:22]  3.2   |  22  |  4.6        Ca     8.9     [02-06-18 @ 11:22]    TPro  5.6  /  Alb  3.2  /  TBili  0.9  /  DBili  0.2  /  AST  35  /  ALT  35  /  AlkPhos  72  [02-06-18 @ 11:22]    PT/INR: PT 14.10, INR 1.30       [02-05-18 @ 20:31]  PTT: 21.6       [02-05-18 @ 20:31]    Troponin 0.11      [02-06-18 @ 11:22]  CK 53      [02-06-18 @ 11:22]    Creatinine Trend:  SCr 4.6 [02-06 @ 11:22]  SCr 3.7 [02-05 @ 20:31]    Urinalysis - [12-22-16 @ 11:23]      Color Yellow / Appearance SL CLOUDY / SG 1.010 / pH >=9.0      Gluc NEGATIVE / Ketone NEGATIVE  / Bili NEGATIVE / Urobili 0.2       Blood NEGATIVE / Protein 30 / Leuk Est NEGATIVE / Nitrite NEGATIVE      RBC < 5 / WBC < 5 / Hyaline  / Gran  / Sq Epi  / Non Sq Epi Rare / Bacteria None Seen      HbA1c 4.3      [11-01-16 @ 15:19]    HBsAb Reactive      [12-27-17 @ 20:06]  HBsAg Nonreact      [12-27-17 @ 20:06]  HBcAb Nonreact      [12-21-16 @ 10:49]  HCV 0.16, Nonreact      [12-27-17 @ 20:06]

## 2018-02-06 NOTE — PROGRESS NOTE ADULT - ASSESSMENT
Transfused 2U pRBC. Will follow post-transfusion CBC. Doing well otherwise. Denies any complaints or symptoms

## 2018-02-06 NOTE — DISCHARGE NOTE ADULT - SECONDARY DIAGNOSIS.
Hypertension Hyperlipidemia ESRD (end stage renal disease) Coronary artery disease involving native coronary artery with other forms of angina pectoris, unspecified whether native or transplanted heart

## 2018-02-06 NOTE — CONSULT NOTE ADULT - PROBLEM SELECTOR RECOMMENDATION 9
pt had fely > 1 year ago and may stay off brilinta and continue aspirin 81 po q24 in pt who has significant anemia and thrombocytopenia
Probably related to BM suppression from linezolid. Doubt HIT, ITP or DIC. Will review peripheral smear.

## 2018-02-06 NOTE — ED ADULT NURSE REASSESSMENT NOTE - NS ED NURSE REASSESS COMMENT FT1
Spoke at great length with family, pt and Dr. Alcantara at the patients bedside. Pt is not in any distress, discomfort or any pain. Pt states he is pain free, resting comfortably and awaiting the results of his tests. Pt is not having any dyspnea or exertional dyspnea at this time. Pt educated about things to warn me about or signs of any worsening. Pt states understanding and effective teaching determined by successful teach back.
At the patients bedside, obtained a consent for blood transfusion with Dr. Alcantara. MD explained and educated patient about rationale and reasoning for transfusion and education deemed successful via successful pt teach back. Confirmatory pink top drawn and sent to the lab for processing. Pt awaiting results prior in order to transfuse.
Pt was transported up to the floor by Transport. PT in no distres or discomfort at the time of transport. IV in tact, pt resting comfortably in the stretcher. Family at the bedside. Pt to be transferred to the floor and transfused on the unit. Pt is A&OX4 VS recorded and placed in the EMR. Pt in no distress or discomfort.

## 2018-02-07 LAB
BASOPHILS # BLD AUTO: 0.04 K/UL — SIGNIFICANT CHANGE UP (ref 0–0.2)
BASOPHILS # BLD AUTO: 0.04 K/UL — SIGNIFICANT CHANGE UP (ref 0–0.2)
BASOPHILS NFR BLD AUTO: 0.4 % — SIGNIFICANT CHANGE UP (ref 0–1)
BASOPHILS NFR BLD AUTO: 0.4 % — SIGNIFICANT CHANGE UP (ref 0–1)
EOSINOPHIL # BLD AUTO: 0.09 K/UL — SIGNIFICANT CHANGE UP (ref 0–0.7)
EOSINOPHIL # BLD AUTO: 0.1 K/UL — SIGNIFICANT CHANGE UP (ref 0–0.7)
EOSINOPHIL NFR BLD AUTO: 0.9 % — SIGNIFICANT CHANGE UP (ref 0–8)
EOSINOPHIL NFR BLD AUTO: 1 % — SIGNIFICANT CHANGE UP (ref 0–8)
HCT VFR BLD CALC: 26.1 % — LOW (ref 42–52)
HCT VFR BLD CALC: 27 % — LOW (ref 42–52)
HGB BLD-MCNC: 9.1 G/DL — LOW (ref 14–18)
HGB BLD-MCNC: 9.3 G/DL — LOW (ref 14–18)
IMM GRANULOCYTES NFR BLD AUTO: 1 % — HIGH (ref 0.1–0.3)
IMM GRANULOCYTES NFR BLD AUTO: 1 % — HIGH (ref 0.1–0.3)
LYMPHOCYTES # BLD AUTO: 1.09 K/UL — LOW (ref 1.2–3.4)
LYMPHOCYTES # BLD AUTO: 1.16 K/UL — LOW (ref 1.2–3.4)
LYMPHOCYTES # BLD AUTO: 10.8 % — LOW (ref 20.5–51.1)
LYMPHOCYTES # BLD AUTO: 11.4 % — LOW (ref 20.5–51.1)
MCHC RBC-ENTMCNC: 30.3 PG — SIGNIFICANT CHANGE UP (ref 27–31)
MCHC RBC-ENTMCNC: 30.4 PG — SIGNIFICANT CHANGE UP (ref 27–31)
MCHC RBC-ENTMCNC: 34.4 G/DL — SIGNIFICANT CHANGE UP (ref 32–37)
MCHC RBC-ENTMCNC: 34.9 G/DL — SIGNIFICANT CHANGE UP (ref 32–37)
MCV RBC AUTO: 87 FL — SIGNIFICANT CHANGE UP (ref 80–94)
MCV RBC AUTO: 88.2 FL — SIGNIFICANT CHANGE UP (ref 80–94)
MONOCYTES # BLD AUTO: 1.33 K/UL — HIGH (ref 0.1–0.6)
MONOCYTES # BLD AUTO: 1.35 K/UL — HIGH (ref 0.1–0.6)
MONOCYTES NFR BLD AUTO: 13.2 % — HIGH (ref 1.7–9.3)
MONOCYTES NFR BLD AUTO: 13.3 % — HIGH (ref 1.7–9.3)
NEUTROPHILS # BLD AUTO: 7.41 K/UL — HIGH (ref 1.4–6.5)
NEUTROPHILS # BLD AUTO: 7.43 K/UL — HIGH (ref 1.4–6.5)
NEUTROPHILS NFR BLD AUTO: 73 % — SIGNIFICANT CHANGE UP (ref 42.2–75.2)
NEUTROPHILS NFR BLD AUTO: 73.6 % — SIGNIFICANT CHANGE UP (ref 42.2–75.2)
NRBC # BLD: 0 /100 WBCS — SIGNIFICANT CHANGE UP (ref 0–0)
PLATELET # BLD AUTO: 59 K/UL — LOW (ref 130–400)
PLATELET # BLD AUTO: 61 K/UL — LOW (ref 130–400)
RBC # BLD: 3 M/UL — LOW (ref 4.7–6.1)
RBC # BLD: 3.06 M/UL — LOW (ref 4.7–6.1)
RBC # BLD: 3.14 M/UL — LOW (ref 4.7–6.1)
RBC # FLD: 14.2 % — SIGNIFICANT CHANGE UP (ref 11.5–14.5)
RBC # FLD: 14.5 % — SIGNIFICANT CHANGE UP (ref 11.5–14.5)
RETICS #: 11.9 K/UL — LOW (ref 25–125)
RETICS/RBC NFR: 0.4 % — LOW (ref 0.5–1.5)
WBC # BLD: 10.15 K/UL — SIGNIFICANT CHANGE UP (ref 4.8–10.8)
WBC # BLD: 9.72 K/UL — SIGNIFICANT CHANGE UP (ref 4.8–10.8)
WBC # FLD AUTO: 10.15 K/UL — SIGNIFICANT CHANGE UP (ref 4.8–10.8)
WBC # FLD AUTO: 9.72 K/UL — SIGNIFICANT CHANGE UP (ref 4.8–10.8)

## 2018-02-07 RX ORDER — DARBEPOETIN ALFA IN POLYSORBAT 200MCG/0.4
60 PEN INJECTOR (ML) SUBCUTANEOUS
Qty: 0 | Refills: 0 | Status: DISCONTINUED | OUTPATIENT
Start: 2018-02-07 | End: 2018-02-12

## 2018-02-07 RX ADMIN — SEVELAMER CARBONATE 800 MILLIGRAM(S): 2400 POWDER, FOR SUSPENSION ORAL at 20:19

## 2018-02-07 RX ADMIN — SEVELAMER CARBONATE 800 MILLIGRAM(S): 2400 POWDER, FOR SUSPENSION ORAL at 08:37

## 2018-02-07 RX ADMIN — Medication 12.5 MILLIGRAM(S): at 20:19

## 2018-02-07 RX ADMIN — PANTOPRAZOLE SODIUM 40 MILLIGRAM(S): 20 TABLET, DELAYED RELEASE ORAL at 05:19

## 2018-02-07 RX ADMIN — SIMVASTATIN 20 MILLIGRAM(S): 20 TABLET, FILM COATED ORAL at 21:30

## 2018-02-07 RX ADMIN — Medication 81 MILLIGRAM(S): at 11:34

## 2018-02-07 RX ADMIN — Medication 1 TABLET(S): at 11:33

## 2018-02-07 RX ADMIN — SEVELAMER CARBONATE 800 MILLIGRAM(S): 2400 POWDER, FOR SUSPENSION ORAL at 11:34

## 2018-02-07 RX ADMIN — Medication 60 MICROGRAM(S): at 17:59

## 2018-02-07 RX ADMIN — LOSARTAN POTASSIUM 12.5 MILLIGRAM(S): 100 TABLET, FILM COATED ORAL at 05:20

## 2018-02-07 RX ADMIN — Medication 1 TABLET(S): at 11:40

## 2018-02-07 RX ADMIN — LOSARTAN POTASSIUM 12.5 MILLIGRAM(S): 100 TABLET, FILM COATED ORAL at 20:18

## 2018-02-07 RX ADMIN — TAMSULOSIN HYDROCHLORIDE 0.4 MILLIGRAM(S): 0.4 CAPSULE ORAL at 11:33

## 2018-02-07 RX ADMIN — Medication 12.5 MILLIGRAM(S): at 05:21

## 2018-02-07 NOTE — PROGRESS NOTE ADULT - SUBJECTIVE AND OBJECTIVE BOX
86 yo M with PMHx listed below p/w SOB on minimal exertion of 2 weeks duration. Denies any other symptoms (F/C/C/hematuria/melena). CBC on admission showed Hg=6.1 and Plt=66. Guaiac negative in ED.   Patient was recently admitted for sepsis 2/2 to [facility] on 1/5/18 and was discharged on Cefepime 2g post-HD and Linezolid 600mg q12 for 3 weeks (finished course 2/2/18).    Renal: Dr. Garladn  Cardiology: Dr. Mendoza  PMD: Dr. 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    PSH:  - CCY    Allergies:  Biaxin (Unknown)  Ceftin (Unknown)  Plavix (Unknown)  Vitamin D (Unknown)  Vitamin D3 (Unknown)    MEDICATIONS  (STANDING):  aspirin  chewable 81 milliGRAM(s) Oral daily  darbepoetin Injectable ViaL 60 MICROGram(s) IV Push every 7 days  lactobacillus acidophilus 1 Tablet(s) Oral daily  losartan 12.5 milliGRAM(s) Oral two times a day  metoprolol     tartrate 12.5 milliGRAM(s) Oral every 12 hours  multivitamin 1 Tablet(s) Oral daily  pantoprazole    Tablet 40 milliGRAM(s) Oral before breakfast  sevelamer hydrochloride 800 milliGRAM(s) Oral three times a day with meals  simvastatin 20 milliGRAM(s) Oral at bedtime  tamsulosin 0.4 milliGRAM(s) Oral daily    MEDICATIONS  (PRN):  docusate sodium 100 milliGRAM(s) Oral daily PRN Constipation      Labs:                        9.3    10.15 )-----------( 59       ( 07 Feb 2018 07:36 )             27.0     CBC Full  -  ( 07 Feb 2018 07:36 )  WBC Count : 10.15 K/uL  Hemoglobin : 9.3 g/dL  Hematocrit : 27.0 %  Platelet Count - Automated : 59 K/uL  Mean Cell Volume : 88.2 fL  Mean Cell Hemoglobin : 30.4 pg  Mean Cell Hemoglobin Concentration : 34.4 g/dL  Auto Neutrophil # : 7.43 K/uL  Auto Lymphocyte # : 1.09 K/uL  Auto Monocyte # : 1.33 K/uL  Auto Eosinophil # : 0.10 K/uL  Auto Basophil # : 0.04 K/uL  Auto Neutrophil % : 73.6 %  Auto Lymphocyte % : 10.8 %  Auto Monocyte % : 13.2 %  Auto Eosinophil % : 1.0 %  Auto Basophil % : 0.4 %      02-06    143  |  105  |  34<H>  ----------------------------<  134<H>  3.2<L>   |  22  |  4.6<HH>    Ca    8.9      06 Feb 2018 11:22    TPro  5.6<L>  /  Alb  3.2  /  TBili  0.9  /  DBili  0.2  /  AST  35  /  ALT  35  /  AlkPhos  72  02-06    Vital Signs Last 24 Hrs  T(C): 35.7 (07 Feb 2018 14:03), Max: 37.4 (06 Feb 2018 21:57)  T(F): 96.3 (07 Feb 2018 14:03), Max: 99.4 (06 Feb 2018 21:57)  HR: 72 (07 Feb 2018 16:03) (70 - 84)  BP: 162/66 (07 Feb 2018 16:03) (162/66 - 182/85)  BP(mean): --  RR: 16 (07 Feb 2018 16:03) (16 - 18)  SpO2: 94% (07 Feb 2018 08:49) (94% - 94%)    I&O's Summary    06 Feb 2018 07:01  -  07 Feb 2018 07:00  --------------------------------------------------------  IN: 560 mL / OUT: 1 mL / NET: 559 mL    07 Feb 2018 07:01  -  07 Feb 2018 18:30  --------------------------------------------------------  IN: 0 mL / OUT: 100 mL / NET: -100 mL      PHYSICAL EXAM:  Constitutional: comfortable, NAD, pale  Respiratory: CTAB, no w/r/r  Cardiovascular: RRR, no m/r/g  Gastrointestinal: soft, NT/ND  Genitourinary: ross in place    IMAGING:    CT CHEST W/ IV CONTRAST (2/6/18):  No pulmonary embolism is visualized.  Bilateral pleural effusions with adjacent atelectasis.  Peribronchiolar inflammatory changes, nonspecific. Differential includes infectious and inflammatory etiologies.  Centrilobular emphysema.  Pneumobilia. Correlate for recent instrumentation.

## 2018-02-07 NOTE — PROGRESS NOTE ADULT - SUBJECTIVE AND OBJECTIVE BOX
Nephrology progress note    Patient is seen and examined, events over the last 24 h noted .  Transfused yesterday. denies SOB. Seen on HD today.      Allergies:  Biaxin (Unknown)  Ceftin (Unknown)  Plavix (Unknown)  Vitamin D (Unknown)  Vitamin D3 (Unknown)    Hospital Medications:   MEDICATIONS  (STANDING):  aspirin  chewable 81 milliGRAM(s) Oral daily  darbepoetin Injectable ViaL 60 MICROGram(s) IV Push every 7 days  lactobacillus acidophilus 1 Tablet(s) Oral daily  losartan 12.5 milliGRAM(s) Oral two times a day  metoprolol     tartrate 12.5 milliGRAM(s) Oral every 12 hours  multivitamin 1 Tablet(s) Oral daily  pantoprazole    Tablet 40 milliGRAM(s) Oral before breakfast  sevelamer hydrochloride 800 milliGRAM(s) Oral three times a day with meals  simvastatin 20 milliGRAM(s) Oral at bedtime  tamsulosin 0.4 milliGRAM(s) Oral daily        VITALS:  T(F): 96.3 (02-07-18 @ 14:03), Max: 99.4 (02-06-18 @ 21:57)  HR: 72 (02-07-18 @ 16:03)  BP: 162/66 (02-07-18 @ 16:03)  RR: 16 (02-07-18 @ 16:03)  SpO2: 94% (02-07-18 @ 08:49)  Wt(kg): --    02-06 @ 07:01  -  02-07 @ 07:00  --------------------------------------------------------  IN: 560 mL / OUT: 1 mL / NET: 559 mL    02-07 @ 07:01  -  02-07 @ 17:33  --------------------------------------------------------  IN: 0 mL / OUT: 100 mL / NET: -100 mL          PHYSICAL EXAM:  Constitutional: NAD  HEENT: anicteric sclera, oropharynx clear, MMM  Neck: No JVD  Respiratory: CTAB, no wheezes, rales or rhonchi  Cardiovascular: S1, S2, RRR  Gastrointestinal: BS+, soft, NT/ND  Extremities: No cyanosis or clubbing. No peripheral edema  Neurological: A/O x 3, no focal deficits  : No CVA tenderness. No ross.   Skin: No rashes  Vascular Access:    LABS:  02-06    143  |  105  |  34<H>  ----------------------------<  134<H>  3.2<L>   |  22  |  4.6<HH>    Ca    8.9      06 Feb 2018 11:22    TPro  5.6<L>  /  Alb  3.2  /  TBili  0.9  /  DBili  0.2  /  AST  35  /  ALT  35  /  AlkPhos  72  02-06                          9.3    10.15 )-----------( 59       ( 07 Feb 2018 07:36 )             27.0       Urine Studies:      RADIOLOGY & ADDITIONAL STUDIES:

## 2018-02-07 NOTE — PROGRESS NOTE ADULT - SUBJECTIVE AND OBJECTIVE BOX
86 yo M with PMHx listed below p/w SOB on minimal exertion of 2 weeks duration. Denies any other symptoms (F/C/C/hematuria/melena). CBC on admission showed Hg=6.1 and Plt=66. Guaiac negative in ED.   Patient was recently admitted for sepsis  to [facility] on 18 and was discharged on Cefepime 2g post-HD and Linezolid 600mg q12 for 3 weeks (finished course 18).    Pty is comfortable in bed with no new issues. Denies any bleeding and tolerated 2 units of blood yestarday    Renal: Dr. Garland  Cardiology: Dr. Mendoza  PMD: Dr. 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    PSH:  - CCY    Allergies:  Biaxin (Unknown)  Ceftin (Unknown)  Plavix (Unknown)  Vitamin D (Unknown)  Vitamin D3 (Unknown)    MEDICATIONS  (STANDING):  aspirin  chewable 81 milliGRAM(s) Oral daily  lactobacillus acidophilus 1 Tablet(s) Oral daily  losartan 12.5 milliGRAM(s) Oral two times a day  metoprolol     tartrate 12.5 milliGRAM(s) Oral every 12 hours  multivitamin 1 Tablet(s) Oral daily  pantoprazole    Tablet 40 milliGRAM(s) Oral before breakfast  sevelamer hydrochloride 800 milliGRAM(s) Oral three times a day with meals  simvastatin 20 milliGRAM(s) Oral at bedtime  tamsulosin 0.4 milliGRAM(s) Oral daily    MEDICATIONS  (PRN):  docusate sodium 100 milliGRAM(s) Oral daily PRN Constipation      Vital Signs (24 Hrs):  T(C): 36.6 (18 @ 06:04), Max: 37.4 (18 @ 21:57)  HR: 84 (18 @ 06:04) (70 - 84)  BP: 182/85 (18 @ 06:04) (145/66 - 182/85)  RR: 18 (18 @ 06:04) (16 - 18)  SpO2: 97% (18 @ 09:45) (97% - 97%)  Wt(kg): --  Daily     Daily Weight in k (2018 14:34)    I&O's Summary    2018 07:01  -  2018 06:18  --------------------------------------------------------  IN: 560 mL / OUT: 1 mL / NET: 559 mL    Labs:                        9.1    9.72  )-----------( 61       ( 2018 00:23 )             26.1             02-06    143  |  105  |  34<H>  ----------------------------<  134<H>  3.2<L>   |  22  |  4.6<HH>    Ca    8.9      2018 11:22    TPro  5.6<L>  /  Alb  3.2  /  TBili  0.9  /  DBili  0.2  /  AST  35  /  ALT  35  /  AlkPhos  72  02-06    LIVER FUNCTIONS - ( 2018 11:22 )  Alb: 3.2 g/dL / Pro: 5.6 g/dL / ALK PHOS: 72 U/L / ALT: 35 U/L / AST: 35 U/L / GGT: x                 PT/INR - ( 2018 20:31 )   PT: 14.10 sec;   INR: 1.30 ratio         PTT - ( 2018 20:31 )  PTT:21.6 sec  CARDIAC MARKERS ( 2018 11:22 )  0.11 ng/mL / x     / 53 U/L / x     / 4.5 ng/mL  CARDIAC MARKERS ( 2018 20:31 )  0.09 ng/mL / x     / x     / x     / 4.3 ng/mL                    PHYSICAL EXAM:  Constitutional: comfortable, NAD, pale  Respiratory: CTAB, no w/r/r  Cardiovascular: RRR, no m/r/g  Gastrointestinal: soft, NT/ND  Genitourinary: ross in place    IMAGING:    CT CHEST W/ IV CONTRAST (18):  No pulmonary embolism is visualized.  Bilateral pleural effusions with adjacent atelectasis.  Peribronchiolar inflammatory changes, nonspecific. Differential includes infectious and inflammatory etiologies.  Centrilobular emphysema.  Pneumobilia. Correlate for recent instrumentation.

## 2018-02-07 NOTE — PROGRESS NOTE ADULT - ASSESSMENT
87/M with ESRD presents with severe anemia.  Pt seen on HD done via Rt AVF  3 K bath    UF 2 Liters tolerated well  Labs reviewed  Anemia - stable, will cont Aranesp 60 mcg weekly with HD

## 2018-02-08 DIAGNOSIS — R33.9 RETENTION OF URINE, UNSPECIFIED: ICD-10-CM

## 2018-02-08 LAB
ANION GAP SERPL CALC-SCNC: 10 MMOL/L — SIGNIFICANT CHANGE UP (ref 7–14)
BASOPHILS # BLD AUTO: 0.03 K/UL — SIGNIFICANT CHANGE UP (ref 0–0.2)
BASOPHILS NFR BLD AUTO: 0.4 % — SIGNIFICANT CHANGE UP (ref 0–1)
BUN SERPL-MCNC: 22 MG/DL — HIGH (ref 10–20)
CALCIUM SERPL-MCNC: 8.9 MG/DL — SIGNIFICANT CHANGE UP (ref 8.5–10.1)
CHLORIDE SERPL-SCNC: 101 MMOL/L — SIGNIFICANT CHANGE UP (ref 98–110)
CO2 SERPL-SCNC: 32 MMOL/L — SIGNIFICANT CHANGE UP (ref 17–32)
CREAT SERPL-MCNC: 4 MG/DL — HIGH (ref 0.7–1.5)
EOSINOPHIL # BLD AUTO: 0.2 K/UL — SIGNIFICANT CHANGE UP (ref 0–0.7)
EOSINOPHIL NFR BLD AUTO: 2.5 % — SIGNIFICANT CHANGE UP (ref 0–8)
GLUCOSE SERPL-MCNC: 115 MG/DL — HIGH (ref 70–110)
HCT VFR BLD CALC: 24.6 % — LOW (ref 42–52)
HGB BLD-MCNC: 8.5 G/DL — LOW (ref 14–18)
IMM GRANULOCYTES NFR BLD AUTO: 1 % — HIGH (ref 0.1–0.3)
LYMPHOCYTES # BLD AUTO: 1.27 K/UL — SIGNIFICANT CHANGE UP (ref 1.2–3.4)
LYMPHOCYTES # BLD AUTO: 15.8 % — LOW (ref 20.5–51.1)
MCHC RBC-ENTMCNC: 30.7 PG — SIGNIFICANT CHANGE UP (ref 27–31)
MCHC RBC-ENTMCNC: 34.6 G/DL — SIGNIFICANT CHANGE UP (ref 32–37)
MCV RBC AUTO: 88.8 FL — SIGNIFICANT CHANGE UP (ref 80–94)
MONOCYTES # BLD AUTO: 1.26 K/UL — HIGH (ref 0.1–0.6)
MONOCYTES NFR BLD AUTO: 15.7 % — HIGH (ref 1.7–9.3)
NEUTROPHILS # BLD AUTO: 5.19 K/UL — SIGNIFICANT CHANGE UP (ref 1.4–6.5)
NEUTROPHILS NFR BLD AUTO: 64.6 % — SIGNIFICANT CHANGE UP (ref 42.2–75.2)
NRBC # BLD: 0 /100 WBCS — SIGNIFICANT CHANGE UP (ref 0–0)
PLATELET # BLD AUTO: 52 K/UL — LOW (ref 130–400)
POTASSIUM SERPL-MCNC: 3.4 MMOL/L — LOW (ref 3.5–5)
POTASSIUM SERPL-SCNC: 3.4 MMOL/L — LOW (ref 3.5–5)
RBC # BLD: 2.77 M/UL — LOW (ref 4.7–6.1)
RBC # FLD: 14.4 % — SIGNIFICANT CHANGE UP (ref 11.5–14.5)
SODIUM SERPL-SCNC: 143 MMOL/L — SIGNIFICANT CHANGE UP (ref 135–146)
WBC # BLD: 8.03 K/UL — SIGNIFICANT CHANGE UP (ref 4.8–10.8)
WBC # FLD AUTO: 8.03 K/UL — SIGNIFICANT CHANGE UP (ref 4.8–10.8)

## 2018-02-08 RX ADMIN — PANTOPRAZOLE SODIUM 40 MILLIGRAM(S): 20 TABLET, DELAYED RELEASE ORAL at 12:48

## 2018-02-08 RX ADMIN — Medication 81 MILLIGRAM(S): at 12:50

## 2018-02-08 RX ADMIN — Medication 1 TABLET(S): at 12:54

## 2018-02-08 RX ADMIN — SEVELAMER CARBONATE 800 MILLIGRAM(S): 2400 POWDER, FOR SUSPENSION ORAL at 12:50

## 2018-02-08 RX ADMIN — LOSARTAN POTASSIUM 12.5 MILLIGRAM(S): 100 TABLET, FILM COATED ORAL at 17:56

## 2018-02-08 RX ADMIN — SIMVASTATIN 20 MILLIGRAM(S): 20 TABLET, FILM COATED ORAL at 21:22

## 2018-02-08 RX ADMIN — Medication 1 TABLET(S): at 12:50

## 2018-02-08 RX ADMIN — LOSARTAN POTASSIUM 12.5 MILLIGRAM(S): 100 TABLET, FILM COATED ORAL at 05:27

## 2018-02-08 RX ADMIN — Medication 12.5 MILLIGRAM(S): at 05:26

## 2018-02-08 RX ADMIN — SEVELAMER CARBONATE 800 MILLIGRAM(S): 2400 POWDER, FOR SUSPENSION ORAL at 17:58

## 2018-02-08 RX ADMIN — TAMSULOSIN HYDROCHLORIDE 0.4 MILLIGRAM(S): 0.4 CAPSULE ORAL at 12:51

## 2018-02-08 RX ADMIN — Medication 12.5 MILLIGRAM(S): at 17:57

## 2018-02-08 NOTE — PROGRESS NOTE ADULT - SUBJECTIVE AND OBJECTIVE BOX
SUBJECTIVE:    Patient is a 87y old  Male who presents with a chief complaint of shortness of breath on minimal exertion (06 Feb 2018 14:34)    Currently admitted to medicine with the primary diagnosis of Symptomatic anemia     Today is hospital day 2d. This morning he is resting comfortably in bed and reports no new issues or overnight events.     PAST MEDICAL & SURGICAL HISTORY  PAST MEDICAL & SURGICAL HISTORY:  Urinary retention  Colon perforation: 2011  TIA (transient ischemic attack): 2008  Gout  Chronic renal failure: on HD  Aortic stenosis, severe  Myocardial infarction  Hypertension  Hyperlipidemia  Congestive heart failure  History of cholecystectomy  Bilateral cataracts  History of hip replacement, total, left  History of colon resection: secondary to colon perforation  AV fistula: right upper arm  Gallbladder disorder: stent removal 10/2017  S/P TAVR (transcatheter aortic valve replacement)    ALLERGIES:  Biaxin (Unknown)  Ceftin (Unknown)  Plavix (Unknown)  Vitamin D (Unknown)  Vitamin D3 (Unknown)    MEDICATIONS:  STANDING MEDICATIONS  aspirin  chewable 81 milliGRAM(s) Oral daily  darbepoetin Injectable ViaL 60 MICROGram(s) IV Push every 7 days  lactobacillus acidophilus 1 Tablet(s) Oral daily  losartan 12.5 milliGRAM(s) Oral two times a day  metoprolol     tartrate 12.5 milliGRAM(s) Oral every 12 hours  multivitamin 1 Tablet(s) Oral daily  pantoprazole    Tablet 40 milliGRAM(s) Oral before breakfast  sevelamer hydrochloride 800 milliGRAM(s) Oral three times a day with meals  simvastatin 20 milliGRAM(s) Oral at bedtime  tamsulosin 0.4 milliGRAM(s) Oral daily    PRN MEDICATIONS  docusate sodium 100 milliGRAM(s) Oral daily PRN    VITALS:   T(F): 97.7  HR: 69  BP: 155/70  RR: 18  SpO2: 94%    LABS:                        8.5    8.03  )-----------( 52       ( 08 Feb 2018 05:36 )             24.6     02-08    143  |  101  |  22<H>  ----------------------------<  115<H>  3.4<L>   |  32  |  4.0<H>    Ca    8.9      08 Feb 2018 05:36    PHYSICAL EXAM:  GEN: No acute distress  HEENT:   LUNGS: Clear to auscultation bilaterally   HEART: S1/S2 present. RRR.   ABD: Soft, non-tender, non-distended. Bowel sounds present  EXT: Pulses positive and symmetric bilaterally on upper and lower extremities. Negative for edema or   NEURO: AAOX3

## 2018-02-08 NOTE — CONSULT NOTE ADULT - ATTENDING COMMENTS
Pt seen and examined with Dr Guillen.  Pt has had bicytopenia since 2017 and currently has normocytic indices.  Pt does not want endoscopic work up unless it is necessary given a history of instrumental perforation in the past.  Since there is a possibility of a multifactorial anemia, the etiology of which could be related to his renal failure, his mediterranean descent, and lastly the possibility of a new MDS I would recall Heme and ask them to rule out MDS.  We also recommend iron and vitamin studies.  If GISSEL is found then endoscopic work up will be readdressed with the patient but if declined a barium work up can be done to rule out occult pathology such as tumor mass lesions or large polyps.
Thrombocytopenia likely related to Linezolid, possibly acute on chronic. Will benefit from outpatient heme follow up. May require bone marrow biopsy in the future.   For acute anemia please check iron studies, B12, folic, MMA, hemolysis panel. Continue with Aranesp with dialysis. s/p 2 unit of pRBC transfusion today.

## 2018-02-08 NOTE — CONSULT NOTE ADULT - SUBJECTIVE AND OBJECTIVE BOX
GI consulted for symptomatic anemia.      HPI: 86 yo M with PMHx of ESRD on HD (M, W, F), CAD s/p PCI and stent on ASA, severe AS s/p TAVR, urinary retention with chronic ross, CCY, biliary stent removal in Oct 2017 at [facility] presents to the ED, sent in by his cardiologist for SOB and echo suspicious for PE that was ruled out in ER. However, patient was found to have acute on chronic anemia and thrombocytopenia ( patient anemic and thrombocytopenic in 2017 as well ).   Pt was recently admitted for sepsis 2/2 E. Coli and VRE bacteriemia on 1/5/18 and was discharged on Cefepime 2 gram post HD and Linezolid 600mg q12 for three weeks (ABx last dose 2/2/18).  GI consulted for anemia.  Pt denies any bleeding per rectum, had guaiac negative brown stools in ER. In ER, patient received 2 units of prbcs.  Pt also denies any melena, changes in bowel habits or weight or appetite.  Last colonoscopy was 10 yrs ago and he had a colon perforation during colonoscopy requiring surgery and 18 inches of colon was taken out as per patient wife.  No family H/O colon CA. Pt getting Darbepoetin during HD.     Allergies:  Biaxin (Unknown)  Ceftin (Unknown)  Plavix (Unknown)  Vitamin D (Unknown)  Vitamin D3 (Unknown)      Home Medications:    Hospital Medications:  aspirin  chewable 81 milliGRAM(s) Oral daily  darbepoetin Injectable ViaL 60 MICROGram(s) IV Push every 7 days  docusate sodium 100 milliGRAM(s) Oral daily PRN  lactobacillus acidophilus 1 Tablet(s) Oral daily  losartan 12.5 milliGRAM(s) Oral two times a day  metoprolol     tartrate 12.5 milliGRAM(s) Oral every 12 hours  multivitamin 1 Tablet(s) Oral daily  pantoprazole    Tablet 40 milliGRAM(s) Oral before breakfast  sevelamer hydrochloride 800 milliGRAM(s) Oral three times a day with meals  simvastatin 20 milliGRAM(s) Oral at bedtime  tamsulosin 0.4 milliGRAM(s) Oral daily      PMHX/PSHX:  Urinary retention  Colon perforation  TIA (transient ischemic attack)  Gout  Chronic renal failure  Aortic stenosis, severe  Myocardial infarction  Hypertension  Hyperlipidemia  Congestive heart failure  History of cholecystectomy  Bilateral cataracts  History of hip replacement, total, left  History of colon resection  AV fistula  H/O colonoscopy  Gallbladder disorder  S/P TAVR (transcatheter aortic valve replacement)      Family history:  Family history of emphysema (Father)  Family history of stroke (Mother)  No pertinent family history in first degree relatives      ROS:     General:  No wt loss, fevers, chills, night sweats, fatigue,   Eyes:  Good vision, no reported pain  ENT:  No  dysphagia  CV:  No pain, palpitations, hypo/hypertension  Resp:  Breathing improved since admission.  GI:  See HPI  Muscle:  No pain, weakness  Neuro:  No weakness, tingling, memory problems      PHYSICAL EXAM:     GENERAL:  Appears stated age, well-groomed, well-nourished, no distress  CHEST:  Full & symmetric excursion, no increased effort, breath sounds clear  HEART:  Regular rhythm, S1, S2, no added sounds  ABDOMEN:  Soft, non-tender, non-distended, normoactive bowel sounds,  no masses ,  NEURO:  Alert, oriented, non focal    Vital Signs:  Vital Signs Last 24 Hrs  T(C): 36.5 (08 Feb 2018 13:04), Max: 36.7 (07 Feb 2018 21:18)  T(F): 97.7 (08 Feb 2018 13:04), Max: 98 (07 Feb 2018 21:18)  HR: 69 (08 Feb 2018 13:04) (68 - 78)  BP: 155/70 (08 Feb 2018 13:04) (145/65 - 173/74)  BP(mean): --  RR: 18 (08 Feb 2018 13:04) (16 - 18)  SpO2: 94% (08 Feb 2018 00:38) (94% - 94%)  Daily     Daily     LABS:                        8.5  <<<6.1  8.03  )-----------( 52       ( 08 Feb 2018 05:36 )             24.6     02-08    143  |  101  |  22<H>  ----------------------------<  115<H>  3.4<L>   |  32  |  4.0<H>    Ca    8.9      08 Feb 2018 05:36

## 2018-02-08 NOTE — PROGRESS NOTE ADULT - ASSESSMENT
1- ESRD : s/p hd yesterday, HD in am  2- anemia/ thrombocytopenia, s/p blood transfusion, on darbo 60, check hemocc  3- BP at goal  4- on renagel, check ph   5- will follow

## 2018-02-08 NOTE — PROGRESS NOTE ADULT - SUBJECTIVE AND OBJECTIVE BOX
[facility] FOLLOW UP NOTE  --------------------------------------------------------------------------------  Chief Complaint:    24 hour events/subjective:        PAST HISTORY  --------------------------------------------------------------------------------  No significant changes to PMH, PSH, FHx, SHx, unless otherwise noted    ALLERGIES & MEDICATIONS  --------------------------------------------------------------------------------  Allergies    Biaxin (Unknown)  Ceftin (Unknown)  Plavix (Unknown)  Vitamin D (Unknown)  Vitamin D3 (Unknown)    Intolerances      Standing Inpatient Medications  aspirin  chewable 81 milliGRAM(s) Oral daily  darbepoetin Injectable ViaL 60 MICROGram(s) IV Push every 7 days  lactobacillus acidophilus 1 Tablet(s) Oral daily  losartan 12.5 milliGRAM(s) Oral two times a day  metoprolol     tartrate 12.5 milliGRAM(s) Oral every 12 hours  multivitamin 1 Tablet(s) Oral daily  pantoprazole    Tablet 40 milliGRAM(s) Oral before breakfast  sevelamer hydrochloride 800 milliGRAM(s) Oral three times a day with meals  simvastatin 20 milliGRAM(s) Oral at bedtime  tamsulosin 0.4 milliGRAM(s) Oral daily    PRN Inpatient Medications  docusate sodium 100 milliGRAM(s) Oral daily PRN      REVIEW OF SYSTEMS  --------------------------------------------------------------------------------  Gen: No weight changes, fatigue, fevers/chills, weakness  Skin: No rashes  Head/Eyes/Ears/Mouth: No headache; Normal hearing; Normal vision w/o blurriness; No sinus pain/discomfort, sore throat  Respiratory: No dyspnea, cough, wheezing, hemoptysis  CV: No chest pain, PND, orthopnea  GI: No abdominal pain, diarrhea, constipation, nausea, vomiting, melena, hematochezia  : No increased frequency, dysuria, hematuria, nocturia  MSK: No joint pain/swelling; no back pain; no edema  Neuro: No dizziness/lightheadedness, weakness, seizures, numbness, tingling  Heme: No easy bruising or bleeding  Endo: No heat/cold intolerance  Psych: No significant nervousness, anxiety, stress, depression    All other systems were reviewed and are negative, except as noted.    VITALS/PHYSICAL EXAM  --------------------------------------------------------------------------------  T(C): 35.7 (02-08-18 @ 06:03), Max: 36.7 (02-07-18 @ 21:18)  HR: 68 (02-08-18 @ 06:03) (68 - 78)  BP: 145/65 (02-08-18 @ 06:03) (145/65 - 173/74)  RR: 18 (02-08-18 @ 06:03) (16 - 18)  SpO2: 94% (02-08-18 @ 00:38) (94% - 94%)  Wt(kg): --        02-07-18 @ 07:01  -  02-08-18 @ 07:00  --------------------------------------------------------  IN: 0 mL / OUT: 2350 mL / NET: -2350 mL      Physical Exam:  	Gen: NAD, well-appearing  	HEENT: PERRL, supple neck, clear oropharynx  	Pulm: CTA B/L  	CV: RRR, S1S2; no rub  	Back: No spinal or CVA tenderness; no sacral edema  	Abd: +BS, soft, nontender/nondistended  	: No suprapubic tenderness  	UE: Warm, FROM, no clubbing, intact strength; no edema; no asterixis  	LE: Warm, FROM, no clubbing, intact strength; no edema  	Neuro: No focal deficits, intact gait  	Psych: Normal affect and mood  	Skin: Warm, without rashes  	Vascular access:    LABS/STUDIES  --------------------------------------------------------------------------------              9.3    10.15 >-----------<  59       [02-07-18 @ 07:36]              27.0     143  |  105  |  34  ----------------------------<  134      [02-06-18 @ 11:22]  3.2   |  22  |  4.6        Ca     8.9     [02-06-18 @ 11:22]    TPro  5.6  /  Alb  3.2  /  TBili  0.9  /  DBili  0.2  /  AST  35  /  ALT  35  /  AlkPhos  72  [02-06-18 @ 11:22]        Troponin 0.11      [02-06-18 @ 11:22]  CK 53      [02-06-18 @ 11:22]        [02-06-18 @ 11:22]    Creatinine Trend:  SCr 4.6 [02-06 @ 11:22]  SCr 3.7 [02-05 @ 20:31]        HbA1c 4.3      [11-01-16 @ 15:19] [facility] FOLLOW UP NOTE  --------------------------------------------------------------------------------  Chief Complaint:    24 hour events noted  no distress           Standing Inpatient Medications  aspirin  chewable 81 milliGRAM(s) Oral daily  darbepoetin Injectable ViaL 60 MICROGram(s) IV Push every 7 days  lactobacillus acidophilus 1 Tablet(s) Oral daily  losartan 12.5 milliGRAM(s) Oral two times a day  metoprolol     tartrate 12.5 milliGRAM(s) Oral every 12 hours  multivitamin 1 Tablet(s) Oral daily  pantoprazole    Tablet 40 milliGRAM(s) Oral before breakfast  sevelamer hydrochloride 800 milliGRAM(s) Oral three times a day with meals  simvastatin 20 milliGRAM(s) Oral at bedtime  tamsulosin 0.4 milliGRAM(s) Oral daily    PRN Inpatient Medications  docusate sodium 100 milliGRAM(s) Oral daily PRN        VITALS/PHYSICAL EXAM  --------------------------------------------------------------------------------  T(C): 35.7 (02-08-18 @ 06:03), Max: 36.7 (02-07-18 @ 21:18)  HR: 68 (02-08-18 @ 06:03) (68 - 78)  BP: 145/65 (02-08-18 @ 06:03) (145/65 - 173/74)  RR: 18 (02-08-18 @ 06:03) (16 - 18)  SpO2: 94% (02-08-18 @ 00:38) (94% - 94%)        02-07-18 @ 07:01  -  02-08-18 @ 07:00  --------------------------------------------------------  IN: 0 mL / OUT: 2350 mL / NET: -2350 mL      Physical Exam:  	Gen: NAD,   	Pulm: CTA B/L  	CV:  no rub  	Abd: +BS, soft, nontender/nondistended  	Vascular access: av fistula     LABS/STUDIES  --------------------------------------------------------------------------------              9.3    10.15 >-----------<  59       [02-07-18 @ 07:36]              27.0     143  |  105  |  34  ----------------------------<  134      [02-06-18 @ 11:22]  3.2   |  22  |  4.6        Ca     8.9     [02-06-18 @ 11:22]    TPro  5.6  /  Alb  3.2  /  TBili  0.9  /  DBili  0.2  /  AST  35  /  ALT  35  /  AlkPhos  72  [02-06-18 @ 11:22]        Troponin 0.11      [02-06-18 @ 11:22]  CK 53      [02-06-18 @ 11:22]        [02-06-18 @ 11:22]    Creatinine Trend:  SCr 4.6 [02-06 @ 11:22]  SCr 3.7 [02-05 @ 20:31]        HbA1c 4.3      [11-01-16 @ 15:19]

## 2018-02-08 NOTE — PROGRESS NOTE ADULT - SUBJECTIVE AND OBJECTIVE BOX
Patient was seen and examined. Spoke with RN. Chart reviewed.    No events overnight.  Vital Signs Last 24 Hrs  T(F): 96.3 (08 Feb 2018 06:03), Max: 98 (07 Feb 2018 21:18)  HR: 68 (08 Feb 2018 06:03) (68 - 78)  BP: 145/65 (08 Feb 2018 06:03) (145/65 - 173/74)  SpO2: 94% (08 Feb 2018 00:38) (94% - 94%)  MEDICATIONS  (STANDING):  aspirin  chewable 81 milliGRAM(s) Oral daily  darbepoetin Injectable ViaL 60 MICROGram(s) IV Push every 7 days  lactobacillus acidophilus 1 Tablet(s) Oral daily  losartan 12.5 milliGRAM(s) Oral two times a day  metoprolol     tartrate 12.5 milliGRAM(s) Oral every 12 hours  multivitamin 1 Tablet(s) Oral daily  pantoprazole    Tablet 40 milliGRAM(s) Oral before breakfast  sevelamer hydrochloride 800 milliGRAM(s) Oral three times a day with meals  simvastatin 20 milliGRAM(s) Oral at bedtime  tamsulosin 0.4 milliGRAM(s) Oral daily    MEDICATIONS  (PRN):  docusate sodium 100 milliGRAM(s) Oral daily PRN Constipation    Labs:                        9.3    10.15 )-----------( 59       ( 07 Feb 2018 07:36 )             27.0                         9.1    9.72  )-----------( 61       ( 07 Feb 2018 00:23 )             26.1     06 Feb 2018 11:22    143    |  105    |  34     ----------------------------<  134    3.2     |  22     |  4.6      Ca    8.9        06 Feb 2018 11:22    TPro  5.6    /  Alb  3.2    /  TBili  0.9    /  DBili  0.2    /  AST  35     /  ALT  35     /  AlkPhos  72     06 Feb 2018 11:22              General: comfortable, NAD  Neurology: A&Ox3, nonfocal  Head:  Normocephalic, atraumatic  ENT:  Mucosa moist, no ulcerations  Neck:  Supple, no JVD,   Skin: no breakdowns (as per RN)  Resp: CTA B/L  CV: RRR, S1S2,   GI: Soft, NT, bowel sounds  MS: No edema, + peripheral pulses, FROM all 4 extremity Patient was seen and examined. Spoke with RN. Chart reviewed. comfortable, sitting oob    No events overnight.  Vital Signs Last 24 Hrs  T(F): 96.3 (08 Feb 2018 06:03), Max: 98 (07 Feb 2018 21:18)  HR: 68 (08 Feb 2018 06:03) (68 - 78)  BP: 145/65 (08 Feb 2018 06:03) (145/65 - 173/74)  SpO2: 94% (08 Feb 2018 00:38) (94% - 94%)  MEDICATIONS  (STANDING):  aspirin  chewable 81 milliGRAM(s) Oral daily  darbepoetin Injectable ViaL 60 MICROGram(s) IV Push every 7 days  lactobacillus acidophilus 1 Tablet(s) Oral daily  losartan 12.5 milliGRAM(s) Oral two times a day  metoprolol     tartrate 12.5 milliGRAM(s) Oral every 12 hours  multivitamin 1 Tablet(s) Oral daily  pantoprazole    Tablet 40 milliGRAM(s) Oral before breakfast  sevelamer hydrochloride 800 milliGRAM(s) Oral three times a day with meals  simvastatin 20 milliGRAM(s) Oral at bedtime  tamsulosin 0.4 milliGRAM(s) Oral daily    MEDICATIONS  (PRN):  docusate sodium 100 milliGRAM(s) Oral daily PRN Constipation    Labs:                        9.3    10.15 )-----------( 59       ( 07 Feb 2018 07:36 )             27.0                         9.1    9.72  )-----------( 61       ( 07 Feb 2018 00:23 )             26.1     06 Feb 2018 11:22    143    |  105    |  34     ----------------------------<  134    3.2     |  22     |  4.6      Ca    8.9        06 Feb 2018 11:22    TPro  5.6    /  Alb  3.2    /  TBili  0.9    /  DBili  0.2    /  AST  35     /  ALT  35     /  AlkPhos  72     06 Feb 2018 11:22              General: comfortable, NAD  Neurology: A&Ox3, nonfocal  Head:  Normocephalic, atraumatic  ENT:  Mucosa moist, no ulcerations  Neck:  Supple, no JVD,   Skin: no breakdowns (as per RN)  Resp: CTA B/L  CV: RRR, S1S2,   GI: Soft, NT, bowel sounds  MS: No edema, + peripheral pulses, FROM all 4 extremity + ross

## 2018-02-08 NOTE — CONSULT NOTE ADULT - ASSESSMENT
86 yo M with PMHx of ESRD on HD (M, W, F), CAD s/p PCI and stent on ASA, severe AS s/p TAVR, urinary retention with chronic ross, CCY, biliary stent removal in Oct 2017 at [facility] presents to the ED, sent in by his cardiologist for SOB and echo suspicious for PE that was ruled out in ER. However, patient was found to have acute on chronic anemia and thrombocytopenia ( patient anemic and thrombocytopenic in 2017 as well ) s/p 2 units of prbcs in ER.    GI consulted for anemia. ( Likely Multifactorial )  - Please ask Hematology to address the possibility of MDS since patient has had Bicytopenia since 2017.  - No evidence of overt or occult GI bleed.  - Continue regular diet.  - Check Iron studies, folate, B12 levels, hemolytic panel.  - If Hematology work up negative, will discuss the need for GI workup for anemia with the patient and his family.  - Will follow up.

## 2018-02-08 NOTE — PROGRESS NOTE ADULT - SUBJECTIVE AND OBJECTIVE BOX
SUBJ:No chest pain or shortness of breath, pt is ambulating and feels much better after 2u prbcs      MEDICATIONS  (STANDING):  aspirin  chewable 81 milliGRAM(s) Oral daily  darbepoetin Injectable ViaL 60 MICROGram(s) IV Push every 7 days  lactobacillus acidophilus 1 Tablet(s) Oral daily  losartan 12.5 milliGRAM(s) Oral two times a day  metoprolol     tartrate 12.5 milliGRAM(s) Oral every 12 hours  multivitamin 1 Tablet(s) Oral daily  pantoprazole    Tablet 40 milliGRAM(s) Oral before breakfast  sevelamer hydrochloride 800 milliGRAM(s) Oral three times a day with meals  simvastatin 20 milliGRAM(s) Oral at bedtime  tamsulosin 0.4 milliGRAM(s) Oral daily    MEDICATIONS  (PRN):  docusate sodium 100 milliGRAM(s) Oral daily PRN Constipation            Vital Signs Last 24 Hrs  T(C): 35.7 (08 Feb 2018 06:03), Max: 36.7 (07 Feb 2018 21:18)  T(F): 96.3 (08 Feb 2018 06:03), Max: 98 (07 Feb 2018 21:18)  HR: 68 (08 Feb 2018 06:03) (68 - 78)  BP: 145/65 (08 Feb 2018 06:03) (145/65 - 173/74)  BP(mean): --  RR: 18 (08 Feb 2018 06:03) (16 - 18)  SpO2: 94% (08 Feb 2018 00:38) (94% - 94%)    REVIEW OF SYSTEMS:  CONSTITUTIONAL: No fever, weight loss, or fatigue  EYES: No eye pain, visual disturbances, or discharge  ENMT:  No  vertigo; No sinus or throat pain  NECK: No pain or stiffness  RESPIRATORY: No cough, wheezing, chills or hemoptysis; No shortness of breath  CARDIOVASCULAR: No chest pain, palpitations, dizziness, or leg swelling  GASTROINTESTINAL: No abdominal or epigastric pain. No nausea, vomiting, or hematemesis; No diarrhea or constipation. No melena or hematochezia.  GENITOURINARY: ross catheter  NEUROLOGICAL: No headaches, memory loss, loss of strength, numbness, or tremors  SKIN: No itching, burning, rashes, or lesions   HEME/LYMPH: No easy bruising, or bleeding gums    PHYSICAL EXAM:  · CONSTITUTIONAL:	Well-developed, well nourished    BMI-  · EYES:	EOMI; PERRL; no drainage or redness  · ENMT	No oral lesions; no gross abnormalities  · NECK:	No bruits;   ·BACK:	No deformity or limitation of movement  ·RESPIRATORY:   airway patent; breath sounds equal; good air movement; respirations non-labored; clear to auscultation bilaterally; no chest wall tenderness; no intercostal retractions; no rales,rhonchi or wheeze  · CARDIOVASCULAR	regular rate and rhythm  no rub  no murmur  normal PMI  . GASTROINTESTINAL:  no distention; no masses palpable; bowel sounds normal; no rebound tenderness; no guarding; no rigidity; no organomegaly  · EXTREMITIES: No cyanosis, clubbing or edema  · VASCULAR: 	Equal and normal pulses (carotid, femoral, dorsalis pedis)  ·NEUROLOGICAL:   alert and oriented x 3; sensation intact; deep reflexes intact; cranial nerves intact; no spontaneous movement; superficial reflexes intact; normal strength  · SKIN:	No lesions; no rash  . LYMPH NODES:	No lymphadedenopathy  · MUSCULOSKELETAL:   No calf tenderness  no joint swelling	    ECG:< from: 12 Lead ECG (02.05.18 @ 22:02) >  Diagnosis Line Normal sinus rhythm  Left ventricular hypertrophy with repolarization abnormality  Prolonged QT  Abnormal ECG      LABS:                        8.5    8.03  )-----------( 52       ( 08 Feb 2018 05:36 )             24.6     02-08    143  |  101  |  22<H>  ----------------------------<  115<H>  3.4<L>   |  32  |  4.0<H>    Ca    8.9      08 Feb 2018 05:36    TPro  5.6<L>  /  Alb  3.2  /  TBili  0.9  /  DBili  0.2  /  AST  35  /  ALT  35  /  AlkPhos  72  02-06    CARDIAC MARKERS ( 06 Feb 2018 11:22 )  0.11 ng/mL / x     / 53 U/L / x     / 4.5 ng/mL          I&O's Summary    07 Feb 2018 07:01  -  08 Feb 2018 07:00  --------------------------------------------------------  IN: 0 mL / OUT: 2350 mL / NET: -2350 mL      BNP  RADIOLOGY & ADDITIONAL STUDIES:    IMPRESSION AND PLAN:

## 2018-02-08 NOTE — PROGRESS NOTE ADULT - ASSESSMENT
ssessment and Plan:   · Assessment		  Doing well. Still complaining of SOB. Will monitor overnight and assess CBC in AM.    Problem/Plan - 1:  ·  Problem: Symptomatic anemia.  Plan: - possible adverse effect of recent antibiotics  - Hg responded appropriately after 2U pRBC (6.1=>9.1)  - Hematology on board - Rx's appreciated  - trending CBC.     Problem/Plan - 2:  ·  Problem: Thrombocytopenia.  Plan: - will trend  - Hematology on board.     Problem/Plan - 3:  ·  Problem: ESRD (end stage renal disease).  Plan: - c/w HD.     Problem/Plan - 4:  ·  Problem: Coronary artery disease involving native coronary artery with other forms of angina pectoris, unspecified whether native or transplanted heart.  Plan: - Cardio on board - c/w ASA + statin, holding Brilinta for now.     Problem/Plan - 5:  ·  Problem: Hypertension.  Plan: - c/w home meds.     Problem/Plan - 6:  Problem: Hyperlipidemia. Plan: - c/w statin. ssessment and Plan:   · Assessment		  Doing well. comf, ambulated with walker    Problem/Plan - 1:  ·  Problem: Symptomatic anemia.  Plan: - possible adverse effect of recent antibiotics  - Hg responded appropriately after 2U pRBC (6.1=>9.1)  - Hematology on board - Rx's appreciated  - trending CBC.     Problem/Plan - 2:  ·  Problem: Thrombocytopenia.  Plan: - will trend  - Hematology on board.     Problem/Plan - 3:  ·  Problem: ESRD (end stage renal disease).  Plan: - c/w HD.     Problem/Plan - 4:  ·  Problem: Coronary artery disease involving native coronary artery with other forms of angina pectoris, unspecified whether native or transplanted heart.  Plan: - Cardio on board - c/w ASA + statin, holding Brilinta for now.     Problem/Plan - 5:  ·  Problem: Hypertension.  Plan: - c/w home meds.     Problem/Plan - 6:  Problem: Hyperlipidemia. Plan: - c/w statin.

## 2018-02-09 ENCOUNTER — RESULT REVIEW (OUTPATIENT)
Age: 83
End: 2018-02-09

## 2018-02-09 LAB
BASOPHILS # BLD AUTO: 0.02 K/UL — SIGNIFICANT CHANGE UP (ref 0–0.2)
BASOPHILS NFR BLD AUTO: 0.3 % — SIGNIFICANT CHANGE UP (ref 0–1)
EOSINOPHIL # BLD AUTO: 0.3 K/UL — SIGNIFICANT CHANGE UP (ref 0–0.7)
EOSINOPHIL NFR BLD AUTO: 4.4 % — SIGNIFICANT CHANGE UP (ref 0–8)
FERRITIN SERPL-MCNC: 2300 NG/ML — HIGH (ref 30–400)
HCT VFR BLD CALC: 23 % — LOW (ref 42–52)
HGB BLD-MCNC: 7.8 G/DL — LOW (ref 14–18)
IMM GRANULOCYTES NFR BLD AUTO: 1.8 % — HIGH (ref 0.1–0.3)
LYMPHOCYTES # BLD AUTO: 1.13 K/UL — LOW (ref 1.2–3.4)
LYMPHOCYTES # BLD AUTO: 16.6 % — LOW (ref 20.5–51.1)
MCHC RBC-ENTMCNC: 30.4 PG — SIGNIFICANT CHANGE UP (ref 27–31)
MCHC RBC-ENTMCNC: 33.9 G/DL — SIGNIFICANT CHANGE UP (ref 32–37)
MCV RBC AUTO: 89.5 FL — SIGNIFICANT CHANGE UP (ref 80–94)
MONOCYTES # BLD AUTO: 1 K/UL — HIGH (ref 0.1–0.6)
MONOCYTES NFR BLD AUTO: 14.7 % — HIGH (ref 1.7–9.3)
NEUTROPHILS # BLD AUTO: 4.25 K/UL — SIGNIFICANT CHANGE UP (ref 1.4–6.5)
NEUTROPHILS NFR BLD AUTO: 62.2 % — SIGNIFICANT CHANGE UP (ref 42.2–75.2)
NRBC # BLD: 1 /100 WBCS — HIGH (ref 0–0)
PLATELET # BLD AUTO: 59 K/UL — LOW (ref 130–400)
RBC # BLD: 2.57 M/UL — LOW (ref 4.7–6.1)
RBC # FLD: 14.4 % — SIGNIFICANT CHANGE UP (ref 11.5–14.5)
WBC # BLD: 6.82 K/UL — SIGNIFICANT CHANGE UP (ref 4.8–10.8)
WBC # FLD AUTO: 6.82 K/UL — SIGNIFICANT CHANGE UP (ref 4.8–10.8)

## 2018-02-09 RX ADMIN — SEVELAMER CARBONATE 800 MILLIGRAM(S): 2400 POWDER, FOR SUSPENSION ORAL at 12:17

## 2018-02-09 RX ADMIN — LOSARTAN POTASSIUM 12.5 MILLIGRAM(S): 100 TABLET, FILM COATED ORAL at 06:17

## 2018-02-09 RX ADMIN — SIMVASTATIN 20 MILLIGRAM(S): 20 TABLET, FILM COATED ORAL at 21:30

## 2018-02-09 RX ADMIN — Medication 1 TABLET(S): at 12:16

## 2018-02-09 RX ADMIN — LOSARTAN POTASSIUM 12.5 MILLIGRAM(S): 100 TABLET, FILM COATED ORAL at 17:15

## 2018-02-09 RX ADMIN — Medication 12.5 MILLIGRAM(S): at 17:15

## 2018-02-09 RX ADMIN — TAMSULOSIN HYDROCHLORIDE 0.4 MILLIGRAM(S): 0.4 CAPSULE ORAL at 12:17

## 2018-02-09 RX ADMIN — Medication 1 TABLET(S): at 12:17

## 2018-02-09 RX ADMIN — Medication 12.5 MILLIGRAM(S): at 06:17

## 2018-02-09 RX ADMIN — PANTOPRAZOLE SODIUM 40 MILLIGRAM(S): 20 TABLET, DELAYED RELEASE ORAL at 06:17

## 2018-02-09 RX ADMIN — Medication 81 MILLIGRAM(S): at 12:16

## 2018-02-09 RX ADMIN — SEVELAMER CARBONATE 800 MILLIGRAM(S): 2400 POWDER, FOR SUSPENSION ORAL at 08:15

## 2018-02-09 RX ADMIN — SEVELAMER CARBONATE 800 MILLIGRAM(S): 2400 POWDER, FOR SUSPENSION ORAL at 17:15

## 2018-02-09 NOTE — PROGRESS NOTE ADULT - ASSESSMENT
Problem/Plan - 1:  ·  Problem: Symptomatic anemia.  Plan: - possible adverse effect of recent antibiotics  - Hg responded appropriately after 2U pRBC (6.1=>9.1) but trending back down 8.4 today.   - Hematology on board - recommended further workup to consult GI and possible bone marrow biopsy.  - trending CBC.     Problem/Plan - 2:  ·  Problem: Thrombocytopenia.  Plan: - will trend  - Hematology on board considering possible bone marrow biopsy.     Problem/Plan - 3:  ·  Problem: ESRD (end stage renal disease).  Plan: - c/w HD  - BMP daily.     Problem/Plan - 4:  ·  Problem: Coronary artery disease involving native coronary artery with other forms of angina pectoris, unspecified whether native or transplanted heart.  Plan: - Cardio on board - c/w ASA + statin, holding Brilinta for now.     Problem/Plan - 5:  ·  Problem: Hypertension.  Plan: - c/w home meds.     Problem/Plan - 6:  Problem: Hyperlipidemia. Plan: -

## 2018-02-09 NOTE — PROGRESS NOTE ADULT - SUBJECTIVE AND OBJECTIVE BOX
Nephrology progress note    Patient is seen and examined, events over the last 24 h noted .  Pt seen on HD.  No c/o SOB or weakness.    Allergies:  Biaxin (Unknown)  Ceftin (Unknown)  Plavix (Unknown)  Vitamin D (Unknown)  Vitamin D3 (Unknown)    Hospital Medications:   MEDICATIONS  (STANDING):  aspirin  chewable 81 milliGRAM(s) Oral daily  darbepoetin Injectable ViaL 60 MICROGram(s) IV Push every 7 days  lactobacillus acidophilus 1 Tablet(s) Oral daily  losartan 12.5 milliGRAM(s) Oral two times a day  metoprolol     tartrate 12.5 milliGRAM(s) Oral every 12 hours  multivitamin 1 Tablet(s) Oral daily  pantoprazole    Tablet 40 milliGRAM(s) Oral before breakfast  sevelamer hydrochloride 800 milliGRAM(s) Oral three times a day with meals  simvastatin 20 milliGRAM(s) Oral at bedtime  tamsulosin 0.4 milliGRAM(s) Oral daily        VITALS:  T(F): 96.2 (02-09-18 @ 06:26), Max: 97.7 (02-08-18 @ 13:04)  HR: 64 (02-09-18 @ 08:30)  BP: 159/74 (02-09-18 @ 08:30)  RR: 16 (02-09-18 @ 08:30)  SpO2: --  Wt(kg): --    02-07 @ 07:01  -  02-08 @ 07:00  --------------------------------------------------------  IN: 0 mL / OUT: 2350 mL / NET: -2350 mL    02-08 @ 07:01  -  02-09 @ 07:00  --------------------------------------------------------  IN: 0 mL / OUT: 150 mL / NET: -150 mL          PHYSICAL EXAM:  Constitutional: NAD  HEENT: anicteric sclera, oropharynx clear, MMM  Neck: No JVD  Respiratory: CTAB, no wheezes, rales or rhonchi  Cardiovascular: S1, S2, RRR  Gastrointestinal: BS+, soft, NT/ND  Extremities: No cyanosis or clubbing. No peripheral edema  Neurological: A/O x 3, no focal deficits  : No CVA tenderness. No ross.   Skin: No rashes  Vascular Access: Rt AVF    LABS:  02-08    143  |  101  |  22<H>  ----------------------------<  115<H>  3.4<L>   |  32  |  4.0<H>    Ca    8.9      08 Feb 2018 05:36                            8.5    8.03  )-----------( 52       ( 08 Feb 2018 05:36 )             24.6       Urine Studies:      RADIOLOGY & ADDITIONAL STUDIES:

## 2018-02-09 NOTE — PROGRESS NOTE ADULT - SUBJECTIVE AND OBJECTIVE BOX
Patient was seen and examined. Spoke with RN. Chart reviewed.    No events overnight.  Vital Signs Last 24 Hrs  T(F): 96.2 (09 Feb 2018 06:26), Max: 97.7 (08 Feb 2018 13:04)  HR: 86 (09 Feb 2018 12:14) (64 - 86)  BP: 183/77 (09 Feb 2018 12:14) (127/50 - 183/77)  SpO2: --  MEDICATIONS  (STANDING):  aspirin  chewable 81 milliGRAM(s) Oral daily  darbepoetin Injectable ViaL 60 MICROGram(s) IV Push every 7 days  lactobacillus acidophilus 1 Tablet(s) Oral daily  losartan 12.5 milliGRAM(s) Oral two times a day  metoprolol     tartrate 12.5 milliGRAM(s) Oral every 12 hours  multivitamin 1 Tablet(s) Oral daily  pantoprazole    Tablet 40 milliGRAM(s) Oral before breakfast  sevelamer hydrochloride 800 milliGRAM(s) Oral three times a day with meals  simvastatin 20 milliGRAM(s) Oral at bedtime  tamsulosin 0.4 milliGRAM(s) Oral daily    MEDICATIONS  (PRN):  docusate sodium 100 milliGRAM(s) Oral daily PRN Constipation    Labs:                        7.8    6.82  )-----------( 59       ( 09 Feb 2018 07:42 )             23.0                         8.5    8.03  )-----------( 52       ( 08 Feb 2018 05:36 )             24.6     08 Feb 2018 05:36    143    |  101    |  22     ----------------------------<  115    3.4     |  32     |  4.0      Ca    8.9        08 Feb 2018 05:36              Radiology:    GEN: No acute distress  HEENT:   LUNGS: Clear to auscultation bilaterally   HEART: S1/S2 present. RRR.   ABD: Soft, non-tender, non-distended. Bowel sounds present  EXT: Pulses positive and symmetric bilaterally on upper and lower extremities. Negative for edema or   NEURO: AAOX3 ross+

## 2018-02-09 NOTE — PROGRESS NOTE ADULT - ASSESSMENT
Doing well pleasant sitting OOB in chair.  86 YO male with bicytopenia. Thrombocytopenia is consistently at ~60, hemoglobin persistently tending downward. Will require further workup before safe for discharge.

## 2018-02-09 NOTE — PROGRESS NOTE ADULT - ASSESSMENT
87/M with ESRD , HTN, AS-s/p TAVR, presents with severe anemia of 6.1 g/dl and thrombocytopenia.    Anemia - Hb drifting down again from 9.1 to 8.5  - s/p RBC tx 2 Units  - on Aranesp 60 with HD   - seen by GI    Thrombocytopenia - pt does NOT get HEparin with HD                             - possibly drug induced                             - follow hem/onc rx    ESRD - on HD today           - 3 K bath ,  cc/min , UF 2 L - tolerated well           -phos, iPTH

## 2018-02-10 LAB
AFP-TM SERPL-MCNC: 0.9 NG/ML — SIGNIFICANT CHANGE UP
BASOPHILS # BLD AUTO: 0.02 K/UL — SIGNIFICANT CHANGE UP (ref 0–0.2)
BASOPHILS NFR BLD AUTO: 0.3 % — SIGNIFICANT CHANGE UP (ref 0–1)
EOSINOPHIL # BLD AUTO: 0.24 K/UL — SIGNIFICANT CHANGE UP (ref 0–0.7)
EOSINOPHIL NFR BLD AUTO: 3.6 % — SIGNIFICANT CHANGE UP (ref 0–8)
HCT VFR BLD CALC: 25.1 % — LOW (ref 42–52)
HGB BLD-MCNC: 8.3 G/DL — LOW (ref 14–18)
IMM GRANULOCYTES NFR BLD AUTO: 1 % — HIGH (ref 0.1–0.3)
LYMPHOCYTES # BLD AUTO: 0.88 K/UL — LOW (ref 1.2–3.4)
LYMPHOCYTES # BLD AUTO: 13 % — LOW (ref 20.5–51.1)
MCHC RBC-ENTMCNC: 30.1 PG — SIGNIFICANT CHANGE UP (ref 27–31)
MCHC RBC-ENTMCNC: 33.1 G/DL — SIGNIFICANT CHANGE UP (ref 32–37)
MCV RBC AUTO: 90.9 FL — SIGNIFICANT CHANGE UP (ref 80–94)
MONOCYTES # BLD AUTO: 0.87 K/UL — HIGH (ref 0.1–0.6)
MONOCYTES NFR BLD AUTO: 12.9 % — HIGH (ref 1.7–9.3)
NEUTROPHILS # BLD AUTO: 4.67 K/UL — SIGNIFICANT CHANGE UP (ref 1.4–6.5)
NEUTROPHILS NFR BLD AUTO: 69.2 % — SIGNIFICANT CHANGE UP (ref 42.2–75.2)
NRBC # BLD: 0 /100 WBCS — SIGNIFICANT CHANGE UP (ref 0–0)
PLATELET # BLD AUTO: 81 K/UL — LOW (ref 130–400)
RBC # BLD: 2.76 M/UL — LOW (ref 4.7–6.1)
RBC # FLD: 14.6 % — HIGH (ref 11.5–14.5)
WBC # BLD: 6.75 K/UL — SIGNIFICANT CHANGE UP (ref 4.8–10.8)
WBC # FLD AUTO: 6.75 K/UL — SIGNIFICANT CHANGE UP (ref 4.8–10.8)

## 2018-02-10 RX ADMIN — Medication 12.5 MILLIGRAM(S): at 05:30

## 2018-02-10 RX ADMIN — TAMSULOSIN HYDROCHLORIDE 0.4 MILLIGRAM(S): 0.4 CAPSULE ORAL at 11:15

## 2018-02-10 RX ADMIN — SIMVASTATIN 20 MILLIGRAM(S): 20 TABLET, FILM COATED ORAL at 21:11

## 2018-02-10 RX ADMIN — Medication 1 TABLET(S): at 11:11

## 2018-02-10 RX ADMIN — Medication 81 MILLIGRAM(S): at 12:38

## 2018-02-10 RX ADMIN — LOSARTAN POTASSIUM 12.5 MILLIGRAM(S): 100 TABLET, FILM COATED ORAL at 05:30

## 2018-02-10 RX ADMIN — PANTOPRAZOLE SODIUM 40 MILLIGRAM(S): 20 TABLET, DELAYED RELEASE ORAL at 06:18

## 2018-02-10 RX ADMIN — LOSARTAN POTASSIUM 12.5 MILLIGRAM(S): 100 TABLET, FILM COATED ORAL at 18:19

## 2018-02-10 RX ADMIN — SEVELAMER CARBONATE 800 MILLIGRAM(S): 2400 POWDER, FOR SUSPENSION ORAL at 18:19

## 2018-02-10 RX ADMIN — Medication 12.5 MILLIGRAM(S): at 18:19

## 2018-02-10 RX ADMIN — SEVELAMER CARBONATE 800 MILLIGRAM(S): 2400 POWDER, FOR SUSPENSION ORAL at 12:38

## 2018-02-10 RX ADMIN — SEVELAMER CARBONATE 800 MILLIGRAM(S): 2400 POWDER, FOR SUSPENSION ORAL at 08:51

## 2018-02-10 NOTE — PROGRESS NOTE ADULT - PROBLEM SELECTOR PLAN 5
- c/w home meds
- c/w home meds  - if continues to be high follow up with renal for recommendation to change/adjust meds
- c/w home meds

## 2018-02-10 NOTE — PROGRESS NOTE ADULT - PROBLEM SELECTOR PROBLEM 3
ESRD (end stage renal disease)
Coronary artery disease involving native coronary artery with other forms of angina pectoris, unspecified whether native or transplanted heart
ESRD (end stage renal disease)

## 2018-02-10 NOTE — PROGRESS NOTE ADULT - PROBLEM SELECTOR PLAN 1
- Seen by Heme - consult in chart  -? cause of blood loss GI vs heme etiology  - possible adverse effect of recent antibiotics  - s/p 2U pRBC. Follow CBC  - Anemia workup pending (LDH, hapto, b12, folate, iron studies, etc)  - Hematology on board - Rx's appreciated
bp controlled on meds, cont current meds
pt's anemia corrected, sxs improved, ? etiology of anemia as per heme
- Possible adverse effect of recent antibiotics vs hemopathology vs occult bleed.   - GI and  Heme/onc on board.   - F/U Bone marrow biopsy results. (sample sent on 1/9)   - trending CBC daily. (stable as of today)
- possible adverse effect of recent antibiotics  - Hg responded appropriately after 2U pRBC (6.1=>9.1)  - Hematology on board - Rx's appreciated  - trending CBC
- possible adverse effect of recent antibiotics  - s/p 2U pRBC. Follow CBC  - Anemia workup pending (LDH, hapto, b12, folate, iron studies, etc)  - Hematology on board - Rx's appreciated
- possible adverse effect of recent antibiotics vs hemopathology vs occult bleed.   - Hg responded appropriately after 2U pRBC (6.1=>9.1) but trending back down 7.8 today.   - GI and  Heme/onc on board.   - Hemeonc will preform a bone marrow biospy today.   - Hematology on board - recommended further workup to consult GI and possible bone marrow biopsy.  - trending CBC daily.
- possible adverse effect of recent antibiotics  - Hg responded appropriately after 2U pRBC (6.1=>9.1) but trending back down 8.4 today.   - Hematology on board - recommended further workup to consult GI and possible bone marrow biopsy.  - trending CBC

## 2018-02-10 NOTE — PROGRESS NOTE ADULT - PROBLEM SELECTOR PLAN 2
s/p bm bx await results  consider prbcs if h/h drops more  f/u cbc tday s/p bm bx await results  consider prbcs if h/h drops more  f/u cbc today

## 2018-02-10 NOTE — PROGRESS NOTE ADULT - PROBLEM SELECTOR PROBLEM 1
Anemia
Hypertension
Symptomatic anemia

## 2018-02-10 NOTE — PROGRESS NOTE ADULT - SUBJECTIVE AND OBJECTIVE BOX
SUBJ:No chest pain or shortness of breath.  pt slightly weaker and some soreness post bone marrow bx      MEDICATIONS  (STANDING):  aspirin  chewable 81 milliGRAM(s) Oral daily  darbepoetin Injectable ViaL 60 MICROGram(s) IV Push every 7 days  lactobacillus acidophilus 1 Tablet(s) Oral daily  losartan 12.5 milliGRAM(s) Oral two times a day  metoprolol     tartrate 12.5 milliGRAM(s) Oral every 12 hours  multivitamin 1 Tablet(s) Oral daily  pantoprazole    Tablet 40 milliGRAM(s) Oral before breakfast  sevelamer hydrochloride 800 milliGRAM(s) Oral three times a day with meals  simvastatin 20 milliGRAM(s) Oral at bedtime  tamsulosin 0.4 milliGRAM(s) Oral daily    MEDICATIONS  (PRN):  docusate sodium 100 milliGRAM(s) Oral daily PRN Constipation            Vital Signs Last 24 Hrs  T(C): 36.2 (10 Feb 2018 05:02), Max: 36.3 (09 Feb 2018 21:35)  T(F): 97.1 (10 Feb 2018 05:02), Max: 97.3 (09 Feb 2018 21:35)  HR: 73 (10 Feb 2018 05:02) (64 - 95)  BP: 118/53 (10 Feb 2018 05:02) (115/55 - 183/77)  BP(mean): --  RR: 18 (10 Feb 2018 05:02) (16 - 18)  SpO2: --    REVIEW OF SYSTEMS:  CONSTITUTIONAL: No fever, positve fatigue  NECK: No pain or stiffness  RESPIRATORY: No cough, wheezing, chills or hemoptysis; No shortness of breath at rest  CARDIOVASCULAR: No chest pain, palpitations, dizziness, or leg swelling  GASTROINTESTINAL: No abdominal or epigastric pain. No nausea, vomiting, or hematemesis; No diarrhea or constipation. No melena or hematochezia.  GENITOURINARY: No dysuria, pt with ross catheter  NEUROLOGICAL: No headaches, memory loss, loss of strength, numbness, or tremors  SKIN: chronic lesions  MUSCULOSKELETAL: No joint pain or swelling; No muscle, back, or extremity pain  HEME/LYMPH: No easy bruising, or bleeding gums    PHYSICAL EXAM:  · CONSTITUTIONAL:	Well-developed, well nourished      · NECK:	No bruits; no thyromegaly or nodules, no jvd  ·RESPIRATORY:   airway patent; breath sounds equal; good air movement; respirations non-labored; clear to auscultation bilaterally; no chest wall tenderness; no intercostal retractions; no rales,rhonchi or wheeze  · CARDIOVASCULAR	regular rate and rhythm  no rub  2/6 ruddy lsb  normal PMI  . GASTROINTESTINAL:  no distention; no masses palpable; bowel sounds normal; no rebound tenderness; no guarding; no rigidity; no organomegaly  · EXTREMITIES: No cyanosis, clubbing or edema  · · MUSCULOSKELETAL:   No calf tenderness  no joint swelling	    LABS             7.8    6.82  )-----------( 59       ( 09 Feb 2018 07:42 )             23.0                   I&O's Summary    09 Feb 2018 07:01  -  10 Feb 2018 07:00  --------------------------------------------------------  IN: 0 mL / OUT: 2350 mL / NET: -2350 mL      BNP  RADIOLOGY & ADDITIONAL STUDIES:    IMPRESSION AND PLAN: SUBJ:No chest pain or shortness of breath.  pt with some soreness post bone marrow bx      MEDICATIONS  (STANDING):  aspirin  chewable 81 milliGRAM(s) Oral daily  darbepoetin Injectable ViaL 60 MICROGram(s) IV Push every 7 days  lactobacillus acidophilus 1 Tablet(s) Oral daily  losartan 12.5 milliGRAM(s) Oral two times a day  metoprolol     tartrate 12.5 milliGRAM(s) Oral every 12 hours  multivitamin 1 Tablet(s) Oral daily  pantoprazole    Tablet 40 milliGRAM(s) Oral before breakfast  sevelamer hydrochloride 800 milliGRAM(s) Oral three times a day with meals  simvastatin 20 milliGRAM(s) Oral at bedtime  tamsulosin 0.4 milliGRAM(s) Oral daily    MEDICATIONS  (PRN):  docusate sodium 100 milliGRAM(s) Oral daily PRN Constipation            Vital Signs Last 24 Hrs  T(C): 36.2 (10 Feb 2018 05:02), Max: 36.3 (09 Feb 2018 21:35)  T(F): 97.1 (10 Feb 2018 05:02), Max: 97.3 (09 Feb 2018 21:35)  HR: 73 (10 Feb 2018 05:02) (64 - 95)  BP: 118/53 (10 Feb 2018 05:02) (115/55 - 183/77)  BP(mean): --  RR: 18 (10 Feb 2018 05:02) (16 - 18)  SpO2: --    REVIEW OF SYSTEMS:  CONSTITUTIONAL: No fever, positve fatigue  NECK: No pain or stiffness  RESPIRATORY: No cough, wheezing, chills or hemoptysis; No shortness of breath at rest  CARDIOVASCULAR: No chest pain, palpitations, dizziness, or leg swelling  GASTROINTESTINAL: No abdominal or epigastric pain. No nausea, vomiting, or hematemesis; No diarrhea or constipation. No melena or hematochezia.  GENITOURINARY: No dysuria, pt with ross catheter  NEUROLOGICAL: No headaches, memory loss, loss of strength, numbness, or tremors  SKIN: chronic lesions  MUSCULOSKELETAL: No joint pain or swelling; No muscle, back, or extremity pain  HEME/LYMPH: No easy bruising, or bleeding gums    PHYSICAL EXAM:  · CONSTITUTIONAL:	Well-developed, well nourished      · NECK:	No bruits; no thyromegaly or nodules, no jvd  ·RESPIRATORY:   airway patent; breath sounds equal; good air movement; respirations non-labored; clear to auscultation bilaterally; no chest wall tenderness; no intercostal retractions; no rales,rhonchi or wheeze  · CARDIOVASCULAR	regular rate and rhythm  no rub  2/6 ruddy lsb  normal PMI  . GASTROINTESTINAL:  no distention; no masses palpable; bowel sounds normal; no rebound tenderness; no guarding; no rigidity; no organomegaly  · EXTREMITIES: No cyanosis, clubbing or edema  · · MUSCULOSKELETAL:   No calf tenderness  no joint swelling	    LABS             7.8    6.82  )-----------( 59       ( 09 Feb 2018 07:42 )             23.0                   I&O's Summary    09 Feb 2018 07:01  -  10 Feb 2018 07:00  --------------------------------------------------------  IN: 0 mL / OUT: 2350 mL / NET: -2350 mL      BNP  RADIOLOGY & ADDITIONAL STUDIES:    IMPRESSION AND PLAN:

## 2018-02-10 NOTE — PROGRESS NOTE ADULT - PROBLEM SELECTOR PLAN 3
- c/w HD  - BMP daily
- c/w HD  - renal say pt and consult is in chart
cont meds including asa for now as long as plts> 50,000
hd as per renal
- HD as per renal.   - BMP daily
- c/w HD
- c/w HD
- HD today  - BMP daily

## 2018-02-10 NOTE — PROGRESS NOTE ADULT - PROBLEM SELECTOR PLAN 4
- Cardio on board - c/w ASA + statin, holding Brilinta for now
- Cardio on board - c/w ASA + statin, holding Brilinta for now
cont asa for now but if significant bleeding or plts < 50,000 may have to reconsider stopping.
pt with indwelling ross was to have trial of removal as per urology.  discussed with house staff
- Cardio on board - c/w ASA + statin, holding Brilinta for now
- Cardio on board - c/w ASA + statin, holding Brilinta for now
- Cardio on board - c/w ASA + statin.
- Cardio on board - c/w ASA + statin.

## 2018-02-10 NOTE — PROGRESS NOTE ADULT - SUBJECTIVE AND OBJECTIVE BOX
Patient was seen and examined. Spoke with RN. Chart reviewed. sitting comf oob, wife at bedside, discussed indetail    No events overnight.  Vital Signs Last 24 Hrs  T(F): 97.1 (10 Feb 2018 05:02), Max: 97.3 (09 Feb 2018 21:35)  HR: 73 (10 Feb 2018 05:02) (69 - 95)  BP: 118/53 (10 Feb 2018 05:02) (115/55 - 140/63)  SpO2: --  MEDICATIONS  (STANDING):  aspirin  chewable 81 milliGRAM(s) Oral daily  darbepoetin Injectable ViaL 60 MICROGram(s) IV Push every 7 days  lactobacillus acidophilus 1 Tablet(s) Oral daily  losartan 12.5 milliGRAM(s) Oral two times a day  metoprolol     tartrate 12.5 milliGRAM(s) Oral every 12 hours  multivitamin 1 Tablet(s) Oral daily  pantoprazole    Tablet 40 milliGRAM(s) Oral before breakfast  sevelamer hydrochloride 800 milliGRAM(s) Oral three times a day with meals  simvastatin 20 milliGRAM(s) Oral at bedtime  tamsulosin 0.4 milliGRAM(s) Oral daily    MEDICATIONS  (PRN):  docusate sodium 100 milliGRAM(s) Oral daily PRN Constipation    Labs:                        8.3    6.75  )-----------( 81       ( 10 Feb 2018 09:17 )             25.1                         7.8    6.82  )-----------( 59       ( 09 Feb 2018 07:42 )             23.0                   Radiology:    General: comfortable, NAD  Neurology: A&Ox3, nonfocal  Head:  Normocephalic, atraumatic  ENT:  Mucosa moist, no ulcerations  Neck:  Supple, no JVD,   Skin: no breakdowns (as per RN)  Resp: CTA B/L  CV: RRR, S1S2,   GI: Soft, NT, bowel sounds  MS: No edema, + peripheral pulses, FROM all 4 extremity  + ross

## 2018-02-10 NOTE — PROGRESS NOTE ADULT - SUBJECTIVE AND OBJECTIVE BOX
SUBJECTIVE:    Patient is a 87y old Male who presents with a chief complaint of shortness of breath on minimal exertion (06 Feb 2018 14:34)    Currently admitted to medicine with the primary diagnosis of Symptomatic anemia     Today is hospital day 4d. This morning he is resting comfortably in bed and reports no new issues or overnight events.     PAST MEDICAL & SURGICAL HISTORY  Urinary retention  Colon perforation: 2011  TIA (transient ischemic attack): 2008  Gout  Chronic renal failure: on HD  Aortic stenosis, severe  Myocardial infarction  Hypertension  Hyperlipidemia  Congestive heart failure  History of cholecystectomy  Bilateral cataracts  History of hip replacement, total, left  History of colon resection: secondary to colon perforation  AV fistula: right upper arm  Gallbladder disorder: stent removal 10/2017  S/P TAVR (transcatheter aortic valve replacement)    SOCIAL HISTORY:  Negative for smoking/alcohol/drug use.     ALLERGIES:  Biaxin (Unknown)  Ceftin (Unknown)  Plavix (Unknown)  Vitamin D (Unknown)  Vitamin D3 (Unknown)    MEDICATIONS:  STANDING MEDICATIONS  aspirin  chewable 81 milliGRAM(s) Oral daily  darbepoetin Injectable ViaL 60 MICROGram(s) IV Push every 7 days  lactobacillus acidophilus 1 Tablet(s) Oral daily  losartan 12.5 milliGRAM(s) Oral two times a day  metoprolol     tartrate 12.5 milliGRAM(s) Oral every 12 hours  multivitamin 1 Tablet(s) Oral daily  pantoprazole    Tablet 40 milliGRAM(s) Oral before breakfast  sevelamer hydrochloride 800 milliGRAM(s) Oral three times a day with meals  simvastatin 20 milliGRAM(s) Oral at bedtime  tamsulosin 0.4 milliGRAM(s) Oral daily    PRN MEDICATIONS  docusate sodium 100 milliGRAM(s) Oral daily PRN    VITALS:   T(F): 95.2  HR: 74  BP: 173/73  RR: 18  SpO2: --    LABS:                        8.3    6.75  )-----------( 81       ( 10 Feb 2018 09:17 )             25.1     PHYSICAL EXAM:  GEN: No acute distress  LUNGS: Clear to auscultation bilaterally   HEART: S1/S2 present. RRR.   ABD: Soft, non-tender, non-distended. Bowel sounds present  EXT: NC/NC/NE/2+PP/WADE  NEURO: AAOX3

## 2018-02-10 NOTE — PROGRESS NOTE ADULT - ASSESSMENT
87/M with ESRD , HTN, AS-s/p TAVR, presents with severe anemia of 6.1 g/dl and thrombocytopenia.    Anemia - Hb drifting down again from 9.1 to 8.5  - s/p RBC tx 2 Units  - on Aranesp 60 with HD   - seen by GI    Thrombocytopenia - pt does NOT get HEparin with HD                             - possibly drug induced                             - follow hem/onc rx    ESRD - on HD today

## 2018-02-10 NOTE — PROGRESS NOTE ADULT - ASSESSMENT
Doing well pleasant sitting OOB in chair.  86 YO male with bicytopenia. Thrombocytopenia trending upward. Hemoglobin stable. Will require further workup before safe for discharge.

## 2018-02-10 NOTE — PROGRESS NOTE ADULT - PROBLEM SELECTOR PROBLEM 4
Coronary artery disease involving native coronary artery with other forms of angina pectoris, unspecified whether native or transplanted heart
Urinary retention
Coronary artery disease involving native coronary artery with other forms of angina pectoris, unspecified whether native or transplanted heart

## 2018-02-10 NOTE — PROGRESS NOTE ADULT - PROBLEM SELECTOR PROBLEM 2
Symptomatic anemia
Thrombocytopenia

## 2018-02-11 LAB
BASOPHILS # BLD AUTO: 0.02 K/UL — SIGNIFICANT CHANGE UP (ref 0–0.2)
BASOPHILS NFR BLD AUTO: 0.3 % — SIGNIFICANT CHANGE UP (ref 0–1)
EOSINOPHIL # BLD AUTO: 0.25 K/UL — SIGNIFICANT CHANGE UP (ref 0–0.7)
EOSINOPHIL NFR BLD AUTO: 3.8 % — SIGNIFICANT CHANGE UP (ref 0–8)
HCT VFR BLD CALC: 25.3 % — LOW (ref 42–52)
HGB BLD-MCNC: 8.3 G/DL — LOW (ref 14–18)
IMM GRANULOCYTES NFR BLD AUTO: 0.9 % — HIGH (ref 0.1–0.3)
LYMPHOCYTES # BLD AUTO: 1.39 K/UL — SIGNIFICANT CHANGE UP (ref 1.2–3.4)
LYMPHOCYTES # BLD AUTO: 21 % — SIGNIFICANT CHANGE UP (ref 20.5–51.1)
MCHC RBC-ENTMCNC: 30.3 PG — SIGNIFICANT CHANGE UP (ref 27–31)
MCHC RBC-ENTMCNC: 32.8 G/DL — SIGNIFICANT CHANGE UP (ref 32–37)
MCV RBC AUTO: 92.3 FL — SIGNIFICANT CHANGE UP (ref 80–94)
MONOCYTES # BLD AUTO: 0.75 K/UL — HIGH (ref 0.1–0.6)
MONOCYTES NFR BLD AUTO: 11.3 % — HIGH (ref 1.7–9.3)
NEUTROPHILS # BLD AUTO: 4.15 K/UL — SIGNIFICANT CHANGE UP (ref 1.4–6.5)
NEUTROPHILS NFR BLD AUTO: 62.7 % — SIGNIFICANT CHANGE UP (ref 42.2–75.2)
NRBC # BLD: 0 /100 WBCS — SIGNIFICANT CHANGE UP (ref 0–0)
PLATELET # BLD AUTO: 97 K/UL — LOW (ref 130–400)
RBC # BLD: 2.74 M/UL — LOW (ref 4.7–6.1)
RBC # FLD: 14.7 % — HIGH (ref 11.5–14.5)
WBC # BLD: 6.62 K/UL — SIGNIFICANT CHANGE UP (ref 4.8–10.8)
WBC # FLD AUTO: 6.62 K/UL — SIGNIFICANT CHANGE UP (ref 4.8–10.8)

## 2018-02-11 RX ADMIN — SEVELAMER CARBONATE 800 MILLIGRAM(S): 2400 POWDER, FOR SUSPENSION ORAL at 08:06

## 2018-02-11 RX ADMIN — Medication 12.5 MILLIGRAM(S): at 18:35

## 2018-02-11 RX ADMIN — Medication 1 TABLET(S): at 11:24

## 2018-02-11 RX ADMIN — SEVELAMER CARBONATE 800 MILLIGRAM(S): 2400 POWDER, FOR SUSPENSION ORAL at 13:01

## 2018-02-11 RX ADMIN — Medication 81 MILLIGRAM(S): at 11:22

## 2018-02-11 RX ADMIN — LOSARTAN POTASSIUM 12.5 MILLIGRAM(S): 100 TABLET, FILM COATED ORAL at 18:34

## 2018-02-11 RX ADMIN — TAMSULOSIN HYDROCHLORIDE 0.4 MILLIGRAM(S): 0.4 CAPSULE ORAL at 11:24

## 2018-02-11 RX ADMIN — Medication 1 TABLET(S): at 11:23

## 2018-02-11 RX ADMIN — LOSARTAN POTASSIUM 12.5 MILLIGRAM(S): 100 TABLET, FILM COATED ORAL at 05:33

## 2018-02-11 RX ADMIN — PANTOPRAZOLE SODIUM 40 MILLIGRAM(S): 20 TABLET, DELAYED RELEASE ORAL at 06:01

## 2018-02-11 RX ADMIN — SEVELAMER CARBONATE 800 MILLIGRAM(S): 2400 POWDER, FOR SUSPENSION ORAL at 17:44

## 2018-02-11 RX ADMIN — SIMVASTATIN 20 MILLIGRAM(S): 20 TABLET, FILM COATED ORAL at 21:56

## 2018-02-11 RX ADMIN — Medication 12.5 MILLIGRAM(S): at 05:33

## 2018-02-11 NOTE — PROGRESS NOTE ADULT - SUBJECTIVE AND OBJECTIVE BOX
Nephrology progress note    Patient is seen and examined, No c/o SOB or weakness.    Allergies:  Biaxin (Unknown)  Ceftin (Unknown)  Plavix (Unknown)  Vitamin D (Unknown)  Vitamin D3 (Unknown)    Hospital Medications:   MEDICATIONS  (STANDING):  aspirin  chewable 81 milliGRAM(s) Oral daily  darbepoetin Injectable ViaL 60 MICROGram(s) IV Push every 7 days  lactobacillus acidophilus 1 Tablet(s) Oral daily  losartan 12.5 milliGRAM(s) Oral two times a day  metoprolol     tartrate 12.5 milliGRAM(s) Oral every 12 hours  multivitamin 1 Tablet(s) Oral daily  pantoprazole    Tablet 40 milliGRAM(s) Oral before breakfast  sevelamer hydrochloride 800 milliGRAM(s) Oral three times a day with meals  simvastatin 20 milliGRAM(s) Oral at bedtime  tamsulosin 0.4 milliGRAM(s) Oral daily        VITALS:  T(F): 96.2 (02-10-18 @ 20:35), Max: 97.1 (02-10-18 @ 05:02)  HR: 69 (02-10-18 @ 20:35)  BP: 150/67 (02-10-18 @ 20:35)  RR: 18 (02-10-18 @ 20:35)  SpO2: --  Wt(kg): --    02-08 @ 07:01  -  02-09 @ 07:00  --------------------------------------------------------  IN: 0 mL / OUT: 150 mL / NET: -150 mL    02-09 @ 07:01  -  02-10 @ 07:00  --------------------------------------------------------  IN: 0 mL / OUT: 2350 mL / NET: -2350 mL    02-10 @ 07:01  -  02-11 @ 04:34  --------------------------------------------------------  IN: 0 mL / OUT: 200 mL / NET: -200 mL          PHYSICAL EXAM:  Constitutional: NAD  HEENT: anicteric sclera, oropharynx clear, MMM  Neck: No JVD  Respiratory: CTAB, no wheezes, rales or rhonchi  Cardiovascular: S1, S2, RRR  Gastrointestinal: BS+, soft, NT/ND  Extremities: No cyanosis or clubbing. No peripheral edema  Neurological: A/O x 3, no focal deficits  : No CVA tenderness. No ross.   Skin: No rashes  Vascular Access:    LABS:                              8.3    6.75  )-----------( 81       ( 10 Feb 2018 09:17 )             25.1     Creatinine, Serum: 4.0 mg/dL (02-08-18 @ 05:36)  Creatinine, Serum: 4.6 mg/dL (02-06-18 @ 11:22)  Creatinine, Serum: 3.7 mg/dL (02-05-18 @ 20:31)          Urine Studies:      RADIOLOGY & ADDITIONAL STUDIES:

## 2018-02-11 NOTE — PROGRESS NOTE ADULT - ASSESSMENT
87/M with ESRD , HTN, AS-s/p TAVR, presents with severe anemia of 6.1 g/dl and thrombocytopenia.    Anemia - Hb drifting down again from 8.5 to 8.3  - on Aranesp 60 with HD   - seen by GI    Thrombocytopenia - pt does NOT get HEparin with HD                             - possibly drug induced                             - follow hem/onc rx    ESRD on HD (MWF)

## 2018-02-12 VITALS — RESPIRATION RATE: 18 BRPM | HEART RATE: 62 BPM | DIASTOLIC BLOOD PRESSURE: 61 MMHG | SYSTOLIC BLOOD PRESSURE: 148 MMHG

## 2018-02-12 LAB
BASOPHILS # BLD AUTO: 0.01 K/UL — SIGNIFICANT CHANGE UP (ref 0–0.2)
BASOPHILS NFR BLD AUTO: 0.2 % — SIGNIFICANT CHANGE UP (ref 0–1)
EOSINOPHIL # BLD AUTO: 0.22 K/UL — SIGNIFICANT CHANGE UP (ref 0–0.7)
EOSINOPHIL NFR BLD AUTO: 3.4 % — SIGNIFICANT CHANGE UP (ref 0–8)
HCT VFR BLD CALC: 25.3 % — LOW (ref 42–52)
HGB BLD-MCNC: 8.3 G/DL — LOW (ref 14–18)
IMM GRANULOCYTES NFR BLD AUTO: 0.8 % — HIGH (ref 0.1–0.3)
LYMPHOCYTES # BLD AUTO: 1.1 K/UL — LOW (ref 1.2–3.4)
LYMPHOCYTES # BLD AUTO: 17.2 % — LOW (ref 20.5–51.1)
MCHC RBC-ENTMCNC: 30 PG — SIGNIFICANT CHANGE UP (ref 27–31)
MCHC RBC-ENTMCNC: 32.8 G/DL — SIGNIFICANT CHANGE UP (ref 32–37)
MCV RBC AUTO: 91.3 FL — SIGNIFICANT CHANGE UP (ref 80–94)
MONOCYTES # BLD AUTO: 0.66 K/UL — HIGH (ref 0.1–0.6)
MONOCYTES NFR BLD AUTO: 10.3 % — HIGH (ref 1.7–9.3)
NEUTROPHILS # BLD AUTO: 4.36 K/UL — SIGNIFICANT CHANGE UP (ref 1.4–6.5)
NEUTROPHILS NFR BLD AUTO: 68.1 % — SIGNIFICANT CHANGE UP (ref 42.2–75.2)
NRBC # BLD: 0 /100 WBCS — SIGNIFICANT CHANGE UP (ref 0–0)
PLATELET # BLD AUTO: 131 K/UL — SIGNIFICANT CHANGE UP (ref 130–400)
RBC # BLD: 2.77 M/UL — LOW (ref 4.7–6.1)
RBC # FLD: 15.5 % — HIGH (ref 11.5–14.5)
WBC # BLD: 6.4 K/UL — SIGNIFICANT CHANGE UP (ref 4.8–10.8)
WBC # FLD AUTO: 6.4 K/UL — SIGNIFICANT CHANGE UP (ref 4.8–10.8)

## 2018-02-12 RX ORDER — DARBEPOETIN ALFA IN POLYSORBAT 200MCG/0.4
0 PEN INJECTOR (ML) SUBCUTANEOUS
Qty: 0 | Refills: 0 | COMMUNITY
Start: 2018-02-12

## 2018-02-12 RX ADMIN — Medication 1 TABLET(S): at 13:01

## 2018-02-12 RX ADMIN — Medication 81 MILLIGRAM(S): at 13:01

## 2018-02-12 RX ADMIN — LOSARTAN POTASSIUM 12.5 MILLIGRAM(S): 100 TABLET, FILM COATED ORAL at 05:32

## 2018-02-12 RX ADMIN — Medication 12.5 MILLIGRAM(S): at 05:32

## 2018-02-12 RX ADMIN — PANTOPRAZOLE SODIUM 40 MILLIGRAM(S): 20 TABLET, DELAYED RELEASE ORAL at 06:10

## 2018-02-12 RX ADMIN — Medication 1 TABLET(S): at 13:02

## 2018-02-12 RX ADMIN — SEVELAMER CARBONATE 800 MILLIGRAM(S): 2400 POWDER, FOR SUSPENSION ORAL at 13:01

## 2018-02-12 RX ADMIN — TAMSULOSIN HYDROCHLORIDE 0.4 MILLIGRAM(S): 0.4 CAPSULE ORAL at 13:02

## 2018-02-12 NOTE — PROGRESS NOTE ADULT - SUBJECTIVE AND OBJECTIVE BOX
[facility] FOLLOW UP NOTE  --------------------------------------------------------------------------------  Chief Complaint:    no new complaints           Standing Inpatient Medications  aspirin  chewable 81 milliGRAM(s) Oral daily  darbepoetin Injectable ViaL 60 MICROGram(s) IV Push every 7 days  lactobacillus acidophilus 1 Tablet(s) Oral daily  losartan 12.5 milliGRAM(s) Oral two times a day  metoprolol     tartrate 12.5 milliGRAM(s) Oral every 12 hours  multivitamin 1 Tablet(s) Oral daily  pantoprazole    Tablet 40 milliGRAM(s) Oral before breakfast  sevelamer hydrochloride 800 milliGRAM(s) Oral three times a day with meals  simvastatin 20 milliGRAM(s) Oral at bedtime  tamsulosin 0.4 milliGRAM(s) Oral daily    PRN Inpatient Medications  docusate sodium 100 milliGRAM(s) Oral daily PRN        VITALS/PHYSICAL EXAM  --------------------------------------------------------------------------------  T(C): 35.7 (02-12-18 @ 05:57), Max: 35.7 (02-12-18 @ 05:57)  HR: 73 (02-12-18 @ 05:57) (64 - 94)  BP: 184/79 (02-12-18 @ 05:57) (138/63 - 197/82)  RR: 19 (02-12-18 @ 05:57) (18 - 19)  SpO2: 97% (02-11-18 @ 11:56) (97% - 97%)      Weight (kg): 73 (02-11-18 @ 12:32)      02-11-18 @ 07:01  -  02-12-18 @ 07:00  --------------------------------------------------------  IN: 0 mL / OUT: 250 mL / NET: -250 mL    02-12-18 @ 07:01  -  02-12-18 @ 09:31  --------------------------------------------------------  IN: 0 mL / OUT: 220 mL / NET: -220 mL      Physical Exam:  	Gen: NAD, well-appearing  	Pulm: CTA B/L  	CV: RRR, S1S2; no rub  	Abd: +BS, soft, nontender/nondistended  	LE:  no edema  	Vascular access:    LABS/STUDIES  --------------------------------------------------------------------------------              8.3    6.40  >-----------<  131      [02-12-18 @ 05:30]              25.3                 Creatinine Trend:  SCr 4.0 [02-08 @ 05:36]  SCr 4.6 [02-06 @ 11:22]  SCr 3.7 [02-05 @ 20:31]        Ferritin 2300.0      [02-08-18 @ 05:36]  HbA1c 4.3      [11-01-16 @ 15:19] [facility] FOLLOW UP NOTE  --------------------------------------------------------------------------------  Chief Complaint:    no new complaints           Standing Inpatient Medications  aspirin  chewable 81 milliGRAM(s) Oral daily  darbepoetin Injectable ViaL 60 MICROGram(s) IV Push every 7 days  lactobacillus acidophilus 1 Tablet(s) Oral daily  losartan 12.5 milliGRAM(s) Oral two times a day  metoprolol     tartrate 12.5 milliGRAM(s) Oral every 12 hours  multivitamin 1 Tablet(s) Oral daily  pantoprazole    Tablet 40 milliGRAM(s) Oral before breakfast  sevelamer hydrochloride 800 milliGRAM(s) Oral three times a day with meals  simvastatin 20 milliGRAM(s) Oral at bedtime  tamsulosin 0.4 milliGRAM(s) Oral daily    PRN Inpatient Medications  docusate sodium 100 milliGRAM(s) Oral daily PRN        VITALS/PHYSICAL EXAM  --------------------------------------------------------------------------------  T(C): 35.7 (02-12-18 @ 05:57), Max: 35.7 (02-12-18 @ 05:57)  HR: 73 (02-12-18 @ 05:57) (64 - 94)  BP: 184/79 (02-12-18 @ 05:57) (138/63 - 197/82)  RR: 19 (02-12-18 @ 05:57) (18 - 19)  SpO2: 97% (02-11-18 @ 11:56) (97% - 97%)      Weight (kg): 73 (02-11-18 @ 12:32)      02-11-18 @ 07:01  -  02-12-18 @ 07:00  --------------------------------------------------------  IN: 0 mL / OUT: 250 mL / NET: -250 mL    02-12-18 @ 07:01  -  02-12-18 @ 09:31  --------------------------------------------------------  IN: 0 mL / OUT: 220 mL / NET: -220 mL      Physical Exam:  	Gen: NAD, well-appearing  	Pulm: CTA B/L  	CV: RRR, S1S2; no rub  	Abd: +BS, soft, nontender/nondistended  	LE:  no edema  	Vascular access: av fistula     LABS/STUDIES  --------------------------------------------------------------------------------              8.3    6.40  >-----------<  131      [02-12-18 @ 05:30]              25.3                 Creatinine Trend:  SCr 4.0 [02-08 @ 05:36]  SCr 4.6 [02-06 @ 11:22]  SCr 3.7 [02-05 @ 20:31]        Ferritin 2300.0      [02-08-18 @ 05:36]  HbA1c 4.3      [11-01-16 @ 15:19]

## 2018-02-12 NOTE — PROGRESS NOTE ADULT - SUBJECTIVE AND OBJECTIVE BOX
Patient was seen and examined. Spoke with RN. Chart reviewed.    No events overnight.  Vital Signs Last 24 Hrs  T(F): 96.2 (12 Feb 2018 05:57), Max: 96.2 (12 Feb 2018 05:57)  HR: 67 (12 Feb 2018 08:30) (64 - 94)  BP: 154/68 (12 Feb 2018 08:30) (138/63 - 197/82)  SpO2: 97% (11 Feb 2018 11:56) (97% - 97%)  MEDICATIONS  (STANDING):  aspirin  chewable 81 milliGRAM(s) Oral daily  darbepoetin Injectable ViaL 60 MICROGram(s) IV Push every 7 days  lactobacillus acidophilus 1 Tablet(s) Oral daily  losartan 12.5 milliGRAM(s) Oral two times a day  metoprolol     tartrate 12.5 milliGRAM(s) Oral every 12 hours  multivitamin 1 Tablet(s) Oral daily  pantoprazole    Tablet 40 milliGRAM(s) Oral before breakfast  sevelamer hydrochloride 800 milliGRAM(s) Oral three times a day with meals  simvastatin 20 milliGRAM(s) Oral at bedtime  tamsulosin 0.4 milliGRAM(s) Oral daily    MEDICATIONS  (PRN):  docusate sodium 100 milliGRAM(s) Oral daily PRN Constipation    Labs:                        8.3    6.40  )-----------( 131      ( 12 Feb 2018 05:30 )             25.3                         8.3    6.62  )-----------( 97       ( 11 Feb 2018 05:50 )             25.3                   Radiology:    General: comfortable, NAD  Neurology: A&Ox3, nonfocal  Head:  Normocephalic, atraumatic  ENT:  Mucosa moist, no ulcerations  Neck:  Supple, no JVD,   Skin: no breakdowns (as per RN)  Resp: CTA B/L  CV: RRR, S1S2,   GI: Soft, NT, bowel sounds  MS: No edema, + peripheral pulses, FROM all 4 extremity  + ross

## 2018-02-12 NOTE — PROGRESS NOTE ADULT - ASSESSMENT
87/M with ESRD , HTN, AS-s/p TAVR, presents with severe anemia of 6.1 g/dl and thrombocytopenia.    Anemia - Hb drifting down again from 9.1 to 8.5  - s/p RBC tx 2 Units  - on Aranesp 60 with HD   - seen by GI    Thrombocytopenia - pt does NOT get HEparin with HD                             - possibly drug induced                             - follow hem/onc rx    ESRD - on HD today  dc today

## 2018-02-12 NOTE — PROGRESS NOTE ADULT - ASSESSMENT
ESRD/anemia/thromboctopenia/uncontrolled HTN  # hd today, standard bath , uf 2 liters as tolerated  # if bp remains elevated , will start norvasc 5   # on darbo, might need to hold if BP remains elevated  # followed by hemoc  # check ph  # will follow

## 2018-02-12 NOTE — PROGRESS NOTE ADULT - PROVIDER SPECIALTY LIST ADULT
Cardiology
Cardiology
Internal Medicine
Nephrology
Internal Medicine

## 2018-02-14 DIAGNOSIS — E87.5 HYPERKALEMIA: ICD-10-CM

## 2018-02-14 DIAGNOSIS — R06.02 SHORTNESS OF BREATH: ICD-10-CM

## 2018-02-14 DIAGNOSIS — J90 PLEURAL EFFUSION, NOT ELSEWHERE CLASSIFIED: ICD-10-CM

## 2018-02-14 DIAGNOSIS — I13.2 HYPERTENSIVE HEART AND CHRONIC KIDNEY DISEASE WITH HEART FAILURE AND WITH STAGE 5 CHRONIC KIDNEY DISEASE, OR END STAGE RENAL DISEASE: ICD-10-CM

## 2018-02-14 DIAGNOSIS — I35.0 NONRHEUMATIC AORTIC (VALVE) STENOSIS: ICD-10-CM

## 2018-02-14 DIAGNOSIS — D50.0 IRON DEFICIENCY ANEMIA SECONDARY TO BLOOD LOSS (CHRONIC): ICD-10-CM

## 2018-02-14 DIAGNOSIS — T36.8X5A ADVERSE EFFECT OF OTHER SYSTEMIC ANTIBIOTICS, INITIAL ENCOUNTER: ICD-10-CM

## 2018-02-14 DIAGNOSIS — Z87.891 PERSONAL HISTORY OF NICOTINE DEPENDENCE: ICD-10-CM

## 2018-02-14 DIAGNOSIS — Z96.0 PRESENCE OF UROGENITAL IMPLANTS: ICD-10-CM

## 2018-02-14 DIAGNOSIS — D69.6 THROMBOCYTOPENIA, UNSPECIFIED: ICD-10-CM

## 2018-02-14 DIAGNOSIS — N18.6 END STAGE RENAL DISEASE: ICD-10-CM

## 2018-02-14 DIAGNOSIS — I50.9 HEART FAILURE, UNSPECIFIED: ICD-10-CM

## 2018-02-14 DIAGNOSIS — J98.11 ATELECTASIS: ICD-10-CM

## 2018-02-14 DIAGNOSIS — D64.89 OTHER SPECIFIED ANEMIAS: ICD-10-CM

## 2018-02-14 DIAGNOSIS — J43.2 CENTRILOBULAR EMPHYSEMA: ICD-10-CM

## 2018-02-14 DIAGNOSIS — R33.9 RETENTION OF URINE, UNSPECIFIED: ICD-10-CM

## 2018-02-14 DIAGNOSIS — Z95.3 PRESENCE OF XENOGENIC HEART VALVE: ICD-10-CM

## 2018-02-14 DIAGNOSIS — Z96.642 PRESENCE OF LEFT ARTIFICIAL HIP JOINT: ICD-10-CM

## 2018-02-14 DIAGNOSIS — Z86.73 PERSONAL HISTORY OF TRANSIENT ISCHEMIC ATTACK (TIA), AND CEREBRAL INFARCTION WITHOUT RESIDUAL DEFICITS: ICD-10-CM

## 2018-02-14 DIAGNOSIS — D69.59 OTHER SECONDARY THROMBOCYTOPENIA: ICD-10-CM

## 2018-02-14 DIAGNOSIS — E78.5 HYPERLIPIDEMIA, UNSPECIFIED: ICD-10-CM

## 2018-02-14 DIAGNOSIS — I25.10 ATHEROSCLEROTIC HEART DISEASE OF NATIVE CORONARY ARTERY WITHOUT ANGINA PECTORIS: ICD-10-CM

## 2018-02-14 DIAGNOSIS — I25.2 OLD MYOCARDIAL INFARCTION: ICD-10-CM

## 2018-02-14 DIAGNOSIS — Z95.5 PRESENCE OF CORONARY ANGIOPLASTY IMPLANT AND GRAFT: ICD-10-CM

## 2018-02-14 DIAGNOSIS — Z99.2 DEPENDENCE ON RENAL DIALYSIS: ICD-10-CM

## 2018-02-14 LAB
TM INTERPRETATION: SIGNIFICANT CHANGE UP
TM INTERPRETATION: SIGNIFICANT CHANGE UP

## 2018-02-15 LAB — CHROM ANALY INTERPHASE BLD FISH-IMP: SIGNIFICANT CHANGE UP

## 2018-02-20 LAB — CHROM ANALY OVERALL INTERP SPEC-IMP: SIGNIFICANT CHANGE UP

## 2018-02-21 LAB — HEMATOPATHOLOGY REPORT: SIGNIFICANT CHANGE UP

## 2018-02-22 LAB — DNA PLOIDY SPEC FC-IMP: SIGNIFICANT CHANGE UP

## 2018-03-20 ENCOUNTER — APPOINTMENT (OUTPATIENT)
Dept: HEMATOLOGY ONCOLOGY | Facility: CLINIC | Age: 83
End: 2018-03-20

## 2018-03-20 ENCOUNTER — OUTPATIENT (OUTPATIENT)
Dept: OUTPATIENT SERVICES | Facility: HOSPITAL | Age: 83
LOS: 1 days | Discharge: HOME | End: 2018-03-20

## 2018-03-20 ENCOUNTER — LABORATORY RESULT (OUTPATIENT)
Age: 83
End: 2018-03-20

## 2018-03-20 VITALS
TEMPERATURE: 96.3 F | DIASTOLIC BLOOD PRESSURE: 74 MMHG | WEIGHT: 152 LBS | HEART RATE: 65 BPM | SYSTOLIC BLOOD PRESSURE: 179 MMHG | BODY MASS INDEX: 23.86 KG/M2 | HEIGHT: 67 IN

## 2018-03-20 DIAGNOSIS — Z95.2 PRESENCE OF PROSTHETIC HEART VALVE: Chronic | ICD-10-CM

## 2018-03-20 DIAGNOSIS — Z87.891 PERSONAL HISTORY OF NICOTINE DEPENDENCE: ICD-10-CM

## 2018-03-20 DIAGNOSIS — N18.6 END STAGE RENAL DISEASE: ICD-10-CM

## 2018-03-20 DIAGNOSIS — H26.9 UNSPECIFIED CATARACT: Chronic | ICD-10-CM

## 2018-03-20 DIAGNOSIS — I77.0 ARTERIOVENOUS FISTULA, ACQUIRED: Chronic | ICD-10-CM

## 2018-03-20 DIAGNOSIS — Z96.642 PRESENCE OF LEFT ARTIFICIAL HIP JOINT: Chronic | ICD-10-CM

## 2018-03-20 DIAGNOSIS — D46.20 REFRACTORY ANEMIA WITH EXCESS OF BLASTS, UNSPECIFIED: ICD-10-CM

## 2018-03-20 DIAGNOSIS — Z90.49 ACQUIRED ABSENCE OF OTHER SPECIFIED PARTS OF DIGESTIVE TRACT: Chronic | ICD-10-CM

## 2018-03-20 DIAGNOSIS — I25.10 ATHEROSCLEROTIC HEART DISEASE OF NATIVE CORONARY ARTERY W/OUT ANGINA PECTORIS: ICD-10-CM

## 2018-03-20 DIAGNOSIS — Z98.61 ATHEROSCLEROTIC HEART DISEASE OF NATIVE CORONARY ARTERY W/OUT ANGINA PECTORIS: ICD-10-CM

## 2018-03-20 DIAGNOSIS — Z98.890 OTHER SPECIFIED POSTPROCEDURAL STATES: Chronic | ICD-10-CM

## 2018-03-20 DIAGNOSIS — K82.9 DISEASE OF GALLBLADDER, UNSPECIFIED: Chronic | ICD-10-CM

## 2018-03-22 DIAGNOSIS — D64.9 ANEMIA, UNSPECIFIED: ICD-10-CM

## 2018-03-26 LAB
FERRITIN SERPL-MCNC: 1194 NG/ML
FOLATE SERPL-MCNC: >20 NG/ML
HCT VFR BLD CALC: 36.9 %
HGB BLD-MCNC: 11.5 G/DL
IRON SATN MFR SERPL: 29 %
IRON SERPL-MCNC: 60 UG/DL
MCHC RBC-ENTMCNC: 31.2 G/DL
MCHC RBC-ENTMCNC: 31.4 PG
MCV RBC AUTO: 100.8 FL
METHYLMALONATE SERPL-SCNC: 1048 NMOL/L
PLATELET # BLD AUTO: 137 K/UL
PMV BLD: 9.4 FL
RBC # BLD: 3.66 M/UL
RBC # FLD: 17.1 %
TIBC SERPL-MCNC: 208 UG/DL
UIBC SERPL-MCNC: 148 UG/DL
VIT B12 SERPL-MCNC: 1023 PG/ML
WBC # FLD AUTO: 7.35 K/UL

## 2018-04-01 ENCOUNTER — EMERGENCY (EMERGENCY)
Facility: HOSPITAL | Age: 83
LOS: 0 days | Discharge: HOME | End: 2018-04-01
Attending: EMERGENCY MEDICINE

## 2018-04-01 VITALS
SYSTOLIC BLOOD PRESSURE: 107 MMHG | DIASTOLIC BLOOD PRESSURE: 78 MMHG | HEART RATE: 115 BPM | RESPIRATION RATE: 20 BRPM | WEIGHT: 149.91 LBS | TEMPERATURE: 99 F

## 2018-04-01 VITALS
RESPIRATION RATE: 18 BRPM | OXYGEN SATURATION: 97 % | TEMPERATURE: 98 F | HEART RATE: 62 BPM | SYSTOLIC BLOOD PRESSURE: 134 MMHG | DIASTOLIC BLOOD PRESSURE: 61 MMHG

## 2018-04-01 DIAGNOSIS — Z79.2 LONG TERM (CURRENT) USE OF ANTIBIOTICS: ICD-10-CM

## 2018-04-01 DIAGNOSIS — I11.0 HYPERTENSIVE HEART DISEASE WITH HEART FAILURE: ICD-10-CM

## 2018-04-01 DIAGNOSIS — I77.0 ARTERIOVENOUS FISTULA, ACQUIRED: Chronic | ICD-10-CM

## 2018-04-01 DIAGNOSIS — Z98.890 OTHER SPECIFIED POSTPROCEDURAL STATES: Chronic | ICD-10-CM

## 2018-04-01 DIAGNOSIS — K82.9 DISEASE OF GALLBLADDER, UNSPECIFIED: Chronic | ICD-10-CM

## 2018-04-01 DIAGNOSIS — H26.9 UNSPECIFIED CATARACT: Chronic | ICD-10-CM

## 2018-04-01 DIAGNOSIS — E78.5 HYPERLIPIDEMIA, UNSPECIFIED: ICD-10-CM

## 2018-04-01 DIAGNOSIS — Z95.2 PRESENCE OF PROSTHETIC HEART VALVE: Chronic | ICD-10-CM

## 2018-04-01 DIAGNOSIS — I50.9 HEART FAILURE, UNSPECIFIED: ICD-10-CM

## 2018-04-01 DIAGNOSIS — Z90.49 ACQUIRED ABSENCE OF OTHER SPECIFIED PARTS OF DIGESTIVE TRACT: Chronic | ICD-10-CM

## 2018-04-01 DIAGNOSIS — I25.10 ATHEROSCLEROTIC HEART DISEASE OF NATIVE CORONARY ARTERY WITHOUT ANGINA PECTORIS: ICD-10-CM

## 2018-04-01 DIAGNOSIS — R50.9 FEVER, UNSPECIFIED: ICD-10-CM

## 2018-04-01 DIAGNOSIS — Z79.83 LONG TERM (CURRENT) USE OF BISPHOSPHONATES: ICD-10-CM

## 2018-04-01 DIAGNOSIS — Z79.82 LONG TERM (CURRENT) USE OF ASPIRIN: ICD-10-CM

## 2018-04-01 DIAGNOSIS — Z88.1 ALLERGY STATUS TO OTHER ANTIBIOTIC AGENTS STATUS: ICD-10-CM

## 2018-04-01 DIAGNOSIS — Z96.642 PRESENCE OF LEFT ARTIFICIAL HIP JOINT: Chronic | ICD-10-CM

## 2018-04-01 DIAGNOSIS — Z88.8 ALLERGY STATUS TO OTHER DRUGS, MEDICAMENTS AND BIOLOGICAL SUBSTANCES: ICD-10-CM

## 2018-04-01 DIAGNOSIS — N39.0 URINARY TRACT INFECTION, SITE NOT SPECIFIED: ICD-10-CM

## 2018-04-01 DIAGNOSIS — Z79.811 LONG TERM (CURRENT) USE OF AROMATASE INHIBITORS: ICD-10-CM

## 2018-04-01 DIAGNOSIS — Z79.899 OTHER LONG TERM (CURRENT) DRUG THERAPY: ICD-10-CM

## 2018-04-01 LAB
ALBUMIN SERPL ELPH-MCNC: 3.7 G/DL — SIGNIFICANT CHANGE UP (ref 3.5–5.2)
ALP SERPL-CCNC: 58 U/L — SIGNIFICANT CHANGE UP (ref 30–115)
ALT FLD-CCNC: 15 U/L — SIGNIFICANT CHANGE UP (ref 0–41)
ANION GAP SERPL CALC-SCNC: 20 MMOL/L — HIGH (ref 7–14)
APPEARANCE UR: (no result)
APTT BLD: 29.6 SEC — SIGNIFICANT CHANGE UP (ref 27–39.2)
AST SERPL-CCNC: 15 U/L — SIGNIFICANT CHANGE UP (ref 0–41)
BACTERIA # UR AUTO: (no result) /HPF
BASE EXCESS BLDV CALC-SCNC: 5 MMOL/L — HIGH (ref -2–2)
BASOPHILS # BLD AUTO: 0.03 K/UL — SIGNIFICANT CHANGE UP (ref 0–0.2)
BASOPHILS NFR BLD AUTO: 0.3 % — SIGNIFICANT CHANGE UP (ref 0–1)
BILIRUB SERPL-MCNC: <0.2 MG/DL — SIGNIFICANT CHANGE UP (ref 0.2–1.2)
BILIRUB UR-MCNC: (no result)
BLD GP AB SCN SERPL QL: SIGNIFICANT CHANGE UP
BUN SERPL-MCNC: 56 MG/DL — HIGH (ref 10–20)
CA-I SERPL-SCNC: 1.11 MMOL/L — LOW (ref 1.12–1.3)
CALCIUM SERPL-MCNC: 8.8 MG/DL — SIGNIFICANT CHANGE UP (ref 8.5–10.1)
CHLORIDE SERPL-SCNC: 98 MMOL/L — SIGNIFICANT CHANGE UP (ref 98–110)
CK MB CFR SERPL CALC: 1.8 NG/ML — SIGNIFICANT CHANGE UP (ref 0.6–6.3)
CK SERPL-CCNC: <7 U/L — SIGNIFICANT CHANGE UP (ref 0–225)
CO2 SERPL-SCNC: 24 MMOL/L — SIGNIFICANT CHANGE UP (ref 17–32)
COLOR SPEC: YELLOW — SIGNIFICANT CHANGE UP
CREAT SERPL-MCNC: 4.4 MG/DL — CRITICAL HIGH (ref 0.7–1.5)
DIFF PNL FLD: (no result)
EOSINOPHIL # BLD AUTO: 0.03 K/UL — SIGNIFICANT CHANGE UP (ref 0–0.7)
EOSINOPHIL NFR BLD AUTO: 0.3 % — SIGNIFICANT CHANGE UP (ref 0–8)
EPI CELLS # UR: (no result) /HPF
GAS PNL BLDV: 139 MMOL/L — SIGNIFICANT CHANGE UP (ref 136–145)
GAS PNL BLDV: SIGNIFICANT CHANGE UP
GLUCOSE SERPL-MCNC: 106 MG/DL — HIGH (ref 70–99)
GLUCOSE UR QL: NEGATIVE MG/DL — SIGNIFICANT CHANGE UP
HCO3 BLDV-SCNC: 29 MMOL/L — SIGNIFICANT CHANGE UP (ref 22–29)
HCT VFR BLD CALC: 32.3 % — LOW (ref 42–52)
HCT VFR BLDA CALC: 28.6 % — LOW (ref 34–44)
HGB BLD CALC-MCNC: 9.3 G/DL — LOW (ref 14–18)
HGB BLD-MCNC: 10.3 G/DL — LOW (ref 14–18)
IMM GRANULOCYTES NFR BLD AUTO: 0.3 % — SIGNIFICANT CHANGE UP (ref 0.1–0.3)
INR BLD: 1.45 RATIO — HIGH (ref 0.65–1.3)
KETONES UR-MCNC: NEGATIVE — SIGNIFICANT CHANGE UP
LACTATE BLDV-MCNC: 0.7 MMOL/L — SIGNIFICANT CHANGE UP (ref 0.5–1.6)
LACTATE SERPL-SCNC: <0.2 MMOL/L — LOW (ref 0.5–2.2)
LEUKOCYTE ESTERASE UR-ACNC: (no result)
LYMPHOCYTES # BLD AUTO: 1.4 K/UL — SIGNIFICANT CHANGE UP (ref 1.2–3.4)
LYMPHOCYTES # BLD AUTO: 12.6 % — LOW (ref 20.5–51.1)
MCHC RBC-ENTMCNC: 30.6 PG — SIGNIFICANT CHANGE UP (ref 27–31)
MCHC RBC-ENTMCNC: 31.9 G/DL — LOW (ref 32–37)
MCV RBC AUTO: 95.8 FL — HIGH (ref 80–94)
MONOCYTES # BLD AUTO: 1.14 K/UL — HIGH (ref 0.1–0.6)
MONOCYTES NFR BLD AUTO: 10.3 % — HIGH (ref 1.7–9.3)
NEUTROPHILS # BLD AUTO: 8.46 K/UL — HIGH (ref 1.4–6.5)
NEUTROPHILS NFR BLD AUTO: 76.2 % — HIGH (ref 42.2–75.2)
NITRITE UR-MCNC: NEGATIVE — SIGNIFICANT CHANGE UP
NRBC # BLD: 0 /100 WBCS — SIGNIFICANT CHANGE UP (ref 0–0)
PCO2 BLDV: 39 MMHG — LOW (ref 41–51)
PH BLDV: 7.48 — HIGH (ref 7.26–7.43)
PH UR: 6 — SIGNIFICANT CHANGE UP (ref 5–8)
PLATELET # BLD AUTO: 154 K/UL — SIGNIFICANT CHANGE UP (ref 130–400)
PO2 BLDV: 71 MMHG — HIGH (ref 20–40)
POTASSIUM BLDV-SCNC: 3.6 MMOL/L — SIGNIFICANT CHANGE UP (ref 3.3–5.6)
POTASSIUM SERPL-MCNC: 3.8 MMOL/L — SIGNIFICANT CHANGE UP (ref 3.5–5)
POTASSIUM SERPL-SCNC: 3.8 MMOL/L — SIGNIFICANT CHANGE UP (ref 3.5–5)
PROT SERPL-MCNC: <0.2 G/DL — LOW (ref 6–8)
PROT UR-MCNC: >=300 MG/DL
PROTHROM AB SERPL-ACNC: 15.8 SEC — HIGH (ref 9.95–12.87)
RBC # BLD: 3.37 M/UL — LOW (ref 4.7–6.1)
RBC # FLD: 15.7 % — HIGH (ref 11.5–14.5)
RBC CASTS # UR COMP ASSIST: (no result) /HPF
SAO2 % BLDV: 96 % — SIGNIFICANT CHANGE UP
SODIUM SERPL-SCNC: 142 MMOL/L — SIGNIFICANT CHANGE UP (ref 135–146)
SP GR SPEC: 1.02 — SIGNIFICANT CHANGE UP (ref 1.01–1.03)
TROPONIN T SERPL-MCNC: 0.12 NG/ML — CRITICAL HIGH
TYPE + AB SCN PNL BLD: SIGNIFICANT CHANGE UP
UROBILINOGEN FLD QL: 1 MG/DL (ref 0.2–0.2)
WBC # BLD: 11.09 K/UL — HIGH (ref 4.8–10.8)
WBC # FLD AUTO: 11.09 K/UL — HIGH (ref 4.8–10.8)
WBC UR QL: >50 /HPF

## 2018-04-01 NOTE — ED PROVIDER NOTE - PROGRESS NOTE DETAILS
pt with no recurrence of fever.  vitals otherwise stable.  found to have uti.  advised pt that prudent for admission due to recent uti and pna, but pt refusing.  wants to go home.  given return precautions, will send rx for abx and advised to f/u with his pmd in 2 days or return to ED

## 2018-04-01 NOTE — ED ADULT NURSE NOTE - OBJECTIVE STATEMENT
Patient admitted to the Ed with complaints of fever. Patient reports having fever of 99.0 at home then took Tylenol and then the fever went away and then came back again. Patient's wife stated that she got worried and wanted him to come get checked out.  Patient denies chest pain, no n/v  Patient denies dizziness and no SOB noted.

## 2018-04-01 NOTE — ED PROVIDER NOTE - OBJECTIVE STATEMENT
87 yo m with pmh of esrd, hd on mwf, chf, cad, as, htn, hld, indwelling ross, recent uti and pna, presents with c/o fever x 2 days.  as per family, pt has had tmax 100.5 at night x 2 nights, so decided to come be evaluated.  pt felt weak tonight, but has resolved and feels back to normal.  pt's fever improved both times with tylenol by wife.  no cp, no cough, no sob, no abd pain, no n/v/d, no dysuria.

## 2018-04-01 NOTE — ED PROVIDER NOTE - NS ED ROS FT
Review of Systems:  	•	CONSTITUTIONAL - + fever, no diaphoresis, no weight change  	•	SKIN - no rash  	•	HEMATOLOGIC - no bleeding, no bruising  	•	EYES - no eye pain, no blurred vision  	•	ENT - no change in hearing, no pain  	•	RESPIRATORY - no shortness of breath, no cough  	•	CARDIAC - no chest pain, no palpitations  	•	GI - no abd pain, no nausea, no vomiting, no diarrhea, no constipation, no bleeding  	               - indwelling ross  	•	MUSCULOSKELETAL - no joint paint, no swelling, no redness  	•	NEUROLOGIC - no weakness, no headache, no anesthesia, no paresthesias

## 2018-04-01 NOTE — ED PROVIDER NOTE - PHYSICAL EXAMINATION
VITAL SIGNS: I have reviewed nursing notes and confirm.  CONSTITUTIONAL: Well-developed; well-nourished; in no acute distress.  SKIN: Skin exam is warm and dry, no acute rash.  HEAD: Normocephalic; atraumatic.  EYES: PERRL, EOM intact; conjunctiva and sclera clear.  ENT: No nasal discharge; airway clear. TMs clear.  NECK: Supple; non tender.  CARD: S1, S2 normal; no murmurs, gallops, or rubs. Regular rate and rhythm.  RESP: mild rales at bases b/l  ABD: Normal bowel sounds; soft; non-distended; non-tender  :  ross in place, clear yellow urine in bag, no cvat  EXT: Normal ROM. No clubbing, cyanosis or edema.  NEURO: Alert, oriented. Grossly unremarkable. No focal deficits.  PSYCH: Cooperative, appropriate.

## 2018-04-02 ENCOUNTER — OUTPATIENT (OUTPATIENT)
Dept: OUTPATIENT SERVICES | Facility: HOSPITAL | Age: 83
LOS: 1 days | Discharge: HOME | End: 2018-04-02

## 2018-04-02 DIAGNOSIS — Z90.49 ACQUIRED ABSENCE OF OTHER SPECIFIED PARTS OF DIGESTIVE TRACT: Chronic | ICD-10-CM

## 2018-04-02 DIAGNOSIS — H26.9 UNSPECIFIED CATARACT: Chronic | ICD-10-CM

## 2018-04-02 DIAGNOSIS — Z41.8 ENCOUNTER FOR OTHER PROCEDURES FOR PURPOSES OTHER THAN REMEDYING HEALTH STATE: ICD-10-CM

## 2018-04-02 DIAGNOSIS — Z95.2 PRESENCE OF PROSTHETIC HEART VALVE: Chronic | ICD-10-CM

## 2018-04-02 DIAGNOSIS — Z96.642 PRESENCE OF LEFT ARTIFICIAL HIP JOINT: Chronic | ICD-10-CM

## 2018-04-02 DIAGNOSIS — K82.9 DISEASE OF GALLBLADDER, UNSPECIFIED: Chronic | ICD-10-CM

## 2018-04-02 DIAGNOSIS — Z98.890 OTHER SPECIFIED POSTPROCEDURAL STATES: Chronic | ICD-10-CM

## 2018-04-02 DIAGNOSIS — I77.0 ARTERIOVENOUS FISTULA, ACQUIRED: Chronic | ICD-10-CM

## 2018-04-02 DIAGNOSIS — R78.81 BACTEREMIA: ICD-10-CM

## 2018-04-04 ENCOUNTER — INPATIENT (INPATIENT)
Facility: HOSPITAL | Age: 83
LOS: 5 days | Discharge: HOME HEALTH | End: 2018-04-10
Attending: INTERNAL MEDICINE

## 2018-04-04 VITALS
DIASTOLIC BLOOD PRESSURE: 81 MMHG | SYSTOLIC BLOOD PRESSURE: 185 MMHG | HEART RATE: 85 BPM | RESPIRATION RATE: 18 BRPM | TEMPERATURE: 98 F | OXYGEN SATURATION: 97 %

## 2018-04-04 DIAGNOSIS — Z90.49 ACQUIRED ABSENCE OF OTHER SPECIFIED PARTS OF DIGESTIVE TRACT: Chronic | ICD-10-CM

## 2018-04-04 DIAGNOSIS — K82.9 DISEASE OF GALLBLADDER, UNSPECIFIED: Chronic | ICD-10-CM

## 2018-04-04 DIAGNOSIS — Z98.890 OTHER SPECIFIED POSTPROCEDURAL STATES: Chronic | ICD-10-CM

## 2018-04-04 DIAGNOSIS — Z95.2 PRESENCE OF PROSTHETIC HEART VALVE: Chronic | ICD-10-CM

## 2018-04-04 DIAGNOSIS — B95.2 ENTEROCOCCUS AS THE CAUSE OF DISEASES CLASSIFIED ELSEWHERE: ICD-10-CM

## 2018-04-04 DIAGNOSIS — I77.0 ARTERIOVENOUS FISTULA, ACQUIRED: Chronic | ICD-10-CM
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
## 2018-04-04 DIAGNOSIS — H26.9 UNSPECIFIED CATARACT: Chronic | ICD-10-CM

## 2018-04-04 DIAGNOSIS — Z96.642 PRESENCE OF LEFT ARTIFICIAL HIP JOINT: Chronic | ICD-10-CM

## 2018-04-04 LAB
ALBUMIN SERPL ELPH-MCNC: 3.7 G/DL — SIGNIFICANT CHANGE UP (ref 3.5–5.2)
ALP SERPL-CCNC: 60 U/L — SIGNIFICANT CHANGE UP (ref 30–115)
ALT FLD-CCNC: 10 U/L — SIGNIFICANT CHANGE UP (ref 0–41)
ANION GAP SERPL CALC-SCNC: 12 MMOL/L — SIGNIFICANT CHANGE UP (ref 7–14)
APPEARANCE UR: (no result)
AST SERPL-CCNC: 12 U/L — SIGNIFICANT CHANGE UP (ref 0–41)
BACTERIA # UR AUTO: (no result) /HPF
BILIRUB SERPL-MCNC: 0.7 MG/DL — SIGNIFICANT CHANGE UP (ref 0.2–1.2)
BILIRUB UR-MCNC: (no result)
BUN SERPL-MCNC: 33 MG/DL — HIGH (ref 10–20)
CALCIUM SERPL-MCNC: 8.6 MG/DL — SIGNIFICANT CHANGE UP (ref 8.5–10.1)
CHLORIDE SERPL-SCNC: 98 MMOL/L — SIGNIFICANT CHANGE UP (ref 98–110)
CO2 SERPL-SCNC: 32 MMOL/L — SIGNIFICANT CHANGE UP (ref 17–32)
COLOR SPEC: SIGNIFICANT CHANGE UP
CREAT SERPL-MCNC: 2.9 MG/DL — HIGH (ref 0.7–1.5)
DIFF PNL FLD: NEGATIVE — SIGNIFICANT CHANGE UP
EPI CELLS # UR: (no result) /HPF
GLUCOSE SERPL-MCNC: 105 MG/DL — HIGH (ref 70–99)
GLUCOSE UR QL: NEGATIVE MG/DL — SIGNIFICANT CHANGE UP
HCT VFR BLD CALC: 34.7 % — LOW (ref 42–52)
HGB BLD-MCNC: 11.1 G/DL — LOW (ref 14–18)
KETONES UR-MCNC: NEGATIVE — SIGNIFICANT CHANGE UP
LACTATE SERPL-SCNC: 1.1 MMOL/L — SIGNIFICANT CHANGE UP (ref 0.5–2.2)
LEUKOCYTE ESTERASE UR-ACNC: (no result)
MCHC RBC-ENTMCNC: 30.5 PG — SIGNIFICANT CHANGE UP (ref 27–31)
MCHC RBC-ENTMCNC: 32 G/DL — SIGNIFICANT CHANGE UP (ref 32–37)
MCV RBC AUTO: 95.3 FL — HIGH (ref 80–94)
NITRITE UR-MCNC: NEGATIVE — SIGNIFICANT CHANGE UP
NRBC # BLD: 0 /100 WBCS — SIGNIFICANT CHANGE UP (ref 0–0)
PH UR: 7.5 — SIGNIFICANT CHANGE UP (ref 5–8)
PLATELET # BLD AUTO: 168 K/UL — SIGNIFICANT CHANGE UP (ref 130–400)
POTASSIUM SERPL-MCNC: 3.7 MMOL/L — SIGNIFICANT CHANGE UP (ref 3.5–5)
POTASSIUM SERPL-SCNC: 3.7 MMOL/L — SIGNIFICANT CHANGE UP (ref 3.5–5)
PROT SERPL-MCNC: 6 G/DL — SIGNIFICANT CHANGE UP (ref 6–8)
PROT UR-MCNC: 100 MG/DL
RBC # BLD: 3.64 M/UL — LOW (ref 4.7–6.1)
RBC # FLD: 15 % — HIGH (ref 11.5–14.5)
SODIUM SERPL-SCNC: 142 MMOL/L — SIGNIFICANT CHANGE UP (ref 135–146)
SP GR SPEC: 1.01 — SIGNIFICANT CHANGE UP (ref 1.01–1.03)
TROPONIN T SERPL-MCNC: 0.11 NG/ML — CRITICAL HIGH
UROBILINOGEN FLD QL: 1 MG/DL (ref 0.2–0.2)
WBC # BLD: 8.26 K/UL — SIGNIFICANT CHANGE UP (ref 4.8–10.8)
WBC # FLD AUTO: 8.26 K/UL — SIGNIFICANT CHANGE UP (ref 4.8–10.8)
WBC UR QL: (no result) /HPF

## 2018-04-04 RX ORDER — DOCUSATE SODIUM 100 MG
100 CAPSULE ORAL DAILY
Qty: 0 | Refills: 0 | Status: DISCONTINUED | OUTPATIENT
Start: 2018-04-04 | End: 2018-04-10

## 2018-04-04 RX ORDER — SIMVASTATIN 20 MG/1
20 TABLET, FILM COATED ORAL AT BEDTIME
Qty: 0 | Refills: 0 | Status: DISCONTINUED | OUTPATIENT
Start: 2018-04-04 | End: 2018-04-10

## 2018-04-04 RX ORDER — HEPARIN SODIUM 5000 [USP'U]/ML
5000 INJECTION INTRAVENOUS; SUBCUTANEOUS EVERY 8 HOURS
Qty: 0 | Refills: 0 | Status: DISCONTINUED | OUTPATIENT
Start: 2018-04-04 | End: 2018-04-10

## 2018-04-04 RX ORDER — LANOLIN ALCOHOL/MO/W.PET/CERES
3 CREAM (GRAM) TOPICAL AT BEDTIME
Qty: 0 | Refills: 0 | Status: DISCONTINUED | OUTPATIENT
Start: 2018-04-04 | End: 2018-04-10

## 2018-04-04 RX ORDER — ASPIRIN/CALCIUM CARB/MAGNESIUM 324 MG
81 TABLET ORAL DAILY
Qty: 0 | Refills: 0 | Status: DISCONTINUED | OUTPATIENT
Start: 2018-04-04 | End: 2018-04-10

## 2018-04-04 RX ORDER — SEVELAMER CARBONATE 2400 MG/1
800 POWDER, FOR SUSPENSION ORAL THREE TIMES A DAY
Qty: 0 | Refills: 0 | Status: DISCONTINUED | OUTPATIENT
Start: 2018-04-04 | End: 2018-04-10

## 2018-04-04 RX ORDER — TAMSULOSIN HYDROCHLORIDE 0.4 MG/1
1 CAPSULE ORAL
Qty: 0 | Refills: 0 | COMMUNITY

## 2018-04-04 RX ORDER — MEROPENEM 1 G/30ML
500 INJECTION INTRAVENOUS EVERY 24 HOURS
Qty: 0 | Refills: 0 | Status: DISCONTINUED | OUTPATIENT
Start: 2018-04-04 | End: 2018-04-10

## 2018-04-04 RX ORDER — METOPROLOL TARTRATE 50 MG
12.5 TABLET ORAL
Qty: 0 | Refills: 0 | Status: DISCONTINUED | OUTPATIENT
Start: 2018-04-04 | End: 2018-04-08

## 2018-04-04 RX ORDER — LACTOBACILLUS ACIDOPHILUS 100MM CELL
1 CAPSULE ORAL
Qty: 0 | Refills: 0 | COMMUNITY

## 2018-04-04 RX ORDER — PANTOPRAZOLE SODIUM 20 MG/1
40 TABLET, DELAYED RELEASE ORAL
Qty: 0 | Refills: 0 | Status: DISCONTINUED | OUTPATIENT
Start: 2018-04-04 | End: 2018-04-10

## 2018-04-04 RX ORDER — SODIUM CHLORIDE 9 MG/ML
1000 INJECTION INTRAMUSCULAR; INTRAVENOUS; SUBCUTANEOUS ONCE
Qty: 0 | Refills: 0 | Status: COMPLETED | OUTPATIENT
Start: 2018-04-04 | End: 2018-04-04

## 2018-04-04 RX ORDER — PIPERACILLIN AND TAZOBACTAM 4; .5 G/20ML; G/20ML
3.38 INJECTION, POWDER, LYOPHILIZED, FOR SOLUTION INTRAVENOUS ONCE
Qty: 0 | Refills: 0 | Status: COMPLETED | OUTPATIENT
Start: 2018-04-04 | End: 2018-04-04

## 2018-04-04 RX ORDER — LOSARTAN POTASSIUM 100 MG/1
12.5 TABLET, FILM COATED ORAL
Qty: 0 | Refills: 0 | Status: DISCONTINUED | OUTPATIENT
Start: 2018-04-04 | End: 2018-04-05

## 2018-04-04 RX ORDER — NITROFURANTOIN MACROCRYSTAL 50 MG
1 CAPSULE ORAL
Qty: 14 | Refills: 0 | OUTPATIENT
Start: 2018-04-04 | End: 2018-04-10

## 2018-04-04 RX ADMIN — PIPERACILLIN AND TAZOBACTAM 200 GRAM(S): 4; .5 INJECTION, POWDER, LYOPHILIZED, FOR SOLUTION INTRAVENOUS at 20:20

## 2018-04-04 RX ADMIN — SODIUM CHLORIDE 1000 MILLILITER(S): 9 INJECTION INTRAMUSCULAR; INTRAVENOUS; SUBCUTANEOUS at 20:20

## 2018-04-04 NOTE — H&P ADULT - ASSESSMENT
87 y/o M with PMH ESRD on  HD MWF, CHF, CAD, AS, HTN, indwelling ross presents with fever oral temp tmax 100F x 4 days.      #Fever due to UTI  -Ucx from 4/1/18 showed E-coli R to Levaquin and Nitrofurantoin, Sensitive to rocephin but patient is allergic to cephalosporins so will start on merpenem       #ESRD on HD MWF  -nephro consult  -c/w Ranvela    #CAD and HTN:  -c/w ASA+ lopressor+ losartan    #DVT PPX    #From home   living with wife

## 2018-04-04 NOTE — ED PROVIDER NOTE - OBJECTIVE STATEMENT
87 y/o M with PMH ESRD on  HD MWF, CHF, CAD, AS, HTN, indwelling ross, recent UTI/PNA on macrobid and levaquin presents with fever oral temp tmax 100F x 4 days. Denies CP, palpitations, SOB, back pain, abdominal pain, n/v/d, black or bloody stools, HA, vision changes, sore throat/difficulty swallowing, confusion, trauma, fall, cough, recent travel, recent illness, sick contacts, leg pain/swelling, urinary symptoms, rash.
denies pain/discomfort

## 2018-04-04 NOTE — ED PROVIDER NOTE - PHYSICAL EXAMINATION
PHYSICAL EXAM:    GENERAL: Alert, appears stated age, well appearing, non-toxic  SKIN: Warm, pink and dry. MMM.   EYE: Normal lids/conjunctiva  ENT: Normal hearing, patent oropharynx without erythema or exudate  NECK: +supple. No meningismus  Pulm: Bilateral BS, normal resp effort, no wheezes, stridor, or retractions  CV: RRR, no M/R/G, 2+ pulses throughout  Abd: soft, non-tender, non-distended, no hepatosplenomegaly. no CVA tenderness.   Mskel: no erythema, cyanosis, edema. no calf tenderness.   Neuro: AAOx3, No speech slurring, pronator drift, facial asymmetry. normal finger-to-nose b/l. 5/5 strength throughout. normal gait.

## 2018-04-04 NOTE — H&P ADULT - NSHPLABSRESULTS_GEN_ALL_CORE
11.1   8.  )-----------( 168      ( 2018 19:51 )             34.7           142  |  98  |  33<H>  ----------------------------<  105<H>  3.7   |  32  |  2.9<H>    Ca    8.6      2018 19:51    TPro  6.0  /  Alb  3.7  /  TBili  0.7  /  DBili  x   /  AST  12  /  ALT  10  /  AlkPhos  60        Urinalysis Basic - ( 2018 19:51 )    Color: Dark Yellow / Appearance: Cloudy / S.015 / pH: x  Gluc: x / Ketone: Negative  / Bili: Small / Urobili: 1.0 mg/dL   Blood: x / Protein: 100 mg/dL / Nitrite: Negative   Leuk Esterase: Moderate / RBC: x / WBC 10-25 /HPF   Sq Epi: x / Non Sq Epi: Few /HPF / Bacteria: Few /HPF      Culture - Urine (18 @ 04:23)    -  Amikacin: S <=8    -  Amoxicillin/Clavulanic Acid: S <=8/4    -  Ampicillin: S 8    -  Ampicillin/Sulbactam: S <=4/2    -  Aztreonam: S <=4    -  Cefazolin: S <=2    -  Cefepime: S <=2    -  Cefoxitin: S <=4    -  Ceftriaxone: S <=1    -  Ciprofloxacin: R >2    -  Ertapenem: S <=0.5    -  Gentamicin: S <=1    -  Imipenem: S <=1    -  Levofloxacin: R >4    -  Meropenem: S <=1    -  Nitrofurantoin: S <=32    -  Piperacillin/Tazobactam: S <=8    -  Tigecycline: S <=1    -  Tobramycin: S <=2    -  Trimethoprim/Sulfamethoxazole: R >2/38    Specimen Source: .Urine Clean Catch (Midstream)    Culture Results:   >100,000 CFU/ml Escherichia coli    Organism Identification: Escherichia coli    Organism: Escherichia coli    Method Type: SISI    Culture - Blood (18 @ 03:28)    Specimen Source: .Blood Blood    Culture Results:   No growth to date.

## 2018-04-04 NOTE — H&P ADULT - HISTORY OF PRESENT ILLNESS
87 y/o M with PMH ESRD on  HD MWF, CHF, CAD, AS, HTN, indwelling ross presents with fever oral temp tmax 100F x 4 days. He came to ED on 4/1/18 with the same complain and left AMA on PO Levaquin. His fever did not resolve so he came back to the hospital.  Denies CP, palpitations, SOB, back pain, abdominal pain, n/v/d, black or bloody stools or urinary symptoms.

## 2018-04-04 NOTE — ED PROVIDER NOTE - NS ED ROS FT
Review of Systems    Constitutional: (+) fever  Eyes/ENT: (-) blurry vision, (-) epistaxis  Cardiovascular: (-) chest pain, (-) syncope  Respiratory: (-) cough, (-) shortness of breath  Gastrointestinal: (-) vomiting, (-) diarrhea  Musculoskeletal: (-) neck pain, (-) back pain  Integumentary: (-) rash, (-) edema  Neurological: (-) headache, (-) altered mental status

## 2018-04-04 NOTE — H&P ADULT - NSHPPHYSICALEXAM_GEN_ALL_CORE
Vital Signs Last 24 Hrs  T(C): 37.8 (04 Apr 2018 22:19), Max: 37.8 (04 Apr 2018 22:19)  T(F): 100.1 (04 Apr 2018 22:19), Max: 100.1 (04 Apr 2018 22:19)  HR: 85 (04 Apr 2018 18:10) (85 - 85)  BP: 185/81 (04 Apr 2018 18:10) (185/81 - 185/81)  RR: 18 (04 Apr 2018 18:10) (18 - 18)  SpO2: 97% (04 Apr 2018 18:10) (97% - 97%)    Appearance: Normal	  HEENT:   Normal oral mucosa, PERRL, EOMI	  Lymphatic: No lymphadenopathy  Cardiovascular: Normal S1 S2, No JVD, No murmurs, No edema  Respiratory: Lungs clear to auscultation	  Psychiatry: A & O x 3, Mood & affect appropriate  Gastrointestinal:  Soft, Non-tender, + BS	  Skin: No rashes, No ecchymoses, No cyanosis	  Neurologic: Non-focal, A&Ox3, nonfocal, WADE x 4  Extremities: Normal range of motion, No clubbing, cyanosis or edema  Vascular: Peripheral pulses palpable 2+ bilaterally

## 2018-04-04 NOTE — H&P ADULT - ATTENDING COMMENTS
Patient seen and examined at the bed side. Not in distress.   Multi drug resistant E coli UTI.   Agree with above note.   C/W Meropenem now and ID evaluation.

## 2018-04-04 NOTE — ED PROVIDER NOTE - MEDICAL DECISION MAKING DETAILS
Chart finished.  89 yo man with persistent fever with indwelling ross likely due to resistant E. coli.  Will admit for IV antibiotics and continued workup for febrile illness in the setting or ESRD on HD.  Piperacilli/tazo given.  No evidence of severe sepsis or septic shock.  Troponin downtrending from the other day.  In addition, QT prolonged but had hemodialysis today.  ? transient hypokalemia vs. hypomagnesemia.  Also possibly medication induced by quinolone.  Will need tele admission.

## 2018-04-04 NOTE — ED POST DISCHARGE NOTE - OTHER COMMUNICATION
MACROBID IS SENSITIVE ON UCX- SENT TO PHARMACY ON RECORD. LEVAQUIN CANCELED AND PATIENT TOLD TO F/U WITH PMD WITHIN 24 HOURS.

## 2018-04-04 NOTE — ED PROVIDER NOTE - PROGRESS NOTE DETAILS
pending trop discussed with MAR elevated but downtrending trop with elevated cre. +, with ekg similar to previous. will admit to tele

## 2018-04-04 NOTE — ED PROVIDER NOTE - CARE PLAN
Principal Discharge DX:	UTI (urinary tract infection)  Secondary Diagnosis:	Fever  Secondary Diagnosis:	Vargas catheter in place on admission

## 2018-04-05 LAB
ANION GAP SERPL CALC-SCNC: 13 MMOL/L — SIGNIFICANT CHANGE UP (ref 7–14)
BASOPHILS # BLD AUTO: 0.04 K/UL — SIGNIFICANT CHANGE UP (ref 0–0.2)
BASOPHILS NFR BLD AUTO: 0.6 % — SIGNIFICANT CHANGE UP (ref 0–1)
BUN SERPL-MCNC: 36 MG/DL — HIGH (ref 10–20)
CALCIUM SERPL-MCNC: 8.1 MG/DL — LOW (ref 8.5–10.1)
CHLORIDE SERPL-SCNC: 102 MMOL/L — SIGNIFICANT CHANGE UP (ref 98–110)
CO2 SERPL-SCNC: 27 MMOL/L — SIGNIFICANT CHANGE UP (ref 17–32)
CREAT SERPL-MCNC: 3 MG/DL — HIGH (ref 0.7–1.5)
EOSINOPHIL # BLD AUTO: 0.18 K/UL — SIGNIFICANT CHANGE UP (ref 0–0.7)
EOSINOPHIL NFR BLD AUTO: 2.7 % — SIGNIFICANT CHANGE UP (ref 0–8)
GLUCOSE SERPL-MCNC: 91 MG/DL — SIGNIFICANT CHANGE UP (ref 70–99)
HCT VFR BLD CALC: 33.1 % — LOW (ref 42–52)
HGB BLD-MCNC: 10.5 G/DL — LOW (ref 14–18)
IMM GRANULOCYTES NFR BLD AUTO: 0.3 % — SIGNIFICANT CHANGE UP (ref 0.1–0.3)
LYMPHOCYTES # BLD AUTO: 1.08 K/UL — LOW (ref 1.2–3.4)
LYMPHOCYTES # BLD AUTO: 16 % — LOW (ref 20.5–51.1)
MCHC RBC-ENTMCNC: 30.4 PG — SIGNIFICANT CHANGE UP (ref 27–31)
MCHC RBC-ENTMCNC: 31.7 G/DL — LOW (ref 32–37)
MCV RBC AUTO: 95.9 FL — HIGH (ref 80–94)
MONOCYTES # BLD AUTO: 0.73 K/UL — HIGH (ref 0.1–0.6)
MONOCYTES NFR BLD AUTO: 10.8 % — HIGH (ref 1.7–9.3)
NEUTROPHILS # BLD AUTO: 4.72 K/UL — SIGNIFICANT CHANGE UP (ref 1.4–6.5)
NEUTROPHILS NFR BLD AUTO: 69.6 % — SIGNIFICANT CHANGE UP (ref 42.2–75.2)
PHOSPHATE SERPL-MCNC: 3.2 MG/DL — SIGNIFICANT CHANGE UP (ref 2.1–4.9)
PLATELET # BLD AUTO: 155 K/UL — SIGNIFICANT CHANGE UP (ref 130–400)
POTASSIUM SERPL-MCNC: 3.5 MMOL/L — SIGNIFICANT CHANGE UP (ref 3.5–5)
POTASSIUM SERPL-SCNC: 3.5 MMOL/L — SIGNIFICANT CHANGE UP (ref 3.5–5)
RBC # BLD: 3.45 M/UL — LOW (ref 4.7–6.1)
RBC # FLD: 15.2 % — HIGH (ref 11.5–14.5)
SODIUM SERPL-SCNC: 142 MMOL/L — SIGNIFICANT CHANGE UP (ref 135–146)
WBC # BLD: 6.77 K/UL — SIGNIFICANT CHANGE UP (ref 4.8–10.8)
WBC # FLD AUTO: 6.77 K/UL — SIGNIFICANT CHANGE UP (ref 4.8–10.8)

## 2018-04-05 RX ORDER — LOSARTAN POTASSIUM 100 MG/1
12.5 TABLET, FILM COATED ORAL EVERY 12 HOURS
Qty: 0 | Refills: 0 | Status: DISCONTINUED | OUTPATIENT
Start: 2018-04-05 | End: 2018-04-07

## 2018-04-05 RX ADMIN — HEPARIN SODIUM 5000 UNIT(S): 5000 INJECTION INTRAVENOUS; SUBCUTANEOUS at 21:20

## 2018-04-05 RX ADMIN — Medication 12.5 MILLIGRAM(S): at 17:42

## 2018-04-05 RX ADMIN — HEPARIN SODIUM 5000 UNIT(S): 5000 INJECTION INTRAVENOUS; SUBCUTANEOUS at 09:24

## 2018-04-05 RX ADMIN — Medication 12.5 MILLIGRAM(S): at 05:51

## 2018-04-05 RX ADMIN — SEVELAMER CARBONATE 800 MILLIGRAM(S): 2400 POWDER, FOR SUSPENSION ORAL at 05:50

## 2018-04-05 RX ADMIN — LOSARTAN POTASSIUM 12.5 MILLIGRAM(S): 100 TABLET, FILM COATED ORAL at 17:42

## 2018-04-05 RX ADMIN — LOSARTAN POTASSIUM 12.5 MILLIGRAM(S): 100 TABLET, FILM COATED ORAL at 05:50

## 2018-04-05 RX ADMIN — SIMVASTATIN 20 MILLIGRAM(S): 20 TABLET, FILM COATED ORAL at 21:20

## 2018-04-05 RX ADMIN — PANTOPRAZOLE SODIUM 40 MILLIGRAM(S): 20 TABLET, DELAYED RELEASE ORAL at 09:24

## 2018-04-05 RX ADMIN — Medication 3 MILLIGRAM(S): at 21:20

## 2018-04-05 RX ADMIN — SEVELAMER CARBONATE 800 MILLIGRAM(S): 2400 POWDER, FOR SUSPENSION ORAL at 09:24

## 2018-04-05 RX ADMIN — HEPARIN SODIUM 5000 UNIT(S): 5000 INJECTION INTRAVENOUS; SUBCUTANEOUS at 05:49

## 2018-04-05 RX ADMIN — Medication 81 MILLIGRAM(S): at 09:24

## 2018-04-05 RX ADMIN — MEROPENEM 100 MILLIGRAM(S): 1 INJECTION INTRAVENOUS at 22:40

## 2018-04-05 NOTE — PROGRESS NOTE ADULT - ASSESSMENT
88M with ESRD on  HD MWF, CHF, CAD, AS, HTN, indwelling ross for chronic post op urinary retention presents with fever oral temp tmax 100F x 4 days. Previously seen in ED for UTI and fevers, left on Levaquin PO. Subsequent Ur Cx found to be resistant to levaquin. Afebrile with santosh.      Fever due to UTI:  - Currently on santosh   -Ucx from 4/1/18 showed E-coli R to Levaquin , Sensitive to rocephin but patient is allergic to cephalosporins   - ID cathie gao for out patient Abx in the setting of MDR UTI   - Afebrile over night     ESRD on HD MWF  -nephro consult  -c/w Ranvela    Urinary Retention: Post op since Dec 2017  Follows with Uro as out patient, Dr. darnell  Is to have TOV on 4/9/2018     CHF and  Aortic Stenosis: Stable currently, cont with home meds     CAD and HTN:  -c/w ASA+ lopressor+ losartan    DVT PPX    From home   living with wife

## 2018-04-06 ENCOUNTER — TRANSCRIPTION ENCOUNTER (OUTPATIENT)
Age: 83
End: 2018-04-06

## 2018-04-06 LAB
ANION GAP SERPL CALC-SCNC: 17 MMOL/L — HIGH (ref 7–14)
BUN SERPL-MCNC: 56 MG/DL — HIGH (ref 10–20)
CALCIUM SERPL-MCNC: 8.5 MG/DL — SIGNIFICANT CHANGE UP (ref 8.5–10.1)
CHLORIDE SERPL-SCNC: 98 MMOL/L — SIGNIFICANT CHANGE UP (ref 98–110)
CO2 SERPL-SCNC: 26 MMOL/L — SIGNIFICANT CHANGE UP (ref 17–32)
CREAT SERPL-MCNC: 4.6 MG/DL — CRITICAL HIGH (ref 0.7–1.5)
CULTURE RESULTS: SIGNIFICANT CHANGE UP
CULTURE RESULTS: SIGNIFICANT CHANGE UP
GLUCOSE SERPL-MCNC: 97 MG/DL — SIGNIFICANT CHANGE UP (ref 70–99)
PHOSPHATE SERPL-MCNC: 4.6 MG/DL — SIGNIFICANT CHANGE UP (ref 2.1–4.9)
POTASSIUM SERPL-MCNC: 4.1 MMOL/L — SIGNIFICANT CHANGE UP (ref 3.5–5)
POTASSIUM SERPL-SCNC: 4.1 MMOL/L — SIGNIFICANT CHANGE UP (ref 3.5–5)
SODIUM SERPL-SCNC: 141 MMOL/L — SIGNIFICANT CHANGE UP (ref 135–146)
SPECIMEN SOURCE: SIGNIFICANT CHANGE UP
SPECIMEN SOURCE: SIGNIFICANT CHANGE UP

## 2018-04-06 RX ADMIN — HEPARIN SODIUM 5000 UNIT(S): 5000 INJECTION INTRAVENOUS; SUBCUTANEOUS at 21:18

## 2018-04-06 RX ADMIN — PANTOPRAZOLE SODIUM 40 MILLIGRAM(S): 20 TABLET, DELAYED RELEASE ORAL at 05:56

## 2018-04-06 RX ADMIN — Medication 12.5 MILLIGRAM(S): at 17:34

## 2018-04-06 RX ADMIN — SEVELAMER CARBONATE 800 MILLIGRAM(S): 2400 POWDER, FOR SUSPENSION ORAL at 21:18

## 2018-04-06 RX ADMIN — LOSARTAN POTASSIUM 12.5 MILLIGRAM(S): 100 TABLET, FILM COATED ORAL at 05:55

## 2018-04-06 RX ADMIN — HEPARIN SODIUM 5000 UNIT(S): 5000 INJECTION INTRAVENOUS; SUBCUTANEOUS at 05:55

## 2018-04-06 RX ADMIN — SEVELAMER CARBONATE 800 MILLIGRAM(S): 2400 POWDER, FOR SUSPENSION ORAL at 17:33

## 2018-04-06 RX ADMIN — Medication 81 MILLIGRAM(S): at 11:36

## 2018-04-06 RX ADMIN — HEPARIN SODIUM 5000 UNIT(S): 5000 INJECTION INTRAVENOUS; SUBCUTANEOUS at 17:36

## 2018-04-06 RX ADMIN — LOSARTAN POTASSIUM 12.5 MILLIGRAM(S): 100 TABLET, FILM COATED ORAL at 17:33

## 2018-04-06 RX ADMIN — SEVELAMER CARBONATE 800 MILLIGRAM(S): 2400 POWDER, FOR SUSPENSION ORAL at 05:55

## 2018-04-06 RX ADMIN — Medication 3 MILLIGRAM(S): at 21:18

## 2018-04-06 RX ADMIN — MEROPENEM 100 MILLIGRAM(S): 1 INJECTION INTRAVENOUS at 21:23

## 2018-04-06 RX ADMIN — Medication 100 MILLIGRAM(S): at 21:19

## 2018-04-06 RX ADMIN — Medication 12.5 MILLIGRAM(S): at 05:55

## 2018-04-06 RX ADMIN — SIMVASTATIN 20 MILLIGRAM(S): 20 TABLET, FILM COATED ORAL at 21:18

## 2018-04-06 NOTE — DISCHARGE NOTE ADULT - PROVIDER TOKENS
TOKKENNEDY:'[id number]',TOKEN:'[id number]' TOKEN:'[id number]',TOKEN:'[id number]',FREE:[LAST:[jovan],FIRST:[darling],PHONE:[phone]],FAX:[(   )    -],ADDRESS:[Primary care]]

## 2018-04-06 NOTE — DISCHARGE NOTE ADULT - MEDICATION SUMMARY - MEDICATIONS TO STOP TAKING
I will STOP taking the medications listed below when I get home from the hospital:    nitrofurantoin macrocrystals-monohydrate 100 mg oral capsule  -- 1 cap(s) by mouth 2 times a day MDD:2  -- Finish all this medication unless otherwise directed by prescriber.  May discolor urine or feces.  Take with food or milk.

## 2018-04-06 NOTE — PROGRESS NOTE ADULT - ASSESSMENT
*Fever resolved likely secondary to UTI patient has chronic ross     Urine culture om 4/4/18 Enterococcus 50-99 K CFU/ML   Urine culture on  4/1/18 E-coli more than 100k CFU/ML   patient allergic to Rocephin   on meropenem for now   follow sensitivities on urine culture from 4/4  spoke to urology trial of void today       *ESRD on HD    *CHF unknown type and AS get echo     *possible D/c [id number] hours

## 2018-04-06 NOTE — DISCHARGE NOTE ADULT - CARE PROVIDER_API CALL
Julius Dodge), Cardiovascular Disease; Internal Medicine  [address] [place] [address]  [address]  [place] [address]  Phone: [phone]  Fax: [phone]    Yahir Garland), Internal Medicine; Nephrology  [address] [place] [address]  Phone: [phone]  Fax: [phone] Julius Dodge), Cardiovascular Disease; Internal Medicine  [address] [facility]  [address]  [place] [address]  Phone: [phone]  Fax: [phone]    Yahir Garland), Internal Medicine; Nephrology  [facility] [place] [address]  Phone: [phone]  Fax: [phone]    darling aldana  Primary care  Phone: [phone]  Fax: (       -

## 2018-04-06 NOTE — DISCHARGE NOTE ADULT - HOSPITAL COURSE
87 y/o M with PMH ESRD on  HD MWF, CHF, CAD, AS, HTN, indwelling ross presents with fever oral temp tmax 100F x 4 days. He came to ED on 4/1/18 with the same complain and left AMA on PO Levaquin. His fever did not resolve so he came back to the hospital. he was started on meropenem urine culture this time showed enterococcus faecium waiting for sensitivity infectious disease consult was placed. 87 y/o M with PMH ESRD on  HD MWF, CHF, CAD, AS, HTN, indwelling ross presents with fever oral temp tmax 100F x 4 days. He came to ED on 4/1/18 with the same complain and left AMA on PO Levaquin. His fever did not resolve so he came back to the hospital. he was started on meropenem urine culture this time showed enterococcus faecium adn E.Coli. Infectious disease consulted and recommended 5 days of IV meropenem and 7 days of Bactrim due to pt angioedema hx with Cephalosporin.  During admission, pt passed voiding trial and Ross d/c'ed. Pt instructed to f/u with PMD

## 2018-04-06 NOTE — DISCHARGE NOTE ADULT - PATIENT PORTAL LINK FT
You can access the [email or web address] Patient Portal, offered by [facility], by registering with the following website: [email or web address]

## 2018-04-06 NOTE — PROGRESS NOTE ADULT - ASSESSMENT
#)Fever resolved likely secondary to UTI patient has chronic ross     Urine culture om 4/4/18 Enterococcus 50-99 K CFU/ML   Urine culture on  4/1/18 E-coli more than 100k CFU/ML   Blood cultures negative  patient allergic to Rocephin   on meropenem for now   ID evaluation pending.  follow sensitivities on urine culture from 4/4  spoke to urology trial of void today if he fails will put a new ross and consult Dr. Jackson    #)ESRD on HD MWF  Went to dialysis today    #)CHF unknown type and AS   Echo ordered    #)Dispo: Home after ID follow up. 89 y/o M with PMH ESRD on  HD MWF, CHF, CAD, AS, HTN, indwelling ross presents with fever oral temp tmax 100F x 4 days.    #)Fever resolved likely secondary to UTI patient has chronic ross     Urine culture om 4/4/18 Enterococcus 50-99 K CFU/ML   Urine culture on  4/1/18 E-coli more than 100k CFU/ML   Blood cultures negative  patient allergic to Rocephin   on meropenem for now   ID evaluation pending.  follow sensitivities on urine culture from 4/4  spoke to urology trial of void today if he fails will put a new ross and consult Dr. Jackson    #)ESRD on HD MWF  Went to dialysis today    #)CHF unknown type and AS   Echo ordered    #)CAD and HTN:  -c/w ASA+ lopressor+ losartan    #)DVT PPX    #)From home   living with wife    #)Dispo: Home after ID follow up.

## 2018-04-06 NOTE — DISCHARGE NOTE ADULT - PLAN OF CARE
Infection Resolution - Please cont Bactrim for 7 more days  - F/U potassium level outpatient during dialysis Resolution - Vargas discontinued. Passed voiding trial  - cont Flomax   - F/U with PMD Blood pressure control - Started on Amlodipine 5mg daily due to elevated BP  - Metoprolol increased 25mg q12 and Losartan 25mg once daily  - F/U with PMD and renal

## 2018-04-06 NOTE — DISCHARGE NOTE ADULT - MEDICATION SUMMARY - MEDICATIONS TO CHANGE
I will SWITCH the dose or number of times a day I take the medications listed below when I get home from the hospital:    losartan  -- 12.5 milligram(s) by mouth 2 times a day    Metoprolol Tartrate  -- 12.5 milligram(s) by mouth every 12 hours

## 2018-04-06 NOTE — DISCHARGE NOTE ADULT - CARE PLAN
Principal Discharge DX:	UTI (urinary tract infection)  Secondary Diagnosis:	Urinary retention Principal Discharge DX:	UTI (urinary tract infection)  Goal:	Infection Resolution  Assessment and plan of treatment:	- Please cont Bactrim for 7 more days  - F/U potassium level outpatient during dialysis  Secondary Diagnosis:	Urinary retention  Goal:	Resolution  Assessment and plan of treatment:	- Vargas discontinued. Passed voiding trial  - cont Flomax   - F/U with PMD  Secondary Diagnosis:	Hypertension  Goal:	Blood pressure control  Assessment and plan of treatment:	- Started on Amlodipine 5mg daily due to elevated BP  - Metoprolol increased 25mg q12 and Losartan 25mg once daily  - F/U with PMD and renal

## 2018-04-06 NOTE — DISCHARGE NOTE ADULT - MEDICATION SUMMARY - MEDICATIONS TO TAKE
I will START or STAY ON the medications listed below when I get home from the hospital:    aspirin 81 mg oral delayed release tablet  -- 1 tab(s) by mouth once a day  -- Indication: For CAD    losartan 25 mg oral tablet  -- 1 tab(s) by mouth once a day  -- Indication: For HTN    tamsulosin 0.4 mg oral capsule  -- 1 cap(s) by mouth once a day (at bedtime)  -- Indication: For BPH    tamsulosin 0.4 mg oral capsule  -- 1 cap(s) by mouth once a day (at bedtime)  -- Indication: For BPH    simvastatin 20 mg oral tablet  -- 1 tab(s) by mouth once a day (at bedtime)  -- Indication: For Dyslipdemia    metoprolol tartrate 25 mg oral tablet  -- 1 tab(s) by mouth 2 times a day  -- Indication: For HTN    amLODIPine 5 mg oral tablet  -- 1 tab(s) by mouth once a day   -- It is very important that you take or use this exactly as directed.  Do not skip doses or discontinue unless directed by your doctor.  Some non-prescription drugs may aggravate your condition.  Read all labels carefully.  If a warning appears, check with your doctor before taking.    -- Indication: For HTN    Colace  -- 100 milligram(s) by mouth once a day, As Needed  -- Indication: For Laxative    Melatonin 3 mg oral tablet  -- 1 tab(s) by mouth once (at bedtime)  -- Indication: For Insomnia    Renvela 800 mg oral tablet  -- 1 tab(s) by mouth 3 times a day (with meals)  -- Indication: For ESRD    Protonix 40 mg oral delayed release tablet  -- 1 tab(s) by mouth once a day  -- Indication: For GERD    sulfamethoxazole-trimethoprim 400 mg-80 mg oral tablet  -- 1 tab(s) by mouth once a day  -- Indication: For UTI    Dialyvite 800 oral tablet  -- 1 tab(s) by mouth once a day  -- Indication: For ESRD

## 2018-04-06 NOTE — CONSULT NOTE ADULT - SUBJECTIVE AND OBJECTIVE BOX
NEPHROLOGY CONSULTATION NOTE    A 89 yo M with PMH listed below presented to hospital because of fever for 4 days  (T 100F).   89 y/o M with PMH ESRD on  HD MWF, CHF, CAD, AS, HTN, indwelling ross presents with fever oral temp tmax 100F x 4 days. He came to ED on 18 with the same complain and left AMA on PO Levaquin. His fever did not resolve so he came back to the hospital.  Denies CP, palpitations, SOB, back pain, abdominal pain, n/v/d, black or bloody stools or urinary symptoms.  PAST MEDICAL & SURGICAL HISTORY:  Urinary retention  Colon perforation:   TIA (transient ischemic attack):   Gout  Chronic renal failure: on HD  Aortic stenosis, severe  Myocardial infarction  Hypertension  Hyperlipidemia  Congestive heart failure  History of cholecystectomy  Bilateral cataracts  History of hip replacement, total, left  History of colon resection: secondary to colon perforation  AV fistula: right upper arm  Gallbladder disorder: stent removal 10/2017  S/P TAVR (transcatheter aortic valve replacement)    Allergies:  Biaxin (Unknown)  Ceftin (Unknown)  Plavix (Unknown)  Vitamin D (Unknown)  Vitamin D3 (Unknown)    Home Medications:  Colace: 100 milligram(s) orally once a day, As Needed (2018 23:21)  darbepoetin leon 25 mcg/mL injectable solution:  injectable  (2018 23:21)  Dialyvite 800 oral tablet: 1 tab(s) orally once a day (2018 23:21)  losartan: 12.5 milligram(s) orally 2 times a day (2018 23:21)  Melatonin 3 mg oral tablet: 1 tab(s) orally once (at bedtime) (2018 23:21)  Metoprolol Tartrate: 12.5 milligram(s) orally every 12 hours (2018 23:21)  Protonix 40 mg oral delayed release tablet: 1 tab(s) orally once a day (2018 23:21)  Renvela 800 mg oral tablet: 1 tab(s) orally 3 times a day (with meals) (2018 23:21)    Hospital Medications:   MEDICATIONS  (STANDING):  aspirin enteric coated 81 milliGRAM(s) Oral daily  heparin  Injectable 5000 Unit(s) SubCutaneous every 8 hours  losartan 12.5 milliGRAM(s) Oral every 12 hours  melatonin 3 milliGRAM(s) Oral at bedtime  meropenem  IVPB 500 milliGRAM(s) IV Intermittent every 24 hours  metoprolol tartrate 12.5 milliGRAM(s) Oral two times a day  pantoprazole    Tablet 40 milliGRAM(s) Oral before breakfast  sevelamer hydrochloride 800 milliGRAM(s) Oral three times a day  simvastatin 20 milliGRAM(s) Oral at bedtime      SOCIAL HISTORY:  Denies ETOH,Smoking,   FAMILY HISTORY:  Family history of emphysema (Father)  Family history of stroke (Mother)    REVIEW OF SYSTEMS:  CONSTITUTIONAL: No weakness, fevers or chills  EYES/ENT: No visual changes;  No vertigo or throat pain   NECK: No pain or stiffness  RESPIRATORY: No cough, wheezing, hemoptysis; No shortness of breath  CARDIOVASCULAR: No chest pain or palpitations.  GASTROINTESTINAL: No abdominal or epigastric pain. No nausea, vomiting, or hematemesis; No diarrhea or constipation. No melena or hematochezia.  GENITOURINARY: No dysuria, frequency, foamy urine, urinary urgency, incontinence or hematuria  NEUROLOGICAL: No numbness or weakness  SKIN: No itching, burning, rashes, or lesions   VASCULAR: No bilateral lower extremity edema.   All other review of systems is negative unless indicated above.    VITALS:  T(F): 96.1 (18 @ 05:15), Max: 97.4 (18 @ 16:11)  HR: 68 (18 @ 12:22)  BP: 161/76 (18 @ 12:22)Vital Signs Last 24 Hrs  BP: 161/76 (2018 12:22) (137/763 - 165/81)  RR: 18 (18 @ 12:22)  SpO2: 97% (18 @ 17:40)     @ 07:01  -   @ 07:00  --------------------------------------------------------  IN: 0 mL / OUT: 200 mL / NET: -200 mL      Height (cm): 170.18 ( @ 18:16)      I&O's Detail    2018 07:01  -  2018 07:00  --------------------------------------------------------  IN:  Total IN: 0 mL    OUT:    Voided: 200 mL  Total OUT: 200 mL    Total NET: -200 mL    PHYSICAL EXAM:  Constitutional: NAD  HEENT: anicteric sclera, oropharynx clear, MMM  Neck: No JVD  Respiratory: CTAB, no wheezes, rales or rhonchi  Cardiovascular: S1, S2, RRR  Gastrointestinal: BS+, soft, NT/ND  Extremities: No cyanosis or clubbing. No peripheral edema  Neurological: A/O x 3, no focal deficits  Psychiatric: Normal mood, normal affect  : No CVA tenderness. No ross.   Skin: No rashes  Vascular Access:    LABS:      141  |  98  |  56<H>  ----------------------------<  97  4.1   |  26  |  4.6<HH>    Ca    8.5      2018 07:29  Phos  4.6     -06    TPro  6.0  /  Alb  3.7  /  TBili  0.7  /  DBili      /  AST  12  /  ALT  10  /  AlkPhos  60  04-    Creatinine Trend: 4.6 <--, 3.0 <--, 2.9 <--, 4.4 <--                        10.5   6.77  )-----------( 155      ( 2018 07:13 )             33.1     Urine Studies:  Urinalysis Basic - ( 2018 19:51 )    Color: Dark Yellow / Appearance: Cloudy / S.015 / pH:   Gluc:  / Ketone: Negative  / Bili: Small / Urobili: 1.0 mg/dL   Blood:  / Protein: 100 mg/dL / Nitrite: Negative   Leuk Esterase: Moderate / RBC:  / WBC 10-25 /HPF   Sq Epi:  / Non Sq Epi: Few /HPF / Bacteria: Few /HPF                RADIOLOGY & ADDITIONAL STUDIES: NEPHROLOGY CONSULTATION NOTE    A 89 yo M with PMH listed below presented to hospital because of fever for 4 days  (T 100F). Renal consult called for ESRD on HD.     PAST MEDICAL & SURGICAL HISTORY:  Urinary retention  Colon perforation:   TIA (transient ischemic attack):   Gout  Chronic renal failure: on HD  Aortic stenosis, severe  Myocardial infarction  Hypertension  Hyperlipidemia  Congestive heart failure  History of cholecystectomy  Bilateral cataracts  History of hip replacement, total, left  History of colon resection: secondary to colon perforation  AV fistula: right upper arm  Gallbladder disorder: stent removal 10/2017  S/P TAVR (transcatheter aortic valve replacement)    Allergies:  Biaxin (Unknown)  Ceftin (Unknown)  Plavix (Unknown)  Vitamin D (Unknown)  Vitamin D3 (Unknown)    Home Medications:  Colace: 100 milligram(s) orally once a day, As Needed (2018 23:21)  darbepoetin leon 25 mcg/mL injectable solution:  injectable  (2018 23:21)  Dialyvite 800 oral tablet: 1 tab(s) orally once a day (2018 23:21)  losartan: 12.5 milligram(s) orally 2 times a day (2018 23:21)  Melatonin 3 mg oral tablet: 1 tab(s) orally once (at bedtime) (2018 23:21)  Metoprolol Tartrate: 12.5 milligram(s) orally every 12 hours (2018 23:21)  Protonix 40 mg oral delayed release tablet: 1 tab(s) orally once a day (2018 23:21)  Renvela 800 mg oral tablet: 1 tab(s) orally 3 times a day (with meals) (2018 23:21)    Hospital Medications:   MEDICATIONS  (STANDING):  aspirin enteric coated 81 milliGRAM(s) Oral daily  heparin  Injectable 5000 Unit(s) SubCutaneous every 8 hours  losartan 12.5 milliGRAM(s) Oral every 12 hours  melatonin 3 milliGRAM(s) Oral at bedtime  meropenem  IVPB 500 milliGRAM(s) IV Intermittent every 24 hours  metoprolol tartrate 12.5 milliGRAM(s) Oral two times a day  pantoprazole    Tablet 40 milliGRAM(s) Oral before breakfast  sevelamer hydrochloride 800 milliGRAM(s) Oral three times a day  simvastatin 20 milliGRAM(s) Oral at bedtime    SOCIAL HISTORY:  Denies ETOH,Smoking,   FAMILY HISTORY:  Family history of emphysema (Father)  Family history of stroke (Mother)    REVIEW OF SYSTEMS:  CONSTITUTIONAL: No weakness, fevers or chills  EYES/ENT: No visual changes;  No vertigo or throat pain   NECK: No pain or stiffness  RESPIRATORY: No cough, wheezing, hemoptysis; No shortness of breath  CARDIOVASCULAR: No chest pain or palpitations.  GASTROINTESTINAL: No abdominal or epigastric pain. No nausea, vomiting, or hematemesis; No diarrhea or constipation. No melena or hematochezia.  GENITOURINARY: No dysuria, frequency, foamy urine, urinary urgency, incontinence or hematuria  NEUROLOGICAL: No numbness or weakness  SKIN: No itching, burning, rashes, or lesions   VASCULAR: No bilateral lower extremity edema.   All other review of systems is negative unless indicated above.    VITALS:  T(F): 96.1 (18 @ 05:15), Max: 97.4 (18 @ 16:11)  HR: 68 (18 @ 12:22)  BP: 161/76 (18 @ 12:22)Vital Signs Last 24 Hrs  BP: 161/76 (2018 12:22) (137/763 - 165/81)  RR: 18 (18 @ 12:22)  SpO2: 97% (18 @ 17:40)    05 @ 07:01  -  06 @ 07:00  --------------------------------------------------------  IN: 0 mL / OUT: 200 mL / NET: -200 mL      Height (cm): 170.18 ( @ 18:16)      I&O's Detail    2018 07:01  -  2018 07:00  --------------------------------------------------------  IN:  Total IN: 0 mL    OUT:    Voided: 200 mL  Total OUT: 200 mL    Total NET: -200 mL    PHYSICAL EXAM:  Constitutional: NAD  HEENT: anicteric sclera, oropharynx clear, MMM  Neck: No JVD  Respiratory: CTAB, no wheezes, rales or rhonchi  Cardiovascular: S1, S2, RRR  Gastrointestinal: BS+, soft, NT/ND  Extremities: No cyanosis or clubbing. No peripheral edema  Neurological: A/O x 3, no focal deficits  Psychiatric: Normal mood, normal affect  : No CVA tenderness. No ross.   Skin: No rashes  Vascular Access:    LABS:      141  |  98  |  56<H>  ----------------------------<  97  4.1   |  26  |  4.6<HH>    Ca    8.5      2018 07:29  Phos  4.6     -    TPro  6.0  /  Alb  3.7  /  TBili  0.7  /  DBili      /  AST  12  /  ALT  10  /  AlkPhos  60      Creatinine Trend: 4.6 <--, 3.0 <--, 2.9 <--, 4.4 <--                        10.5   6.77  )-----------( 155      ( 2018 07:13 )             33.1     Urine Studies:  Urinalysis Basic - ( 2018 19:51 )    Color: Dark Yellow / Appearance: Cloudy / S.015 / pH:   Gluc:  / Ketone: Negative  / Bili: Small / Urobili: 1.0 mg/dL   Blood:  / Protein: 100 mg/dL / Nitrite: Negative   Leuk Esterase: Moderate / RBC:  / WBC 10-25 /HPF   Sq Epi:  / Non Sq Epi: Few /HPF / Bacteria: Few /HPF                RADIOLOGY & ADDITIONAL STUDIES: NEPHROLOGY CONSULTATION NOTE    A 89 yo M with PMH listed below presented to hospital because of fever for 4 days  (T 100F). denied cough no sputum production no rash no abdominal pain , patient has chronic indwelling urinary catheter and was diagnosed with UTI / cultures grew enterococcus on Merrem now . Renal consult called for ESRD on HD.     PAST MEDICAL & SURGICAL HISTORY:  Urinary retention  Colon perforation:   TIA (transient ischemic attack):   Gout  Chronic renal failure: on HD  Aortic stenosis, severe  Myocardial infarction  Hypertension  Hyperlipidemia  Congestive heart failure  History of cholecystectomy  Bilateral cataracts  History of hip replacement, total, left  History of colon resection: secondary to colon perforation  AV fistula: right upper arm  Gallbladder disorder: stent removal 10/2017  S/P TAVR (transcatheter aortic valve replacement)    Allergies:  Biaxin (Unknown)  Ceftin (Unknown)  Plavix (Unknown)  Vitamin D (Unknown)  Vitamin D3 (Unknown)    Home Medications:  Colace: 100 milligram(s) orally once a day, As Needed (2018 23:21)  darbepoetin leon 25 mcg/mL injectable solution:  injectable  (2018 23:21)  Dialyvite 800 oral tablet: 1 tab(s) orally once a day (2018 23:21)  losartan: 12.5 milligram(s) orally 2 times a day (2018 23:21)  Melatonin 3 mg oral tablet: 1 tab(s) orally once (at bedtime) (2018 23:21)  Metoprolol Tartrate: 12.5 milligram(s) orally every 12 hours (2018 23:21)  Protonix 40 mg oral delayed release tablet: 1 tab(s) orally once a day (2018 23:21)  Renvela 800 mg oral tablet: 1 tab(s) orally 3 times a day (with meals) (2018 23:21)    Hospital Medications:   MEDICATIONS  (STANDING):  aspirin enteric coated 81 milliGRAM(s) Oral daily  heparin  Injectable 5000 Unit(s) SubCutaneous every 8 hours  losartan 12.5 milliGRAM(s) Oral every 12 hours  melatonin 3 milliGRAM(s) Oral at bedtime  meropenem  IVPB 500 milliGRAM(s) IV Intermittent every 24 hours  metoprolol tartrate 12.5 milliGRAM(s) Oral two times a day  pantoprazole    Tablet 40 milliGRAM(s) Oral before breakfast  sevelamer hydrochloride 800 milliGRAM(s) Oral three times a day  simvastatin 20 milliGRAM(s) Oral at bedtime    SOCIAL HISTORY:  Denies ETOH,Smoking,   FAMILY HISTORY:  Family history of emphysema (Father)  Family history of stroke (Mother)    REVIEW OF SYSTEMS:  CONSTITUTIONAL: positive fevers   RESPIRATORY: No cough, wheezing, hemoptysis; No shortness of breath  CARDIOVASCULAR: No chest pain or palpitations.  GASTROINTESTINAL: No abdominal or epigastric pain. No nausea, vomiting, or hematemesis; No diarrhea or constipation. No melena or hematochezia.  GENITOURINARY: chronic indwelling catheter   SKIN: No itching, burning, rashes, or lesions   VASCULAR: No bilateral lower extremity edema.   All other review of systems is negative unless indicated above.    VITALS:  T(F): 96.1 (18 @ 05:15), Max: 97.4 (18 @ 16:11)  HR: 68 (18 @ 12:22)  BP: 161/76 (18 @ 12:22)Vital Signs Last 24 Hrs  BP: 161/76 (2018 12:22) (137/763 - 165/81)  RR: 18 (18 @ 12:22)  SpO2: 97% (18 @ 17:40)     @ 07:01  -   @ 07:00  --------------------------------------------------------  IN: 0 mL / OUT: 200 mL / NET: -200 mL      Height (cm): 170.18 ( @ 18:16)      I&O's Detail    2018 07:01  -  2018 07:00  --------------------------------------------------------  IN:  Total IN: 0 mL    OUT:    Voided: 200 mL  Total OUT: 200 mL    Total NET: -200 mL    PHYSICAL EXAM:  Constitutional: NAD  HEENT: anicteric sclera, oropharynx clear, MMM  Neck: No JVD  Respiratory: CTAB, no wheezes, rales or rhonchi  Cardiovascular: S1, S2, RRR  Gastrointestinal: BS+, soft, NT/ND  Extremities: No cyanosis or clubbing. No peripheral edema  Neurological: A/O x 3, no focal deficits  Psychiatric: Normal mood, normal affect  : No CVA tenderness. positive  ross.   Skin: No rashes  Vascular Access:    LABS:      141  |  98  |  56<H>  ----------------------------<  97  4.1   |  26  |  4.6<HH>    Ca    8.5      2018 07:29  Phos  4.6     -    TPro  6.0  /  Alb  3.7  /  TBili  0.7  /  DBili      /  AST  12  /  ALT  10  /  AlkPhos  60      Creatinine Trend: 4.6 <--, 3.0 <--, 2.9 <--, 4.4 <--                        10.5   6.77  )-----------( 155      ( 2018 07:13 )             33.1     Urine Studies:  Urinalysis Basic - ( 2018 19:51 )    Color: Dark Yellow / Appearance: Cloudy / S.015 / pH:   Gluc:  / Ketone: Negative  / Bili: Small / Urobili: 1.0 mg/dL   Blood:  / Protein: 100 mg/dL / Nitrite: Negative   Leuk Esterase: Moderate / RBC:  / WBC 10-25 /HPF   Sq Epi:  / Non Sq Epi: Few /HPF / Bacteria: Few /HPF        Culture Results:   50,000 - 99,000 CFU/mL Enterococcus faecium (18 @ 19:51)            RADIOLOGY & ADDITIONAL STUDIES:    < from: Xray Chest 1 View-PORTABLE IMMEDIATE (18 @ 20:47) >    Impression:      Small left pleural effusion with left lung base opacity.     < end of copied text >

## 2018-04-07 LAB
-  AMPICILLIN: SIGNIFICANT CHANGE UP
-  CIPROFLOXACIN: SIGNIFICANT CHANGE UP
-  NITROFURANTOIN: SIGNIFICANT CHANGE UP
-  TETRACYCLINE: SIGNIFICANT CHANGE UP
-  VANCOMYCIN: SIGNIFICANT CHANGE UP
ANION GAP SERPL CALC-SCNC: 16 MMOL/L — HIGH (ref 7–14)
BUN SERPL-MCNC: 37 MG/DL — HIGH (ref 10–20)
CALCIUM SERPL-MCNC: 8.4 MG/DL — LOW (ref 8.5–10.1)
CHLORIDE SERPL-SCNC: 95 MMOL/L — LOW (ref 98–110)
CK SERPL-CCNC: 28 U/L — SIGNIFICANT CHANGE UP (ref 0–225)
CO2 SERPL-SCNC: 27 MMOL/L — SIGNIFICANT CHANGE UP (ref 17–32)
CREAT SERPL-MCNC: 4 MG/DL — HIGH (ref 0.7–1.5)
CULTURE RESULTS: SIGNIFICANT CHANGE UP
GLUCOSE SERPL-MCNC: 89 MG/DL — SIGNIFICANT CHANGE UP (ref 70–99)
HCT VFR BLD CALC: 36.2 % — LOW (ref 42–52)
HGB BLD-MCNC: 11.6 G/DL — LOW (ref 14–18)
MAGNESIUM SERPL-MCNC: 2 MG/DL — SIGNIFICANT CHANGE UP (ref 1.8–2.4)
MCHC RBC-ENTMCNC: 30.2 PG — SIGNIFICANT CHANGE UP (ref 27–31)
MCHC RBC-ENTMCNC: 32 G/DL — SIGNIFICANT CHANGE UP (ref 32–37)
MCV RBC AUTO: 94.3 FL — HIGH (ref 80–94)
METHOD TYPE: SIGNIFICANT CHANGE UP
NRBC # BLD: 0 /100 WBCS — SIGNIFICANT CHANGE UP (ref 0–0)
ORGANISM # SPEC MICROSCOPIC CNT: SIGNIFICANT CHANGE UP
ORGANISM # SPEC MICROSCOPIC CNT: SIGNIFICANT CHANGE UP
PLATELET # BLD AUTO: 139 K/UL — SIGNIFICANT CHANGE UP (ref 130–400)
POTASSIUM SERPL-MCNC: 4 MMOL/L — SIGNIFICANT CHANGE UP (ref 3.5–5)
POTASSIUM SERPL-SCNC: 4 MMOL/L — SIGNIFICANT CHANGE UP (ref 3.5–5)
RBC # BLD: 3.84 M/UL — LOW (ref 4.7–6.1)
RBC # FLD: 15.1 % — HIGH (ref 11.5–14.5)
SODIUM SERPL-SCNC: 138 MMOL/L — SIGNIFICANT CHANGE UP (ref 135–146)
SPECIMEN SOURCE: SIGNIFICANT CHANGE UP
TROPONIN T SERPL-MCNC: 0.1 NG/ML — CRITICAL HIGH
WBC # BLD: 7.64 K/UL — SIGNIFICANT CHANGE UP (ref 4.8–10.8)
WBC # FLD AUTO: 7.64 K/UL — SIGNIFICANT CHANGE UP (ref 4.8–10.8)

## 2018-04-07 RX ORDER — LOSARTAN POTASSIUM 100 MG/1
25 TABLET, FILM COATED ORAL DAILY
Qty: 0 | Refills: 0 | Status: DISCONTINUED | OUTPATIENT
Start: 2018-04-07 | End: 2018-04-10

## 2018-04-07 RX ORDER — NITROFURANTOIN MACROCRYSTAL 50 MG
1 CAPSULE ORAL
Qty: 14 | Refills: 0 | OUTPATIENT
Start: 2018-04-07 | End: 2018-04-13

## 2018-04-07 RX ADMIN — LOSARTAN POTASSIUM 12.5 MILLIGRAM(S): 100 TABLET, FILM COATED ORAL at 06:23

## 2018-04-07 RX ADMIN — Medication 3 MILLIGRAM(S): at 21:34

## 2018-04-07 RX ADMIN — HEPARIN SODIUM 5000 UNIT(S): 5000 INJECTION INTRAVENOUS; SUBCUTANEOUS at 06:23

## 2018-04-07 RX ADMIN — Medication 12.5 MILLIGRAM(S): at 17:08

## 2018-04-07 RX ADMIN — Medication 81 MILLIGRAM(S): at 11:38

## 2018-04-07 RX ADMIN — SEVELAMER CARBONATE 800 MILLIGRAM(S): 2400 POWDER, FOR SUSPENSION ORAL at 06:23

## 2018-04-07 RX ADMIN — MEROPENEM 100 MILLIGRAM(S): 1 INJECTION INTRAVENOUS at 22:42

## 2018-04-07 RX ADMIN — HEPARIN SODIUM 5000 UNIT(S): 5000 INJECTION INTRAVENOUS; SUBCUTANEOUS at 21:34

## 2018-04-07 RX ADMIN — HEPARIN SODIUM 5000 UNIT(S): 5000 INJECTION INTRAVENOUS; SUBCUTANEOUS at 14:47

## 2018-04-07 RX ADMIN — SIMVASTATIN 20 MILLIGRAM(S): 20 TABLET, FILM COATED ORAL at 21:34

## 2018-04-07 RX ADMIN — PANTOPRAZOLE SODIUM 40 MILLIGRAM(S): 20 TABLET, DELAYED RELEASE ORAL at 06:23

## 2018-04-07 RX ADMIN — SEVELAMER CARBONATE 800 MILLIGRAM(S): 2400 POWDER, FOR SUSPENSION ORAL at 14:47

## 2018-04-07 RX ADMIN — LOSARTAN POTASSIUM 25 MILLIGRAM(S): 100 TABLET, FILM COATED ORAL at 11:38

## 2018-04-07 RX ADMIN — Medication 12.5 MILLIGRAM(S): at 06:23

## 2018-04-07 NOTE — CONSULT NOTE ADULT - SUBJECTIVE AND OBJECTIVE BOX
ALLEN CABRERA 88yMalePatient is a 88y old  Male who presents with a chief complaint of fever (2018 15:25)      Patient has history of:  Biaxin (Unknown)  Ceftin (Unknown)  Plavix (Unknown)  Vitamin D (Unknown)  Vitamin D3 (Unknown)        UTI  ^UTI  H/o or current diagnosis of HF- ACEI/ARB contraindication unknown  H/o or current diagnosis of HF- Contraindication to ACEI/ARBs  H/o or current diagnosis of HF- ACEI/ARB contraindication unknown  H/o or current diagnosis of HF- Contraindication to ACEI/ARBs  H/o or current diagnosis of HF- ACEI/ARB contraindication unknown  H/o or current diagnosis of HF- ACEI/ARB contraindication unknown  H/o or current diagnosis of HF- Contraindication to ACEI/ARBs  H/o or current diagnosis of HF- ACEI/ARB contraindication unknown  H/o or current diagnosis of HF- Contraindication to ACEI/ARBs  H/o or current diagnosis of HF- ACEI/ARB contraindication unknown  H/o or current diagnosis of HF- Contraindication to ACEI/ARBs  Family history of emphysema (Father)  Family history of stroke (Mother)  No pertinent family history in first degree relatives  Handoff  MEWS Score  Urinary retention  Colon perforation  TIA (transient ischemic attack)  Gout  Chronic renal failure  Aortic stenosis, severe  Myocardial infarction  Hypertension  Hyperlipidemia  Congestive heart failure  UTI (urinary tract infection)  History of cholecystectomy  Bilateral cataracts  History of hip replacement, total, left  History of colon resection  AV fistula  H/O colonoscopy  Gallbladder disorder  S/P TAVR (transcatheter aortic valve replacement)  MED EVAL  3  Urinary retention  Vargas catheter in place on admission  Fever        Patient treated with:  meropenem  IVPB 500 milliGRAM(s) IV Intermittent every 24 hours        PHYSICAL EXAM  T(F): 96.1 (18 @ 05:59), Max: 97.5 (18 @ 16:04)  HR: 70 (18 @ 05:59) (65 - 74)  BP: 136/77 (18 @ 05:59) (136/77 - 161/76)  RR: 18 (18 @ 05:59) (18 - 20)  SpO2: --  Daily     Daily Weight in k.8 (2018 05:59)  HEENT: normal, no nuchal rigidity  Cor: RSR Nl S1 S2  Lungs: clear  Decreased breath sounds at bases    Abdomen: Nontender, Nl BS,     Ext: No clubbing,cyanosis or edema    LAB & RADIOLOGIC RESULTS:            141  |  98  |  56<H>  ----------------------------<  97  4.1   |  26  |  4.6<HH>            Hyponatremia         Hypernatremia                  Culture - Urine (collected 2018 19:51)  Source: .Urine Catheterized  Preliminary Report (2018 23:00):    50,000 - 99,000 CFU/mL Enterococcus faecium    Culture - Blood (collected 2018 19:51)  Source: .Blood Blood  Preliminary Report (2018 03:01):    No growth to date.    Culture - Blood (collected 2018 19:51)  Source: .Blood Blood  Preliminary Report (2018 03:01):    No growth to date.    Culture - Urine (collected 2018 04:23)  Source: .Urine Clean Catch (Midstream)  Final Report (2018 16:58):    >100,000 CFU/ml Escherichia coli  Organism: Escherichia coli (2018 16:58)  Organism: Escherichia coli (2018 16:58)      -  Amikacin: S <=8      -  Amoxicillin/Clavulanic Acid: S <=8/4      -  Ampicillin: S 8      -  Ampicillin/Sulbactam: S <=4/2      -  Aztreonam: S <=4      -  Cefazolin: S <=2      -  Cefepime: S <=2      -  Cefoxitin: S <=4      -  Ceftriaxone: S <=1      -  Ciprofloxacin: R >2      -  Ertapenem: S <=0.5      -  Gentamicin: S <=1      -  Imipenem: S <=1      -  Levofloxacin: R >4      -  Meropenem: S <=1      -  Nitrofurantoin: S <=32      -  Piperacillin/Tazobactam: S <=8      -  Tigecycline: S <=1      -  Tobramycin: S <=2      -  Trimethoprim/Sulfamethoxazole: R >2/38      Method Type: SISI    Culture - Blood (collected 2018 03:28)  Source: .Blood Blood  Final Report (2018 09:02):    No growth at 5 days.    Culture - Blood (collected 2018 03:28)  Source: .Blood Blood  Final Report (2018 09:02):    No growth at 5 days.

## 2018-04-07 NOTE — PROGRESS NOTE ADULT - ASSESSMENT
87 yo man with PMH of ESRD on HD , chronic ross catheter presenting with UTI/ Enterococcus   # s/p HD yesterday, HD on monday  # ID notes appreciated, santosh for 5 days  #ph at goal on renagel  #no venofer, ferritin elevated  # change losartan to 25 q 24   #will follow

## 2018-04-07 NOTE — PROGRESS NOTE ADULT - ASSESSMENT
89 y/o M with PMH ESRD on  HD MWF, CHF, CAD, AS, HTN, indwelling ross presents with fever oral temp tmax 100F x 4 days.    #)Fever resolved likely secondary to UTI patient has chronic ross     Urine culture om 4/4/18 Enterococcus 50-99 K CFU/ML   Urine culture on  4/1/18 E-coli more than 100k CFU/ML   Blood cultures negative  patient allergic to Rocephin   on meropenem for now continue for 5 days as per ID today day3.  follow sensitivities on urine culture from 4/4  spoke to urology trial of void today if he fails will put a new ross and consult Dr. Jackson    #)ESRD on HD MWF  Went to dialysis yesterday    #)CHF unknown type and AS   Echo pending    #)CAD and HTN:  -c/w ASA+ lopressor+ losartan    #)DVT PPX    #)From home   living with wife    #)Dispo: Home once stable. 89 y/o M with PMH ESRD on  HD MWF, CHF, CAD, AS, HTN, indwelling ross presents with fever oral temp tmax 100F x 4 days.    #)Fever resolved likely secondary to UTI patient has chronic ross     Urine culture om 4/4/18 Enterococcus 50-99 K CFU/ML   Urine culture on  4/1/18 E-coli more than 100k CFU/ML   Blood cultures negative  patient allergic to Rocephin   on meropenem for now continue for 5 days as per ID today day3.  follow sensitivities on urine culture from 4/4  PV residual urine is zero he is not retaining urine.     #)ESRD on HD MWF  Went to dialysis yesterday    #)CHF unknown type and AS   Echo pending    #)CAD and HTN:  -c/w ASA+ lopressor+ losartan    #)DVT PPX    #)From home   living with wife    #)Dispo: Home once stable.

## 2018-04-07 NOTE — PROGRESS NOTE ADULT - ASSESSMENT
*Fever resolved likely secondary to UTI patient has chronic ross     Urine culture om 4/4/18 Enterococcus 50-99 K CFU/ML   Urine culture on  4/1/18 E-coli more than 100k CFU/ML   patient allergic to Rocephin   on meropenem for now needs atleast 5 days per ID       *ESRD on HD    *CHF unknown type and AS: asymptomatic     *DVT PX *Fever resolved likely secondary to UTI patient has chronic ross     Urine culture om 4/4/18 Enterococcus 50-99 K CFU/ML   Urine culture on  4/1/18 E-coli more than 100k CFU/ML   patient allergic to Rocephin   on meropenem for now needs atleast 5 days per ID     urinary retention:   ross out   check bladder sono   if retains urine then consult urology     *ESRD on HD    *CHF unknown type and AS: asymptomatic     *DVT PX

## 2018-04-08 LAB
ANION GAP SERPL CALC-SCNC: 18 MMOL/L — HIGH (ref 7–14)
BUN SERPL-MCNC: 52 MG/DL — HIGH (ref 10–20)
CALCIUM SERPL-MCNC: 8.7 MG/DL — SIGNIFICANT CHANGE UP (ref 8.5–10.1)
CHLORIDE SERPL-SCNC: 91 MMOL/L — LOW (ref 98–110)
CO2 SERPL-SCNC: 25 MMOL/L — SIGNIFICANT CHANGE UP (ref 17–32)
CREAT SERPL-MCNC: 5.2 MG/DL — CRITICAL HIGH (ref 0.7–1.5)
GLUCOSE SERPL-MCNC: 96 MG/DL — SIGNIFICANT CHANGE UP (ref 70–99)
HCT VFR BLD CALC: 37.9 % — LOW (ref 42–52)
HGB BLD-MCNC: 12.3 G/DL — LOW (ref 14–18)
MAGNESIUM SERPL-MCNC: 2.1 MG/DL — SIGNIFICANT CHANGE UP (ref 1.8–2.4)
MCHC RBC-ENTMCNC: 30.4 PG — SIGNIFICANT CHANGE UP (ref 27–31)
MCHC RBC-ENTMCNC: 32.5 G/DL — SIGNIFICANT CHANGE UP (ref 32–37)
MCV RBC AUTO: 93.8 FL — SIGNIFICANT CHANGE UP (ref 80–94)
NRBC # BLD: 0 /100 WBCS — SIGNIFICANT CHANGE UP (ref 0–0)
PLATELET # BLD AUTO: 148 K/UL — SIGNIFICANT CHANGE UP (ref 130–400)
POTASSIUM SERPL-MCNC: 4.9 MMOL/L — SIGNIFICANT CHANGE UP (ref 3.5–5)
POTASSIUM SERPL-SCNC: 4.9 MMOL/L — SIGNIFICANT CHANGE UP (ref 3.5–5)
RBC # BLD: 4.04 M/UL — LOW (ref 4.7–6.1)
RBC # FLD: 14.7 % — HIGH (ref 11.5–14.5)
SODIUM SERPL-SCNC: 134 MMOL/L — LOW (ref 135–146)
WBC # BLD: 8.22 K/UL — SIGNIFICANT CHANGE UP (ref 4.8–10.8)
WBC # FLD AUTO: 8.22 K/UL — SIGNIFICANT CHANGE UP (ref 4.8–10.8)

## 2018-04-08 RX ORDER — METOPROLOL TARTRATE 50 MG
25 TABLET ORAL
Qty: 0 | Refills: 0 | Status: DISCONTINUED | OUTPATIENT
Start: 2018-04-08 | End: 2018-04-10

## 2018-04-08 RX ADMIN — MEROPENEM 100 MILLIGRAM(S): 1 INJECTION INTRAVENOUS at 21:41

## 2018-04-08 RX ADMIN — HEPARIN SODIUM 5000 UNIT(S): 5000 INJECTION INTRAVENOUS; SUBCUTANEOUS at 14:57

## 2018-04-08 RX ADMIN — Medication 12.5 MILLIGRAM(S): at 05:30

## 2018-04-08 RX ADMIN — SEVELAMER CARBONATE 800 MILLIGRAM(S): 2400 POWDER, FOR SUSPENSION ORAL at 05:30

## 2018-04-08 RX ADMIN — HEPARIN SODIUM 5000 UNIT(S): 5000 INJECTION INTRAVENOUS; SUBCUTANEOUS at 05:30

## 2018-04-08 RX ADMIN — SIMVASTATIN 20 MILLIGRAM(S): 20 TABLET, FILM COATED ORAL at 21:40

## 2018-04-08 RX ADMIN — Medication 81 MILLIGRAM(S): at 11:12

## 2018-04-08 RX ADMIN — Medication 3 MILLIGRAM(S): at 21:40

## 2018-04-08 RX ADMIN — SEVELAMER CARBONATE 800 MILLIGRAM(S): 2400 POWDER, FOR SUSPENSION ORAL at 14:55

## 2018-04-08 RX ADMIN — LOSARTAN POTASSIUM 25 MILLIGRAM(S): 100 TABLET, FILM COATED ORAL at 05:30

## 2018-04-08 RX ADMIN — Medication 25 MILLIGRAM(S): at 17:18

## 2018-04-08 RX ADMIN — HEPARIN SODIUM 5000 UNIT(S): 5000 INJECTION INTRAVENOUS; SUBCUTANEOUS at 21:40

## 2018-04-08 RX ADMIN — PANTOPRAZOLE SODIUM 40 MILLIGRAM(S): 20 TABLET, DELAYED RELEASE ORAL at 08:15

## 2018-04-08 NOTE — PROGRESS NOTE ADULT - ASSESSMENT
89 yo man with PMH of ESRD on HD , chronic ross catheter presenting with UTI/ Enterococcus   #  HD on monday  # ID notes appreciated, santosh for 5 days  #ph at goal on renagel  #no venofer, ferritin elevated  # change losartan to 25 q 24   please monitor blood pressure accurately  #will follow

## 2018-04-08 NOTE — PROGRESS NOTE ADULT - ASSESSMENT
*Fever resolved likely secondary to UTI patient had chronic ross     Urine culture om 4/4/18 Enterococcus 50-99 K CFU/ML   Urine culture on  4/1/18 E-coli more than 100k CFU/ML   patient allergic to Rocephin   on meropenem for now needs atleast 5 days per ID will have ID follow up and somment on VRE urine culture   discontinued ross and patient passed trial of void     *ESRD on HD    *HTN: not well controlled will increase lopressor to 25 BID and if ok with renal to increase losartan to 50 qday     *CHF unknown type and AS: asymptomatic     *DVT PX

## 2018-04-09 LAB
ANION GAP SERPL CALC-SCNC: 16 MMOL/L — HIGH (ref 7–14)
BUN SERPL-MCNC: 63 MG/DL — CRITICAL HIGH (ref 10–20)
CALCIUM SERPL-MCNC: 8.8 MG/DL — SIGNIFICANT CHANGE UP (ref 8.5–10.1)
CHLORIDE SERPL-SCNC: 90 MMOL/L — LOW (ref 98–110)
CO2 SERPL-SCNC: 25 MMOL/L — SIGNIFICANT CHANGE UP (ref 17–32)
CREAT SERPL-MCNC: 5.1 MG/DL — CRITICAL HIGH (ref 0.7–1.5)
GLUCOSE SERPL-MCNC: 105 MG/DL — HIGH (ref 70–99)
HCT VFR BLD CALC: 37.2 % — LOW (ref 42–52)
HGB BLD-MCNC: 12.4 G/DL — LOW (ref 14–18)
MAGNESIUM SERPL-MCNC: 2.1 MG/DL — SIGNIFICANT CHANGE UP (ref 1.8–2.4)
MCHC RBC-ENTMCNC: 30.8 PG — SIGNIFICANT CHANGE UP (ref 27–31)
MCHC RBC-ENTMCNC: 33.3 G/DL — SIGNIFICANT CHANGE UP (ref 32–37)
MCV RBC AUTO: 92.3 FL — SIGNIFICANT CHANGE UP (ref 80–94)
NRBC # BLD: 0 /100 WBCS — SIGNIFICANT CHANGE UP (ref 0–0)
PLATELET # BLD AUTO: 163 K/UL — SIGNIFICANT CHANGE UP (ref 130–400)
POTASSIUM SERPL-MCNC: 4.6 MMOL/L — SIGNIFICANT CHANGE UP (ref 3.5–5)
POTASSIUM SERPL-SCNC: 4.6 MMOL/L — SIGNIFICANT CHANGE UP (ref 3.5–5)
RBC # BLD: 4.03 M/UL — LOW (ref 4.7–6.1)
RBC # FLD: 14.7 % — HIGH (ref 11.5–14.5)
SODIUM SERPL-SCNC: 131 MMOL/L — LOW (ref 135–146)
WBC # BLD: 8.29 K/UL — SIGNIFICANT CHANGE UP (ref 4.8–10.8)
WBC # FLD AUTO: 8.29 K/UL — SIGNIFICANT CHANGE UP (ref 4.8–10.8)

## 2018-04-09 RX ORDER — AMOXICILLIN 250 MG/5ML
500 SUSPENSION, RECONSTITUTED, ORAL (ML) ORAL
Qty: 0 | Refills: 0 | Status: DISCONTINUED | OUTPATIENT
Start: 2018-04-09 | End: 2018-04-09

## 2018-04-09 RX ORDER — AMLODIPINE BESYLATE 2.5 MG/1
1 TABLET ORAL
Qty: 30 | Refills: 0
Start: 2018-04-09 | End: 2018-05-08

## 2018-04-09 RX ORDER — LOSARTAN POTASSIUM 100 MG/1
0.5 TABLET, FILM COATED ORAL
Qty: 0 | Refills: 0 | DISCHARGE
Start: 2018-04-09 | End: 2018-05-08

## 2018-04-09 RX ORDER — AMLODIPINE BESYLATE 2.5 MG/1
5 TABLET ORAL DAILY
Qty: 0 | Refills: 0 | Status: DISCONTINUED | OUTPATIENT
Start: 2018-04-09 | End: 2018-04-10

## 2018-04-09 RX ORDER — LOSARTAN POTASSIUM 100 MG/1
1 TABLET, FILM COATED ORAL
Qty: 30 | Refills: 0
Start: 2018-04-09 | End: 2018-05-08

## 2018-04-09 RX ORDER — METOPROLOL TARTRATE 50 MG
1 TABLET ORAL
Qty: 60 | Refills: 0
Start: 2018-04-09 | End: 2018-05-08

## 2018-04-09 RX ORDER — AMOXICILLIN 250 MG/5ML
1 SUSPENSION, RECONSTITUTED, ORAL (ML) ORAL
Qty: 18 | Refills: 0 | OUTPATIENT
Start: 2018-04-09 | End: 2018-04-17

## 2018-04-09 RX ORDER — METOPROLOL TARTRATE 50 MG
12.5 TABLET ORAL
Qty: 0 | Refills: 0 | COMMUNITY

## 2018-04-09 RX ADMIN — SEVELAMER CARBONATE 800 MILLIGRAM(S): 2400 POWDER, FOR SUSPENSION ORAL at 17:37

## 2018-04-09 RX ADMIN — HEPARIN SODIUM 5000 UNIT(S): 5000 INJECTION INTRAVENOUS; SUBCUTANEOUS at 17:47

## 2018-04-09 RX ADMIN — AMLODIPINE BESYLATE 5 MILLIGRAM(S): 2.5 TABLET ORAL at 17:45

## 2018-04-09 RX ADMIN — Medication 25 MILLIGRAM(S): at 17:37

## 2018-04-09 RX ADMIN — Medication 25 MILLIGRAM(S): at 05:40

## 2018-04-09 RX ADMIN — SEVELAMER CARBONATE 800 MILLIGRAM(S): 2400 POWDER, FOR SUSPENSION ORAL at 06:55

## 2018-04-09 RX ADMIN — SIMVASTATIN 20 MILLIGRAM(S): 20 TABLET, FILM COATED ORAL at 21:28

## 2018-04-09 RX ADMIN — HEPARIN SODIUM 5000 UNIT(S): 5000 INJECTION INTRAVENOUS; SUBCUTANEOUS at 21:29

## 2018-04-09 RX ADMIN — Medication 81 MILLIGRAM(S): at 17:45

## 2018-04-09 RX ADMIN — PANTOPRAZOLE SODIUM 40 MILLIGRAM(S): 20 TABLET, DELAYED RELEASE ORAL at 06:55

## 2018-04-09 RX ADMIN — HEPARIN SODIUM 5000 UNIT(S): 5000 INJECTION INTRAVENOUS; SUBCUTANEOUS at 05:40

## 2018-04-09 RX ADMIN — MEROPENEM 100 MILLIGRAM(S): 1 INJECTION INTRAVENOUS at 22:13

## 2018-04-09 RX ADMIN — Medication 3 MILLIGRAM(S): at 21:28

## 2018-04-09 RX ADMIN — SEVELAMER CARBONATE 800 MILLIGRAM(S): 2400 POWDER, FOR SUSPENSION ORAL at 21:28

## 2018-04-09 RX ADMIN — LOSARTAN POTASSIUM 25 MILLIGRAM(S): 100 TABLET, FILM COATED ORAL at 05:40

## 2018-04-09 NOTE — CONSULT NOTE ADULT - SUBJECTIVE AND OBJECTIVE BOX
HPI:  87 y/o left-handed  white M with PMH ESRD on  HD MWF, CHF, CAD, AS, HTN, indwelling Vargas catheter who presents with fever oral temp tmax 100 F x 4 days. He came to ED on 4/1/18 with the same complaint and left AMA on PO Levaquin. His fever did not resolve so he came back to the hospital. Denies CP, palpitations, SOB, back pain, abdominal pain, n/v/d, black or bloody stools or urinary symptoms. (04 Apr 2018 23:22)      PAST MEDICAL & SURGICAL HISTORY:  Urinary retention  Colon perforation: 2011  TIA (transient ischemic attack): 2008  Gout  Chronic renal failure: on HD  Aortic stenosis, severe  Myocardial infarction  Hypertension  Hyperlipidemia  Congestive heart failure  History of cholecystectomy  Bilateral cataracts  History of hip replacement, total, left  History of colon resection: secondary to colon perforation  AV fistula: right upper arm  Gallbladder disorder: stent removal 10/2017  S/P TAVR (transcatheter aortic valve replacement)      Hospital Course:  On IV meropenem    TODAY'S SUBJECTIVE & REVIEW OF SYMPTOMS:  seen by me in hemodialysis     Constitutional + weakness   Cardio WNL   Resp WNL   GI WNL  Heme WNL  Endo WNL  Skin WNL  MSK WNL  Neuro WNL  Cognitive WNL  Psych WNL      MEDICATIONS  (STANDING):  aspirin enteric coated 81 milliGRAM(s) Oral daily  heparin  Injectable 5000 Unit(s) SubCutaneous every 8 hours  losartan 25 milliGRAM(s) Oral daily  melatonin 3 milliGRAM(s) Oral at bedtime  meropenem  IVPB 500 milliGRAM(s) IV Intermittent every 24 hours  metoprolol tartrate 25 milliGRAM(s) Oral two times a day  pantoprazole    Tablet 40 milliGRAM(s) Oral before breakfast  sevelamer hydrochloride 800 milliGRAM(s) Oral three times a day  simvastatin 20 milliGRAM(s) Oral at bedtime    MEDICATIONS  (PRN):  docusate sodium 100 milliGRAM(s) Oral daily PRN Constipation      FAMILY HISTORY:  Family history of emphysema (Father)  Family history of MI, stroke (Mother)      Allergies    Biaxin (Unknown)  Ceftin (Unknown)  Plavix (Unknown)  Vitamin D (Unknown)  Vitamin D3 (Unknown)    Intolerances        SOCIAL HISTORY:    [  ] EtOH  [ X ] Smoking--former  [  ] Substance abuse     Home Environment:  [  ] Home Alone  [ X ] Lives with Family  [  ] Home Health Aid    Dwelling:  [  ] Apartment  [ X ] Private House  [  ] Adult Home  [  ] Skilled Nursing Facility      [  ] Short Term  [  ] Long Term  [ X ] Stairs 5 step entry       Elevator [  ]    FUNCTIONAL STATUS PTA: (Check all that apply)  Ambulation: [ X  ]Independent    [  ] Dependent     [  ] Non-Ambulatory  Assistive Device: [X  ] SA Cane  [  ]  Q Cane  [  ] Walker  [  ]  Wheelchair  ADL : [ X ] Independent  [  ]  Dependent       Vital Signs Last 24 Hrs  T(C): 35.8 (09 Apr 2018 06:06), Max: 36.1 (08 Apr 2018 20:12)  T(F): 96.5 (09 Apr 2018 06:06), Max: 97 (08 Apr 2018 20:12)  HR: 69 (09 Apr 2018 06:06) (65 - 70)  BP: 183/84 (09 Apr 2018 06:06) (139/71 - 183/84)  BP(mean): --  RR: 18 (08 Apr 2018 20:12) (18 - 20)  SpO2: 99% (09 Apr 2018 08:31) (99% - 99%)      PHYSICAL EXAM: Alert & Oriented X3  GENERAL: NAD, well-groomed, well-developed  HEAD:  Atraumatic, Normocephalic  EYES: EOMI, PERRLA, conjunctiva and sclera clear  NECK: Supple, No JVD, Normal thyroid  CHEST/LUNG: Clear to percussion bilaterally; No rales, rhonchi, wheezing, or rubs  HEART: Regular rate and rhythm; No murmurs, rubs, or gallops  ABDOMEN: Soft, Nontender, Nondistended; Bowel sounds present  EXTREMITIES:  + RUE AV graft, trace edema BLEs    NERVOUS SYSTEM:  Cranial Nerves 2-12 intact [ X ] Abnormal  [  ]  ROM: WFL all extremities [X ]  Abnormal [  ]  Motor Strength: WFL all extremities  [ X ]  Abnormal [  ]  Sensation: intact to light touch [X  ] Abnormal [  ]  Reflexes: Symmetric [  ]  Abnormal [  ]    FUNCTIONAL STATUS:  Bed Mobility: Independent [ X ]  Supervision [  ]  Needs Assistance [  ]  N/A [  ]  Transfers: Independent [  ]  Supervision [ X ]  Needs Assistance [  ]  N/A [  ]   Ambulation: Independent [  ]  Supervision [ X ]  Needs Assistance [  ]  N/A [  ]  ADL: Independent [ X ] Requires Assistance [  ] N/A [  ]      LABS:                        12.4   8.29  )-----------( 163      ( 09 Apr 2018 07:55 )             37.2     04-09    131<L>  |  90<L>  |  63<HH>  ----------------------------<  105<H>  4.6   |  25  |  5.1<HH>    Ca    8.8      09 Apr 2018 07:55  Mg     2.1     04-09            RADIOLOGY & ADDITIONAL STUDIES:    EXAM:  XR CHEST PORTABLE IMMED 1V            PROCEDURE DATE:  04/04/2018            INTERPRETATION:  Clinical History / Reason for exam: Fever    Comparison : Chest radiograph 4/1/2018.    Technique/Positioning: Patient rotated.    Findings:    Support devices: None.    Cardiac/mediastinum/hilum: Unchanged    Lung parenchyma/Pleura: Small left pleural effusion with left lung base   opacity which may represent atelectasis or consolidation.    Skeleton/soft tissues: Unchanged    Impression:      Small left pleural effusion with left lung base opacity.           EXAM:  CT CHEST IC            PROCEDURE DATE:  02/06/2018            INTERPRETATION:  Reason for Exam:  r/o pulm  embolus    Technique: CT of the chest was performed from the thoracic inlet to the   level of the adrenal glands 80 cc Optiray contrast injection. 20  cc   contrast was discarded. Axial images where obtained along with coronal   and sagittal reformatted images.     Comparison: None available.    Findings:    Tubes/Lines: None    Lungs, Pleura, and Airways: Bilateral pleural effusions with adjacent   atelectasis. Centrilobular emphysema most pronounced in the lung apices.   Peribronchial inflammatory changes are seen in the bilateral lower lobes,   nonspecific (series 7 image 199).    Pulmonary nodules:      There are no filling defects in the main pulmonary artery or segmental   branches.        Mediastinum: The aorta is normal in caliber. No significant anterior   mediastinal, axillary, supraclavicular, hilar, or subcarinal   lymphadenopathy is seen.     Heart: Postsurgicalchanges of TAVR. Heart size is normal. Aortocoronary   and mitral annular calcifications are present. Post aortic valve   replacement.    Abdomen: Pneumobilia. Renal cysts.    Bones and soft tissues: Diffuse etiopathic skeletal hyperostosis (dish)   is seen within the thoracic spine.         IMPRESSION:  No pulmonary embolism is visualized.    Bilateral pleural effusions with adjacent atelectasis.    Peribronchiolar inflammatory changes, nonspecific. Differential includes   infectious and inflammatory etiologies.    Centrilobular emphysema.     Pneumobilia. Correlate for recent instrumentation.

## 2018-04-09 NOTE — CONSULT NOTE ADULT - ASSESSMENT
IMPRESSION   E. Coli urinary tract infection in pt with chronic Vargas, CKD 5, HF and colon resection    CXray with   Small left pleural effusion with left lung base opacity.     On meropenem 500 mg IV Intermittent every 24 hours    Allergic Biaxin (unspecified) & cephalosporins (anaphylaxis)    SUGGESTIONs  Would give at least 5 days of Meropenem to cover lung and urine (less wbcs on U/A) followed by no oral antibiotics (has anaphylaxis with cephalosporins, CKD5 and resistance to Bactrim & Cipro)    Monitor pulmonary status
IMPRESSION: Rehab for deconditioning secondary to UTI    PRECAUTIONS: [  ] Cardiac  [  ] Respiratory  [  ] Seizures [  ] Contact Isolation  [  ] Droplet Isolation  [ X ] Other  RUE    Weight Bearing Status:  WBAT    RECOMMENDATION:    Out of Bed to Chair     DVT/Decubiti Prophylaxis    REHAB PLAN:     [  X ] Bedside P/T 3-5 times a week   [   ]   Bedside O/T  2-3 times a week             [   ] No Rehab Therapy Indicated                   [   ]  Speech Therapy   Conditioning/ROM                                    ADL  Bed Mobility                                               Conditioning/ROM  Transfers                                                     Bed Mobility  Sitting /Standing Balance                         Transfers                                        Gait Training                                               Sitting/Standing Balance  Stair Training [ X  ]Applicable                    Home equipment Eval                                                                        Splinting  [   ] Only      GOALS:   ADL   [   ]   Independent                    Transfers  [  X ] Independent                          Ambulation  [ X  ] Independent     [  X  ] With device                            [   ]  CG                                                         [   ]  CG                                                                  [   ] CG                            [    ] Min A                                                   [   ] Min A                                                              [   ] Min  A          DISCHARGE PLAN:   [   ]  Good candidate for Intensive Rehabilitation/Hospital based-4A [facility]                                             Will tolerate 3hrs Intensive Rehab Daily                                       [  X  ]  Short Term Rehab in Skilled Nursing Facility                                       [   X ]  Home with Outpatient or VN services                                         [    ]  Possible Candidate for Intensive Hospital based Rehab      Thank you.
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

## 2018-04-09 NOTE — PROGRESS NOTE ADULT - ASSESSMENT
*UTI: switch to amoxy per ID recs for 9 more days     *ESRD on HD    *HTN: not at goal add amlodipine 5 q24    *CHF unknown type and AS: asymptomatic     *DVT PX     d/c today if stable after HD     spent 35 minutes on discharge *UTI:   patient has 2 urine cultures 1st one positive for Ecoli more than 100K CFU/ML and patient had a chronic ross for a month for retention. presents with fevers and after starting IV meropenem fevers resolved. he has a 2nd urine culture that grew VRE 50-99 k. I discussed this with ID and since fevers resolved on meropenem then no need to give Zyvox for VRE and the organism that caused the UTI is the E-coli. also I spoke to patients primary care doctor today and she mentioned that patient was given ceftin in the past and developed a rash and patient states that he had LIP swelling with it. so In this case I will avoid cephalosporins. I asked the PMD if the patient ever got PCN in the past and she said not known. patient also does not know if he got it in the past. given that there is cross reactivity would avoid PCN based ABx. patient tolerated Meropenem well during hospital stay. so will need to place midline and give IV meropenem for one more week  (discussed with ID over phone )     *ESRD on HD    *HTN: not at goal add amlodipine 5 q24    *CHF unknown type and AS: asymptomatic     *DVT PX     d/c once IV meropenem can be set up for home

## 2018-04-09 NOTE — PROGRESS NOTE ADULT - ASSESSMENT
87 yo man with PMH of ESRD on HD , chronic ross catheter presenting with UTI/ Enterococcus   # HD today, standard bath, uf 3 liters tolerated , av fistula   # on santosh for 5 days  #ph at goal on renagel  #no venofer, ferritin elevated  # if BP remains elevated after HD , will add norvasc 5 q 24   #will follow

## 2018-04-09 NOTE — PROGRESS NOTE ADULT - ASSESSMENT
87 y/o M with PMH ESRD on  HD MWF, CHF, CAD, AS, HTN, indwelling ross presents with fever oral temp tmax 100F x 4 days.    #)Fever resolved likely secondary to UTI patient has chronic ross   -Urine culture om 4/4/18 Enterococcus 50-99 K CFU/ML   - Urine culture on  4/1/18 E-coli more than 100k CFU/ML   - Blood cultures negative  - patient allergic to Rocephin   - on meropenem for now. Will need PICC line for 7 more days of IV antibiotic  - Pt passed voiding trial and ross d/c'ed    #)ESRD on HD MWF  - Went to dialysis today    #)CAD and HTN:  -c/w ASA+ lopressor+ losartan  - Will add Norvasc 5mg q24    #)DVT PPX    #)From home   living with wife    #)Dispo: Home once stable.

## 2018-04-10 VITALS
SYSTOLIC BLOOD PRESSURE: 173 MMHG | TEMPERATURE: 98 F | HEART RATE: 66 BPM | WEIGHT: 158.73 LBS | RESPIRATION RATE: 18 BRPM | DIASTOLIC BLOOD PRESSURE: 74 MMHG

## 2018-04-10 RX ORDER — TAMSULOSIN HYDROCHLORIDE 0.4 MG/1
0.4 CAPSULE ORAL AT BEDTIME
Qty: 0 | Refills: 0 | Status: DISCONTINUED | OUTPATIENT
Start: 2018-04-10 | End: 2018-04-10

## 2018-04-10 RX ORDER — TAMSULOSIN HYDROCHLORIDE 0.4 MG/1
1 CAPSULE ORAL
Qty: 0 | Refills: 0 | COMMUNITY
Start: 2018-04-10

## 2018-04-10 RX ORDER — TAMSULOSIN HYDROCHLORIDE 0.4 MG/1
1 CAPSULE ORAL
Qty: 30 | Refills: 0
Start: 2018-04-10 | End: 2018-05-09

## 2018-04-10 RX ADMIN — Medication 25 MILLIGRAM(S): at 05:39

## 2018-04-10 RX ADMIN — PANTOPRAZOLE SODIUM 40 MILLIGRAM(S): 20 TABLET, DELAYED RELEASE ORAL at 05:39

## 2018-04-10 RX ADMIN — LOSARTAN POTASSIUM 25 MILLIGRAM(S): 100 TABLET, FILM COATED ORAL at 05:39

## 2018-04-10 RX ADMIN — Medication 1 TABLET(S): at 11:39

## 2018-04-10 RX ADMIN — AMLODIPINE BESYLATE 5 MILLIGRAM(S): 2.5 TABLET ORAL at 05:39

## 2018-04-10 RX ADMIN — SEVELAMER CARBONATE 800 MILLIGRAM(S): 2400 POWDER, FOR SUSPENSION ORAL at 05:39

## 2018-04-10 RX ADMIN — HEPARIN SODIUM 5000 UNIT(S): 5000 INJECTION INTRAVENOUS; SUBCUTANEOUS at 05:39

## 2018-04-10 RX ADMIN — Medication 81 MILLIGRAM(S): at 11:39

## 2018-04-10 NOTE — PROGRESS NOTE ADULT - ASSESSMENT
87 yo man with PMH of ESRD on HD , chronic ross catheter presenting with UTI/ Enterococcus   # s/p hd yesterday, hd in am  # ID notes appreciated, need to follow k closely with bactrim   #ph at goal on renagel  #no venofer, ferritin elevated, no jailyn   # BP remains elevated, started on norvasc 5 yesterday can increase to 10 if remains uncontrolled   # ? d/c plan

## 2018-04-10 NOTE — PROGRESS NOTE ADULT - ASSESSMENT
Presently asymptomatic  UCs with E coli and Enterococci which represent colonizing ty.  Bactrim 1 SS tablet for 7 more days.  D/C iv antibiotics.   Recall  prn please.

## 2018-04-10 NOTE — PROGRESS NOTE ADULT - PROVIDER SPECIALTY LIST ADULT
Hospitalist
Infectious Disease
Internal Medicine
Nephrology
Hospitalist

## 2018-04-10 NOTE — PROGRESS NOTE ADULT - SUBJECTIVE AND OBJECTIVE BOX
ALLEN CABRERA  88y, Male      OVERNIGHT EVENTS:    no fevers, has no complaints.    VITALS:  T(F): 98, Max: 98 (04-10-18 @ 05:43)  HR: 66  BP: 173/74  RR: 18Vital Signs Last 24 Hrs  T(C): 36.7 (10 Apr 2018 05:43), Max: 36.7 (10 Apr 2018 05:43)  T(F): 98 (10 Apr 2018 05:43), Max: 98 (10 Apr 2018 05:43)  HR: 66 (10 Apr 2018 05:43) (64 - 93)  BP: 173/74 (10 Apr 2018 05:43) (153/87 - 173/74)  BP(mean): --  RR: 18 (10 Apr 2018 05:43) (16 - 18)  SpO2: 98% (09 Apr 2018 20:15) (98% - 99%)    TESTS & MEASUREMENTS:                        12.4   8.29  )-----------( 163      ( 09 Apr 2018 07:55 )             37.2     04-09    131<L>  |  90<L>  |  63<HH>  ----------------------------<  105<H>  4.6   |  25  |  5.1<HH>    Ca    8.8      09 Apr 2018 07:55  Mg     2.1     04-09          Culture - Urine (collected 04-04-18 @ 19:51)  Source: .Urine Catheterized  Final Report (04-07-18 @ 21:47):    50,000 - 99,000 CFU/mL Enterococcus faecium (vancomycin resistant)    Normal Urogenital ty present  Organism: Enterococcus faecium (vancomycin resistant) (04-07-18 @ 21:47)  Organism: Enterococcus faecium (vancomycin resistant) (04-07-18 @ 21:47)      -  Ampicillin: R >8      -  Ciprofloxacin: R >2      -  Nitrofurantoin: I 64      -  Tetra/Doxy: R >8      -  Vancomycin: R >16      Method Type: SISI    Culture - Blood (collected 04-04-18 @ 19:51)  Source: .Blood Blood  Final Report (04-10-18 @ 03:01):    No growth at 5 days.    Culture - Blood (collected 04-04-18 @ 19:51)  Source: .Blood Blood  Final Report (04-10-18 @ 03:01):    No growth at 5 days.            RADIOLOGY & ADDITIONAL TESTS:    ANTIBIOTICS:  meropenem  IVPB 500 milliGRAM(s) IV Intermittent every 24 hours
ALLEN CABRERA 88y Male   MRN#: [id number]    Patient is a 88y old Male who presents with a chief complaint of fever (2018 23:22)    Currently admitted to medicine with the primary diagnosis of UTI (urinary tract infection)     Today is hospital day 1d. This morning he is resting comfortably in bed and reports no new issues or overnight events. No complaints at this time. Feels well.     PAST MEDICAL & SURGICAL HISTORY  Urinary retention  Colon perforation:   TIA (transient ischemic attack):   Gout  Chronic renal failure: on HD  Aortic stenosis, severe  Myocardial infarction  Hypertension  Hyperlipidemia  Congestive heart failure  History of cholecystectomy  Bilateral cataracts  History of hip replacement, total, left  History of colon resection: secondary to colon perforation  AV fistula: right upper arm  Gallbladder disorder: stent removal 10/2017  S/P TAVR (transcatheter aortic valve replacement)      ALLERGIES:  Biaxin (Unknown)  Ceftin (Unknown)  Plavix (Unknown)  Vitamin D (Unknown)  Vitamin D3 (Unknown)      MEDICATIONS:  STANDING MEDICATIONS  aspirin enteric coated 81 milliGRAM(s) Oral daily  heparin  Injectable 5000 Unit(s) SubCutaneous every 8 hours  losartan 12.5 milliGRAM(s) Oral every 12 hours  melatonin 3 milliGRAM(s) Oral at bedtime  meropenem  IVPB 500 milliGRAM(s) IV Intermittent every 24 hours  metoprolol tartrate 12.5 milliGRAM(s) Oral two times a day  pantoprazole    Tablet 40 milliGRAM(s) Oral before breakfast  sevelamer hydrochloride 800 milliGRAM(s) Oral three times a day  simvastatin 20 milliGRAM(s) Oral at bedtime    PRN MEDICATIONS  docusate sodium 100 milliGRAM(s) Oral daily PRN      VITALS:   T(F): 96.1  HR: 98  BP: 155/74  RR: 18  SpO2: 97%    LABS:                        10.5   6.77  )-----------( 155      ( 2018 07:13 )             33.1     04-05    142  |  102  |  36<H>  ----------------------------<  91  3.5   |  27  |  3.0<H>    Ca    8.1<L>      2018 07:13  Phos  3.2     04-05    TPro  6.0  /  Alb  3.7  /  TBili  0.7  /  DBili  x   /  AST  12  /  ALT  10  /  AlkPhos  60        Culture - Urine (18 @ 04:23)    -  Amikacin: S <=8    -  Amoxicillin/Clavulanic Acid: S <=8/4    -  Ampicillin: S 8    -  Ampicillin/Sulbactam: S <=4/2    -  Aztreonam: S <=4    -  Cefazolin: S <=2    -  Cefepime: S <=2    -  Cefoxitin: S <=4    -  Ceftriaxone: S <=1    -  Ciprofloxacin: R >2    -  Ertapenem: S <=0.5    -  Gentamicin: S <=1    -  Imipenem: S <=1    -  Levofloxacin: R >4    -  Meropenem: S <=1    -  Nitrofurantoin: S <=32    -  Piperacillin/Tazobactam: S <=8    -  Tigecycline: S <=1    -  Tobramycin: S <=2    -  Trimethoprim/Sulfamethoxazole: R >    Specimen Source: .Urine Clean Catch (Midstream)    Culture Results:   >100,000 CFU/ml Escherichia coli    Organism Identification: Escherichia coli    Organism: Escherichia coli    Method Type: [facility]        Urinalysis Basic - ( 2018 19:51 )    Color: Dark Yellow / Appearance: Cloudy / S.015 / pH: x  Gluc: x / Ketone: Negative  / Bili: Small / Urobili: 1.0 mg/dL   Blood: x / Protein: 100 mg/dL / Nitrite: Negative   Leuk Esterase: Moderate / RBC: x / WBC 10-25 /HPF   Sq Epi: x / Non Sq Epi: Few /HPF / Bacteria: Few /HPF        Lactate, Blood: 1.1 mmol/L (18 @ 19:51)  Troponin T, Serum: 0.11 ng/mL <HH> (18 @ 19:51)      CARDIAC MARKERS ( 2018 19:51 )  x     / 0.11 ng/mL / x     / x     / x            RADIOLOGY:      PHYSICAL EXAM:    GENERAL: NAD, well-developed  HEAD:  Atraumatic, Normocephalic  EYES: EOMI, PERRLA, conjunctiva and sclera clear  CHEST/LUNG: Clear to auscultation bilaterally; No wheeze  HEART: Regular rate and rhythm; 2/6 systolic murmurs best heard at RUSB, No rubs, or gallops  ABDOMEN: Soft, Nontender, Nondistended; Bowel sounds present  EXTREMITIES:  2+ Peripheral Pulses, No clubbing, cyanosis, or edema. Chronic venous stasis changes   PSYCH: AAOx3  NEUROLOGY: non-focal  SKIN: No rashes or lesions
Nephrology progress note    Patient is seen and examined, events over the last 24 h noted .    Allergies:  Biaxin (Unknown)  Ceftin (Unknown)  Plavix (Unknown)  Vitamin D (Unknown)  Vitamin D3 (Unknown)    Hospital Medications:   MEDICATIONS  (STANDING):  aspirin enteric coated 81 milliGRAM(s) Oral daily  heparin  Injectable 5000 Unit(s) SubCutaneous every 8 hours  losartan 25 milliGRAM(s) Oral daily  melatonin 3 milliGRAM(s) Oral at bedtime  meropenem  IVPB 500 milliGRAM(s) IV Intermittent every 24 hours  metoprolol tartrate 25 milliGRAM(s) Oral two times a day  pantoprazole    Tablet 40 milliGRAM(s) Oral before breakfast  sevelamer hydrochloride 800 milliGRAM(s) Oral three times a day  simvastatin 20 milliGRAM(s) Oral at bedtime        VITALS:  T(F): 95.8 (18 @ 13:21), Max: 96.4 (18 @ 06:01)  HR: 70 (18 @ 13:21)  BP: 179/- (18 @ 13:21)  RR: 20 (18 @ 13:21)  SpO2: --  Wt(kg): --     @ 07:01  -   @ 07:00  --------------------------------------------------------  IN: 100 mL / OUT: 2850 mL / NET: -2750 mL     @ 07:01  -   @ 07:00  --------------------------------------------------------  IN: 330 mL / OUT: 450 mL / NET: -120 mL          PHYSICAL EXAM:  Constitutional: NAD  HEENT: anicteric sclera, oropharynx clear, MMM  Neck: No JVD  Respiratory: CTAB, no wheezes, rales or rhonchi  Cardiovascular: S1, S2, RRR  Gastrointestinal: BS+, soft, NT/ND  Extremities: No cyanosis or clubbing. No peripheral edema  :  No ross.   Skin: No rashes    LABS:      134<L>  |  91<L>  |  52<H>  ----------------------------<  96  4.9   |  25  |  5.2<HH>    Ca    8.7      2018 07:05  Mg     2.1                                 12.3   8.22  )-----------( 148      ( 2018 07:05 )             37.9       Urine Studies:  Urinalysis Basic - ( 2018 19:51 )    Color: Dark Yellow / Appearance: Cloudy / S.015 / pH:   Gluc:  / Ketone: Negative  / Bili: Small / Urobili: 1.0 mg/dL   Blood:  / Protein: 100 mg/dL / Nitrite: Negative   Leuk Esterase: Moderate / RBC:  / WBC 10-25 /HPF   Sq Epi:  / Non Sq Epi: Few /HPF / Bacteria: Few /HPF        RADIOLOGY & ADDITIONAL STUDIES:  Xray Chest 1 View-PORTABLE IMMEDIATE (18 @ 20:47)   Impression:      Small left pleural effusion with left lung base opacity.
SUBJECTIVE:    Patient is a 88y old Male who presents with a chief complaint of fever (06 Apr 2018 15:25)    Currently admitted to medicine with the primary diagnosis of UTI (urinary tract infection)     Today is hospital day 3d. This morning he is resting comfortably in bed and reports no new issues or overnight events.     PAST MEDICAL & SURGICAL HISTORY  Urinary retention  Colon perforation: 2011  TIA (transient ischemic attack): 2008  Gout  Chronic renal failure: on HD  Aortic stenosis, severe  Myocardial infarction  Hypertension  Hyperlipidemia  Congestive heart failure  History of cholecystectomy  Bilateral cataracts  History of hip replacement, total, left  History of colon resection: secondary to colon perforation  AV fistula: right upper arm  Gallbladder disorder: stent removal 10/2017  S/P TAVR (transcatheter aortic valve replacement)    SOCIAL HISTORY:  Negative for smoking/alcohol/drug use.     ALLERGIES:  Biaxin (Unknown)  Ceftin (Unknown)  Plavix (Unknown)  Vitamin D (Unknown)  Vitamin D3 (Unknown)    MEDICATIONS:  STANDING MEDICATIONS  aspirin enteric coated 81 milliGRAM(s) Oral daily  heparin  Injectable 5000 Unit(s) SubCutaneous every 8 hours  losartan 25 milliGRAM(s) Oral daily  melatonin 3 milliGRAM(s) Oral at bedtime  meropenem  IVPB 500 milliGRAM(s) IV Intermittent every 24 hours  metoprolol tartrate 12.5 milliGRAM(s) Oral two times a day  pantoprazole    Tablet 40 milliGRAM(s) Oral before breakfast  sevelamer hydrochloride 800 milliGRAM(s) Oral three times a day  simvastatin 20 milliGRAM(s) Oral at bedtime    PRN MEDICATIONS  docusate sodium 100 milliGRAM(s) Oral daily PRN    VITALS:   T(F): 96.1  HR: 69  BP: 143/67  RR: 18  SpO2: --    LABS:                        11.6   7.64  )-----------( 139      ( 07 Apr 2018 07:23 )             36.2     04-07    138  |  95<L>  |  37<H>  ----------------------------<  89  4.0   |  27  |  4.0<H>    Ca    8.4<L>      07 Apr 2018 07:23  Phos  4.6     04-06  Mg     2.0     04-07            Creatine Kinase, Serum: 28 U/L (04-07-18 @ 07:23)  Troponin T, Serum: 0.10 ng/mL <HH> (04-07-18 @ 07:23)      Culture - Urine (collected 04 Apr 2018 19:51)  Source: .Urine Catheterized  Preliminary Report (05 Apr 2018 23:00):    50,000 - 99,000 CFU/mL Enterococcus faecium    Culture - Blood (collected 04 Apr 2018 19:51)  Source: .Blood Blood  Preliminary Report (06 Apr 2018 03:01):    No growth to date.    Culture - Blood (collected 04 Apr 2018 19:51)  Source: .Blood Blood  Preliminary Report (06 Apr 2018 03:01):    No growth to date.      CARDIAC MARKERS ( 07 Apr 2018 07:23 )  x     / 0.10 ng/mL / 28 U/L / x     / x          RADIOLOGY:    PHYSICAL EXAM:  GEN: No acute distress  LUNGS: Clear to auscultation bilaterally   HEART: S1/S2 present. RRR.   ABD: Soft, non-tender, non-distended. Bowel sounds present  EXT: No edema  NEURO: AAOX3
SUBJECTIVE:    Patient is a 88y old Male who presents with a chief complaint of fever (06 Apr 2018 15:25)    Currently admitted to medicine with the primary diagnosis of UTI (urinary tract infection)     Today is hospital day 5d. This morning he is resting comfortably in bed and reports no new issues or overnight events.     PAST MEDICAL & SURGICAL HISTORY  Urinary retention  Colon perforation: 2011  TIA (transient ischemic attack): 2008  Gout  Chronic renal failure: on HD  Aortic stenosis, severe  Myocardial infarction  Hypertension  Hyperlipidemia  Congestive heart failure  History of cholecystectomy  Bilateral cataracts  History of hip replacement, total, left  History of colon resection: secondary to colon perforation  AV fistula: right upper arm  Gallbladder disorder: stent removal 10/2017  S/P TAVR (transcatheter aortic valve replacement)    SOCIAL HISTORY:  Negative for smoking/alcohol/drug use.     Home Medications:  Home Medications:  Colace: 100 milligram(s) orally once a day, As Needed (04 Apr 2018 23:21)  Dialyvite 800 oral tablet: 1 tab(s) orally once a day (04 Apr 2018 23:21)  Melatonin 3 mg oral tablet: 1 tab(s) orally once (at bedtime) (04 Apr 2018 23:21)  Protonix 40 mg oral delayed release tablet: 1 tab(s) orally once a day (04 Apr 2018 23:21)  Renvela 800 mg oral tablet: 1 tab(s) orally 3 times a day (with meals) (04 Apr 2018 23:21)      ALLERGIES:  Biaxin (Unknown)  Ceftin (Unknown)  Plavix (Unknown)  Vitamin D (Unknown)  Vitamin D3 (Unknown)    MEDICATIONS:  STANDING MEDICATIONS  amLODIPine   Tablet 5 milliGRAM(s) Oral daily  aspirin enteric coated 81 milliGRAM(s) Oral daily  heparin  Injectable 5000 Unit(s) SubCutaneous every 8 hours  losartan 25 milliGRAM(s) Oral daily  melatonin 3 milliGRAM(s) Oral at bedtime  meropenem  IVPB 500 milliGRAM(s) IV Intermittent every 24 hours  metoprolol tartrate 25 milliGRAM(s) Oral two times a day  pantoprazole    Tablet 40 milliGRAM(s) Oral before breakfast  sevelamer hydrochloride 800 milliGRAM(s) Oral three times a day  simvastatin 20 milliGRAM(s) Oral at bedtime    PRN MEDICATIONS  docusate sodium 100 milliGRAM(s) Oral daily PRN    VITALS:   Vital Signs Last 24 Hrs  T(C): 35.6 (09 Apr 2018 15:51), Max: 36.1 (08 Apr 2018 20:12)  T(F): 96 (09 Apr 2018 15:51), Max: 97 (08 Apr 2018 20:12)  HR: 93 (09 Apr 2018 15:51) (64 - 93)  BP: 168/77 (09 Apr 2018 15:51) (139/71 - 183/84)  BP(mean): --  RR: 16 (09 Apr 2018 15:51) (16 - 18)  SpO2: 99% (09 Apr 2018 08:31) (99% - 99%)  CAPILLARY BLOOD GLUCOSE    LABS:                        12.4   8.29  )-----------( 163      ( 09 Apr 2018 07:55 )             37.2     04-09    131<L>  |  90<L>  |  63<HH>  ----------------------------<  105<H>  4.6   |  25  |  5.1<HH>    Ca    8.8      09 Apr 2018 07:55  Mg     2.1     04-09      RADIOLOGY:    PHYSICAL EXAM:  GENERAL: NAD, well-developed  HEAD:  Atraumatic, Normocephalic  EYES: EOMI, PERRLA, conjunctiva and sclera clear  NECK: Supple, No JVD  CHEST/LUNG: Clear to auscultation bilaterally; No wheeze  HEART: Regular rate and rhythm; No murmurs, rubs, or gallops  ABDOMEN: Soft, Nontender, Nondistended; Bowel sounds present  EXTREMITIES:  2+ Peripheral Pulses, No clubbing, cyanosis, or edema  PSYCH: AAOx3  NEUROLOGY: non-focal  SKIN: No rashes or lesions
SUBJECTIVE:    Patient is a 88y old Male who presents with a chief complaint of fever (2018 23:22)    Currently admitted to medicine with the primary diagnosis of UTI (urinary tract infection)     Today is hospital day 2d. This morning he is resting comfortably in bed and reports no new issues or overnight events.     PAST MEDICAL & SURGICAL HISTORY  Urinary retention  Colon perforation:   TIA (transient ischemic attack):   Gout  Chronic renal failure: on HD  Aortic stenosis, severe  Myocardial infarction  Hypertension  Hyperlipidemia  Congestive heart failure  History of cholecystectomy  Bilateral cataracts  History of hip replacement, total, left  History of colon resection: secondary to colon perforation  AV fistula: right upper arm  Gallbladder disorder: stent removal 10/2017  S/P TAVR (transcatheter aortic valve replacement)    SOCIAL HISTORY:  Negative for smoking/alcohol/drug use.     ALLERGIES:  Biaxin (Unknown)  Ceftin (Unknown)  Plavix (Unknown)  Vitamin D (Unknown)  Vitamin D3 (Unknown)    MEDICATIONS:  STANDING MEDICATIONS  aspirin enteric coated 81 milliGRAM(s) Oral daily  heparin  Injectable 5000 Unit(s) SubCutaneous every 8 hours  losartan 12.5 milliGRAM(s) Oral every 12 hours  melatonin 3 milliGRAM(s) Oral at bedtime  meropenem  IVPB 500 milliGRAM(s) IV Intermittent every 24 hours  metoprolol tartrate 12.5 milliGRAM(s) Oral two times a day  pantoprazole    Tablet 40 milliGRAM(s) Oral before breakfast  sevelamer hydrochloride 800 milliGRAM(s) Oral three times a day  simvastatin 20 milliGRAM(s) Oral at bedtime    PRN MEDICATIONS  docusate sodium 100 milliGRAM(s) Oral daily PRN    VITALS:   T(F): 96.1  HR: 68  BP: 161/76  RR: 18  SpO2: 97%    LABS:                        10.5   6.77  )-----------( 155      ( 2018 07:13 )             33.1     04-    141  |  98  |  56<H>  ----------------------------<  97  4.1   |  26  |  4.6<HH>    Ca    8.5      2018 07:29  Phos  4.6     -    TPro  6.0  /  Alb  3.7  /  TBili  0.7  /  DBili  x   /  AST  12  /  ALT  10  /  AlkPhos  60  -04      Urinalysis Basic - ( 2018 19:51 )    Color: Dark Yellow / Appearance: Cloudy / S.015 / pH: x  Gluc: x / Ketone: Negative  / Bili: Small / Urobili: 1.0 mg/dL   Blood: x / Protein: 100 mg/dL / Nitrite: Negative   Leuk Esterase: Moderate / RBC: x / WBC 10-25 /HPF   Sq Epi: x / Non Sq Epi: Few /HPF / Bacteria: Few /HPF            Culture - Urine (collected 2018 19:51)  Source: .Urine Catheterized  Preliminary Report (2018 23:00):    50,000 - 99,000 CFU/mL Enterococcus faecium    Culture - Blood (collected 2018 19:51)  Source: .Blood Blood  Preliminary Report (2018 03:01):    No growth to date.    Culture - Blood (collected 2018 19:51)  Source: .Blood Blood  Preliminary Report (2018 03:01):    No growth to date.      CARDIAC MARKERS ( 2018 19:51 )  x     / 0.11 ng/mL / x     / x     / x          RADIOLOGY:    PHYSICAL EXAM:  GEN: No acute distress  LUNGS: Clear to auscultation bilaterally   HEART: S1/S2 present. RRR.   ABD: Soft, non-tender, non-distended. Bowel sounds present  EXT: No edema  NEURO: AAOX3
no chest pain and no fevers . not sob      Vital Signs Last 24 Hrs  T(C): 35.6 (06 Apr 2018 05:15), Max: 36.3 (05 Apr 2018 16:11)  T(F): 96.1 (06 Apr 2018 05:15), Max: 97.4 (05 Apr 2018 16:11)  HR: 68 (06 Apr 2018 12:22) (68 - 98)  BP: 161/76 (06 Apr 2018 12:22) (137/763 - 165/81)  RR: 18 (06 Apr 2018 12:22) (17 - 18)  SpO2: 97% (05 Apr 2018 17:40) (96% - 97%)    PHYSICAL EXAM:  GENERAL: NAD, well-developed  HEAD:  Atraumatic, Normocephalic  EYES: EOMI, PERRLA, conjunctiva and sclera clear  NECK: Supple, No JVD  CHEST/LUNG: Clear to auscultation bilaterally; No wheeze  HEART: Regular rate and rhythm; No murmurs, rubs, or gallops  ABDOMEN: Soft, Nontender, Nondistended; Bowel sounds present  EXTREMITIES:  2+ Peripheral Pulses, No clubbing, cyanosis, or edema  PSYCH: AAOx3  NEUROLOGY: non-focal  SKIN: No rashes or lesions                          10.5   6.77  )-----------( 155      ( 05 Apr 2018 07:13 )             33.1     04-06    141  |  98  |  56<H>  ----------------------------<  97  4.1   |  26  |  4.6<HH>    Ca    8.5      06 Apr 2018 07:29  Phos  4.6     04-06    TPro  6.0  /  Alb  3.7  /  TBili  0.7  /  DBili  x   /  AST  12  /  ALT  10  /  AlkPhos  60  04-04    LIVER FUNCTIONS - ( 04 Apr 2018 19:51 )  Alb: 3.7 g/dL / Pro: 6.0 g/dL / ALK PHOS: 60 U/L / ALT: 10 U/L / AST: 12 U/L / GGT: x             CARDIAC MARKERS ( 04 Apr 2018 19:51 )  x     / 0.11 ng/mL / x     / x     / x            Culture - Urine (collected 04 Apr 2018 19:51)  Source: .Urine Catheterized  Preliminary Report (05 Apr 2018 23:00):    50,000 - 99,000 CFU/mL Enterococcus faecium    Culture - Blood (collected 04 Apr 2018 19:51)  Source: .Blood Blood  Preliminary Report (06 Apr 2018 03:01):    No growth to date.    Culture - Blood (collected 04 Apr 2018 19:51)  Source: .Blood Blood  Preliminary Report (06 Apr 2018 03:01):    No growth to date.
no chest pain and no sob     Vital Signs Last 24 Hrs  T(C): 35.6 (07 Apr 2018 05:59), Max: 36.4 (06 Apr 2018 16:04)  T(F): 96.1 (07 Apr 2018 05:59), Max: 97.5 (06 Apr 2018 16:04)  HR: 70 (07 Apr 2018 05:59) (65 - 74)  BP: 136/77 (07 Apr 2018 05:59) (136/77 - 161/76)  BP(mean): --  RR: 18 (07 Apr 2018 05:59) (18 - 20)  SpO2: --    PHYSICAL EXAM:  GENERAL: NAD,  HEAD:  Atraumatic, Normocephalic  EYES: EOMI, PERRLA, conjunctiva and sclera clear  NECK: Supple, No JVD  CHEST/LUNG: decreased breath sound B/L   HEART: Regular rate and rhythm; No murmurs, rubs, or gallops  ABDOMEN: Soft, Nontender, Nondistended; Bowel sounds present  EXTREMITIES:  2+ Peripheral Pulses, No clubbing, cyanosis, or edema  PSYCH: AAOx3  NEUROLOGY: non-focal        04-06    141  |  98  |  56<H>  ----------------------------<  97  4.1   |  26  |  4.6<HH>    Ca    8.5      06 Apr 2018 07:29  Phos  4.6     04-06                Culture - Urine (collected 04 Apr 2018 19:51)  Source: .Urine Catheterized  Preliminary Report (05 Apr 2018 23:00):    50,000 - 99,000 CFU/mL Enterococcus faecium    Culture - Blood (collected 04 Apr 2018 19:51)  Source: .Blood Blood  Preliminary Report (06 Apr 2018 03:01):    No growth to date.    Culture - Blood (collected 04 Apr 2018 19:51)  Source: .Blood Blood  Preliminary Report (06 Apr 2018 03:01):    No growth to date.
no chest pain and no sob     Vital Signs Last 24 Hrs  T(C): 35.8 (09 Apr 2018 06:06), Max: 36.1 (08 Apr 2018 20:12)  T(F): 96.5 (09 Apr 2018 06:06), Max: 97 (08 Apr 2018 20:12)  HR: 69 (09 Apr 2018 06:06) (65 - 70)  BP: 183/84 (09 Apr 2018 06:06) (139/71 - 183/84)  RR: 18 (08 Apr 2018 20:12) (18 - 20)  SpO2: 99% (09 Apr 2018 08:31) (99% - 99%)    PHYSICAL EXAM:  GENERAL: NAD, well-developed  HEAD:  Atraumatic, Normocephalic  EYES: EOMI, PERRLA, conjunctiva and sclera clear  NECK: Supple, No JVD  CHEST/LUNG: Clear to auscultation bilaterally; No wheeze  HEART: Regular rate and rhythm; No murmurs, rubs, or gallops  ABDOMEN: Soft, Nontender, Nondistended; Bowel sounds present  EXTREMITIES:  2+ Peripheral Pulses, No clubbing, cyanosis, or edema  PSYCH: AAOx3  NEUROLOGY: non-focal  SKIN: No rashes or lesions                          12.4   8.29  )-----------( 163      ( 09 Apr 2018 07:55 )             37.2     04-09    131<L>  |  90<L>  |  63<HH>  ----------------------------<  105<H>  4.6   |  25  |  5.1<HH>    Ca    8.8      09 Apr 2018 07:55  Mg     2.1     04-09
patient seen and examined independently on morning rounds, chart reviewed and discussed with medicine resident and agree with the above discharge note with following addendum:    time spendt on discharge >30 minutes including coordination of discharge care    no overnight events- transitioned off iv antibiotics- tolerated oral bactrim today (first dose) without any signs of allergic reaction- family bedside- stable for discahrge home today      PE:  GEN-NAD, AAOx3  CHEST- Clear to auscultation bilaterally, fair air entry  CVS- +s1/s2 RRR no murmurs  ABD- soft NT ND +bs  EXT- no e/c/c
seen  and examined  no new complaints  s/p HD yesterday       Standing Inpatient Medications  amLODIPine   Tablet 5 milliGRAM(s) Oral daily  aspirin enteric coated 81 milliGRAM(s) Oral daily  heparin  Injectable 5000 Unit(s) SubCutaneous every 8 hours  losartan 25 milliGRAM(s) Oral daily  melatonin 3 milliGRAM(s) Oral at bedtime  meropenem  IVPB 500 milliGRAM(s) IV Intermittent every 24 hours  metoprolol tartrate 25 milliGRAM(s) Oral two times a day  pantoprazole    Tablet 40 milliGRAM(s) Oral before breakfast  sevelamer hydrochloride 800 milliGRAM(s) Oral three times a day  simvastatin 20 milliGRAM(s) Oral at bedtime      VITALS/PHYSICAL EXAM  --------------------------------------------------------------------------------  T(C): 36.7 (04-10-18 @ 05:43), Max: 36.7 (04-10-18 @ 05:43)  HR: 66 (04-10-18 @ 05:43) (64 - 93)  BP: 173/74 (04-10-18 @ 05:43) (153/87 - 173/74)  RR: 18 (04-10-18 @ 05:43) (16 - 18)  SpO2: 98% (04-09-18 @ 20:15) (98% - 98%)  Wt(kg): --        04-09-18 @ 07:01  -  04-10-18 @ 07:00  --------------------------------------------------------  IN: 170 mL / OUT: 3000 mL / NET: -2830 mL      Physical Exam:  	Gen: NAD  	Pulm: CTA B/L  	CV:  S1S2; no rub  	Abd:  soft, nontender/nondistended  	LE: edema  	Vascular access: av fistula     LABS/STUDIES  --------------------------------------------------------------------------------              12.4   8.29  >-----------<  163      [04-09-18 @ 07:55]              37.2     131  |  90  |  63  ----------------------------<  105      [04-09-18 @ 07:55]  4.6   |  25  |  5.1        Ca     8.8     [04-09-18 @ 07:55]      Mg     2.1     [04-09-18 @ 07:55]            Creatinine Trend:  SCr 5.1 [04-09 @ 07:55]  SCr 5.2 [04-08 @ 07:05]  SCr 4.0 [04-07 @ 07:23]  SCr 4.6 [04-06 @ 07:29]  SCr 3.0 [04-05 @ 07:13]    Urinalysis - [04-04-18 @ 19:51]      Color Dark Yellow / Appearance Cloudy / SG 1.015 / pH 7.5      Gluc Negative / Ketone Negative  / Bili Small / Urobili 1.0       Blood Negative / Protein 100 / Leuk Est Moderate / Nitrite Negative      RBC  / WBC 10-25 / Hyaline  / Gran  / Sq Epi  / Non Sq Epi Few / Bacteria Few      Ferritin 2300.0      [02-08-18 @ 05:36]  HbA1c 4.3      [11-01-16 @ 15:19]
seen and examined  no distress  sitting on his chair  no new complaints     Standing Inpatient Medications  aspirin enteric coated 81 milliGRAM(s) Oral daily  heparin  Injectable 5000 Unit(s) SubCutaneous every 8 hours  losartan 25 milliGRAM(s) Oral daily  melatonin 3 milliGRAM(s) Oral at bedtime  meropenem  IVPB 500 milliGRAM(s) IV Intermittent every 24 hours  metoprolol tartrate 25 milliGRAM(s) Oral two times a day  pantoprazole    Tablet 40 milliGRAM(s) Oral before breakfast  sevelamer hydrochloride 800 milliGRAM(s) Oral three times a day  simvastatin 20 milliGRAM(s) Oral at bedtime          VITALS/PHYSICAL EXAM  --------------------------------------------------------------------------------  T(C): 35.8 (04-09-18 @ 06:06), Max: 36.1 (04-08-18 @ 20:12)  HR: 69 (04-09-18 @ 06:06) (65 - 70)  BP: 183/84 (04-09-18 @ 06:06) (139/71 - 183/84)  RR: 18 (04-08-18 @ 20:12) (18 - 20)  SpO2: 99% (04-09-18 @ 08:31) (99% - 99%)  Wt(kg): --        Physical Exam:  	Gen: NAD  	Pulm: decrease BS  B/L  	CV:  S1S2; no rub  	Abd:  nontender/nondistended  	UE: av fistula   	LE: no edema   	  	  LABS/STUDIES  --------------------------------------------------------------------------------              12.4   8.29  >-----------<  163      [04-09-18 @ 07:55]              37.2     134  |  91  |  52  ----------------------------<  96      [04-08-18 @ 07:05]  4.9   |  25  |  5.2        Ca     8.7     [04-08-18 @ 07:05]      Mg     2.1     [04-09-18 @ 07:55]              Ferritin 2300.0      [02-08-18 @ 05:36]  HbA1c 4.3      [11-01-16 @ 15:19]
seen and examined  no new complaints  sitting on his chair       Standing Inpatient Medications  aspirin enteric coated 81 milliGRAM(s) Oral daily  heparin  Injectable 5000 Unit(s) SubCutaneous every 8 hours  losartan 12.5 milliGRAM(s) Oral every 12 hours  melatonin 3 milliGRAM(s) Oral at bedtime  meropenem  IVPB 500 milliGRAM(s) IV Intermittent every 24 hours  metoprolol tartrate 12.5 milliGRAM(s) Oral two times a day  pantoprazole    Tablet 40 milliGRAM(s) Oral before breakfast  sevelamer hydrochloride 800 milliGRAM(s) Oral three times a day  simvastatin 20 milliGRAM(s) Oral at bedtime          VITALS/PHYSICAL EXAM  --------------------------------------------------------------------------------  T(C): 35.6 (04-07-18 @ 05:59), Max: 36.4 (04-06-18 @ 16:04)  HR: 70 (04-07-18 @ 05:59) (65 - 74)  BP: 136/77 (04-07-18 @ 05:59) (136/77 - 161/76)  RR: 18 (04-07-18 @ 05:59) (18 - 20)  SpO2: --  Wt(kg): --  Height (cm): 170.18 (04-05-18 @ 18:16)      04-06-18 @ 07:01  -  04-07-18 @ 07:00  --------------------------------------------------------  IN: 100 mL / OUT: 2850 mL / NET: -2750 mL      Physical Exam:  	Gen: NAD  	Pulm:  decrease BS B/L  	CV: S1S2; no rub  	Abd: nontender/nondistended  	LE:  no edema  	Vascular access: av fistula     LABS/STUDIES  --------------------------------------------------------------------------------    141  |  98  |  56  ----------------------------<  97      [04-06-18 @ 07:29]  4.1   |  26  |  4.6        Ca     8.5     [04-06-18 @ 07:29]      Phos  4.6     [04-06-18 @ 07:29]                Ferritin 2300.0      [02-08-18 @ 05:36]  HbA1c 4.3      [11-01-16 @ 15:19]
no chest pain and no sob     Vital Signs Last 24 Hrs  T(C): 35.8 (08 Apr 2018 06:01), Max: 35.8 (08 Apr 2018 06:01)  T(F): 96.4 (08 Apr 2018 06:01), Max: 96.4 (08 Apr 2018 06:01)  HR: 71 (08 Apr 2018 06:01) (65 - 71)  BP: 187/82 (08 Apr 2018 06:01) (143/67 - 187/82)  BP(mean): --  RR: 18 (08 Apr 2018 06:01) (18 - 18)  SpO2: --    PHYSICAL EXAM:  GENERAL: NAD, well-developed  HEAD:  Atraumatic, Normocephalic  EYES: EOMI, PERRLA, conjunctiva and sclera clear  NECK: Supple, No JVD  CHEST/LUNG: Clear to auscultation bilaterally; No wheeze  HEART: Regular rate and rhythm; No murmurs, rubs, or gallops  ABDOMEN: Soft, Nontender, Nondistended; Bowel sounds present  EXTREMITIES:  2+ Peripheral Pulses, No clubbing, cyanosis, or edema  PSYCH: AAOx3  NEUROLOGY: non-focal  SKIN: No rashes or lesions                          12.3   8.22  )-----------( 148      ( 08 Apr 2018 07:05 )             37.9     04-07    138  |  95<L>  |  37<H>  ----------------------------<  89  4.0   |  27  |  4.0<H>    Ca    8.4<L>      07 Apr 2018 07:23  Mg     2.0     04-07          CARDIAC MARKERS ( 07 Apr 2018 07:23 )  x     / 0.10 ng/mL / 28 U/L / x     / x

## 2018-04-12 ENCOUNTER — TRANSCRIPTION ENCOUNTER (OUTPATIENT)
Age: 83
End: 2018-04-12

## 2018-04-13 DIAGNOSIS — N18.6 END STAGE RENAL DISEASE: ICD-10-CM

## 2018-04-13 DIAGNOSIS — I25.2 OLD MYOCARDIAL INFARCTION: ICD-10-CM

## 2018-04-13 DIAGNOSIS — Z86.73 PERSONAL HISTORY OF TRANSIENT ISCHEMIC ATTACK (TIA), AND CEREBRAL INFARCTION WITHOUT RESIDUAL DEFICITS: ICD-10-CM

## 2018-04-13 DIAGNOSIS — Z16.24 RESISTANCE TO MULTIPLE ANTIBIOTICS: ICD-10-CM

## 2018-04-13 DIAGNOSIS — T83.511A INFECTION AND INFLAMMATORY REACTION DUE TO INDWELLING URETHRAL CATHETER, INITIAL ENCOUNTER: ICD-10-CM

## 2018-04-13 DIAGNOSIS — I13.2 HYPERTENSIVE HEART AND CHRONIC KIDNEY DISEASE WITH HEART FAILURE AND WITH STAGE 5 CHRONIC KIDNEY DISEASE, OR END STAGE RENAL DISEASE: ICD-10-CM

## 2018-04-13 DIAGNOSIS — Z99.2 DEPENDENCE ON RENAL DIALYSIS: ICD-10-CM

## 2018-04-13 DIAGNOSIS — Z96.642 PRESENCE OF LEFT ARTIFICIAL HIP JOINT: ICD-10-CM

## 2018-04-13 DIAGNOSIS — I25.10 ATHEROSCLEROTIC HEART DISEASE OF NATIVE CORONARY ARTERY WITHOUT ANGINA PECTORIS: ICD-10-CM

## 2018-04-13 DIAGNOSIS — D64.9 ANEMIA, UNSPECIFIED: ICD-10-CM

## 2018-04-13 DIAGNOSIS — N39.0 URINARY TRACT INFECTION, SITE NOT SPECIFIED: ICD-10-CM

## 2018-04-13 DIAGNOSIS — B96.20 UNSPECIFIED ESCHERICHIA COLI [E. COLI] AS THE CAUSE OF DISEASES CLASSIFIED ELSEWHERE: ICD-10-CM

## 2018-04-13 DIAGNOSIS — R33.9 RETENTION OF URINE, UNSPECIFIED: ICD-10-CM

## 2018-04-13 DIAGNOSIS — I50.9 HEART FAILURE, UNSPECIFIED: ICD-10-CM

## 2018-04-13 DIAGNOSIS — Z95.2 PRESENCE OF PROSTHETIC HEART VALVE: ICD-10-CM

## 2018-04-13 DIAGNOSIS — R50.9 FEVER, UNSPECIFIED: ICD-10-CM

## 2018-04-30 ENCOUNTER — EMERGENCY (EMERGENCY)
Facility: HOSPITAL | Age: 83
LOS: 1 days | Discharge: HOME | End: 2018-04-30
Attending: EMERGENCY MEDICINE | Admitting: EMERGENCY MEDICINE

## 2018-04-30 VITALS
TEMPERATURE: 97 F | RESPIRATION RATE: 18 BRPM | DIASTOLIC BLOOD PRESSURE: 65 MMHG | OXYGEN SATURATION: 100 % | HEART RATE: 69 BPM | SYSTOLIC BLOOD PRESSURE: 138 MMHG

## 2018-04-30 DIAGNOSIS — Z98.890 OTHER SPECIFIED POSTPROCEDURAL STATES: Chronic | ICD-10-CM

## 2018-04-30 DIAGNOSIS — H26.9 UNSPECIFIED CATARACT: Chronic | ICD-10-CM

## 2018-04-30 DIAGNOSIS — Z79.82 LONG TERM (CURRENT) USE OF ASPIRIN: ICD-10-CM

## 2018-04-30 DIAGNOSIS — I13.2 HYPERTENSIVE HEART AND CHRONIC KIDNEY DISEASE WITH HEART FAILURE AND WITH STAGE 5 CHRONIC KIDNEY DISEASE, OR END STAGE RENAL DISEASE: ICD-10-CM

## 2018-04-30 DIAGNOSIS — R53.1 WEAKNESS: ICD-10-CM

## 2018-04-30 DIAGNOSIS — E78.5 HYPERLIPIDEMIA, UNSPECIFIED: ICD-10-CM

## 2018-04-30 DIAGNOSIS — I25.10 ATHEROSCLEROTIC HEART DISEASE OF NATIVE CORONARY ARTERY WITHOUT ANGINA PECTORIS: ICD-10-CM

## 2018-04-30 DIAGNOSIS — Z99.2 DEPENDENCE ON RENAL DIALYSIS: ICD-10-CM

## 2018-04-30 DIAGNOSIS — I50.9 HEART FAILURE, UNSPECIFIED: ICD-10-CM

## 2018-04-30 DIAGNOSIS — Z98.890 OTHER SPECIFIED POSTPROCEDURAL STATES: ICD-10-CM

## 2018-04-30 DIAGNOSIS — I25.2 OLD MYOCARDIAL INFARCTION: ICD-10-CM

## 2018-04-30 DIAGNOSIS — Z90.49 ACQUIRED ABSENCE OF OTHER SPECIFIED PARTS OF DIGESTIVE TRACT: Chronic | ICD-10-CM

## 2018-04-30 DIAGNOSIS — R50.9 FEVER, UNSPECIFIED: ICD-10-CM

## 2018-04-30 DIAGNOSIS — Z95.5 PRESENCE OF CORONARY ANGIOPLASTY IMPLANT AND GRAFT: ICD-10-CM

## 2018-04-30 DIAGNOSIS — I77.0 ARTERIOVENOUS FISTULA, ACQUIRED: Chronic | ICD-10-CM

## 2018-04-30 DIAGNOSIS — N18.6 END STAGE RENAL DISEASE: ICD-10-CM

## 2018-04-30 DIAGNOSIS — Z96.642 PRESENCE OF LEFT ARTIFICIAL HIP JOINT: Chronic | ICD-10-CM

## 2018-04-30 DIAGNOSIS — K82.9 DISEASE OF GALLBLADDER, UNSPECIFIED: Chronic | ICD-10-CM

## 2018-04-30 DIAGNOSIS — Z95.2 PRESENCE OF PROSTHETIC HEART VALVE: Chronic | ICD-10-CM

## 2018-04-30 LAB
HCT VFR BLD CALC: 35.5 % — LOW (ref 42–52)
HGB BLD-MCNC: 11.4 G/DL — LOW (ref 14–18)
MCHC RBC-ENTMCNC: 29.7 PG — SIGNIFICANT CHANGE UP (ref 27–31)
MCHC RBC-ENTMCNC: 32.1 G/DL — SIGNIFICANT CHANGE UP (ref 32–37)
MCV RBC AUTO: 92.4 FL — SIGNIFICANT CHANGE UP (ref 80–94)
NRBC # BLD: 0 /100 WBCS — SIGNIFICANT CHANGE UP (ref 0–0)
PLATELET # BLD AUTO: 155 K/UL — SIGNIFICANT CHANGE UP (ref 130–400)
RBC # BLD: 3.84 M/UL — LOW (ref 4.7–6.1)
RBC # FLD: 15.3 % — HIGH (ref 11.5–14.5)
WBC # BLD: 6.98 K/UL — SIGNIFICANT CHANGE UP (ref 4.8–10.8)
WBC # FLD AUTO: 6.98 K/UL — SIGNIFICANT CHANGE UP (ref 4.8–10.8)

## 2018-04-30 NOTE — ED PROVIDER NOTE - OBJECTIVE STATEMENT
87 y/o male with PMHx of HTN, ESRD on HD (last session earlier today), CAD s/p 3 stents and recent admission to [facility] 4/20 for UTI presents with complaints of chills and T max 100.3. Patient also reports generalized weakness, but adds that he feels that way after HD. Denies sore troat, CP, SOB, N/V/D or  symptoms.     Patient sees Dr. Jackson/ [facility] outpatient who recommended they come in.   PMD: Dr. Velez

## 2018-04-30 NOTE — ED PROVIDER NOTE - PHYSICAL EXAMINATION
Gen: Elderly male, in NAD   HEENT: PERRLA, NC/AT, neck sot supple no LAD   Cardio: RRR, no murmurs   Resp: CTA B/L   Abdomen: Soft nt/nd   Extremities: No edema

## 2018-04-30 NOTE — ED PROVIDER NOTE - ATTENDING CONTRIBUTION TO CARE
88y m ext pmh incl colon perf s/p colonoscopy many yrs ago requiring partial colon resection, esrd on HD mwf (last session today) with some uo daily, cad/mi/stents on asa, s/p tavr, chf, htn, hl p/w fever x 1d. Pt rpts chills yest, accomp by fever today (oral temp 100.3). Took tylenol 1000 mg @ 7pm. Denies any other sx. No ha, neck pain/stiffness, uri sx, sore throat, cp/sob, nvd, abd pain, flank pain, dysuria, frequency, hematuria, rash. Pt has h/o urosepsis after elective GB resection in Dec 2017, was admitted to hospital earlier this month w/UTI (indwelling ross was still present @ that time), discharged to home 4/10/18 on bactrim which pt completed appx 10d ago. Per family medical team had considered sending pt home on picc line but decided to trial course of po abx first. Pt and family called Dr. Jackson's office today, referred to ED by Dr. Mitchell. PMD Dr. Velez - Cardio Dr. Cano - Renal Dr. Garland - Uro Dr. Jackson. PE: elderly m wdwn nad, ncat, perrl/eomi, op clear pharynx nl, neck supple no tenderness no meningismus +from, rrr nl s1s2 no mrg, ctab no wrr, abd soft ntnd no palpable masses no rgr, no cvat, ext RUQ fistula w/palpable thrill no signs of infection, nl rom x 3 dpi, skin warm well-perfused no rash. a/p- fever, h/o urosepsis & recent admission for uti, HD pt - urine, labs, cxr, reassess

## 2018-05-01 ENCOUNTER — INPATIENT (INPATIENT)
Facility: HOSPITAL | Age: 83
LOS: 5 days | Discharge: HOME | End: 2018-05-07
Attending: INTERNAL MEDICINE | Admitting: INTERNAL MEDICINE

## 2018-05-01 VITALS
SYSTOLIC BLOOD PRESSURE: 165 MMHG | OXYGEN SATURATION: 100 % | RESPIRATION RATE: 18 BRPM | HEART RATE: 65 BPM | DIASTOLIC BLOOD PRESSURE: 65 MMHG

## 2018-05-01 VITALS
HEART RATE: 71 BPM | SYSTOLIC BLOOD PRESSURE: 141 MMHG | TEMPERATURE: 100 F | DIASTOLIC BLOOD PRESSURE: 65 MMHG | WEIGHT: 149.91 LBS | OXYGEN SATURATION: 96 % | RESPIRATION RATE: 18 BRPM

## 2018-05-01 DIAGNOSIS — K82.9 DISEASE OF GALLBLADDER, UNSPECIFIED: Chronic | ICD-10-CM

## 2018-05-01 DIAGNOSIS — Z90.49 ACQUIRED ABSENCE OF OTHER SPECIFIED PARTS OF DIGESTIVE TRACT: Chronic | ICD-10-CM

## 2018-05-01 DIAGNOSIS — I77.0 ARTERIOVENOUS FISTULA, ACQUIRED: Chronic | ICD-10-CM

## 2018-05-01 DIAGNOSIS — Z98.890 OTHER SPECIFIED POSTPROCEDURAL STATES: Chronic | ICD-10-CM

## 2018-05-01 DIAGNOSIS — Z96.642 PRESENCE OF LEFT ARTIFICIAL HIP JOINT: Chronic | ICD-10-CM

## 2018-05-01 DIAGNOSIS — H26.9 UNSPECIFIED CATARACT: Chronic | ICD-10-CM

## 2018-05-01 DIAGNOSIS — Z95.2 PRESENCE OF PROSTHETIC HEART VALVE: Chronic | ICD-10-CM

## 2018-05-01 LAB
ALBUMIN SERPL ELPH-MCNC: 3.7 G/DL — SIGNIFICANT CHANGE UP (ref 3.5–5.2)
ALP SERPL-CCNC: 62 U/L — SIGNIFICANT CHANGE UP (ref 30–115)
ALT FLD-CCNC: 9 U/L — SIGNIFICANT CHANGE UP (ref 0–41)
ANION GAP SERPL CALC-SCNC: 12 MMOL/L — SIGNIFICANT CHANGE UP (ref 7–14)
APPEARANCE UR: CLEAR — SIGNIFICANT CHANGE UP
APPEARANCE UR: CLEAR — SIGNIFICANT CHANGE UP
AST SERPL-CCNC: 14 U/L — SIGNIFICANT CHANGE UP (ref 0–41)
BACTERIA # UR AUTO: (no result) /HPF
BASOPHILS # BLD AUTO: 0.03 K/UL — SIGNIFICANT CHANGE UP (ref 0–0.2)
BASOPHILS NFR BLD AUTO: 0.5 % — SIGNIFICANT CHANGE UP (ref 0–1)
BILIRUB SERPL-MCNC: 0.9 MG/DL — SIGNIFICANT CHANGE UP (ref 0.2–1.2)
BILIRUB UR-MCNC: NEGATIVE — SIGNIFICANT CHANGE UP
BILIRUB UR-MCNC: NEGATIVE — SIGNIFICANT CHANGE UP
BUN SERPL-MCNC: 37 MG/DL — HIGH (ref 10–20)
CALCIUM SERPL-MCNC: 8.2 MG/DL — LOW (ref 8.5–10.1)
CHLORIDE SERPL-SCNC: 99 MMOL/L — SIGNIFICANT CHANGE UP (ref 98–110)
CO2 SERPL-SCNC: 29 MMOL/L — SIGNIFICANT CHANGE UP (ref 17–32)
COLOR SPEC: YELLOW — SIGNIFICANT CHANGE UP
COLOR SPEC: YELLOW — SIGNIFICANT CHANGE UP
CREAT SERPL-MCNC: 3.5 MG/DL — HIGH (ref 0.7–1.5)
DIFF PNL FLD: NEGATIVE — SIGNIFICANT CHANGE UP
DIFF PNL FLD: NEGATIVE — SIGNIFICANT CHANGE UP
EOSINOPHIL # BLD AUTO: 0.1 K/UL — SIGNIFICANT CHANGE UP (ref 0–0.7)
EOSINOPHIL NFR BLD AUTO: 1.5 % — SIGNIFICANT CHANGE UP (ref 0–8)
GLUCOSE SERPL-MCNC: 93 MG/DL — SIGNIFICANT CHANGE UP (ref 70–99)
GLUCOSE UR QL: NEGATIVE MG/DL — SIGNIFICANT CHANGE UP
GLUCOSE UR QL: NEGATIVE MG/DL — SIGNIFICANT CHANGE UP
HCT VFR BLD CALC: 36.6 % — LOW (ref 42–52)
HGB BLD-MCNC: 11.8 G/DL — LOW (ref 14–18)
IMM GRANULOCYTES NFR BLD AUTO: 0.2 % — SIGNIFICANT CHANGE UP (ref 0.1–0.3)
KETONES UR-MCNC: NEGATIVE — SIGNIFICANT CHANGE UP
KETONES UR-MCNC: NEGATIVE — SIGNIFICANT CHANGE UP
LACTATE SERPL-SCNC: 0.8 MMOL/L — SIGNIFICANT CHANGE UP (ref 0.5–2.2)
LEUKOCYTE ESTERASE UR-ACNC: (no result)
LEUKOCYTE ESTERASE UR-ACNC: NEGATIVE — SIGNIFICANT CHANGE UP
LYMPHOCYTES # BLD AUTO: 1.13 K/UL — LOW (ref 1.2–3.4)
LYMPHOCYTES # BLD AUTO: 17.3 % — LOW (ref 20.5–51.1)
MCHC RBC-ENTMCNC: 29.4 PG — SIGNIFICANT CHANGE UP (ref 27–31)
MCHC RBC-ENTMCNC: 32.2 G/DL — SIGNIFICANT CHANGE UP (ref 32–37)
MCV RBC AUTO: 91.3 FL — SIGNIFICANT CHANGE UP (ref 80–94)
MONOCYTES # BLD AUTO: 0.82 K/UL — HIGH (ref 0.1–0.6)
MONOCYTES NFR BLD AUTO: 12.6 % — HIGH (ref 1.7–9.3)
NEUTROPHILS # BLD AUTO: 4.44 K/UL — SIGNIFICANT CHANGE UP (ref 1.4–6.5)
NEUTROPHILS NFR BLD AUTO: 67.9 % — SIGNIFICANT CHANGE UP (ref 42.2–75.2)
NITRITE UR-MCNC: NEGATIVE — SIGNIFICANT CHANGE UP
NITRITE UR-MCNC: POSITIVE
NRBC # BLD: 0 /100 WBCS — SIGNIFICANT CHANGE UP (ref 0–0)
PH UR: 6 — SIGNIFICANT CHANGE UP (ref 5–8)
PH UR: 6.5 — SIGNIFICANT CHANGE UP (ref 5–8)
PLATELET # BLD AUTO: 154 K/UL — SIGNIFICANT CHANGE UP (ref 130–400)
POTASSIUM SERPL-MCNC: 4 MMOL/L — SIGNIFICANT CHANGE UP (ref 3.5–5)
POTASSIUM SERPL-SCNC: 4 MMOL/L — SIGNIFICANT CHANGE UP (ref 3.5–5)
PROT SERPL-MCNC: 6 G/DL — SIGNIFICANT CHANGE UP (ref 6–8)
PROT UR-MCNC: 100 MG/DL
PROT UR-MCNC: 100 MG/DL
RBC # BLD: 4.01 M/UL — LOW (ref 4.7–6.1)
RBC # FLD: 15.3 % — HIGH (ref 11.5–14.5)
SODIUM SERPL-SCNC: 140 MMOL/L — SIGNIFICANT CHANGE UP (ref 135–146)
SP GR SPEC: 1.01 — SIGNIFICANT CHANGE UP (ref 1.01–1.03)
SP GR SPEC: 1.02 — SIGNIFICANT CHANGE UP (ref 1.01–1.03)
UROBILINOGEN FLD QL: 0.2 MG/DL — SIGNIFICANT CHANGE UP (ref 0.2–0.2)
UROBILINOGEN FLD QL: 0.2 MG/DL — SIGNIFICANT CHANGE UP (ref 0.2–0.2)
WBC # BLD: 6.53 K/UL — SIGNIFICANT CHANGE UP (ref 4.8–10.8)
WBC # FLD AUTO: 6.53 K/UL — SIGNIFICANT CHANGE UP (ref 4.8–10.8)
WBC UR QL: SIGNIFICANT CHANGE UP /HPF

## 2018-05-01 NOTE — ED PROVIDER NOTE - OBJECTIVE STATEMENT
89 yo male h/o CAD s/p stents, HTN, ESRD on HD ( Mon, Wed, Fri with Dr Garland), h/o recent multidrug resistant UTI , finished taking Bactrim 10 days ago returns to ER today c/o fever of 100.5 today.  According to patient and his wife, he was seen here yesterday for the same, Tmax at home was 100.4, labs were done and patient was sent home with instructions to return to ED if febrile.  ( there was no documented fever yesterday).  The patient reports generalized weakness, but denies any CP, SOB, SOB, abdominal or flank pain,. no dysuria/frequency or urgency.  Will check labs including UA, send U culture and discuss the case with his PMD.

## 2018-05-01 NOTE — ED PROVIDER NOTE - NS ED ROS FT
Constitutional: (+) fever, (-) chills  Eyes/ENT: (-) blurry vision, (-) epistaxis  Cardiovascular: (-) chest pain, (-) syncope  Respiratory: (-) cough, (-) shortness of breath  Gastrointestinal: (-) vomiting, (-) diarrhea, (-) abdominal pain  : (-)  dysuria hematuria or frequency  Musculoskeletal: (-) neck pain, (-) back pain, (-) joint pain  Integumentary: (-) rash, (-) edema  Neurological: (-) headache, (-) altered mental status  Psychiatric: (-) hallucinations  Allergic/Immunologic: (-) pruritus

## 2018-05-01 NOTE — ED PROVIDER NOTE - PHYSICAL EXAMINATION
Vital Signs: I have reviewed the initial vital signs.  Constitutional: well-nourished, appears stated age, no acute distress  HEENT: PERRL, mmm,   Cardiovascular: regular rate, regular rhythm, well-perfused extremities, capillary refill , 2 sec  Respiratory: unlabored respiratory effort, clear to auscultation bilaterally, speaking full sentences  Gastrointestinal: soft, non-tender abdomen, no pulsatile mass, no CVA ttp  Musculoskeletal: supple neck, no lower extremity edema, no calf ttp, FROM at all joints  Integumentary: warm, dry, no rash  Neurologic: awake, alert, no gross neuro deficits  Psychiatric: appropriate mood, appropriate affect

## 2018-05-02 LAB
ANION GAP SERPL CALC-SCNC: 13 MMOL/L — SIGNIFICANT CHANGE UP (ref 7–14)
BACTERIA # UR AUTO: (no result) /HPF
BUN SERPL-MCNC: 53 MG/DL — HIGH (ref 10–20)
CALCIUM SERPL-MCNC: 8.2 MG/DL — LOW (ref 8.5–10.1)
CHLORIDE SERPL-SCNC: 95 MMOL/L — LOW (ref 98–110)
CO2 SERPL-SCNC: 29 MMOL/L — SIGNIFICANT CHANGE UP (ref 17–32)
CREAT SERPL-MCNC: 4.5 MG/DL — CRITICAL HIGH (ref 0.7–1.5)
GLUCOSE SERPL-MCNC: 129 MG/DL — HIGH (ref 70–99)
POTASSIUM SERPL-MCNC: 3.9 MMOL/L — SIGNIFICANT CHANGE UP (ref 3.5–5)
POTASSIUM SERPL-SCNC: 3.9 MMOL/L — SIGNIFICANT CHANGE UP (ref 3.5–5)
RBC CASTS # UR COMP ASSIST: (no result) /HPF
SODIUM SERPL-SCNC: 137 MMOL/L — SIGNIFICANT CHANGE UP (ref 135–146)
WBC UR QL: SIGNIFICANT CHANGE UP /HPF

## 2018-05-02 RX ORDER — METOPROLOL TARTRATE 50 MG
25 TABLET ORAL
Qty: 0 | Refills: 0 | Status: DISCONTINUED | OUTPATIENT
Start: 2018-05-02 | End: 2018-05-07

## 2018-05-02 RX ORDER — SIMVASTATIN 20 MG/1
20 TABLET, FILM COATED ORAL AT BEDTIME
Qty: 0 | Refills: 0 | Status: DISCONTINUED | OUTPATIENT
Start: 2018-05-02 | End: 2018-05-07

## 2018-05-02 RX ORDER — HEPARIN SODIUM 5000 [USP'U]/ML
5000 INJECTION INTRAVENOUS; SUBCUTANEOUS EVERY 12 HOURS
Qty: 0 | Refills: 0 | Status: DISCONTINUED | OUTPATIENT
Start: 2018-05-02 | End: 2018-05-07

## 2018-05-02 RX ORDER — PANTOPRAZOLE SODIUM 20 MG/1
40 TABLET, DELAYED RELEASE ORAL
Qty: 0 | Refills: 0 | Status: DISCONTINUED | OUTPATIENT
Start: 2018-05-02 | End: 2018-05-07

## 2018-05-02 RX ORDER — DOCUSATE SODIUM 100 MG
100 CAPSULE ORAL DAILY
Qty: 0 | Refills: 0 | Status: DISCONTINUED | OUTPATIENT
Start: 2018-05-02 | End: 2018-05-07

## 2018-05-02 RX ORDER — MEROPENEM 1 G/30ML
500 INJECTION INTRAVENOUS EVERY 24 HOURS
Qty: 0 | Refills: 0 | Status: DISCONTINUED | OUTPATIENT
Start: 2018-05-02 | End: 2018-05-07

## 2018-05-02 RX ORDER — AMLODIPINE BESYLATE 2.5 MG/1
5 TABLET ORAL DAILY
Qty: 0 | Refills: 0 | Status: DISCONTINUED | OUTPATIENT
Start: 2018-05-02 | End: 2018-05-07

## 2018-05-02 RX ORDER — SEVELAMER CARBONATE 2400 MG/1
800 POWDER, FOR SUSPENSION ORAL THREE TIMES A DAY
Qty: 0 | Refills: 0 | Status: DISCONTINUED | OUTPATIENT
Start: 2018-05-02 | End: 2018-05-07

## 2018-05-02 RX ORDER — LINEZOLID 600 MG/300ML
600 INJECTION, SOLUTION INTRAVENOUS ONCE
Qty: 0 | Refills: 0 | Status: COMPLETED | OUTPATIENT
Start: 2018-05-02 | End: 2018-05-02

## 2018-05-02 RX ORDER — LOSARTAN POTASSIUM 100 MG/1
25 TABLET, FILM COATED ORAL DAILY
Qty: 0 | Refills: 0 | Status: DISCONTINUED | OUTPATIENT
Start: 2018-05-02 | End: 2018-05-07

## 2018-05-02 RX ORDER — ASPIRIN/CALCIUM CARB/MAGNESIUM 324 MG
81 TABLET ORAL DAILY
Qty: 0 | Refills: 0 | Status: DISCONTINUED | OUTPATIENT
Start: 2018-05-02 | End: 2018-05-07

## 2018-05-02 RX ORDER — TAMSULOSIN HYDROCHLORIDE 0.4 MG/1
0.4 CAPSULE ORAL AT BEDTIME
Qty: 0 | Refills: 0 | Status: DISCONTINUED | OUTPATIENT
Start: 2018-05-02 | End: 2018-05-07

## 2018-05-02 RX ORDER — MEROPENEM 1 G/30ML
500 INJECTION INTRAVENOUS ONCE
Qty: 0 | Refills: 0 | Status: DISCONTINUED | OUTPATIENT
Start: 2018-05-02 | End: 2018-05-02

## 2018-05-02 RX ORDER — MEROPENEM 1 G/30ML
500 INJECTION INTRAVENOUS ONCE
Qty: 0 | Refills: 0 | Status: COMPLETED | OUTPATIENT
Start: 2018-05-02 | End: 2018-05-02

## 2018-05-02 RX ORDER — LANOLIN ALCOHOL/MO/W.PET/CERES
3 CREAM (GRAM) TOPICAL AT BEDTIME
Qty: 0 | Refills: 0 | Status: DISCONTINUED | OUTPATIENT
Start: 2018-05-02 | End: 2018-05-07

## 2018-05-02 RX ADMIN — SEVELAMER CARBONATE 800 MILLIGRAM(S): 2400 POWDER, FOR SUSPENSION ORAL at 22:46

## 2018-05-02 RX ADMIN — AMLODIPINE BESYLATE 5 MILLIGRAM(S): 2.5 TABLET ORAL at 06:34

## 2018-05-02 RX ADMIN — SIMVASTATIN 20 MILLIGRAM(S): 20 TABLET, FILM COATED ORAL at 22:46

## 2018-05-02 RX ADMIN — MEROPENEM 100 MILLIGRAM(S): 1 INJECTION INTRAVENOUS at 11:14

## 2018-05-02 RX ADMIN — PANTOPRAZOLE SODIUM 40 MILLIGRAM(S): 20 TABLET, DELAYED RELEASE ORAL at 06:34

## 2018-05-02 RX ADMIN — Medication 81 MILLIGRAM(S): at 11:18

## 2018-05-02 RX ADMIN — TAMSULOSIN HYDROCHLORIDE 0.4 MILLIGRAM(S): 0.4 CAPSULE ORAL at 22:46

## 2018-05-02 RX ADMIN — LINEZOLID 300 MILLIGRAM(S): 600 INJECTION, SOLUTION INTRAVENOUS at 12:08

## 2018-05-02 RX ADMIN — SEVELAMER CARBONATE 800 MILLIGRAM(S): 2400 POWDER, FOR SUSPENSION ORAL at 14:13

## 2018-05-02 RX ADMIN — MEROPENEM 100 MILLIGRAM(S): 1 INJECTION INTRAVENOUS at 07:02

## 2018-05-02 RX ADMIN — LOSARTAN POTASSIUM 25 MILLIGRAM(S): 100 TABLET, FILM COATED ORAL at 06:34

## 2018-05-02 RX ADMIN — SEVELAMER CARBONATE 800 MILLIGRAM(S): 2400 POWDER, FOR SUSPENSION ORAL at 06:34

## 2018-05-02 RX ADMIN — HEPARIN SODIUM 5000 UNIT(S): 5000 INJECTION INTRAVENOUS; SUBCUTANEOUS at 06:34

## 2018-05-02 RX ADMIN — HEPARIN SODIUM 5000 UNIT(S): 5000 INJECTION INTRAVENOUS; SUBCUTANEOUS at 18:49

## 2018-05-02 RX ADMIN — Medication 1 TABLET(S): at 11:18

## 2018-05-02 RX ADMIN — Medication 25 MILLIGRAM(S): at 18:49

## 2018-05-02 RX ADMIN — Medication 25 MILLIGRAM(S): at 06:34

## 2018-05-02 NOTE — H&P ADULT - HISTORY OF PRESENT ILLNESS
89 yo male h/o CAD s/p stents, HTN, ESRD on HD ( Mon, Wed, Fri with Dr Garland), recent hospitalization 4/4-4/6 for fever 2/2 UTI / urinary retention with ucx showing enterococcus faecium and ecoli,  sp iv meropenem and d/c'ed on bactrim (angioedema hx with cephalosporins) pw recurrent fever. 87 yo male h/o CAD s/p stents, HTN, ESRD on HD ( Mon, Wed, Fri with Dr Garland), recent hospitalization 4/4-4/6 for fever 2/2 UTI / urinary retention with ucx showing enterococcus faecium and ecoli,  sp iv meropenem and d/c'ed on bactrim (angioedema hx with cephalosporins) pw recurrent fever.       Pt states he felt warm yesterday at home and felt some chills.  Discovered that he had a fever 100.3/100.4 and came to ED.   Pt otherwise denied all other symptoms, including flank pain / nausea / vomiting / diarrhea / abd pain / dysuria / urinary frequency / changes in urination habits / chest pain / sob / palpitations / cough

## 2018-05-02 NOTE — CONSULT NOTE ADULT - SUBJECTIVE AND OBJECTIVE BOX
NEPHROLOGY CONSULTATION NOTE    Patient is a 88y Male who presented to the hospital with low grade T 100.5 F soon after he completed ABx for UTI. He has ESRD on HD MWF, recurrent admissions for fevers. He had indwelling Ross until 2weeks when it was d/luis. Denies hematuria, dysuria, abd pain, no SOB    PAST MEDICAL & SURGICAL HISTORY:  ESRD (end stage renal disease) on dialysis  Urinary retention  Colon perforation:   TIA (transient ischemic attack):   Gout  Chronic renal failure: on HD  Aortic stenosis, severe  Myocardial infarction  Hypertension  Hyperlipidemia  Congestive heart failure  History of cholecystectomy  Bilateral cataracts  History of hip replacement, total, left  History of colon resection: secondary to colon perforation  AV fistula: right upper arm  Gallbladder disorder: stent removal 10/2017  S/P TAVR (transcatheter aortic valve replacement)    Allergies:  Biaxin (Unknown)  Ceftin (Unknown)  Plavix (Unknown)  Vitamin D (Unknown)  Vitamin D3 (Unknown)    Home Medications Reviewed  Hospital Medications:   MEDICATIONS  (STANDING):  amLODIPine   Tablet 5 milliGRAM(s) Oral daily  aspirin enteric coated 81 milliGRAM(s) Oral daily  heparin  Injectable 5000 Unit(s) SubCutaneous every 12 hours  losartan 25 milliGRAM(s) Oral daily  meropenem  IVPB 500 milliGRAM(s) IV Intermittent every 24 hours  metoprolol tartrate 25 milliGRAM(s) Oral two times a day  multivitamin 1 Tablet(s) Oral daily  pantoprazole    Tablet 40 milliGRAM(s) Oral before breakfast  sevelamer hydrochloride 800 milliGRAM(s) Oral three times a day  simvastatin 20 milliGRAM(s) Oral at bedtime  tamsulosin 0.4 milliGRAM(s) Oral at bedtime      SOCIAL HISTORY:  Denies ETOH,Smoking,   FAMILY HISTORY:  Family history of emphysema (Father)  Family history of stroke (Mother)        REVIEW OF SYSTEMS:  CONSTITUTIONAL: c/o fevers  EYES/ENT: No visual changes;  No vertigo or throat pain   RESPIRATORY: No cough, wheezing, hemoptysis; No shortness of breath  CARDIOVASCULAR: No chest pain or palpitations.  GASTROINTESTINAL: No abdominal or epigastric pain. No nausea, vomiting, or hematemesis; No diarrhea or constipation.  GENITOURINARY: No dysuria, frequency  SKIN: No itching, burning, rashes, or lesions   VASCULAR: No bilateral lower extremity edema.   All other review of systems is negative unless indicated above.    VITALS:  T(F): 96.8 (18 @ 12:43), Max: 99.7 (18 @ 20:28)  HR: 73 (18 @ 14:57)  BP: 136/66 (18 @ 14:57)  RR: 16 (18 @ 14:57)  SpO2: 97% (18 @ 23:37)     @ 07:01  -   @ 15:41  --------------------------------------------------------  IN: 420 mL / OUT: 150 mL / NET: 270 mL      Height (cm): 170.18 ( @ 06:51)  Weight (kg): 72.9 ( @ 06:51)  BMI (kg/m2): 25.2 ( @ 06:51)  BSA (m2): 1.84 ( @ 06:51)      I&O's Detail    02 May 2018 07:01  -  02 May 2018 15:41  --------------------------------------------------------  IN:    Oral Fluid: 420 mL  Total IN: 420 mL    OUT:    Voided: 150 mL  Total OUT: 150 mL    Total NET: 270 mL            PHYSICAL EXAM:  Constitutional: NAD  HEENT: anicteric sclera, oropharynx clear, MMM  Neck: No JVD  Respiratory: CTAB, no wheezes, rales or rhonchi  Cardiovascular: S1, S2, RRR  Gastrointestinal: BS+, soft, NT/ND  Extremities: No cyanosis or clubbing. No peripheral edema  Neurological: A/O x 3, no focal deficits  Psychiatric: Normal mood, normal affect  : No CVA tenderness. No ross.   Skin: No rashes  Vascular Access: Rt AVF with thrill, no redness    LABS:      137  |  95<L>  |  53<H>  ----------------------------<  129<H>  3.9   |  29  |  4.5<HH>    Ca    8.2<L>      01 May 2018 21:37    TPro  6.0  /  Alb  3.7  /  TBili  0.9  /  DBili      /  AST  14  /  ALT  9   /  AlkPhos  62  04-30    Creatinine Trend: 4.5 <--, 3.5 <--, 5.1 <--, 5.2 <--, 4.0 <--, 4.6 <--, 3.0 <--, 2.9 <--                        11.8   6.53  )-----------( 154      ( 01 May 2018 21:37 )             36.6     Urine Studies:  Urinalysis Basic - ( 01 May 2018 21:37 )    Color: Yellow / Appearance: Clear / S.020 / pH:   Gluc:  / Ketone: Negative  / Bili: Negative / Urobili: 0.2 mg/dL   Blood:  / Protein: 100 mg/dL / Nitrite: Positive   Leuk Esterase: Negative / RBC: 5-10 /HPF / WBC 3-5 /HPF   Sq Epi:  / Non Sq Epi:  / Bacteria: Moderate /HPF                RADIOLOGY & ADDITIONAL STUDIES:    < from: Xray Chest 1 View- PORTABLE-Urgent (18 @ 00:08) >  Slightly decreased bilateral effusions and opacities.     < end of copied text >

## 2018-05-02 NOTE — CONSULT NOTE ADULT - ASSESSMENT
89 yo male h/o CAD s/p stents, HTN, ESRD on HD ( Mon, Wed, Fri with Dr Garland), recent hospitalization 4/4-4/6 for fever 2/2 UTI / urinary retention with ucx showing enterococcus faecium and ecoli,  sp iv meropenem and d/c'ed on bactrim (angioedema hx with cephalosporins) pw recurrent fever/chills without any other symptoms.  Found to have persistent uti on UA.       FEVER 2/2 UTI vs another source (endocarditis?)  --  ucx from previous admissions (vre,  ecoli)  --  repeat ucx, bcx  --  iv abx:  santosh [id number]  --  id eval  -- obtain kidney and bladder sono to r/o obstruction/retention    ESRD on HD MWF  --HD due today  --3 hrs, 2 K bath,   UF 2 Liters as tolerated    CAD / HTN / DL / HF (COMPENSATED) /s/ TAVR H/O TIA   --  cw home regimen      D/W HS

## 2018-05-02 NOTE — H&P ADULT - ATTENDING COMMENTS
pt seen and examined independently, I have read and agree with above  continue iv abx  hd as per renal,   check cx  continue current treatment,

## 2018-05-02 NOTE — PATIENT PROFILE ADULT. - ABILITY TO HEAR (WITH HEARING AID OR HEARING APPLIANCE IF NORMALLY USED):
Mildly to Moderately Impaired: difficulty hearing in some environments or speaker may need to increase volume or speak distinctly/hearing aids home

## 2018-05-02 NOTE — H&P ADULT - ASSESSMENT
89 yo male h/o CAD s/p stents, HTN, ESRD on HD ( Mon, Wed, Fri with Dr Garland), recent hospitalization 4/4-4/6 for fever 2/2 UTI / urinary retention with ucx showing enterococcus faecium and ecoli,  sp iv meropenem and d/c'ed on bactrim (angioedema hx with cephalosporins) pw recurrent fever.         FEVER 2/2 UTI  --  ucx from previous admissions (vre,  ecoli)  --  check ucx, bcx  --  iv abx:  santosh  --  id eval    ESRD on HD MWF  --  cw hd as scheduled    CAD / HTN / DL / HF (COMPENSATED) / H/O TIA   --  cw home regimen      DVT PPX  DISPO: from home 89 yo male h/o CAD s/p stents, HTN, ESRD on HD ( Mon, Wed, Fri with Dr Garland), recent hospitalization 4/4-4/6 for fever 2/2 UTI / urinary retention with ucx showing enterococcus faecium and ecoli,  sp iv meropenem and d/c'ed on bactrim (angioedema hx with cephalosporins) pw recurrent fever/chills without any other symptoms.  Found to have persistent uti on ua.       FEVER 2/2 UTI  --  ucx from previous admissions (vre,  ecoli)  --  repeat ucx, bcx  --  iv abx:  santosh [id number]  --  id eval    ESRD on HD MWF  --  cw hd as scheduled    CAD / HTN / DL / HF (COMPENSATED) / H/O TIA   --  cw home regimen      DVT PPX  DISPO: from home

## 2018-05-02 NOTE — H&P ADULT - PMH
Aortic stenosis, severe    Chronic renal failure  on HD  Colon perforation  2011  Congestive heart failure    ESRD (end stage renal disease) on dialysis    Gout    Hyperlipidemia    Hypertension    Myocardial infarction    TIA (transient ischemic attack)  2008  Urinary retention

## 2018-05-02 NOTE — H&P ADULT - NSHPPHYSICALEXAM_GEN_ALL_CORE
Constitutional: non-toxic,  not in acute distress  HEENT: eomi,  wnl  Respiratory:  clear lung sounds b/l;   Cardiovascular:  s1, s2,  regular, +sys murmur  Gastrointestinal:  nontender, nondistended, +bowel sounds; no flank pain  Extremities: no edema b/l le  Neurological: nonfocal; wnl, aaox3    ------------------------------------------------------------------     VITAL SIGNS   T(F): 98.7 (05-01 @ 23:37), Max: 99.7 (05-01 @ 20:28)  HR: 69 (05-01 @ 23:37)  BP: 133/66 (05-01 @ 23:37)  RR: 18 (05-01 @ 23:37)  SpO2: 97% (05-01 @ 23:37)

## 2018-05-03 LAB
-  AMIKACIN: SIGNIFICANT CHANGE UP
-  AMOXICILLIN/CLAVULANIC ACID: SIGNIFICANT CHANGE UP
-  AMPICILLIN/SULBACTAM: SIGNIFICANT CHANGE UP
-  AMPICILLIN: SIGNIFICANT CHANGE UP
-  AZTREONAM: SIGNIFICANT CHANGE UP
-  CEFAZOLIN: SIGNIFICANT CHANGE UP
-  CEFEPIME: SIGNIFICANT CHANGE UP
-  CEFOXITIN: SIGNIFICANT CHANGE UP
-  CEFTRIAXONE: SIGNIFICANT CHANGE UP
-  CIPROFLOXACIN: SIGNIFICANT CHANGE UP
-  ERTAPENEM: SIGNIFICANT CHANGE UP
-  GENTAMICIN: SIGNIFICANT CHANGE UP
-  IMIPENEM: SIGNIFICANT CHANGE UP
-  LEVOFLOXACIN: SIGNIFICANT CHANGE UP
-  MEROPENEM: SIGNIFICANT CHANGE UP
-  NITROFURANTOIN: SIGNIFICANT CHANGE UP
-  PIPERACILLIN/TAZOBACTAM: SIGNIFICANT CHANGE UP
-  TETRACYCLINE: SIGNIFICANT CHANGE UP
-  TETRACYCLINE: SIGNIFICANT CHANGE UP
-  TIGECYCLINE: SIGNIFICANT CHANGE UP
-  TOBRAMYCIN: SIGNIFICANT CHANGE UP
-  TRIMETHOPRIM/SULFAMETHOXAZOLE: SIGNIFICANT CHANGE UP
-  VANCOMYCIN: SIGNIFICANT CHANGE UP
-  VANCOMYCIN: SIGNIFICANT CHANGE UP
ALBUMIN SERPL ELPH-MCNC: 3.5 G/DL — SIGNIFICANT CHANGE UP (ref 3.5–5.2)
ALP SERPL-CCNC: 64 U/L — SIGNIFICANT CHANGE UP (ref 30–115)
ALT FLD-CCNC: 7 U/L — SIGNIFICANT CHANGE UP (ref 0–41)
ANION GAP SERPL CALC-SCNC: 14 MMOL/L — SIGNIFICANT CHANGE UP (ref 7–14)
AST SERPL-CCNC: 11 U/L — SIGNIFICANT CHANGE UP (ref 0–41)
BASOPHILS # BLD AUTO: 0.06 K/UL — SIGNIFICANT CHANGE UP (ref 0–0.2)
BASOPHILS NFR BLD AUTO: 0.9 % — SIGNIFICANT CHANGE UP (ref 0–1)
BILIRUB SERPL-MCNC: 0.8 MG/DL — SIGNIFICANT CHANGE UP (ref 0.2–1.2)
BUN SERPL-MCNC: 33 MG/DL — HIGH (ref 10–20)
CALCIUM SERPL-MCNC: 8.3 MG/DL — LOW (ref 8.5–10.1)
CHLORIDE SERPL-SCNC: 98 MMOL/L — SIGNIFICANT CHANGE UP (ref 98–110)
CO2 SERPL-SCNC: 29 MMOL/L — SIGNIFICANT CHANGE UP (ref 17–32)
CREAT SERPL-MCNC: 3.5 MG/DL — HIGH (ref 0.7–1.5)
CULTURE RESULTS: SIGNIFICANT CHANGE UP
CULTURE RESULTS: SIGNIFICANT CHANGE UP
EOSINOPHIL # BLD AUTO: 0.35 K/UL — SIGNIFICANT CHANGE UP (ref 0–0.7)
EOSINOPHIL NFR BLD AUTO: 5 % — SIGNIFICANT CHANGE UP (ref 0–8)
GLUCOSE SERPL-MCNC: 95 MG/DL — SIGNIFICANT CHANGE UP (ref 70–99)
HCT VFR BLD CALC: 35.7 % — LOW (ref 42–52)
HGB BLD-MCNC: 11.5 G/DL — LOW (ref 14–18)
IMM GRANULOCYTES NFR BLD AUTO: 0.1 % — SIGNIFICANT CHANGE UP (ref 0.1–0.3)
LYMPHOCYTES # BLD AUTO: 1.4 K/UL — SIGNIFICANT CHANGE UP (ref 1.2–3.4)
LYMPHOCYTES # BLD AUTO: 20.1 % — LOW (ref 20.5–51.1)
MAGNESIUM SERPL-MCNC: 2 MG/DL — SIGNIFICANT CHANGE UP (ref 1.8–2.4)
MCHC RBC-ENTMCNC: 29.3 PG — SIGNIFICANT CHANGE UP (ref 27–31)
MCHC RBC-ENTMCNC: 32.2 G/DL — SIGNIFICANT CHANGE UP (ref 32–37)
MCV RBC AUTO: 90.8 FL — SIGNIFICANT CHANGE UP (ref 80–94)
METHOD TYPE: SIGNIFICANT CHANGE UP
MONOCYTES # BLD AUTO: 0.87 K/UL — HIGH (ref 0.1–0.6)
MONOCYTES NFR BLD AUTO: 12.5 % — HIGH (ref 1.7–9.3)
NEUTROPHILS # BLD AUTO: 4.28 K/UL — SIGNIFICANT CHANGE UP (ref 1.4–6.5)
NEUTROPHILS NFR BLD AUTO: 61.4 % — SIGNIFICANT CHANGE UP (ref 42.2–75.2)
ORGANISM # SPEC MICROSCOPIC CNT: SIGNIFICANT CHANGE UP
PLATELET # BLD AUTO: 154 K/UL — SIGNIFICANT CHANGE UP (ref 130–400)
POTASSIUM SERPL-MCNC: 4 MMOL/L — SIGNIFICANT CHANGE UP (ref 3.5–5)
POTASSIUM SERPL-SCNC: 4 MMOL/L — SIGNIFICANT CHANGE UP (ref 3.5–5)
PROT SERPL-MCNC: 5.9 G/DL — LOW (ref 6–8)
RBC # BLD: 3.93 M/UL — LOW (ref 4.7–6.1)
RBC # FLD: 14.8 % — HIGH (ref 11.5–14.5)
SODIUM SERPL-SCNC: 141 MMOL/L — SIGNIFICANT CHANGE UP (ref 135–146)
SPECIMEN SOURCE: SIGNIFICANT CHANGE UP
SPECIMEN SOURCE: SIGNIFICANT CHANGE UP
WBC # BLD: 6.97 K/UL — SIGNIFICANT CHANGE UP (ref 4.8–10.8)
WBC # FLD AUTO: 6.97 K/UL — SIGNIFICANT CHANGE UP (ref 4.8–10.8)

## 2018-05-03 RX ADMIN — TAMSULOSIN HYDROCHLORIDE 0.4 MILLIGRAM(S): 0.4 CAPSULE ORAL at 21:26

## 2018-05-03 RX ADMIN — SIMVASTATIN 20 MILLIGRAM(S): 20 TABLET, FILM COATED ORAL at 21:26

## 2018-05-03 RX ADMIN — Medication 1 TABLET(S): at 11:11

## 2018-05-03 RX ADMIN — LOSARTAN POTASSIUM 25 MILLIGRAM(S): 100 TABLET, FILM COATED ORAL at 05:29

## 2018-05-03 RX ADMIN — Medication 25 MILLIGRAM(S): at 05:28

## 2018-05-03 RX ADMIN — MEROPENEM 100 MILLIGRAM(S): 1 INJECTION INTRAVENOUS at 11:11

## 2018-05-03 RX ADMIN — PANTOPRAZOLE SODIUM 40 MILLIGRAM(S): 20 TABLET, DELAYED RELEASE ORAL at 05:28

## 2018-05-03 RX ADMIN — SEVELAMER CARBONATE 800 MILLIGRAM(S): 2400 POWDER, FOR SUSPENSION ORAL at 05:28

## 2018-05-03 RX ADMIN — Medication 100 MILLIGRAM(S): at 05:28

## 2018-05-03 RX ADMIN — HEPARIN SODIUM 5000 UNIT(S): 5000 INJECTION INTRAVENOUS; SUBCUTANEOUS at 05:27

## 2018-05-03 RX ADMIN — SEVELAMER CARBONATE 800 MILLIGRAM(S): 2400 POWDER, FOR SUSPENSION ORAL at 21:26

## 2018-05-03 RX ADMIN — HEPARIN SODIUM 5000 UNIT(S): 5000 INJECTION INTRAVENOUS; SUBCUTANEOUS at 17:16

## 2018-05-03 RX ADMIN — Medication 81 MILLIGRAM(S): at 11:11

## 2018-05-03 RX ADMIN — AMLODIPINE BESYLATE 5 MILLIGRAM(S): 2.5 TABLET ORAL at 05:29

## 2018-05-03 RX ADMIN — Medication 25 MILLIGRAM(S): at 17:16

## 2018-05-03 RX ADMIN — SEVELAMER CARBONATE 800 MILLIGRAM(S): 2400 POWDER, FOR SUSPENSION ORAL at 13:14

## 2018-05-03 NOTE — PROGRESS NOTE ADULT - ASSESSMENT
88 M w/ pmh of CAD s/p stents, TAVR, HTN, ESRD on HD ( MWF ), recent hospitalization 4/4-4/6 for fever 2/2 UTI / ucx grew enterococcus faecium and ecoli, treated w/ iv meropenem and d/c'ed on bactrim who pw recurrent fever/chills, denies dysuria. In ED found to have positive UA, Ucx on 5/1 grew the same organisms and the pt was admitted and started on Meropenem.    # UTI  - ID following, c/w meropenem x3 days              - switch to Linezolid if pt spikes a fever in view of TAVR    # ESRD on HD  - Nephrology following, s/p HD on 5/2  - On Sevelamer    # HTN   # CAD s/p PCI  # BPH  # DVT / GI ppx  - Amlodipine, Metoprolol, Simvastatin, Tamsulosin  - Heparin, Protonix

## 2018-05-03 NOTE — PROGRESS NOTE ADULT - SUBJECTIVE AND OBJECTIVE BOX
SUBJECTIVE:    Patient is a 88y old Male who presents with a chief complaint of fever (02 May 2018 03:22)    Today is hospital day 1d. This morning he is resting comfortably in bed and reports no new issues or overnight events.     PAST MEDICAL & SURGICAL HISTORY  ESRD (end stage renal disease) on dialysis  Urinary retention  Colon perforation:   TIA (transient ischemic attack):   Gout  Chronic renal failure: on HD  Aortic stenosis, severe  Myocardial infarction  Hypertension  Hyperlipidemia  Congestive heart failure  History of cholecystectomy  Bilateral cataracts  History of hip replacement, total, left  History of colon resection: secondary to colon perforation  AV fistula: right upper arm  Gallbladder disorder: stent removal 10/2017  S/P TAVR (transcatheter aortic valve replacement)    SOCIAL HISTORY:  Negative for smoking/alcohol/drug use.     ALLERGIES:  Biaxin (Unknown)  Ceftin (Unknown)  Plavix (Unknown)  Vitamin D (Unknown)  Vitamin D3 (Unknown)    MEDICATIONS:  STANDING MEDICATIONS  amLODIPine   Tablet 5 milliGRAM(s) Oral daily  aspirin enteric coated 81 milliGRAM(s) Oral daily  heparin  Injectable 5000 Unit(s) SubCutaneous every 12 hours  losartan 25 milliGRAM(s) Oral daily  meropenem  IVPB 500 milliGRAM(s) IV Intermittent every 24 hours  metoprolol tartrate 25 milliGRAM(s) Oral two times a day  multivitamin 1 Tablet(s) Oral daily  pantoprazole    Tablet 40 milliGRAM(s) Oral before breakfast  sevelamer hydrochloride 800 milliGRAM(s) Oral three times a day  simvastatin 20 milliGRAM(s) Oral at bedtime  tamsulosin 0.4 milliGRAM(s) Oral at bedtime    PRN MEDICATIONS  docusate sodium 100 milliGRAM(s) Oral daily PRN  melatonin 3 milliGRAM(s) Oral at bedtime PRN    VITALS:   T(F): 98.3  HR: 75  BP: 149/68  RR: 18  SpO2: 97%    LABS:                        11.5   6.97  )-----------( 154      ( 03 May 2018 04:59 )             35.7     05-03    141  |  98  |  33<H>  ----------------------------<  95  4.0   |  29  |  3.5<H>    Ca    8.3<L>      03 May 2018 04:59  Mg     2.0     05-03    TPro  5.9<L>  /  Alb  3.5  /  TBili  0.8  /  DBili  x   /  AST  11  /  ALT  7   /  AlkPhos  64  05-03      Urinalysis Basic - ( 01 May 2018 21:37 )    Color: Yellow / Appearance: Clear / S.020 / pH: x  Gluc: x / Ketone: Negative  / Bili: Negative / Urobili: 0.2 mg/dL   Blood: x / Protein: 100 mg/dL / Nitrite: Positive   Leuk Esterase: Negative / RBC: 5-10 /HPF / WBC 3-5 /HPF   Sq Epi: x / Non Sq Epi: x / Bacteria: Moderate /HPF            Culture - Urine (collected 01 May 2018 21:37)  Source: .Urine Clean Catch (Midstream)  Preliminary Report (02 May 2018 21:36):    >100,000 CFU/ml Enterococcus faecium    10,000 - 49,000 CFU/mL Escherichia coli    Culture - Blood (collected 2018 23:14)  Source: .Blood Blood  Preliminary Report (02 May 2018 06:01):    No growth to date.    Culture - Urine (collected 2018 22:33)  Source: .Urine Clean Catch (Midstream)  Preliminary Report (02 May 2018 12:14):    >100,000 CFU/ml Enterococcus faecium          RADIOLOGY:    PHYSICAL EXAM:  GEN: No acute distress  LUNGS: Clear to auscultation bilaterally   HEART: S1/S2 present. RRR.   ABD: Soft, non-tender, non-distended. Bowel sounds present  EXT: NC/NC/NE/WADE  NEURO: AAOX3

## 2018-05-03 NOTE — CONSULT NOTE ADULT - ASSESSMENT
IMPRESSION  Pt with ESRD on hemodialysis, hx  TAVR, colon resection & THR admitted with fever   No DM    Hx allergy Biaxin (Unknown) & Ceftin (Unknown)    Hx UTI with VRE faecium (4/4 50,000-99,000) & E coli (4/1 R cipro & bactrim)    4/30 Urine C&S >100,000 VRE faecium  5/1 Urine C&S >100,000 VRE faecium & <50,000 E coli (susceptibilities pending)    Received 1 dose linezolid 5/1  On meropenem 500 mg IV Intermittent every 24 hours    Tmax 98.8 with normal wbc  U/A with nitrite but only 3-5 wbcs      SUGGESTIONs  3 days of meropenem  If fever recurs: start Linezolid 600mg q12h IVPB in view of TAVR

## 2018-05-03 NOTE — PROGRESS NOTE ADULT - ASSESSMENT
87 yo male h/o CAD s/p stents, HTN, ESRD on HD ( Mon, Wed, Fri with Dr Garland), recent hospitalization 4/4-4/6 for fever 2/2 UTI / urinary retention with ucx showing enterococcus faecium and ecoli,  sp iv meropenem and d/c'ed on bactrim (angioedema hx with cephalosporins) pw recurrent fever/chills without any other symptoms.  Found to have persistent uti on UA.     # s/p hd yesterday , hd in am  # UTI on meropenem  # on renagel, check ph level  # BP better controlled   # will follow 89 yo male h/o CAD s/p stents, HTN, ESRD on HD ( Mon, Wed, Fri with Dr Garland), recent hospitalization 4/4-4/6 for fever 2/2 UTI / urinary retention with ucx showing enterococcus faecium and ecoli,  sp iv meropenem and d/c'ed on bactrim (angioedema hx with cephalosporins) pw recurrent fever/chills without any other symptoms.  Found to have persistent uti on UA.     # s/p hd yesterday , hd in am  # UTI on meropenem, UC noted, blood culture negative   # on renagel, check ph level  # BP better controlled   # will follow

## 2018-05-03 NOTE — CONSULT NOTE ADULT - SUBJECTIVE AND OBJECTIVE BOX
ALLEN CABRERA 88yMalePatient is a 88y old  Male who presents with a chief complaint of fever (02 May 2018 03:22)      Patient has history of:  Biaxin (Unknown)  Ceftin (Unknown)  Plavix (Unknown)  Vitamin D (Unknown)  Vitamin D3 (Unknown)          URINARY TRACT INFECTION  URIANRY TRACT INFECTION  ^URINARY TRACT INFECTION  H/o or current diagnosis of HF- ACEI/ARB contraindication unknown  H/o or current diagnosis of HF- Contraindication to ACEI/ARBs  H/o or current diagnosis of HF- ACEI/ARB contraindication unknown  H/o or current diagnosis of HF- Contraindication to ACEI/ARBs  H/o or current diagnosis of HF- ACEI/ARB contraindication unknown  H/o or current diagnosis of HF- ACEI/ARB contraindication unknown  H/o or current diagnosis of HF- Contraindication to ACEI/ARBs  H/o or current diagnosis of HF- ACEI/ARB contraindication unknown  H/o or current diagnosis of HF- Contraindication to ACEI/ARBs  H/o or current diagnosis of HF- ACEI/ARB contraindication unknown  H/o or current diagnosis of HF- Contraindication to ACEI/ARBs  Family history of emphysema (Father)  Family history of stroke (Mother)  No pertinent family history in first degree relatives  Handoff  MEWS Score  ESRD (end stage renal disease) on dialysis  Urinary retention  Colon perforation  TIA (transient ischemic attack)  Gout  Chronic renal failure  Aortic stenosis, severe  Myocardial infarction  Hypertension  Hyperlipidemia  Congestive heart failure  Urinary tract infection  History of cholecystectomy  Bilateral cataracts  History of hip replacement, total, left  History of colon resection  AV fistula  H/O colonoscopy  Gallbladder disorder  S/P TAVR (transcatheter aortic valve replacement)  FVER  0        Patient treated with:  meropenem  IVPB 500 milliGRAM(s) IV Intermittent every 24 hours        PHYSICAL EXAM  T(F): 98.3 (18 @ 05:51), Max: 98.8 (18 @ 20:43)  HR: 75 (18 @ 05:51) (65 - 83)  BP: 149/68 (18 @ 05:51) (117/57 - 149/68)  RR: 18 (18 @ 05:51) (16 - 19)  SpO2: --  Daily     Daily   HEENT: normal, no nuchal rigidity  Cor: RSR Nl S1 S2  Lungs: clear  Decreased breath sounds at bases    Abdomen: Nontender, Nl BS,     Ext: No clubbing,cyanosis or edema    LAB & RADIOLOGIC RESULTS:                        11.5   6.97  )-----------( 154      ( 03 May 2018 04:59 )             35.7         05-    141  |  98  |  33<H>  ----------------------------<  95  4.0   |  29  |  3.5<H>    Mg     2.0         TPro  5.9<L>  /  Alb  3.5  /  TBili  0.8  /  DBili  x   /  AST  11  /  ALT  7   /  AlkPhos  64             Creatinine, Serum: 3.5 mg/dL (18 @ 04:59)  eGFR if Non African American: 15 mL/min/1.73M2 (18 @ 04:59)  eGFR if African American: 17 mL/min/1.73M2 (18 @ 04:59)    ESRD on HD      Urinalysis Basic - ( 01 May 2018 21:37 )    Color: Yellow / Appearance: Clear / S.020 / pH: x  Gluc: x / Ketone: Negative  / Bili: Negative / Urobili: 0.2 mg/dL   Blood: x / Protein: 100 mg/dL / Nitrite: Positive   Leuk Esterase: Negative / RBC: 5-10 /HPF / WBC 3-5 /HPF   Sq Epi: x / Non Sq Epi: x / Bacteria: Moderate /HPF          Culture - Urine (collected 01 May 2018 21:37)  Source: .Urine Clean Catch (Midstream)  Preliminary Report (02 May 2018 21:36):    >100,000 CFU/ml Enterococcus faecium    10,000 - 49,000 CFU/mL Escherichia coli    Culture - Blood (collected 2018 23:14)  Source: .Blood Blood  Preliminary Report (02 May 2018 06:01):    No growth to date.    Culture - Urine (collected 2018 22:33)  Source: .Urine Clean Catch (Midstream)  Preliminary Report (02 May 2018 12:14):    >100,000 CFU/ml Enterococcus faecium

## 2018-05-03 NOTE — PROGRESS NOTE ADULT - SUBJECTIVE AND OBJECTIVE BOX
Patient was seen and examined. Spoke with RN. Chart reviewed.    No events overnight.  Vital Signs Last 24 Hrs  T(F): 98.3 (03 May 2018 05:51), Max: 98.8 (02 May 2018 20:43)  HR: 75 (03 May 2018 05:51) (65 - 83)  BP: 149/68 (03 May 2018 05:51) (117/57 - 149/68)  SpO2: 97% (03 May 2018 10:28) (97% - 97%)  MEDICATIONS  (STANDING):  amLODIPine   Tablet 5 milliGRAM(s) Oral daily  aspirin enteric coated 81 milliGRAM(s) Oral daily  heparin  Injectable 5000 Unit(s) SubCutaneous every 12 hours  losartan 25 milliGRAM(s) Oral daily  meropenem  IVPB 500 milliGRAM(s) IV Intermittent every 24 hours  metoprolol tartrate 25 milliGRAM(s) Oral two times a day  multivitamin 1 Tablet(s) Oral daily  pantoprazole    Tablet 40 milliGRAM(s) Oral before breakfast  sevelamer hydrochloride 800 milliGRAM(s) Oral three times a day  simvastatin 20 milliGRAM(s) Oral at bedtime  tamsulosin 0.4 milliGRAM(s) Oral at bedtime    MEDICATIONS  (PRN):  docusate sodium 100 milliGRAM(s) Oral daily PRN Constipation  melatonin 3 milliGRAM(s) Oral at bedtime PRN Insomnia    Labs:                        11.5   6.97  )-----------( 154      ( 03 May 2018 04:59 )             35.7                         11.8   6.53  )-----------( 154      ( 01 May 2018 21:37 )             36.6     03 May 2018 04:59    141    |  98     |  33     ----------------------------<  95     4.0     |  29     |  3.5    01 May 2018 21:37    137    |  95     |  53     ----------------------------<  129    3.9     |  29     |  4.5      Ca    8.3        03 May 2018 04:59  Ca    8.2        01 May 2018 21:37  Mg     2.0       03 May 2018 04:59    TPro  5.9    /  Alb  3.5    /  TBili  0.8    /  DBili  x      /  AST  11     /  ALT  7      /  AlkPhos  64     03 May 2018 04:59      Urinalysis Basic - ( 01 May 2018 21:37 )    Color: Yellow / Appearance: Clear / S.020 / pH: x  Gluc: x / Ketone: Negative  / Bili: Negative / Urobili: 0.2 mg/dL   Blood: x / Protein: 100 mg/dL / Nitrite: Positive   Leuk Esterase: Negative / RBC: 5-10 /HPF / WBC 3-5 /HPF   Sq Epi: x / Non Sq Epi: x / Bacteria: Moderate /HPF        Culture - Urine (collected 01 May 2018 21:37)  Source: .Urine Clean Catch (Midstream)  Preliminary Report (02 May 2018 21:36):    >100,000 CFU/ml Enterococcus faecium    10,000 - 49,000 CFU/mL Escherichia coli    Culture - Blood (collected 2018 23:14)  Source: .Blood Blood  Preliminary Report (02 May 2018 06:01):    No growth to date.    Culture - Urine (collected 2018 22:33)  Source: .Urine Clean Catch (Midstream)  Preliminary Report (02 May 2018 12:14):    >100,000 CFU/ml Enterococcus faecium        Radiology:    General: comfortable, NAD  Neurology: A&Ox3, nonfocal  Head:  Normocephalic, atraumatic  ENT:  Mucosa moist, no ulcerations  Neck:  Supple, no JVD,   Skin: no breakdowns (as per RN)  Resp: CTA B/L  CV: RRR, S1S2,   GI: Soft, NT, bowel sounds  MS: No edema, + peripheral pulses, FROM all 4 extremity

## 2018-05-03 NOTE — PROGRESS NOTE ADULT - SUBJECTIVE AND OBJECTIVE BOX
seen and examined  lying comfortable  no distress       Standing Inpatient Medications  amLODIPine   Tablet 5 milliGRAM(s) Oral daily  aspirin enteric coated 81 milliGRAM(s) Oral daily  heparin  Injectable 5000 Unit(s) SubCutaneous every 12 hours  losartan 25 milliGRAM(s) Oral daily  meropenem  IVPB 500 milliGRAM(s) IV Intermittent every 24 hours  metoprolol tartrate 25 milliGRAM(s) Oral two times a day  multivitamin 1 Tablet(s) Oral daily  pantoprazole    Tablet 40 milliGRAM(s) Oral before breakfast  sevelamer hydrochloride 800 milliGRAM(s) Oral three times a day  simvastatin 20 milliGRAM(s) Oral at bedtime  tamsulosin 0.4 milliGRAM(s) Oral at bedtime      VITALS/PHYSICAL EXAM  --------------------------------------------------------------------------------  T(C): 36.8 (05-03-18 @ 05:51), Max: 37.1 (05-02-18 @ 20:43)  HR: 75 (05-03-18 @ 05:51) (65 - 83)  BP: 149/68 (05-03-18 @ 05:51) (117/57 - 174/77)  RR: 18 (05-03-18 @ 05:51) (16 - 19)  SpO2: --  Wt(kg): --  Height (cm): 170.18 (05-02-18 @ 06:51)  Weight (kg): 72.9 (05-02-18 @ 06:51)  BMI (kg/m2): 25.2 (05-02-18 @ 06:51)  BSA (m2): 1.84 (05-02-18 @ 06:51)      05-02-18 @ 07:01  -  05-03-18 @ 06:23  --------------------------------------------------------  IN: 420 mL / OUT: 2150 mL / NET: -1730 mL      Physical Exam:  	Gen: NAD  	Pulm: CTA B/L  	CV:  S1S2; no rub  	Abd:nontender/nondistended  	LE: no edema  	Vascular access: av fistula     LABS/STUDIES  --------------------------------------------------------------------------------              11.5   6.97  >-----------<  154      [05-03-18 @ 04:59]              35.7     141  |  98  |  33  ----------------------------<  95      [05-03-18 @ 04:59]  4.0   |  29  |  3.5        Ca     8.3     [05-03-18 @ 04:59]      Mg     2.0     [05-03-18 @ 04:59]    TPro  5.9  /  Alb  3.5  /  TBili  0.8  /  DBili  x   /  AST  11  /  ALT  7   /  AlkPhos  64  [05-03-18 @ 04:59]          Creatinine Trend:  SCr 3.5 [05-03 @ 04:59]  SCr 4.5 [05-01 @ 21:37]  SCr 3.5 [04-30 @ 23:14]  SCr 5.1 [04-09 @ 07:55]  SCr 5.2 [04-08 @ 07:05]    Urinalysis - [05-01-18 @ 21:37]      Color Yellow / Appearance Clear / SG 1.020 / pH 6.0      Gluc Negative / Ketone Negative  / Bili Negative / Urobili 0.2       Blood Negative / Protein 100 / Leuk Est Negative / Nitrite Positive      RBC 5-10 / WBC 3-5 / Hyaline  / Gran  / Sq Epi  / Non Sq Epi  / Bacteria Moderate      Ferritin 2300.0      [02-08-18 @ 05:36]  HbA1c 4.3      [11-01-16 @ 15:19] seen and examined  lying comfortable  no distress       Standing Inpatient Medications  amLODIPine   Tablet 5 milliGRAM(s) Oral daily  aspirin enteric coated 81 milliGRAM(s) Oral daily  heparin  Injectable 5000 Unit(s) SubCutaneous every 12 hours  losartan 25 milliGRAM(s) Oral daily  meropenem  IVPB 500 milliGRAM(s) IV Intermittent every 24 hours  metoprolol tartrate 25 milliGRAM(s) Oral two times a day  multivitamin 1 Tablet(s) Oral daily  pantoprazole    Tablet 40 milliGRAM(s) Oral before breakfast  sevelamer hydrochloride 800 milliGRAM(s) Oral three times a day  simvastatin 20 milliGRAM(s) Oral at bedtime  tamsulosin 0.4 milliGRAM(s) Oral at bedtime      VITALS/PHYSICAL EXAM  --------------------------------------------------------------------------------  T(C): 36.8 (05-03-18 @ 05:51), Max: 37.1 (05-02-18 @ 20:43)  HR: 75 (05-03-18 @ 05:51) (65 - 83)  BP: 149/68 (05-03-18 @ 05:51) (117/57 - 174/77)  RR: 18 (05-03-18 @ 05:51) (16 - 19)  SpO2: --  Wt(kg): --  Height (cm): 170.18 (05-02-18 @ 06:51)  Weight (kg): 72.9 (05-02-18 @ 06:51)  BMI (kg/m2): 25.2 (05-02-18 @ 06:51)  BSA (m2): 1.84 (05-02-18 @ 06:51)      05-02-18 @ 07:01  -  05-03-18 @ 06:23  --------------------------------------------------------  IN: 420 mL / OUT: 2150 mL / NET: -1730 mL    Culture Results:   >100,000 CFU/ml Enterococcus faecium  10,000 - 49,000 CFU/mL Escherichia coli (05.01.18 @ 21:37)      Physical Exam:  	Gen: NAD  	Pulm: CTA B/L  	CV:  S1S2; no rub  	Abd:nontender/nondistended  	LE: no edema  	Vascular access: av fistula     LABS/STUDIES  --------------------------------------------------------------------------------              11.5   6.97  >-----------<  154      [05-03-18 @ 04:59]              35.7     141  |  98  |  33  ----------------------------<  95      [05-03-18 @ 04:59]  4.0   |  29  |  3.5        Ca     8.3     [05-03-18 @ 04:59]      Mg     2.0     [05-03-18 @ 04:59]    TPro  5.9  /  Alb  3.5  /  TBili  0.8  /  DBili  x   /  AST  11  /  ALT  7   /  AlkPhos  64  [05-03-18 @ 04:59]          Creatinine Trend:  SCr 3.5 [05-03 @ 04:59]  SCr 4.5 [05-01 @ 21:37]  SCr 3.5 [04-30 @ 23:14]  SCr 5.1 [04-09 @ 07:55]  SCr 5.2 [04-08 @ 07:05]    Urinalysis - [05-01-18 @ 21:37]      Color Yellow / Appearance Clear / SG 1.020 / pH 6.0      Gluc Negative / Ketone Negative  / Bili Negative / Urobili 0.2       Blood Negative / Protein 100 / Leuk Est Negative / Nitrite Positive      RBC 5-10 / WBC 3-5 / Hyaline  / Gran  / Sq Epi  / Non Sq Epi  / Bacteria Moderate      Ferritin 2300.0      [02-08-18 @ 05:36]  HbA1c 4.3      [11-01-16 @ 15:19]

## 2018-05-04 LAB
ANION GAP SERPL CALC-SCNC: 15 MMOL/L — HIGH (ref 7–14)
BASOPHILS # BLD AUTO: 0.05 K/UL — SIGNIFICANT CHANGE UP (ref 0–0.2)
BASOPHILS NFR BLD AUTO: 0.8 % — SIGNIFICANT CHANGE UP (ref 0–1)
BUN SERPL-MCNC: 50 MG/DL — HIGH (ref 10–20)
CALCIUM SERPL-MCNC: 8.2 MG/DL — LOW (ref 8.5–10.1)
CHLORIDE SERPL-SCNC: 99 MMOL/L — SIGNIFICANT CHANGE UP (ref 98–110)
CO2 SERPL-SCNC: 27 MMOL/L — SIGNIFICANT CHANGE UP (ref 17–32)
CREAT SERPL-MCNC: 4.6 MG/DL — CRITICAL HIGH (ref 0.7–1.5)
EOSINOPHIL # BLD AUTO: 0.56 K/UL — SIGNIFICANT CHANGE UP (ref 0–0.7)
EOSINOPHIL NFR BLD AUTO: 8.6 % — HIGH (ref 0–8)
GLUCOSE SERPL-MCNC: 89 MG/DL — SIGNIFICANT CHANGE UP (ref 70–99)
HCT VFR BLD CALC: 34.3 % — LOW (ref 42–52)
HGB BLD-MCNC: 11.2 G/DL — LOW (ref 14–18)
IMM GRANULOCYTES NFR BLD AUTO: 0.3 % — SIGNIFICANT CHANGE UP (ref 0.1–0.3)
LYMPHOCYTES # BLD AUTO: 1.24 K/UL — SIGNIFICANT CHANGE UP (ref 1.2–3.4)
LYMPHOCYTES # BLD AUTO: 19 % — LOW (ref 20.5–51.1)
MCHC RBC-ENTMCNC: 29.3 PG — SIGNIFICANT CHANGE UP (ref 27–31)
MCHC RBC-ENTMCNC: 32.7 G/DL — SIGNIFICANT CHANGE UP (ref 32–37)
MCV RBC AUTO: 89.8 FL — SIGNIFICANT CHANGE UP (ref 80–94)
MONOCYTES # BLD AUTO: 0.76 K/UL — HIGH (ref 0.1–0.6)
MONOCYTES NFR BLD AUTO: 11.7 % — HIGH (ref 1.7–9.3)
NEUTROPHILS # BLD AUTO: 3.89 K/UL — SIGNIFICANT CHANGE UP (ref 1.4–6.5)
NEUTROPHILS NFR BLD AUTO: 59.6 % — SIGNIFICANT CHANGE UP (ref 42.2–75.2)
NRBC # BLD: 0 /100 WBCS — SIGNIFICANT CHANGE UP (ref 0–0)
PLATELET # BLD AUTO: 161 K/UL — SIGNIFICANT CHANGE UP (ref 130–400)
POTASSIUM SERPL-MCNC: 4.3 MMOL/L — SIGNIFICANT CHANGE UP (ref 3.5–5)
POTASSIUM SERPL-SCNC: 4.3 MMOL/L — SIGNIFICANT CHANGE UP (ref 3.5–5)
RBC # BLD: 3.82 M/UL — LOW (ref 4.7–6.1)
RBC # FLD: 14.7 % — HIGH (ref 11.5–14.5)
SODIUM SERPL-SCNC: 141 MMOL/L — SIGNIFICANT CHANGE UP (ref 135–146)
WBC # BLD: 6.52 K/UL — SIGNIFICANT CHANGE UP (ref 4.8–10.8)
WBC # FLD AUTO: 6.52 K/UL — SIGNIFICANT CHANGE UP (ref 4.8–10.8)

## 2018-05-04 RX ADMIN — HEPARIN SODIUM 5000 UNIT(S): 5000 INJECTION INTRAVENOUS; SUBCUTANEOUS at 06:11

## 2018-05-04 RX ADMIN — SIMVASTATIN 20 MILLIGRAM(S): 20 TABLET, FILM COATED ORAL at 21:23

## 2018-05-04 RX ADMIN — SEVELAMER CARBONATE 800 MILLIGRAM(S): 2400 POWDER, FOR SUSPENSION ORAL at 13:06

## 2018-05-04 RX ADMIN — LOSARTAN POTASSIUM 25 MILLIGRAM(S): 100 TABLET, FILM COATED ORAL at 06:12

## 2018-05-04 RX ADMIN — HEPARIN SODIUM 5000 UNIT(S): 5000 INJECTION INTRAVENOUS; SUBCUTANEOUS at 17:21

## 2018-05-04 RX ADMIN — MEROPENEM 100 MILLIGRAM(S): 1 INJECTION INTRAVENOUS at 12:32

## 2018-05-04 RX ADMIN — Medication 81 MILLIGRAM(S): at 12:32

## 2018-05-04 RX ADMIN — Medication 1 TABLET(S): at 12:32

## 2018-05-04 RX ADMIN — PANTOPRAZOLE SODIUM 40 MILLIGRAM(S): 20 TABLET, DELAYED RELEASE ORAL at 06:12

## 2018-05-04 RX ADMIN — AMLODIPINE BESYLATE 5 MILLIGRAM(S): 2.5 TABLET ORAL at 06:12

## 2018-05-04 RX ADMIN — Medication 25 MILLIGRAM(S): at 06:12

## 2018-05-04 RX ADMIN — Medication 25 MILLIGRAM(S): at 17:21

## 2018-05-04 RX ADMIN — SEVELAMER CARBONATE 800 MILLIGRAM(S): 2400 POWDER, FOR SUSPENSION ORAL at 06:12

## 2018-05-04 RX ADMIN — SEVELAMER CARBONATE 800 MILLIGRAM(S): 2400 POWDER, FOR SUSPENSION ORAL at 21:23

## 2018-05-04 RX ADMIN — TAMSULOSIN HYDROCHLORIDE 0.4 MILLIGRAM(S): 0.4 CAPSULE ORAL at 21:23

## 2018-05-04 NOTE — PROGRESS NOTE ADULT - SUBJECTIVE AND OBJECTIVE BOX
Patient is a 88y old  Male who presents with a chief complaint of fever (02 May 2018 03:22)      Ovenight events:    PAST MEDICAL & SURGICAL HISTORY:  ESRD (end stage renal disease) on dialysis  Urinary retention  Colon perforation: 2011  TIA (transient ischemic attack): 2008  Gout  Chronic renal failure: on HD  Aortic stenosis, severe  Myocardial infarction  Hypertension  Hyperlipidemia  Congestive heart failure  History of cholecystectomy  Bilateral cataracts  History of hip replacement, total, left  History of colon resection: secondary to colon perforation  AV fistula: right upper arm  Gallbladder disorder: stent removal 10/2017  S/P TAVR (transcatheter aortic valve replacement)        FAMILY HISTORY:  Family history of emphysema (Father)  Family history of stroke (Mother)        Allergies    Biaxin (Unknown)  Ceftin (Unknown)  Plavix (Unknown)  Vitamin D (Unknown)  Vitamin D3 (Unknown)    Intolerances        amLODIPine   Tablet 5 milliGRAM(s) Oral daily  aspirin enteric coated 81 milliGRAM(s) Oral daily  docusate sodium 100 milliGRAM(s) Oral daily PRN  heparin  Injectable 5000 Unit(s) SubCutaneous every 12 hours  losartan 25 milliGRAM(s) Oral daily  melatonin 3 milliGRAM(s) Oral at bedtime PRN  meropenem  IVPB 500 milliGRAM(s) IV Intermittent every 24 hours  metoprolol tartrate 25 milliGRAM(s) Oral two times a day  multivitamin 1 Tablet(s) Oral daily  pantoprazole    Tablet 40 milliGRAM(s) Oral before breakfast  sevelamer hydrochloride 800 milliGRAM(s) Oral three times a day  simvastatin 20 milliGRAM(s) Oral at bedtime  tamsulosin 0.4 milliGRAM(s) Oral at bedtime  : Home Meds:      PHYSICAL EXAM    ICU Vital Signs Last 24 Hrs  T(C): 36 (04 May 2018 06:12), Max: 36 (04 May 2018 06:12)  T(F): 96.8 (04 May 2018 06:12), Max: 96.8 (04 May 2018 06:12)  HR: 64 (04 May 2018 12:00) (64 - 74)  BP: 119/58 (04 May 2018 12:00) (119/58 - 155/67)  BP(mean): --  ABP: --  ABP(mean): --  RR: 18 (04 May 2018 12:00) (18 - 18)  SpO2: 97% (03 May 2018 21:00) (97% - 97%)      GEN: No acute distress  LUNGS: Clear to auscultation bilaterally   HEART: S1/S2 present. RRR.   ABD: Soft, non-tender, non-distended. Bowel sounds present  EXT: NC/NC/NE/MAURO  NEURO: AAOX3    05-03-18 @ 07:01  -  05-04-18 @ 07:00  --------------------------------------------------------  IN:  Total IN: 0 mL    OUT:    Voided: 100 mL  Total OUT: 100 mL    Total NET: -100 mL      05-04-18 @ 07:01  -  05-04-18 @ 14:12  --------------------------------------------------------  IN:  Total IN: 0 mL    OUT:    Other: 2000 mL  Total OUT: 2000 mL    Total NET: -2000 mL          LABS:                          11.2   6.52  )-----------( 161      ( 04 May 2018 07:08 )             34.3                                               05-04    141  |  99  |  50<H>  ----------------------------<  89  4.3   |  27  |  4.6<HH>    Ca    8.2<L>      04 May 2018 07:08  Mg     2.0     05-03    TPro  5.9<L>  /  Alb  3.5  /  TBili  0.8  /  DBili  x   /  AST  11  /  ALT  7   /  AlkPhos  64  05-03                                                                                           LIVER FUNCTIONS - ( 03 May 2018 04:59 )  Alb: 3.5 g/dL / Pro: 5.9 g/dL / ALK PHOS: 64 U/L / ALT: 7 U/L / AST: 11 U/L / GGT: x                                                  Culture - Blood (collected 03 May 2018 04:59)  Source: .Blood None  Preliminary Report (04 May 2018 12:01):    No growth to date.    Culture - Urine (collected 01 May 2018 21:37)  Source: .Urine Clean Catch (Midstream)  Final Report (03 May 2018 23:01):    >100,000 CFU/ml Enterococcus faecium (vancomycin resistant)    10,000 - 49,000 CFU/mL Escherichia coli ESBL  Organism: Enterococcus faecium (vancomycin resistant)  Escherichia coli ESBL (03 May 2018 23:02)  Organism: Escherichia coli ESBL (03 May 2018 23:02)  Organism: Enterococcus faecium (vancomycin resistant) (03 May 2018 23:02)                                                                                           X-Rays:                                                                                         ECHO:    MEDICATIONS  (STANDING):  amLODIPine   Tablet 5 milliGRAM(s) Oral daily  aspirin enteric coated 81 milliGRAM(s) Oral daily  heparin  Injectable 5000 Unit(s) SubCutaneous every 12 hours  losartan 25 milliGRAM(s) Oral daily  meropenem  IVPB 500 milliGRAM(s) IV Intermittent every 24 hours  metoprolol tartrate 25 milliGRAM(s) Oral two times a day  multivitamin 1 Tablet(s) Oral daily  pantoprazole    Tablet 40 milliGRAM(s) Oral before breakfast  sevelamer hydrochloride 800 milliGRAM(s) Oral three times a day  simvastatin 20 milliGRAM(s) Oral at bedtime  tamsulosin 0.4 milliGRAM(s) Oral at bedtime    MEDICATIONS  (PRN):  docusate sodium 100 milliGRAM(s) Oral daily PRN Constipation  melatonin 3 milliGRAM(s) Oral at bedtime PRN Insomnia

## 2018-05-04 NOTE — PROGRESS NOTE ADULT - SUBJECTIVE AND OBJECTIVE BOX
SUBJECTIVE:    Patient is a 88y old Male who presents with a chief complaint of fever (02 May 2018 03:22)    Today is hospital day 2d. This morning he is resting comfortably in bed and reports no new issues or overnight events.     PAST MEDICAL & SURGICAL HISTORY  ESRD (end stage renal disease) on dialysis  Urinary retention  Colon perforation: 2011  TIA (transient ischemic attack): 2008  Gout  Chronic renal failure: on HD  Aortic stenosis, severe  Myocardial infarction  Hypertension  Hyperlipidemia  Congestive heart failure  History of cholecystectomy  Bilateral cataracts  History of hip replacement, total, left  History of colon resection: secondary to colon perforation  AV fistula: right upper arm  Gallbladder disorder: stent removal 10/2017  S/P TAVR (transcatheter aortic valve replacement)    SOCIAL HISTORY:  Negative for smoking/alcohol/drug use.     ALLERGIES:  Biaxin (Unknown)  Ceftin (Unknown)  Plavix (Unknown)  Vitamin D (Unknown)  Vitamin D3 (Unknown)    MEDICATIONS:  STANDING MEDICATIONS  amLODIPine   Tablet 5 milliGRAM(s) Oral daily  aspirin enteric coated 81 milliGRAM(s) Oral daily  heparin  Injectable 5000 Unit(s) SubCutaneous every 12 hours  losartan 25 milliGRAM(s) Oral daily  meropenem  IVPB 500 milliGRAM(s) IV Intermittent every 24 hours  metoprolol tartrate 25 milliGRAM(s) Oral two times a day  multivitamin 1 Tablet(s) Oral daily  pantoprazole    Tablet 40 milliGRAM(s) Oral before breakfast  sevelamer hydrochloride 800 milliGRAM(s) Oral three times a day  simvastatin 20 milliGRAM(s) Oral at bedtime  tamsulosin 0.4 milliGRAM(s) Oral at bedtime    PRN MEDICATIONS  docusate sodium 100 milliGRAM(s) Oral daily PRN  melatonin 3 milliGRAM(s) Oral at bedtime PRN    VITALS:   T(F): 96.8  HR: 68  BP: 128/63  RR: 18  SpO2: 97%    LABS:                        11.2   6.52  )-----------( 161      ( 04 May 2018 07:08 )             34.3     05-04    141  |  99  |  50<H>  ----------------------------<  89  4.3   |  27  |  4.6<HH>    Ca    8.2<L>      04 May 2018 07:08  Mg     2.0     05-03    TPro  5.9<L>  /  Alb  3.5  /  TBili  0.8  /  DBili  x   /  AST  11  /  ALT  7   /  AlkPhos  64  05-03              Culture - Urine (collected 01 May 2018 21:37)  Source: .Urine Clean Catch (Midstream)  Final Report (03 May 2018 23:01):    >100,000 CFU/ml Enterococcus faecium (vancomycin resistant)    10,000 - 49,000 CFU/mL Escherichia coli ESBL  Organism: Enterococcus faecium (vancomycin resistant)  Escherichia coli ESBL (03 May 2018 23:02)  Organism: Escherichia coli ESBL (03 May 2018 23:02)  Organism: Enterococcus faecium (vancomycin resistant) (03 May 2018 23:02)          RADIOLOGY:    PHYSICAL EXAM:  GEN: No acute distress  LUNGS: Clear to auscultation bilaterally   HEART: S1/S2 present. RRR.   ABD: Soft, non-tender, non-distended. Bowel sounds present  EXT: NC/NC/NE/WADE  NEURO: AAOX3

## 2018-05-04 NOTE — PROGRESS NOTE ADULT - SUBJECTIVE AND OBJECTIVE BOX
seen and  examined  no new complaints  lying comfortable      Standing Inpatient Medications  amLODIPine   Tablet 5 milliGRAM(s) Oral daily  aspirin enteric coated 81 milliGRAM(s) Oral daily  heparin  Injectable 5000 Unit(s) SubCutaneous every 12 hours  losartan 25 milliGRAM(s) Oral daily  meropenem  IVPB 500 milliGRAM(s) IV Intermittent every 24 hours  metoprolol tartrate 25 milliGRAM(s) Oral two times a day  multivitamin 1 Tablet(s) Oral daily  pantoprazole    Tablet 40 milliGRAM(s) Oral before breakfast  sevelamer hydrochloride 800 milliGRAM(s) Oral three times a day  simvastatin 20 milliGRAM(s) Oral at bedtime  tamsulosin 0.4 milliGRAM(s) Oral at bedtime          VITALS/PHYSICAL EXAM  --------------------------------------------------------------------------------  T(C): 36 (05-04-18 @ 06:12), Max: 36.3 (05-03-18 @ 14:01)  HR: 68 (05-04-18 @ 09:01) (68 - 74)  BP: 128/63 (05-04-18 @ 09:01) (125/56 - 155/67)  RR: 18 (05-04-18 @ 09:01) (18 - 18)  SpO2: 97% (05-03-18 @ 21:00) (97% - 97%)  Wt(kg): --        05-03-18 @ 07:01  -  05-04-18 @ 07:00  --------------------------------------------------------  IN: 0 mL / OUT: 100 mL / NET: -100 mL      Physical Exam:  	Gen: NAD  	Pulm: CTA B/L  	CV:  S1S2; no rub  	Abd: soft, nontender/nondistended  	LE:  no edema  	  LABS/STUDIES  --------------------------------------------------------------------------------              11.2   6.52  >-----------<  161      [05-04-18 @ 07:08]              34.3     141  |  99  |  50  ----------------------------<  89      [05-04-18 @ 07:08]  4.3   |  27  |  4.6        Ca     8.2     [05-04-18 @ 07:08]      Mg     2.0     [05-03-18 @ 04:59]    TPro  5.9  /  Alb  3.5  /  TBili  0.8  /  DBili  x   /  AST  11  /  ALT  7   /  AlkPhos  64  [05-03-18 @ 04:59]          Creatinine Trend:  SCr 4.6 [05-04 @ 07:08]  SCr 3.5 [05-03 @ 04:59]  SCr 4.5 [05-01 @ 21:37]  SCr 3.5 [04-30 @ 23:14]  SCr 5.1 [04-09 @ 07:55]    Urinalysis - [05-01-18 @ 21:37]      Color Yellow / Appearance Clear / SG 1.020 / pH 6.0      Gluc Negative / Ketone Negative  / Bili Negative / Urobili 0.2       Blood Negative / Protein 100 / Leuk Est Negative / Nitrite Positive      RBC 5-10 / WBC 3-5 / Hyaline  / Gran  / Sq Epi  / Non Sq Epi  / Bacteria Moderate      Ferritin 2300.0      [02-08-18 @ 05:36]  HbA1c 4.3      [11-01-16 @ 15:19]

## 2018-05-04 NOTE — PROGRESS NOTE ADULT - ASSESSMENT
87 yo male h/o CAD s/p stents, HTN, ESRD on HD ( Mon, Wed, Fri with Dr Garland), recent hospitalization 4/4-4/6 for fever 2/2 UTI / urinary retention with ucx showing enterococcus faecium and ecoli,  sp iv meropenem and d/c'ed on bactrim (angioedema hx with cephalosporins) pw recurrent fever/chills without any other symptoms.  Found to have persistent uti on UA.     # hd today, standard bath, uf 2 liters as tolertaed  # UTI on meropenem: 3 more days , UC noted, blood culture negative, id notes appreciated, if spikes again will need zyvox  # on renagel, check ph level  # BP at goal  # will follow

## 2018-05-04 NOTE — PROGRESS NOTE ADULT - ASSESSMENT
IMPRESSION  Pt with ESRD on hemodialysis, hx  TAVR, colon resection & THR admitted with fever   No DM    Hx allergy Biaxin (Unknown) & Ceftin (Unknown)    Hx UTI with VRE faecium (4/4 50,000-99,000) & E coli (4/1 R cipro & bactrim)    4/30 Urine C&S >100,000 VRE faecium  5/1 Urine C&S >100,000 VRE faecium & <50,000 E coli (susceptibilities pending)    Received 1 dose linezolid 5/1  On meropenem 500 mg IV Intermittent every 24 hours    Tmax 98.8 with normal wbc  U/A with nitrite but only 3-5 wbcs      SUGGESTIONs  2 more days of meropenem  If fever recurs: start Linezolid 600mg q12h IVPB in view of TAVR

## 2018-05-04 NOTE — PROGRESS NOTE ADULT - ASSESSMENT
Assessment and Plan:   · Assessment		  88 M w/ pmh of CAD s/p stents, TAVR, HTN, ESRD on HD ( MWF ), recent hospitalization 4/4-4/6 for fever 2/2 UTI / ucx grew enterococcus faecium and ecoli, treated w/ iv meropenem and d/c'ed on bactrim who pw recurrent fever/chills, denies dysuria. In ED found to have positive UA, Ucx on 5/1 grew the same organisms and the pt was admitted and started on Meropenem.    # UTI  - Urine Culture now grew ESBL E. Coli and VRE  - ID following, c/w meropenem x 2 more days              - switch to Linezolid if pt spikes a fever in view of TAVR    # ESRD on HD  - Nephrology following, going for dialysis today  - On Sevelamer    # HTN   # CAD s/p PCI  # BPH  # DVT / GI ppx  - Amlodipine, Metoprolol, Simvastatin, Tamsulosin  - Heparin, Protonix

## 2018-05-04 NOTE — PROGRESS NOTE ADULT - ASSESSMENT
88 M w/ pmh of CAD s/p stents, TAVR, HTN, ESRD on HD ( MWF ), recent hospitalization 4/4-4/6 for fever 2/2 UTI / ucx grew enterococcus faecium and ecoli, treated w/ iv meropenem and d/c'ed on bactrim who pw recurrent fever/chills, denies dysuria. In ED found to have positive UA, Ucx on 5/1 grew the same organisms and the pt was admitted and started on Meropenem.    # UTI  - ID following, c/w meropenem x 2 more days              - switch to Linezolid if pt spikes a fever in view of TAVR    # ESRD on HD  - Nephrology following, going for dialysis today  - On Sevelamer    # HTN   # CAD s/p PCI  # BPH  # DVT / GI ppx  - Amlodipine, Metoprolol, Simvastatin, Tamsulosin  - Heparin, Protonix 88 M w/ pmh of CAD s/p stents, TAVR, HTN, ESRD on HD ( MWF ), recent hospitalization 4/4-4/6 for fever 2/2 UTI / ucx grew enterococcus faecium and ecoli, treated w/ iv meropenem and d/c'ed on bactrim who pw recurrent fever/chills, denies dysuria. In ED found to have positive UA, Ucx on 5/1 grew the same organisms and the pt was admitted and started on Meropenem.    # UTI  - Urine Culture now grew ESBL E. Coli and VRE  - ID following, c/w meropenem x 2 more days              - switch to Linezolid if pt spikes a fever in view of TAVR    # ESRD on HD  - Nephrology following, going for dialysis today  - On Sevelamer    # HTN   # CAD s/p PCI  # BPH  # DVT / GI ppx  - Amlodipine, Metoprolol, Simvastatin, Tamsulosin  - Heparin, Protonix 88 M w/ pmh of CAD s/p stents, TAVR, HTN, ESRD on HD ( MWF ), recent hospitalization 4/4-4/6 for fever 2/2 UTI / ucx grew enterococcus faecium and ecoli, treated w/ iv meropenem and d/c'ed on bactrim who pw recurrent fever/chills, denies dysuria. In ED found to have positive UA, Ucx on 5/1 grew the same organisms and the pt was admitted and started on Meropenem.    # UTI  - Urine Culture now grew ESBL E. Coli and VRE -> f/u w/ ID regarding PICC?  - ID following, c/w meropenem x 2 more days              - switch to Linezolid if pt spikes a fever in view of TAVR    # ESRD on HD  - Nephrology following, going for dialysis today  - On Sevelamer    # HTN   # CAD s/p PCI  # BPH  # DVT / GI ppx  - Amlodipine, Metoprolol, Simvastatin, Tamsulosin  - Heparin, Protonix 88 M w/ pmh of CAD s/p stents, TAVR, HTN, ESRD on HD ( MWF ), recent hospitalization 4/4-4/6 for fever 2/2 UTI / ucx grew enterococcus faecium and ecoli, treated w/ iv meropenem and d/c'ed on bactrim who pw recurrent fever/chills, denies dysuria. In ED found to have positive UA, Ucx on 5/1 grew the same organisms and the pt was admitted and started on Meropenem.    # UTI  - Urine Culture now grew ESBL E. Coli and VRE -> f/u w/ ID regarding PICC?  - ID following, c/w meropenem x 2 more days              - switch to Linezolid if pt spikes a fever in view of TAVR ( per pts wife, pt had an adverse reaction to this medication in the past, was anemic and needed to be transfused )    # ESRD on HD  - Nephrology following, going for dialysis today  - On Sevelamer    # HTN   # CAD s/p PCI  # BPH  # DVT / GI ppx  - Amlodipine, Metoprolol, Simvastatin, Tamsulosin  - Heparin, Protonix

## 2018-05-04 NOTE — PROGRESS NOTE ADULT - SUBJECTIVE AND OBJECTIVE BOX
Patient was seen and examined. Spoke with RN. Chart reviewed.    No events overnight.  Vital Signs Last 24 Hrs  T(F): 96.8 (04 May 2018 06:12), Max: 97.3 (03 May 2018 14:01)  HR: 64 (04 May 2018 12:00) (64 - 74)  BP: 119/58 (04 May 2018 12:00) (119/58 - 155/67)  SpO2: 97% (03 May 2018 21:00) (97% - 97%)  MEDICATIONS  (STANDING):  amLODIPine   Tablet 5 milliGRAM(s) Oral daily  aspirin enteric coated 81 milliGRAM(s) Oral daily  heparin  Injectable 5000 Unit(s) SubCutaneous every 12 hours  losartan 25 milliGRAM(s) Oral daily  meropenem  IVPB 500 milliGRAM(s) IV Intermittent every 24 hours  metoprolol tartrate 25 milliGRAM(s) Oral two times a day  multivitamin 1 Tablet(s) Oral daily  pantoprazole    Tablet 40 milliGRAM(s) Oral before breakfast  sevelamer hydrochloride 800 milliGRAM(s) Oral three times a day  simvastatin 20 milliGRAM(s) Oral at bedtime  tamsulosin 0.4 milliGRAM(s) Oral at bedtime    MEDICATIONS  (PRN):  docusate sodium 100 milliGRAM(s) Oral daily PRN Constipation  melatonin 3 milliGRAM(s) Oral at bedtime PRN Insomnia    Labs:                        11.2   6.52  )-----------( 161      ( 04 May 2018 07:08 )             34.3                         11.5   6.97  )-----------( 154      ( 03 May 2018 04:59 )             35.7     04 May 2018 07:08    141    |  99     |  50     ----------------------------<  89     4.3     |  27     |  4.6    03 May 2018 04:59    141    |  98     |  33     ----------------------------<  95     4.0     |  29     |  3.5      Ca    8.2        04 May 2018 07:08  Ca    8.3        03 May 2018 04:59  Mg     2.0       03 May 2018 04:59    TPro  5.9    /  Alb  3.5    /  TBili  0.8    /  DBili  x      /  AST  11     /  ALT  7      /  AlkPhos  64     03 May 2018 04:59          Culture - Blood (collected 03 May 2018 04:59)  Source: .Blood None  Preliminary Report (04 May 2018 12:01):    No growth to date.    Culture - Urine (collected 01 May 2018 21:37)  Source: .Urine Clean Catch (Midstream)  Final Report (03 May 2018 23:01):    >100,000 CFU/ml Enterococcus faecium (vancomycin resistant)    10,000 - 49,000 CFU/mL Escherichia coli ESBL  Organism: Enterococcus faecium (vancomycin resistant)  Escherichia coli ESBL (03 May 2018 23:02)  Organism: Escherichia coli ESBL (03 May 2018 23:02)  Organism: Enterococcus faecium (vancomycin resistant) (03 May 2018 23:02)        Radiology:    General: comfortable, NAD  Neurology: A&Ox3, nonfocal  Head:  Normocephalic, atraumatic  ENT:  Mucosa moist, no ulcerations  Neck:  Supple, no JVD,   Skin: no breakdowns (as per RN)  Resp: CTA B/L  CV: RRR, S1S2,   GI: Soft, NT, bowel sounds  MS: No edema, + peripheral pulses, FROM all 4 extremity

## 2018-05-05 LAB
ANION GAP SERPL CALC-SCNC: 17 MMOL/L — HIGH (ref 7–14)
BUN SERPL-MCNC: 36 MG/DL — HIGH (ref 10–20)
CALCIUM SERPL-MCNC: 8.6 MG/DL — SIGNIFICANT CHANGE UP (ref 8.5–10.1)
CHLORIDE SERPL-SCNC: 96 MMOL/L — LOW (ref 98–110)
CO2 SERPL-SCNC: 28 MMOL/L — SIGNIFICANT CHANGE UP (ref 17–32)
CREAT SERPL-MCNC: 3.7 MG/DL — HIGH (ref 0.7–1.5)
GLUCOSE SERPL-MCNC: 107 MG/DL — HIGH (ref 70–99)
POTASSIUM SERPL-MCNC: 4.1 MMOL/L — SIGNIFICANT CHANGE UP (ref 3.5–5)
POTASSIUM SERPL-SCNC: 4.1 MMOL/L — SIGNIFICANT CHANGE UP (ref 3.5–5)
SODIUM SERPL-SCNC: 141 MMOL/L — SIGNIFICANT CHANGE UP (ref 135–146)

## 2018-05-05 RX ADMIN — SEVELAMER CARBONATE 800 MILLIGRAM(S): 2400 POWDER, FOR SUSPENSION ORAL at 06:25

## 2018-05-05 RX ADMIN — SEVELAMER CARBONATE 800 MILLIGRAM(S): 2400 POWDER, FOR SUSPENSION ORAL at 14:56

## 2018-05-05 RX ADMIN — SEVELAMER CARBONATE 800 MILLIGRAM(S): 2400 POWDER, FOR SUSPENSION ORAL at 21:47

## 2018-05-05 RX ADMIN — LOSARTAN POTASSIUM 25 MILLIGRAM(S): 100 TABLET, FILM COATED ORAL at 06:24

## 2018-05-05 RX ADMIN — Medication 25 MILLIGRAM(S): at 06:25

## 2018-05-05 RX ADMIN — MEROPENEM 100 MILLIGRAM(S): 1 INJECTION INTRAVENOUS at 11:20

## 2018-05-05 RX ADMIN — PANTOPRAZOLE SODIUM 40 MILLIGRAM(S): 20 TABLET, DELAYED RELEASE ORAL at 06:25

## 2018-05-05 RX ADMIN — TAMSULOSIN HYDROCHLORIDE 0.4 MILLIGRAM(S): 0.4 CAPSULE ORAL at 21:47

## 2018-05-05 RX ADMIN — Medication 1 TABLET(S): at 11:21

## 2018-05-05 RX ADMIN — AMLODIPINE BESYLATE 5 MILLIGRAM(S): 2.5 TABLET ORAL at 06:24

## 2018-05-05 RX ADMIN — HEPARIN SODIUM 5000 UNIT(S): 5000 INJECTION INTRAVENOUS; SUBCUTANEOUS at 18:05

## 2018-05-05 RX ADMIN — Medication 81 MILLIGRAM(S): at 11:20

## 2018-05-05 RX ADMIN — SIMVASTATIN 20 MILLIGRAM(S): 20 TABLET, FILM COATED ORAL at 21:47

## 2018-05-05 RX ADMIN — HEPARIN SODIUM 5000 UNIT(S): 5000 INJECTION INTRAVENOUS; SUBCUTANEOUS at 06:25

## 2018-05-05 RX ADMIN — Medication 25 MILLIGRAM(S): at 18:01

## 2018-05-05 NOTE — PROGRESS NOTE ADULT - SUBJECTIVE AND OBJECTIVE BOX
Patient was seen and examined. Spoke with RN. Chart reviewed.    No events overnight.  Vital Signs Last 24 Hrs  T(F): 97.6 (05 May 2018 06:25), Max: 97.8 (04 May 2018 20:07)  HR: 82 (05 May 2018 06:25) (63 - 82)  BP: 145/76 (05 May 2018 06:25) (135/59 - 159/69)  SpO2: 96% (04 May 2018 21:00) (96% - 97%)  MEDICATIONS  (STANDING):  amLODIPine   Tablet 5 milliGRAM(s) Oral daily  aspirin enteric coated 81 milliGRAM(s) Oral daily  heparin  Injectable 5000 Unit(s) SubCutaneous every 12 hours  losartan 25 milliGRAM(s) Oral daily  meropenem  IVPB 500 milliGRAM(s) IV Intermittent every 24 hours  metoprolol tartrate 25 milliGRAM(s) Oral two times a day  multivitamin 1 Tablet(s) Oral daily  pantoprazole    Tablet 40 milliGRAM(s) Oral before breakfast  sevelamer hydrochloride 800 milliGRAM(s) Oral three times a day  simvastatin 20 milliGRAM(s) Oral at bedtime  tamsulosin 0.4 milliGRAM(s) Oral at bedtime    MEDICATIONS  (PRN):  docusate sodium 100 milliGRAM(s) Oral daily PRN Constipation  melatonin 3 milliGRAM(s) Oral at bedtime PRN Insomnia    Labs:                        11.2   6.52  )-----------( 161      ( 04 May 2018 07:08 )             34.3     05 May 2018 07:04    141    |  96     |  36     ----------------------------<  107    4.1     |  28     |  3.7    04 May 2018 07:08    141    |  99     |  50     ----------------------------<  89     4.3     |  27     |  4.6      Ca    8.6        05 May 2018 07:04  Ca    8.2        04 May 2018 07:08            Culture - Blood (collected 03 May 2018 04:59)  Source: .Blood None  Preliminary Report (04 May 2018 12:01):    No growth to date.        Radiology:    General: comfortable, NAD  Neurology: A&Ox3, nonfocal  Head:  Normocephalic, atraumatic  ENT:  Mucosa moist, no ulcerations  Neck:  Supple, no JVD,   Skin: no breakdowns (as per RN)  Resp: CTA B/L  CV: RRR, S1S2,   GI: Soft, NT, bowel sounds  MS: No edema, + peripheral pulses, FROM all 4 extremity

## 2018-05-05 NOTE — PROGRESS NOTE ADULT - ASSESSMENT
87 yo male h/o CAD s/p stents, HTN, ESRD on HD ( Mon, Wed, Fri with Dr Garland), recent hospitalization 4/4-4/6 for fever 2/2 UTI / urinary retention with ucx showing enterococcus faecium and ecoli,  sp iv meropenem and d/c'ed on bactrim (angioedema hx with cephalosporins) pw recurrent fever/chills without any other symptoms.  Found to have persistent uti on UA.     # s/p hd yesterday, for HD on monday  # remains on santosh: 2 more days , UC noted, blood culture negative, id notes appreciated, if spikes again will need zyvox  # on renagel, check ph level  # BP at goal  # will follow

## 2018-05-05 NOTE — PROGRESS NOTE ADULT - ASSESSMENT
· Assessment		  88 M w/ pmh of CAD s/p stents, TAVR, HTN, ESRD on HD ( MWF ), recent hospitalization 4/4-4/6 for fever 2/2 UTI / ucx grew enterococcus faecium and ecoli, treated w/ iv meropenem and d/c'ed on bactrim who pw recurrent fever/chills, denies dysuria. In ED found to have positive UA, Ucx on 5/1 grew the same organisms and the pt was admitted and started on Meropenem.    # UTI  - Urine Culture now grew ESBL E. Coli and VRE  - ID following, c/w meropenem x 2 more days              - switch to Linezolid if pt spikes a fever in view of TAVR    # ESRD on HD  - Nephrology following, going for dialysis today  - On Sevelamer    # HTN   # CAD s/p PCI  # BPH  # DVT / GI ppx  - Amlodipine, Metoprolol, Simvastatin, Tamsulosin  - Heparin, Protonix

## 2018-05-05 NOTE — PROGRESS NOTE ADULT - SUBJECTIVE AND OBJECTIVE BOX
seen and examined  no distress  s/p hd yesterday  sitting on his chair    Standing Inpatient Medications  amLODIPine   Tablet 5 milliGRAM(s) Oral daily  aspirin enteric coated 81 milliGRAM(s) Oral daily  heparin  Injectable 5000 Unit(s) SubCutaneous every 12 hours  losartan 25 milliGRAM(s) Oral daily  meropenem  IVPB 500 milliGRAM(s) IV Intermittent every 24 hours  metoprolol tartrate 25 milliGRAM(s) Oral two times a day  multivitamin 1 Tablet(s) Oral daily  pantoprazole    Tablet 40 milliGRAM(s) Oral before breakfast  sevelamer hydrochloride 800 milliGRAM(s) Oral three times a day  simvastatin 20 milliGRAM(s) Oral at bedtime  tamsulosin 0.4 milliGRAM(s) Oral at bedtime    PRN Inpatient Medications  docusate sodium 100 milliGRAM(s) Oral daily PRN  melatonin 3 milliGRAM(s) Oral at bedtime PRN      VITALS/PHYSICAL EXAM  --------------------------------------------------------------------------------  T(C): 36.4 (05-05-18 @ 06:25), Max: 36.6 (05-04-18 @ 20:07)  HR: 82 (05-05-18 @ 06:25) (63 - 82)  BP: 145/76 (05-05-18 @ 06:25) (119/58 - 159/69)  RR: 18 (05-05-18 @ 06:25) (16 - 18)  SpO2: 96% (05-04-18 @ 21:00) (96% - 97%)  Wt(kg): --        05-04-18 @ 07:01  -  05-05-18 @ 07:00  --------------------------------------------------------  IN: 0 mL / OUT: 2100 mL / NET: -2100 mL      Physical Exam:  	Gen: NAD  	Pulm: CTA B/L  	CV:  S1S2; no rub  	Abd:  soft, nontender/nondistended  	LE: no edema  	Vascular access: av fistula    LABS/STUDIES  --------------------------------------------------------------------------------              11.2   6.52  >-----------<  161      [05-04-18 @ 07:08]              34.3     141  |  96  |  36  ----------------------------<  107      [05-05-18 @ 07:04]  4.1   |  28  |  3.7        Ca     8.6     [05-05-18 @ 07:04]            Creatinine Trend:  SCr 3.7 [05-05 @ 07:04]  SCr 4.6 [05-04 @ 07:08]  SCr 3.5 [05-03 @ 04:59]  SCr 4.5 [05-01 @ 21:37]  SCr 3.5 [04-30 @ 23:14]    Urinalysis - [05-01-18 @ 21:37]      Color Yellow / Appearance Clear / SG 1.020 / pH 6.0      Gluc Negative / Ketone Negative  / Bili Negative / Urobili 0.2       Blood Negative / Protein 100 / Leuk Est Negative / Nitrite Positive      RBC 5-10 / WBC 3-5 / Hyaline  / Gran  / Sq Epi  / Non Sq Epi  / Bacteria Moderate      Ferritin 2300.0      [02-08-18 @ 05:36]  HbA1c 4.3      [11-01-16 @ 15:19]

## 2018-05-05 NOTE — PROGRESS NOTE ADULT - SUBJECTIVE AND OBJECTIVE BOX
SUBJECTIVE:    Patient is a 88y old Male who presents with a chief complaint of fever (02 May 2018 03:22)    Today is hospital day 3d. This morning he is resting comfortably in bed and reports no new issues or overnight events.     PAST MEDICAL & SURGICAL HISTORY  ESRD (end stage renal disease) on dialysis  Urinary retention  Colon perforation: 2011  TIA (transient ischemic attack): 2008  Gout  Chronic renal failure: on HD  Aortic stenosis, severe  Myocardial infarction  Hypertension  Hyperlipidemia  Congestive heart failure  History of cholecystectomy  Bilateral cataracts  History of hip replacement, total, left  History of colon resection: secondary to colon perforation  AV fistula: right upper arm  Gallbladder disorder: stent removal 10/2017  S/P TAVR (transcatheter aortic valve replacement)    SOCIAL HISTORY:  Negative for smoking/alcohol/drug use.     ALLERGIES:  Biaxin (Unknown)  Ceftin (Unknown)  Plavix (Unknown)  Vitamin D (Unknown)  Vitamin D3 (Unknown)    MEDICATIONS:  STANDING MEDICATIONS  amLODIPine   Tablet 5 milliGRAM(s) Oral daily  aspirin enteric coated 81 milliGRAM(s) Oral daily  heparin  Injectable 5000 Unit(s) SubCutaneous every 12 hours  losartan 25 milliGRAM(s) Oral daily  meropenem  IVPB 500 milliGRAM(s) IV Intermittent every 24 hours  metoprolol tartrate 25 milliGRAM(s) Oral two times a day  multivitamin 1 Tablet(s) Oral daily  pantoprazole    Tablet 40 milliGRAM(s) Oral before breakfast  sevelamer hydrochloride 800 milliGRAM(s) Oral three times a day  simvastatin 20 milliGRAM(s) Oral at bedtime  tamsulosin 0.4 milliGRAM(s) Oral at bedtime    PRN MEDICATIONS  docusate sodium 100 milliGRAM(s) Oral daily PRN  melatonin 3 milliGRAM(s) Oral at bedtime PRN    VITALS:   T(F): 97.9  HR: 67  BP: 128/59  RR: 16  SpO2: --    LABS:                        11.2   6.52  )-----------( 161      ( 04 May 2018 07:08 )             34.3     05-05    141  |  96<L>  |  36<H>  ----------------------------<  107<H>  4.1   |  28  |  3.7<H>    Ca    8.6      05 May 2018 07:04                Culture - Blood (collected 03 May 2018 04:59)  Source: .Blood None  Preliminary Report (04 May 2018 12:01):    No growth to date.          RADIOLOGY:    PHYSICAL EXAM:  GEN: No acute distress  LUNGS: Clear to auscultation bilaterally   HEART: S1/S2 present. RRR.   ABD: Soft, non-tender, non-distended. Bowel sounds present  EXT: NC/NC/NE/WADE  NEURO: [id number]

## 2018-05-05 NOTE — PROGRESS NOTE ADULT - ASSESSMENT
88 M w/ pmh of CAD s/p stents, TAVR, HTN, ESRD on HD ( MWF ), recent hospitalization 4/4-4/6 for fever 2/2 UTI / ucx grew enterococcus faecium and ecoli, treated w/ iv meropenem and d/c'ed on bactrim who pw recurrent fever/chills, denies dysuria. In ED found to have positive UA, Ucx on 5/1 grew the same organisms and the pt was admitted and started on Meropenem.    # UTI  - Urine Culture now grew ESBL E. Coli and VRE -> f/u w/ ID regarding PICC, id was recalled on 5/4, will try again todd in the AM    # ESRD on HD  - Nephrology following, going for dialysis on monday  - On Sevelamer    # HTN   # CAD s/p PCI  # BPH  # DVT / GI ppx  - Amlodipine, Metoprolol, Simvastatin, Tamsulosin  - Heparin, Protonix

## 2018-05-06 LAB
ANION GAP SERPL CALC-SCNC: 16 MMOL/L — HIGH (ref 7–14)
BUN SERPL-MCNC: 54 MG/DL — HIGH (ref 10–20)
CALCIUM SERPL-MCNC: 8.2 MG/DL — LOW (ref 8.5–10.1)
CHLORIDE SERPL-SCNC: 97 MMOL/L — LOW (ref 98–110)
CO2 SERPL-SCNC: 26 MMOL/L — SIGNIFICANT CHANGE UP (ref 17–32)
CREAT SERPL-MCNC: 4.9 MG/DL — CRITICAL HIGH (ref 0.7–1.5)
CULTURE RESULTS: SIGNIFICANT CHANGE UP
GLUCOSE SERPL-MCNC: 84 MG/DL — SIGNIFICANT CHANGE UP (ref 70–99)
POTASSIUM SERPL-MCNC: 4.4 MMOL/L — SIGNIFICANT CHANGE UP (ref 3.5–5)
POTASSIUM SERPL-SCNC: 4.4 MMOL/L — SIGNIFICANT CHANGE UP (ref 3.5–5)
SODIUM SERPL-SCNC: 139 MMOL/L — SIGNIFICANT CHANGE UP (ref 135–146)
SPECIMEN SOURCE: SIGNIFICANT CHANGE UP

## 2018-05-06 RX ADMIN — Medication 1 TABLET(S): at 13:26

## 2018-05-06 RX ADMIN — Medication 81 MILLIGRAM(S): at 13:26

## 2018-05-06 RX ADMIN — AMLODIPINE BESYLATE 5 MILLIGRAM(S): 2.5 TABLET ORAL at 05:53

## 2018-05-06 RX ADMIN — LOSARTAN POTASSIUM 25 MILLIGRAM(S): 100 TABLET, FILM COATED ORAL at 05:53

## 2018-05-06 RX ADMIN — SEVELAMER CARBONATE 800 MILLIGRAM(S): 2400 POWDER, FOR SUSPENSION ORAL at 05:53

## 2018-05-06 RX ADMIN — MEROPENEM 100 MILLIGRAM(S): 1 INJECTION INTRAVENOUS at 13:26

## 2018-05-06 RX ADMIN — SEVELAMER CARBONATE 800 MILLIGRAM(S): 2400 POWDER, FOR SUSPENSION ORAL at 13:26

## 2018-05-06 RX ADMIN — HEPARIN SODIUM 5000 UNIT(S): 5000 INJECTION INTRAVENOUS; SUBCUTANEOUS at 05:54

## 2018-05-06 RX ADMIN — HEPARIN SODIUM 5000 UNIT(S): 5000 INJECTION INTRAVENOUS; SUBCUTANEOUS at 17:54

## 2018-05-06 RX ADMIN — Medication 25 MILLIGRAM(S): at 05:53

## 2018-05-06 RX ADMIN — PANTOPRAZOLE SODIUM 40 MILLIGRAM(S): 20 TABLET, DELAYED RELEASE ORAL at 06:43

## 2018-05-06 RX ADMIN — Medication 25 MILLIGRAM(S): at 17:55

## 2018-05-06 NOTE — PROGRESS NOTE ADULT - SUBJECTIVE AND OBJECTIVE BOX
Patient Name: Mahnaz Becerra   YOB: 1947  Referring Primary Care Physician: Bernardo Botello DO      Chief Complaint:    Chief Complaint   Patient presents with   • Left Knee - Follow-up        Subjective:    HPI:   Mahnaz Becerra is a pleasant 70 y.o. year old who presents today for evaluation of   Chief Complaint   Patient presents with   • Left Knee - Follow-up   .  KNEE: TIMING:  The pain is described as ACUTE on CHRONIC.  LOCATION: medial joint line tenderness. AGGRAVATING FACTORS:  Is worsened by prolonged standing, sitting, walking and squatting activities.  ASSOCIATED SYMPTOMS:  swelling, tenderness, and aching. OTHER SYMPTOMS denies popping, locking or catching. RELIEVING FACTORS:  Previous treatment has included cortisone injections  viscosupplementation  OTC Tylenol  OTC meds/NSAIDS  activtiy modification  assistive devices  physical therapy  weight loss  ice/heat/rest.    This problem is not new to this examiner.     Medications:   Home Medications:  Current Outpatient Prescriptions on File Prior to Visit   Medication Sig   • albuterol (VENTOLIN HFA) 108 (90 BASE) MCG/ACT inhaler Inhale 2 puffs Every 6 (Six) Hours As Needed for wheezing.   • cyclobenzaprine (FLEXERIL) 10 MG tablet Take 1 tablet by mouth 2 (Two) Times a Day As Needed for muscle spasms.   • dicyclomine (BENTYL) 20 MG tablet Take 1 tablet by mouth As Needed (abd cramps).   • hydrochlorothiazide (HYDRODIURIL) 25 MG tablet Take 1 tablet by mouth Daily.   • hydrocortisone 1 % ointment Apply  topically 2 (Two) Times a Day.   • Multiple Vitamins-Minerals (CENTRUM SILVER ADULT 50+ PO) Take  by mouth daily.   • pantoprazole (PROTONIX) 40 MG EC tablet Take 1 tablet by mouth 2 (Two) Times a Day.   • traMADol (ULTRAM) 50 MG tablet Take 1 tablet by mouth Every 4 (Four) Hours As Needed for Moderate Pain (4-6).   • vitamin E 400 UNIT capsule Take 400 Units by mouth daily.   • [DISCONTINUED] cephalexin (KEFLEX) 500 MG capsule Take 1  Nephrology progress note    Patient is seen and examined, events over the last 24 h noted .  denies any complaints, no SOB, no chest pain    Allergies:  Biaxin (Unknown)  Ceftin (Unknown)  linezolid (Other)  Plavix (Unknown)  Vitamin D (Unknown)  Vitamin D3 (Unknown)    Hospital Medications:   MEDICATIONS  (STANDING):  amLODIPine   Tablet 5 milliGRAM(s) Oral daily  aspirin enteric coated 81 milliGRAM(s) Oral daily  heparin  Injectable 5000 Unit(s) SubCutaneous every 12 hours  losartan 25 milliGRAM(s) Oral daily  meropenem  IVPB 500 milliGRAM(s) IV Intermittent every 24 hours  metoprolol tartrate 25 milliGRAM(s) Oral two times a day  multivitamin 1 Tablet(s) Oral daily  pantoprazole    Tablet 40 milliGRAM(s) Oral before breakfast  sevelamer hydrochloride 800 milliGRAM(s) Oral three times a day  simvastatin 20 milliGRAM(s) Oral at bedtime  tamsulosin 0.4 milliGRAM(s) Oral at bedtime        VITALS:  T(F): 97.7 (18 @ 05:49), Max: 97.9 (18 @ 21:36)  HR: 72 (18 @ 05:49)  BP: 155/70 (18 @ 05:49)  RR: 18 (18 @ 05:49)  SpO2: --  Wt(kg): --     @ 07:01  -   @ 07:00  --------------------------------------------------------  IN: 0 mL / OUT: 2100 mL / NET: -2100 mL          PHYSICAL EXAM:  Constitutional: NAD  HEENT: anicteric sclera, oropharynx clear, MMM  Neck: No JVD  Respiratory: CTAB, no wheezes, rales or rhonchi  Cardiovascular: S1, S2, RRR  Gastrointestinal: BS+, soft, NT/ND  Extremities: No cyanosis or clubbing. No peripheral edema  :  No ross.   Skin: No rashes  left AV fisula  LABS:      139  |  97<L>  |  54<H>  ----------------------------<  84  4.4   |  26  |  4.9<HH>    Ca    8.2<L>      06 May 2018 05:53          Urine Studies:  Urinalysis Basic - ( 01 May 2018 21:37 )    Color: Yellow / Appearance: Clear / S.020 / pH:   Gluc:  / Ketone: Negative  / Bili: Negative / Urobili: 0.2 mg/dL   Blood:  / Protein: 100 mg/dL / Nitrite: Positive   Leuk Esterase: Negative / RBC: 5-10 /HPF / WBC 3-5 /HPF   Sq Epi:  / Non Sq Epi:  / Bacteria: Moderate /HPF capsule by mouth 2 (Two) Times a Day.   • [DISCONTINUED] nabumetone (RELAFEN) 500 MG tablet Take 1 tablet by mouth 2 (Two) Times a Day As Needed for Mild Pain (1-3).   • [DISCONTINUED] oxyCODONE-acetaminophen (PERCOCET) 7.5-325 MG per tablet  1-2 Oral Q4H PRN severe pain     Current Facility-Administered Medications on File Prior to Visit   Medication   • ipratropium-albuterol (DUO-NEB) nebulizer solution 3 mL     Current Medications:  Scheduled Meds:  ipratropium-albuterol 3 mL Nebulization 4x Daily - RT     Continuous Infusions:   PRN Meds:.    I have reviewed the patient's medical history in detail and updated the computerized patient record.  Review and summarization of old records includes:    Past Medical History:   Diagnosis Date   • Acid reflux    • Asthma    • Bronchitis    • Edema    • IBS (irritable bowel syndrome)    • Knee pain, right    • Neuromuscular disorder     MS   • PONV (postoperative nausea and vomiting)     Patient states she had post-op nausea and vomiting after hysterectomy in .        Past Surgical History:   Procedure Laterality Date   • APPENDECTOMY     • BREAST LUMPECTOMY Bilateral    • BREAST SURGERY      REDUCTION   •  SECTION     • CHOLECYSTECTOMY     • COLONOSCOPY  2012    Dr Moe   • DILATATION AND CURETTAGE     • ENDOSCOPY  2012    Dr Moe   • ENDOSCOPY N/A 2016    Procedure: ESOPHAGOGASTRODUODENOSCOPY WITH BX;  Surgeon: Nasim Moe MD;  Location: [facility] ENDOSCOPY;  Service:    • EYE SURGERY Bilateral     BLEPHAROPLASTY   • HYSTERECTOMY     • KNEE ARTHROSCOPY Right 2016    Procedure: KNEE ARTHROSCOPY, PARTIAL MEDIAL AND LATERAL MENISECTOMY, CHONDROPLASTY OF THE PATELLA, REMOVAL OF LOOSE BODIES;  Surgeon: Artur Pat MD;  Location: [facility] OR [facility] [place];  Service:    • KNEE SURGERY Left 2014   • OOPHORECTOMY     • TONSILLECTOMY          Social History     Occupational History   • Not on file.     Social History Main  Topics   • Smoking status: Former Smoker     Packs/day: 0.25     Types: Cigarettes     Quit date: 1983   • Smokeless tobacco: Never Used      Comment: Patient states she was a social smoker.   • Alcohol use Yes      Comment: Occasional   • Drug use: No   • Sexual activity: Defer    Social History     Social History Narrative        Family History   Problem Relation Age of Onset   • Hypertension Mother    • Stroke Mother    • Alzheimer's disease Mother    • Heart disease Father    • Emphysema Father    • Stroke Maternal Grandmother    • Cancer Maternal Grandfather    • No Known Problems Paternal Grandmother    • Cerebral aneurysm Paternal Grandfather        ROS: 14 point review of systems was performed and all other systems were reviewed and are negative except for documented findings in HPI and today's encounter.     Allergies:   Allergies   Allergen Reactions   • Lansoprazole Itching     AND TURN RED   • Levofloxacin Swelling     RED RASH   • Cefdinir Other (See Comments)     Abdominal pain, caused upset stomach   • Trazodone And Nefazodone Other (See Comments)     Severe muscle cramps     Constitutional:  Denies fever, shaking or chills   Eyes:  Denies change in visual acuity   HENT:  Denies nasal congestion or sore throat   Respiratory:  Denies cough or shortness of breath   Cardiovascular:  Denies chest pain or severe LE edema   GI:  Denies abdominal pain, nausea, vomiting, bloody stools or diarrhea   Musculoskeletal:  Numbness, tingling, pain, or loss of motor function only as noted above in history of present illness.  : Denies painful urination or hematuria  Integument:  Denies rash, lesion or ulceration   Neurologic:  Denies headache or focal weakness  Endocrine:  Denies lymphadenopathy  Psych:  Denies confusion or change in mental status   Hem:  Denies active bleeding    Objective:    Physical Exam: 70 y.o. female  Body mass index is 35.78 kg/(m^2)., @WT@, @HT@  Vitals:    11/09/17 1049   Temp: 98.2 °F  (36.8 °C)     Vital signs reviewed.   General Appearance:    Alert, cooperative, in no acute distress                  Eyes: conjunctiva clear  ENT: external ears and nose atraumatic  CV: no peripheral edema  Resp: normal respiratory effort  Skin: no rashes or wounds; normal turgor  Psych: mood and affect appropriate  Lymph: no nodes appreciated  Neuro: gross sensation intact  Vascular:  Palpable peripheral pulse in noted extremity  Musculoskeletal Extremities: DETAILED KNEE Exam: Left knee: Painful gait w/wo limp, muscle atrophy, erythema, ecchymosis, or gross deformity noted, Large knee effusion, + medial joint line tenderness, Active range of motion flexion contracture, 5/5 strength flexion and extension, The knee is stable to varus and valgus stress testing, VARUS VALGUS NEUTRAL: varus alignment of the limb, Lachman negative, Posterior drawer negative, Cherri's negative, Patellofemoral grind +, Sensation grossly intact to light tough throughout the lower extremity, Skin is intact, Distal pulses are palpable, No signs or symptoms of DVT    Radiology:   Imaging done previously in the office, images were personally viewed and discussed at length with the patient:    Indication: pain related symptoms,  Views: 3V AP, LAT & 40 degree PA bilateral knee(s)   Findings: severe end-stage arthritis (bone on bone, subchondral sclerosis/cysts, osteophytes)  Comparison views: viewed last xray done in the office.       Assessment:     ICD-10-CM ICD-9-CM   1. Arthritis of left knee M17.12 716.96        Large Joint Arthrocentesis  Date/Time: 11/9/2017 11:08 AM  Consent given by: patient  Site marked: site marked  Timeout: Immediately prior to procedure a time out was called to verify the correct patient, procedure, equipment, support staff and site/side marked as required   Supporting Documentation  Indications: pain and joint swelling   Procedure Details  Location: knee - L knee  Preparation: Patient was prepped and draped in  the usual sterile fashion  Needle size: 22 G  Approach: anterolateral  Medications administered: 80 mg methylPREDNISolone acetate 80 MG/ML; 2 mL bupivacaine (PF) 0.5 %; 2 mL lidocaine PF 1% 1 %  Patient tolerance: patient tolerated the procedure well with no immediate complications             Plan:  27 min spent face to face with patient 20 min spent counseling about natural history and expected course of assessed complaint and reviewed treatment options that have been tried and not tried and those currently available. Questions answered.    Natural history and expected course of this patient's diagnosis discussed along with evaluation of therapies. Questions answered.  Advice on benefits of, and types of regular/moderate exercise including biomechanical forces involved as it pertains to this complaint, with a goal of 20 min at least 3 times a week.    Address and update of wt loss, physical exercises, use of assistive devices, and monitoring of Medications per orders to address ortho complaints; Evaluation and discussion of safety, precautions, side effects, and warnings given especially of long term NSAID therapy.  Lifestyle measures for weight loss, suggest starting at 10lbs, with biomechanical explanations for weight loss and how this affects orthopedic condition.  OTC analgesics as needed with dosage warning and instructions given.  Cryotherapy/brachy therapy as indicated with instructions.   TKA: Obtain Cardiac clearance for surgery. Continuation of conservative management vs. TKA: I reviewed anatomy of a total knee arthroplasty in laymen's terms, as well as typical postoperative recovery and possibly 6-12 months for maximal recovery, and possible need for rehabilitation stay after hospitalization. We also discussed risks, benefits, alternatives, and limitations of procedure with risks including but not limited to neurovascular damage, bleeding, infection, malalignment, chronic pian, failure of implants,  osteolysis, loosening of implants, loss of motion, weakness, stiffness, instability, DVT, pulmonary embolus, death, stroke, complex regional pain syndrome, myocardial infarction, and need for additional procedures. Concept of substitution vs. replacement discussed.  No guarantees were given regarding results of surgery.  Patient verbalized understanding, and was given the opportunity to ask and have all questions answered today.   Cortisone injection given for relief until surgery. (see procedure note)    Tramadol for acute pain flare of arthritis to get her through to surgery.   11/9/2017   TRINH

## 2018-05-06 NOTE — PROGRESS NOTE ADULT - ASSESSMENT
89 yo male h/o CAD s/p stents, HTN, ESRD on HD ( Mon, Wed, Fri with Dr Garland), recent hospitalization 4/4-4/6 for fever 2/2 UTI / urinary retention with ucx showing enterococcus faecium and ecoli,  sp iv meropenem and d/c'ed on bactrim (angioedema hx with cephalosporins) pw recurrent fever/chills without any other symptoms.  Found to have persistent uti on UA.     #  HD on monday  # remains on santosh: UC noted, blood culture negative, id notes appreciated, on linezolid  # on renagel, check ph level  # BP at goal  # will follow

## 2018-05-06 NOTE — PROGRESS NOTE ADULT - SUBJECTIVE AND OBJECTIVE BOX
Patient was seen and examined. Spoke with RN. Chart reviewed.  No events overnight.  Vital Signs Last 24 Hrs  T(F): 97.7 (06 May 2018 05:49), Max: 97.9 (05 May 2018 21:36)  HR: 72 (06 May 2018 05:49) (67 - 72)  BP: 155/70 (06 May 2018 05:49) (128/59 - 155/70)  SpO2: --  MEDICATIONS  (STANDING):  amLODIPine   Tablet 5 milliGRAM(s) Oral daily  aspirin enteric coated 81 milliGRAM(s) Oral daily  heparin  Injectable 5000 Unit(s) SubCutaneous every 12 hours  losartan 25 milliGRAM(s) Oral daily  meropenem  IVPB 500 milliGRAM(s) IV Intermittent every 24 hours  metoprolol tartrate 25 milliGRAM(s) Oral two times a day  multivitamin 1 Tablet(s) Oral daily  pantoprazole    Tablet 40 milliGRAM(s) Oral before breakfast  sevelamer hydrochloride 800 milliGRAM(s) Oral three times a day  simvastatin 20 milliGRAM(s) Oral at bedtime  tamsulosin 0.4 milliGRAM(s) Oral at bedtime    MEDICATIONS  (PRN):  docusate sodium 100 milliGRAM(s) Oral daily PRN Constipation  melatonin 3 milliGRAM(s) Oral at bedtime PRN Insomnia    Labs:    05 May 2018 07:04    141    |  96     |  36     ----------------------------<  107    4.1     |  28     |  3.7      Ca    8.6        05 May 2018 07:04            General: comfortable, NAD  Neurology: A&Ox3, nonfocal  Head:  Normocephalic, atraumatic  ENT:  Mucosa moist, no ulcerations  Neck:  Supple, no JVD,   Skin: no breakdowns (as per RN)  Resp: CTA B/L  CV: RRR, S1S2,   GI: Soft, NT, bowel sounds  MS: No edema, + peripheral pulses, FROM all 4 extremity      A/P:  88 M w/ pmh of CAD s/p stents, TAVR, HTN, ESRD on HD ( MWF ), recent hospitalization 4/4-4/6 for fever 2/2 UTI / ucx grew enterococcus faecium and ecoli, treated w/ iv meropenem and d/c'ed on bactrim who pw recurrent fever/chills, denies dysuria. In ED found to have positive UA, Ucx on 5/1 grew the same organisms and the pt was admitted and started on Meropenem.    # UTI  - Urine Culture now grew ESBL E. Coli and VRE -> f/u w/ ID regarding PICC, id was recalled on 5/4, will try again todd in the AM    # ESRD on HD  - Nephrology following, going for dialysis on monday  - On Sevelamer    Last day of merrem today    DC today after abx dose if ok with renal      DVT prophylaxis  Decubitus prevention- all measures as per RN protocol  Please call or text me with any questions or updates

## 2018-05-07 ENCOUNTER — TRANSCRIPTION ENCOUNTER (OUTPATIENT)
Age: 83
End: 2018-05-07

## 2018-05-07 VITALS — DIASTOLIC BLOOD PRESSURE: 61 MMHG | RESPIRATION RATE: 18 BRPM | SYSTOLIC BLOOD PRESSURE: 143 MMHG | HEART RATE: 56 BPM

## 2018-05-07 RX ADMIN — LOSARTAN POTASSIUM 25 MILLIGRAM(S): 100 TABLET, FILM COATED ORAL at 06:04

## 2018-05-07 RX ADMIN — SEVELAMER CARBONATE 800 MILLIGRAM(S): 2400 POWDER, FOR SUSPENSION ORAL at 00:42

## 2018-05-07 RX ADMIN — Medication 25 MILLIGRAM(S): at 06:04

## 2018-05-07 RX ADMIN — HEPARIN SODIUM 5000 UNIT(S): 5000 INJECTION INTRAVENOUS; SUBCUTANEOUS at 06:06

## 2018-05-07 RX ADMIN — SEVELAMER CARBONATE 800 MILLIGRAM(S): 2400 POWDER, FOR SUSPENSION ORAL at 06:03

## 2018-05-07 RX ADMIN — AMLODIPINE BESYLATE 5 MILLIGRAM(S): 2.5 TABLET ORAL at 06:04

## 2018-05-07 RX ADMIN — TAMSULOSIN HYDROCHLORIDE 0.4 MILLIGRAM(S): 0.4 CAPSULE ORAL at 00:43

## 2018-05-07 RX ADMIN — Medication 81 MILLIGRAM(S): at 12:08

## 2018-05-07 RX ADMIN — SEVELAMER CARBONATE 800 MILLIGRAM(S): 2400 POWDER, FOR SUSPENSION ORAL at 15:18

## 2018-05-07 RX ADMIN — SIMVASTATIN 20 MILLIGRAM(S): 20 TABLET, FILM COATED ORAL at 00:43

## 2018-05-07 RX ADMIN — PANTOPRAZOLE SODIUM 40 MILLIGRAM(S): 20 TABLET, DELAYED RELEASE ORAL at 06:04

## 2018-05-07 RX ADMIN — Medication 1 TABLET(S): at 12:08

## 2018-05-07 NOTE — DISCHARGE NOTE ADULT - ABILITY TO HEAR (WITH HEARING AID OR HEARING APPLIANCE IF NORMALLY USED):
hearing aids home/Mildly to Moderately Impaired: difficulty hearing in some environments or speaker may need to increase volume or speak distinctly

## 2018-05-07 NOTE — PROGRESS NOTE ADULT - SUBJECTIVE AND OBJECTIVE BOX
MALLIKAALLEN VALVERDE  88y, Male      OVERNIGHT EVENTS:    No fevers, does urinate and no  complaints. Has no complaints.    VITALS:  T(F): 96.2, Max: 96.9 (05-06-18 @ 14:52)  HR: 74  BP: 160/74  RR: 18Vital Signs Last 24 Hrs  T(C): 35.7 (07 May 2018 05:37), Max: 36.1 (06 May 2018 14:52)  T(F): 96.2 (07 May 2018 05:37), Max: 96.9 (06 May 2018 14:52)  HR: 74 (07 May 2018 05:37) (70 - 74)  BP: 160/74 (07 May 2018 05:37) (149/65 - 160/74)  BP(mean): --  RR: 18 (07 May 2018 05:37) (18 - 18)  SpO2: --    TESTS & MEASUREMENTS:    05-06    139  |  97<L>  |  54<H>  ----------------------------<  84  4.4   |  26  |  4.9<HH>    Ca    8.2<L>      06 May 2018 05:53          Culture - Blood (collected 05-03-18 @ 04:59)  Source: .Blood None  Preliminary Report (05-04-18 @ 12:01):    No growth to date.    Culture - Urine (collected 05-01-18 @ 21:37)  Source: .Urine Clean Catch (Midstream)  Final Report (05-03-18 @ 23:01):    >100,000 CFU/ml Enterococcus faecium (vancomycin resistant)    10,000 - 49,000 CFU/mL Escherichia coli ESBL  Organism: Enterococcus faecium (vancomycin resistant)  Escherichia coli ESBL (05-03-18 @ 23:02)  Organism: Escherichia coli ESBL (05-03-18 @ 23:02)      -  Amikacin: S <=8      -  Amoxicillin/Clavulanic Acid: S <=8/4      -  Ampicillin: R >16      -  Ampicillin/Sulbactam: R >16/8      -  Aztreonam: R <=4      -  Cefazolin: R 8      -  Cefepime: R <=2      -  Cefoxitin: S 8      -  Ceftriaxone: R <=1      -  Ciprofloxacin: R >2      -  Ertapenem: S <=0.5      -  Gentamicin: S <=1      -  Imipenem: S <=1      -  Levofloxacin: R >4      -  Meropenem: S <=1      -  Nitrofurantoin: S <=32      -  Piperacillin/Tazobactam: R <=8      -  Tigecycline: S <=1      -  Tobramycin: S <=2      -  Trimethoprim/Sulfamethoxazole: R >2/38      Method Type: SISI  Organism: Enterococcus faecium (vancomycin resistant) (05-03-18 @ 23:02)      -  Ampicillin: R >8      -  Ciprofloxacin: R >2      -  Nitrofurantoin: I 64      -  Tetra/Doxy: R >8      -  Vancomycin: R >16      Method Type: SISI    Culture - Blood (collected 04-30-18 @ 23:14)  Source: .Blood Blood  Final Report (05-06-18 @ 06:00):    No growth at 5 days.    Culture - Urine (collected 04-30-18 @ 22:33)  Source: .Urine Clean Catch (Midstream)  Final Report (05-03-18 @ 14:02):    >100,000 CFU/ml Enterococcus faecium (vancomycin resistant)  Organism: Enterococcus faecium (vancomycin resistant) (05-03-18 @ 14:02)  Organism: Enterococcus faecium (vancomycin resistant) (05-03-18 @ 14:02)      -  Ampicillin: R >8      -  Ciprofloxacin: R >2      -  Nitrofurantoin: I 64      -  Tetra/Doxy: R >8      -  Vancomycin: R >16      Method Type: SISI            RADIOLOGY & ADDITIONAL TESTS:    ANTIBIOTICS:  meropenem  IVPB 500 milliGRAM(s) IV Intermittent every 24 hours

## 2018-05-07 NOTE — DISCHARGE NOTE ADULT - CARE PROVIDER_API CALL
Pramod Pascual), Internal Medicine  [address] [place] [address]  Phone: [phone]  Fax: [phone]    Yahir Garland), Internal Medicine; Nephrology  [address] [place] [address]  Phone: [phone]  Fax: [phone]    Julius Dodge), Cardiovascular Disease; Internal Medicine  [address] [place] [address]  Phone: [phone]  Fax: [phone]

## 2018-05-07 NOTE — PROGRESS NOTE ADULT - ASSESSMENT
89 yo male h/o CAD s/p stents, HTN, ESRD on HD ( Mon, Wed, Fri with Dr Garland), recent hospitalization 4/4-4/6 for fever 2/2 UTI / urinary retention with ucx showing enterococcus faecium and ecoli,  sp iv meropenem and d/c'ed on bactrim (angioedema hx with cephalosporins) pw recurrent fever/chills without any other symptoms.  Found to have persistent uti on UA.     # hd today, standard bath, uf 2 liters as tolerated, av fistula   # Id notes appreciated, d/c meropenem   # on renagel, check ph level  # ? d/c plan

## 2018-05-07 NOTE — PROGRESS NOTE ADULT - ASSESSMENT
A/P:  88 M w/ pmh of CAD s/p stents, TAVR, HTN, ESRD on HD ( MWF ), recent hospitalization 4/4-4/6 for fever 2/2 UTI / ucx grew enterococcus faecium and ecoli, treated w/ iv meropenem and d/c'ed on bactrim who pw recurrent fever/chills, denies dysuria. In ED found to have positive UA, Ucx on 5/1 grew the same organisms and the pt was admitted and started on Meropenem.    # UTI  - Urine Culture now grew ESBL E. Coli and VRE -> f/u w/ ID regarding PICC, id was recalled on 5/4, will try again todd in the AM    # ESRD on HD  - Nephrology following, going for dialysis on monday  - On Sevelamer    Last day of merrem today    DC today after abx dose if ok with renal      DVT prophylaxis  Decubitus prevention- all measures as per RN protocol  Please call or text me with any questions or updates

## 2018-05-07 NOTE — PROGRESS NOTE ADULT - PROVIDER SPECIALTY LIST ADULT
Infectious Disease
Internal Medicine
Nephrology
Infectious Disease
Internal Medicine

## 2018-05-07 NOTE — PROGRESS NOTE ADULT - SUBJECTIVE AND OBJECTIVE BOX
Patient was seen and examined. Spoke with RN. Chart reviewed.    No events overnight.  Vital Signs Last 24 Hrs  T(F): 96.2 (07 May 2018 05:37), Max: 96.9 (06 May 2018 14:52)  HR: 56 (07 May 2018 11:17) (56 - 74)  BP: 143/61 (07 May 2018 11:17) (143/61 - 160/74)  SpO2: --  MEDICATIONS  (STANDING):  amLODIPine   Tablet 5 milliGRAM(s) Oral daily  aspirin enteric coated 81 milliGRAM(s) Oral daily  heparin  Injectable 5000 Unit(s) SubCutaneous every 12 hours  losartan 25 milliGRAM(s) Oral daily  metoprolol tartrate 25 milliGRAM(s) Oral two times a day  multivitamin 1 Tablet(s) Oral daily  pantoprazole    Tablet 40 milliGRAM(s) Oral before breakfast  sevelamer hydrochloride 800 milliGRAM(s) Oral three times a day  simvastatin 20 milliGRAM(s) Oral at bedtime  tamsulosin 0.4 milliGRAM(s) Oral at bedtime    MEDICATIONS  (PRN):  docusate sodium 100 milliGRAM(s) Oral daily PRN Constipation  melatonin 3 milliGRAM(s) Oral at bedtime PRN Insomnia    Labs:    06 May 2018 05:53    139    |  97     |  54     ----------------------------<  84     4.4     |  26     |  4.9      Ca    8.2        06 May 2018 05:53              Radiology:    General: comfortable, NAD  Neurology: A&Ox3, nonfocal  Head:  Normocephalic, atraumatic  ENT:  Mucosa moist, no ulcerations  Neck:  Supple, no JVD,   Skin: no breakdowns (as per RN)  Resp: CTA B/L  CV: RRR, S1S2,   GI: Soft, NT, bowel sounds  MS: No edema, + peripheral pulses, FROM all 4 extremity

## 2018-05-07 NOTE — DISCHARGE NOTE ADULT - CARE PLAN
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

## 2018-05-07 NOTE — PROGRESS NOTE ADULT - ASSESSMENT
IMPRESSION:  No evidence of pyelonephritis.  See no need for a PICC line to treat the ESBL in urine  Blood cultures are negative and UA reveals no pyuria.    RECOMMENDATIONS:  D/C meropenem.

## 2018-05-07 NOTE — PROGRESS NOTE ADULT - SUBJECTIVE AND OBJECTIVE BOX
seen and examined  no new complaints   lying comfortable     Standing Inpatient Medications  amLODIPine   Tablet 5 milliGRAM(s) Oral daily  aspirin enteric coated 81 milliGRAM(s) Oral daily  heparin  Injectable 5000 Unit(s) SubCutaneous every 12 hours  losartan 25 milliGRAM(s) Oral daily  metoprolol tartrate 25 milliGRAM(s) Oral two times a day  multivitamin 1 Tablet(s) Oral daily  pantoprazole    Tablet 40 milliGRAM(s) Oral before breakfast  sevelamer hydrochloride 800 milliGRAM(s) Oral three times a day  simvastatin 20 milliGRAM(s) Oral at bedtime  tamsulosin 0.4 milliGRAM(s) Oral at bedtime        VITALS/PHYSICAL EXAM  --------------------------------------------------------------------------------  T(C): 35.7 (05-07-18 @ 05:37), Max: 36.1 (05-06-18 @ 14:52)  HR: 74 (05-07-18 @ 05:37) (70 - 74)  BP: 160/74 (05-07-18 @ 05:37) (149/65 - 160/74)  RR: 18 (05-07-18 @ 05:37) (18 - 18)  SpO2: --  Wt(kg): --        Physical Exam:  	Gen: NAD  	Pulm: CTA B/L  	CV:  S1S2; no rub  	Abd:  soft, nontender/nondistended  	LE:  no edema  	Vascular access:    LABS/STUDIES  --------------------------------------------------------------------------------    139  |  97  |  54  ----------------------------<  84      [05-06-18 @ 05:53]  4.4   |  26  |  4.9        Ca     8.2     [05-06-18 @ 05:53]            Creatinine Trend:  SCr 4.9 [05-06 @ 05:53]  SCr 3.7 [05-05 @ 07:04]  SCr 4.6 [05-04 @ 07:08]  SCr 3.5 [05-03 @ 04:59]  SCr 4.5 [05-01 @ 21:37]    Urinalysis - [05-01-18 @ 21:37]      Color Yellow / Appearance Clear / SG 1.020 / pH 6.0      Gluc Negative / Ketone Negative  / Bili Negative / Urobili 0.2       Blood Negative / Protein 100 / Leuk Est Negative / Nitrite Positive      RBC 5-10 / WBC 3-5 / Hyaline  / Gran  / Sq Epi  / Non Sq Epi  / Bacteria Moderate      Ferritin 2300.0      [02-08-18 @ 05:36]  HbA1c 4.3      [11-01-16 @ 15:19] seen and examined  no new complaints   lying comfortable     Standing Inpatient Medications  amLODIPine   Tablet 5 milliGRAM(s) Oral daily  aspirin enteric coated 81 milliGRAM(s) Oral daily  heparin  Injectable 5000 Unit(s) SubCutaneous every 12 hours  losartan 25 milliGRAM(s) Oral daily  metoprolol tartrate 25 milliGRAM(s) Oral two times a day  multivitamin 1 Tablet(s) Oral daily  pantoprazole    Tablet 40 milliGRAM(s) Oral before breakfast  sevelamer hydrochloride 800 milliGRAM(s) Oral three times a day  simvastatin 20 milliGRAM(s) Oral at bedtime  tamsulosin 0.4 milliGRAM(s) Oral at bedtime        VITALS/PHYSICAL EXAM  --------------------------------------------------------------------------------  T(C): 35.7 (05-07-18 @ 05:37), Max: 36.1 (05-06-18 @ 14:52)  HR: 74 (05-07-18 @ 05:37) (70 - 74)  BP: 160/74 (05-07-18 @ 05:37) (149/65 - 160/74)  RR: 18 (05-07-18 @ 05:37) (18 - 18)  SpO2: --  Wt(kg): --        Physical Exam:  	Gen: NAD  	Pulm: CTA B/L  	CV:  S1S2; no rub  	Abd:  soft, nontender/nondistended  	LE:  no edema  	Vascular access: av fistula     LABS/STUDIES  --------------------------------------------------------------------------------    139  |  97  |  54  ----------------------------<  84      [05-06-18 @ 05:53]  4.4   |  26  |  4.9        Ca     8.2     [05-06-18 @ 05:53]            Creatinine Trend:  SCr 4.9 [05-06 @ 05:53]  SCr 3.7 [05-05 @ 07:04]  SCr 4.6 [05-04 @ 07:08]  SCr 3.5 [05-03 @ 04:59]  SCr 4.5 [05-01 @ 21:37]    Urinalysis - [05-01-18 @ 21:37]      Color Yellow / Appearance Clear / SG 1.020 / pH 6.0      Gluc Negative / Ketone Negative  / Bili Negative / Urobili 0.2       Blood Negative / Protein 100 / Leuk Est Negative / Nitrite Positive      RBC 5-10 / WBC 3-5 / Hyaline  / Gran  / Sq Epi  / Non Sq Epi  / Bacteria Moderate      Ferritin 2300.0      [02-08-18 @ 05:36]  HbA1c 4.3      [11-01-16 @ 15:19]

## 2018-05-07 NOTE — DISCHARGE NOTE ADULT - PATIENT PORTAL LINK FT
You can access the [facility] Patient Portal, offered by [facility], by registering with the following website: [email or web address]

## 2018-05-07 NOTE — DISCHARGE NOTE ADULT - MEDICATION SUMMARY - MEDICATIONS TO TAKE
I will START or STAY ON the medications listed below when I get home from the hospital:    aspirin 81 mg oral delayed release tablet  -- 1 tab(s) by mouth once a day  -- Indication: For HCM    losartan 25 mg oral tablet  -- 1 tab(s) by mouth once a day  -- Indication: For HTN    tamsulosin 0.4 mg oral capsule  -- 1 cap(s) by mouth once a day (at bedtime)  -- Indication: For BPH    simvastatin 20 mg oral tablet  -- 1 tab(s) by mouth once a day (at bedtime)  -- Indication: For DLD    metoprolol tartrate 25 mg oral tablet  -- 1 tab(s) by mouth 2 times a day  -- Indication: For HTN    amLODIPine 5 mg oral tablet  -- 1 tab(s) by mouth once a day   -- It is very important that you take or use this exactly as directed.  Do not skip doses or discontinue unless directed by your doctor.  Some non-prescription drugs may aggravate your condition.  Read all labels carefully.  If a warning appears, check with your doctor before taking.    -- Indication: For HTN    Colace  -- 100 milligram(s) by mouth once a day, As Needed  -- Indication: For Constipation    Melatonin 3 mg oral tablet  -- 1 tab(s) by mouth once (at bedtime)  -- Indication: For Insomnia    Renvela 800 mg oral tablet  -- 1 tab(s) by mouth 3 times a day (with meals)  -- Indication: For ESRD (end stage renal disease) on dialysis    Protonix 40 mg oral delayed release tablet  -- 1 tab(s) by mouth once a day  -- Indication: For GERD    Dialyvite 800 oral tablet  -- 1 tab(s) by mouth once a day  -- Indication: For ESRD (end stage renal disease) on dialysis

## 2018-05-08 ENCOUNTER — APPOINTMENT (OUTPATIENT)
Dept: HEMATOLOGY ONCOLOGY | Facility: CLINIC | Age: 83
End: 2018-05-08

## 2018-05-08 LAB
CULTURE RESULTS: SIGNIFICANT CHANGE UP
SPECIMEN SOURCE: SIGNIFICANT CHANGE UP

## 2018-05-21 DIAGNOSIS — N39.0 URINARY TRACT INFECTION, SITE NOT SPECIFIED: ICD-10-CM

## 2018-05-21 DIAGNOSIS — N18.6 END STAGE RENAL DISEASE: ICD-10-CM

## 2018-05-21 DIAGNOSIS — Z90.49 ACQUIRED ABSENCE OF OTHER SPECIFIED PARTS OF DIGESTIVE TRACT: ICD-10-CM

## 2018-05-21 DIAGNOSIS — I50.9 HEART FAILURE, UNSPECIFIED: ICD-10-CM

## 2018-05-21 DIAGNOSIS — B96.20 UNSPECIFIED ESCHERICHIA COLI [E. COLI] AS THE CAUSE OF DISEASES CLASSIFIED ELSEWHERE: ICD-10-CM

## 2018-05-21 DIAGNOSIS — I25.2 OLD MYOCARDIAL INFARCTION: ICD-10-CM

## 2018-05-21 DIAGNOSIS — Z86.73 PERSONAL HISTORY OF TRANSIENT ISCHEMIC ATTACK (TIA), AND CEREBRAL INFARCTION WITHOUT RESIDUAL DEFICITS: ICD-10-CM

## 2018-05-21 DIAGNOSIS — Z96.642 PRESENCE OF LEFT ARTIFICIAL HIP JOINT: ICD-10-CM

## 2018-05-21 DIAGNOSIS — M10.9 GOUT, UNSPECIFIED: ICD-10-CM

## 2018-05-21 DIAGNOSIS — I13.2 HYPERTENSIVE HEART AND CHRONIC KIDNEY DISEASE WITH HEART FAILURE AND WITH STAGE 5 CHRONIC KIDNEY DISEASE, OR END STAGE RENAL DISEASE: ICD-10-CM

## 2018-05-21 DIAGNOSIS — I25.10 ATHEROSCLEROTIC HEART DISEASE OF NATIVE CORONARY ARTERY WITHOUT ANGINA PECTORIS: ICD-10-CM

## 2018-05-21 DIAGNOSIS — Z95.4 PRESENCE OF OTHER HEART-VALVE REPLACEMENT: ICD-10-CM

## 2018-05-21 DIAGNOSIS — E78.5 HYPERLIPIDEMIA, UNSPECIFIED: ICD-10-CM

## 2018-05-21 DIAGNOSIS — Z95.5 PRESENCE OF CORONARY ANGIOPLASTY IMPLANT AND GRAFT: ICD-10-CM

## 2018-05-21 DIAGNOSIS — B95.2 ENTEROCOCCUS AS THE CAUSE OF DISEASES CLASSIFIED ELSEWHERE: ICD-10-CM

## 2018-05-21 DIAGNOSIS — Z99.2 DEPENDENCE ON RENAL DIALYSIS: ICD-10-CM

## 2018-05-21 DIAGNOSIS — N40.0 BENIGN PROSTATIC HYPERPLASIA WITHOUT LOWER URINARY TRACT SYMPTOMS: ICD-10-CM

## 2018-05-21 DIAGNOSIS — Z16.22 RESISTANCE TO VANCOMYCIN RELATED ANTIBIOTICS: ICD-10-CM

## 2018-05-21 DIAGNOSIS — Z16.12 EXTENDED SPECTRUM BETA LACTAMASE (ESBL) RESISTANCE: ICD-10-CM

## 2018-06-22 ENCOUNTER — INPATIENT (INPATIENT)
Facility: HOSPITAL | Age: 83
LOS: 2 days | Discharge: HOME | End: 2018-06-25
Attending: INTERNAL MEDICINE | Admitting: INTERNAL MEDICINE

## 2018-06-22 VITALS
SYSTOLIC BLOOD PRESSURE: 147 MMHG | RESPIRATION RATE: 20 BRPM | TEMPERATURE: 99 F | HEART RATE: 67 BPM | OXYGEN SATURATION: 96 % | DIASTOLIC BLOOD PRESSURE: 61 MMHG

## 2018-06-22 DIAGNOSIS — Z90.49 ACQUIRED ABSENCE OF OTHER SPECIFIED PARTS OF DIGESTIVE TRACT: Chronic | ICD-10-CM

## 2018-06-22 DIAGNOSIS — K82.9 DISEASE OF GALLBLADDER, UNSPECIFIED: Chronic | ICD-10-CM

## 2018-06-22 DIAGNOSIS — Z95.2 PRESENCE OF PROSTHETIC HEART VALVE: Chronic | ICD-10-CM

## 2018-06-22 DIAGNOSIS — Z98.890 OTHER SPECIFIED POSTPROCEDURAL STATES: Chronic | ICD-10-CM

## 2018-06-22 DIAGNOSIS — H26.9 UNSPECIFIED CATARACT: Chronic | ICD-10-CM

## 2018-06-22 DIAGNOSIS — I77.0 ARTERIOVENOUS FISTULA, ACQUIRED: Chronic | ICD-10-CM

## 2018-06-22 DIAGNOSIS — Z96.642 PRESENCE OF LEFT ARTIFICIAL HIP JOINT: Chronic | ICD-10-CM

## 2018-06-22 LAB
ALBUMIN SERPL ELPH-MCNC: 3.9 G/DL — SIGNIFICANT CHANGE UP (ref 3.5–5.2)
ALP SERPL-CCNC: 63 U/L — SIGNIFICANT CHANGE UP (ref 30–115)
ALT FLD-CCNC: 13 U/L — SIGNIFICANT CHANGE UP (ref 0–41)
ANION GAP SERPL CALC-SCNC: 18 MMOL/L — HIGH (ref 7–14)
AST SERPL-CCNC: 27 U/L — SIGNIFICANT CHANGE UP (ref 0–41)
BASOPHILS # BLD AUTO: 0.03 K/UL — SIGNIFICANT CHANGE UP (ref 0–0.2)
BASOPHILS NFR BLD AUTO: 0.2 % — SIGNIFICANT CHANGE UP (ref 0–1)
BILIRUB SERPL-MCNC: 1.3 MG/DL — HIGH (ref 0.2–1.2)
BUN SERPL-MCNC: 42 MG/DL — HIGH (ref 10–20)
CALCIUM SERPL-MCNC: 8.6 MG/DL — SIGNIFICANT CHANGE UP (ref 8.5–10.1)
CHLORIDE SERPL-SCNC: 96 MMOL/L — LOW (ref 98–110)
CO2 SERPL-SCNC: 27 MMOL/L — SIGNIFICANT CHANGE UP (ref 17–32)
CREAT SERPL-MCNC: 3.1 MG/DL — HIGH (ref 0.7–1.5)
EOSINOPHIL # BLD AUTO: 0.44 K/UL — SIGNIFICANT CHANGE UP (ref 0–0.7)
EOSINOPHIL NFR BLD AUTO: 3.5 % — SIGNIFICANT CHANGE UP (ref 0–8)
GAS PNL BLDV: SIGNIFICANT CHANGE UP
GLUCOSE SERPL-MCNC: 107 MG/DL — HIGH (ref 70–99)
HCT VFR BLD CALC: 33.9 % — LOW (ref 42–52)
HGB BLD-MCNC: 11.2 G/DL — LOW (ref 14–18)
IMM GRANULOCYTES NFR BLD AUTO: 0.3 % — SIGNIFICANT CHANGE UP (ref 0.1–0.3)
LACTATE SERPL-SCNC: 1.1 MMOL/L — SIGNIFICANT CHANGE UP (ref 0.5–2.2)
LYMPHOCYTES # BLD AUTO: 0.77 K/UL — LOW (ref 1.2–3.4)
LYMPHOCYTES # BLD AUTO: 6.1 % — LOW (ref 20.5–51.1)
MCHC RBC-ENTMCNC: 29.9 PG — SIGNIFICANT CHANGE UP (ref 27–31)
MCHC RBC-ENTMCNC: 33 G/DL — SIGNIFICANT CHANGE UP (ref 32–37)
MCV RBC AUTO: 90.4 FL — SIGNIFICANT CHANGE UP (ref 80–94)
MONOCYTES # BLD AUTO: 1.01 K/UL — HIGH (ref 0.1–0.6)
MONOCYTES NFR BLD AUTO: 8 % — SIGNIFICANT CHANGE UP (ref 1.7–9.3)
NEUTROPHILS # BLD AUTO: 10.35 K/UL — HIGH (ref 1.4–6.5)
NEUTROPHILS NFR BLD AUTO: 81.9 % — HIGH (ref 42.2–75.2)
PLATELET # BLD AUTO: 157 K/UL — SIGNIFICANT CHANGE UP (ref 130–400)
POTASSIUM SERPL-MCNC: 4.1 MMOL/L — SIGNIFICANT CHANGE UP (ref 3.5–5)
POTASSIUM SERPL-SCNC: 4.1 MMOL/L — SIGNIFICANT CHANGE UP (ref 3.5–5)
PROT SERPL-MCNC: 6.4 G/DL — SIGNIFICANT CHANGE UP (ref 6–8)
RBC # BLD: 3.75 M/UL — LOW (ref 4.7–6.1)
RBC # FLD: 16.5 % — HIGH (ref 11.5–14.5)
SODIUM SERPL-SCNC: 141 MMOL/L — SIGNIFICANT CHANGE UP (ref 135–146)
WBC # BLD: 12.64 K/UL — HIGH (ref 4.8–10.8)
WBC # FLD AUTO: 12.64 K/UL — HIGH (ref 4.8–10.8)

## 2018-06-22 RX ORDER — GENTAMICIN SULFATE 40 MG/ML
100 VIAL (ML) INJECTION ONCE
Qty: 0 | Refills: 0 | Status: COMPLETED | OUTPATIENT
Start: 2018-06-22 | End: 2018-06-22

## 2018-06-22 RX ORDER — GENTAMICIN SULFATE 40 MG/ML
500 VIAL (ML) INJECTION ONCE
Qty: 0 | Refills: 0 | Status: DISCONTINUED | OUTPATIENT
Start: 2018-06-22 | End: 2018-06-22

## 2018-06-22 RX ORDER — VANCOMYCIN HCL 1 G
1000 VIAL (EA) INTRAVENOUS ONCE
Qty: 0 | Refills: 0 | Status: COMPLETED | OUTPATIENT
Start: 2018-06-22 | End: 2018-06-22

## 2018-06-22 RX ADMIN — Medication 250 MILLIGRAM(S): at 23:00

## 2018-06-22 NOTE — ED PROVIDER NOTE - ATTENDING CONTRIBUTION TO CARE
pt with shaking chills, fever at home, had dialysis today. fever occurred after dialysis. no other complaints. dialysis access in RUE +thrill. no signs of infection. otherwise unremarkable exam. will get cultures, labs, cxr, iv abx.

## 2018-06-22 NOTE — ED ADULT NURSE NOTE - OBJECTIVE STATEMENT
Pt reports he had a fever of 100.8 at 8:30pm last night. According to the patient when this happens it means he has ecoli. He denies any cough, abd pain , dizziness and chest pain.

## 2018-06-22 NOTE — ED PROVIDER NOTE - PHYSICAL EXAMINATION
Constitutional: Well developed, well nourished. NAD.  Head: Normocephalic, atraumatic.  Eyes: PERRL. EOMI.  ENT: No nasal discharge. Mucous membranes moist.  Neck: Supple. Painless ROM.  Cardiovascular: Normal S1, S2. Regular rate and rhythm. No murmurs, rubs, or gallops.  Pulmonary: Normal respiratory rate and effort. Lungs clear to auscultation bilaterally. No wheezing, rales, or rhonchi.  Abdominal: Soft. Nondistended. Nontender. No rebound, guarding, rigidity.  Extremities. Pelvis stable. No lower extremity edema, symmetric calves. + fistula without redness. + bruit.  Skin: No rashes, cyanosis.  Neuro: AAOx3. No focal neurological deficits.  Psych: Normal mood. Normal affect.

## 2018-06-22 NOTE — ED PROVIDER NOTE - OBJECTIVE STATEMENT
Pt is an 89 y/o male with hx of ESRD on dialysis M/W/F with Dada (still makes urine), HTN, valve replacement not on AC, who presents to ED for fever, chills today post dialysis. No cough, chest pain, SOB, abd pain, n/v/d, urinary complaints, rash.

## 2018-06-22 NOTE — ED PROVIDER NOTE - NS ED ROS FT
Constitutional: + fever, chills.  Eyes: No visual changes.  ENT: No hearing changes. No sore throat.  Neck: No neck pain or stiffness.  Cardiovascular: No chest pain, palpitations, edema.  Pulmonary: No SOB, cough. No hemoptysis.  Abdominal: No abdominal pain, nausea, vomiting, diarrhea.  : No dysuria, frequency.  Neuro: No headache, syncope, dizziness.  MS: No back pain. No calf pain/swelling.  Psych: No suicidal ideations.

## 2018-06-23 LAB
APPEARANCE UR: CLEAR — SIGNIFICANT CHANGE UP
BILIRUB UR-MCNC: (no result)
COLOR SPEC: SIGNIFICANT CHANGE UP
DIFF PNL FLD: NEGATIVE — SIGNIFICANT CHANGE UP
GLUCOSE UR QL: NEGATIVE MG/DL — SIGNIFICANT CHANGE UP
KETONES UR-MCNC: NEGATIVE — SIGNIFICANT CHANGE UP
LEUKOCYTE ESTERASE UR-ACNC: (no result)
NITRITE UR-MCNC: NEGATIVE — SIGNIFICANT CHANGE UP
PH UR: 6 — SIGNIFICANT CHANGE UP (ref 5–8)
PROT UR-MCNC: 100 MG/DL
SP GR SPEC: 1.02 — SIGNIFICANT CHANGE UP (ref 1.01–1.03)
UROBILINOGEN FLD QL: 1 MG/DL (ref 0.2–0.2)

## 2018-06-23 RX ORDER — LANOLIN ALCOHOL/MO/W.PET/CERES
3 CREAM (GRAM) TOPICAL AT BEDTIME
Qty: 0 | Refills: 0 | Status: DISCONTINUED | OUTPATIENT
Start: 2018-06-23 | End: 2018-06-25

## 2018-06-23 RX ORDER — SIMVASTATIN 20 MG/1
20 TABLET, FILM COATED ORAL AT BEDTIME
Qty: 0 | Refills: 0 | Status: DISCONTINUED | OUTPATIENT
Start: 2018-06-23 | End: 2018-06-25

## 2018-06-23 RX ORDER — LOSARTAN POTASSIUM 100 MG/1
25 TABLET, FILM COATED ORAL AT BEDTIME
Qty: 0 | Refills: 0 | Status: DISCONTINUED | OUTPATIENT
Start: 2018-06-23 | End: 2018-06-25

## 2018-06-23 RX ORDER — ACETAMINOPHEN 500 MG
650 TABLET ORAL EVERY 6 HOURS
Qty: 0 | Refills: 0 | Status: DISCONTINUED | OUTPATIENT
Start: 2018-06-23 | End: 2018-06-25

## 2018-06-23 RX ORDER — AMLODIPINE BESYLATE 2.5 MG/1
5 TABLET ORAL DAILY
Qty: 0 | Refills: 0 | Status: DISCONTINUED | OUTPATIENT
Start: 2018-06-23 | End: 2018-06-25

## 2018-06-23 RX ORDER — SEVELAMER CARBONATE 2400 MG/1
800 POWDER, FOR SUSPENSION ORAL THREE TIMES A DAY
Qty: 0 | Refills: 0 | Status: DISCONTINUED | OUTPATIENT
Start: 2018-06-23 | End: 2018-06-25

## 2018-06-23 RX ORDER — MEROPENEM 1 G/30ML
500 INJECTION INTRAVENOUS EVERY 12 HOURS
Qty: 0 | Refills: 0 | Status: DISCONTINUED | OUTPATIENT
Start: 2018-06-23 | End: 2018-06-25

## 2018-06-23 RX ORDER — ASPIRIN/CALCIUM CARB/MAGNESIUM 324 MG
81 TABLET ORAL DAILY
Qty: 0 | Refills: 0 | Status: DISCONTINUED | OUTPATIENT
Start: 2018-06-23 | End: 2018-06-25

## 2018-06-23 RX ORDER — HEPARIN SODIUM 5000 [USP'U]/ML
5000 INJECTION INTRAVENOUS; SUBCUTANEOUS EVERY 8 HOURS
Qty: 0 | Refills: 0 | Status: DISCONTINUED | OUTPATIENT
Start: 2018-06-23 | End: 2018-06-25

## 2018-06-23 RX ORDER — METOPROLOL TARTRATE 50 MG
25 TABLET ORAL
Qty: 0 | Refills: 0 | Status: DISCONTINUED | OUTPATIENT
Start: 2018-06-23 | End: 2018-06-25

## 2018-06-23 RX ORDER — PANTOPRAZOLE SODIUM 20 MG/1
40 TABLET, DELAYED RELEASE ORAL
Qty: 0 | Refills: 0 | Status: DISCONTINUED | OUTPATIENT
Start: 2018-06-23 | End: 2018-06-25

## 2018-06-23 RX ORDER — TAMSULOSIN HYDROCHLORIDE 0.4 MG/1
0.4 CAPSULE ORAL AT BEDTIME
Qty: 0 | Refills: 0 | Status: DISCONTINUED | OUTPATIENT
Start: 2018-06-23 | End: 2018-06-25

## 2018-06-23 RX ORDER — MEROPENEM 1 G/30ML
1000 INJECTION INTRAVENOUS EVERY 8 HOURS
Qty: 0 | Refills: 0 | Status: DISCONTINUED | OUTPATIENT
Start: 2018-06-23 | End: 2018-06-23

## 2018-06-23 RX ADMIN — MEROPENEM 100 MILLIGRAM(S): 1 INJECTION INTRAVENOUS at 17:23

## 2018-06-23 RX ADMIN — Medication 81 MILLIGRAM(S): at 12:02

## 2018-06-23 RX ADMIN — SEVELAMER CARBONATE 800 MILLIGRAM(S): 2400 POWDER, FOR SUSPENSION ORAL at 21:15

## 2018-06-23 RX ADMIN — HEPARIN SODIUM 5000 UNIT(S): 5000 INJECTION INTRAVENOUS; SUBCUTANEOUS at 05:17

## 2018-06-23 RX ADMIN — AMLODIPINE BESYLATE 5 MILLIGRAM(S): 2.5 TABLET ORAL at 08:10

## 2018-06-23 RX ADMIN — Medication 25 MILLIGRAM(S): at 08:10

## 2018-06-23 RX ADMIN — HEPARIN SODIUM 5000 UNIT(S): 5000 INJECTION INTRAVENOUS; SUBCUTANEOUS at 14:18

## 2018-06-23 RX ADMIN — Medication 100 MILLIGRAM(S): at 00:20

## 2018-06-23 RX ADMIN — Medication 25 MILLIGRAM(S): at 17:23

## 2018-06-23 RX ADMIN — SIMVASTATIN 20 MILLIGRAM(S): 20 TABLET, FILM COATED ORAL at 21:15

## 2018-06-23 RX ADMIN — HEPARIN SODIUM 5000 UNIT(S): 5000 INJECTION INTRAVENOUS; SUBCUTANEOUS at 21:15

## 2018-06-23 RX ADMIN — SEVELAMER CARBONATE 800 MILLIGRAM(S): 2400 POWDER, FOR SUSPENSION ORAL at 14:18

## 2018-06-23 RX ADMIN — Medication 1 TABLET(S): at 12:02

## 2018-06-23 RX ADMIN — TAMSULOSIN HYDROCHLORIDE 0.4 MILLIGRAM(S): 0.4 CAPSULE ORAL at 21:15

## 2018-06-23 RX ADMIN — SEVELAMER CARBONATE 800 MILLIGRAM(S): 2400 POWDER, FOR SUSPENSION ORAL at 08:10

## 2018-06-23 RX ADMIN — LOSARTAN POTASSIUM 25 MILLIGRAM(S): 100 TABLET, FILM COATED ORAL at 21:15

## 2018-06-23 RX ADMIN — Medication 3 MILLIGRAM(S): at 21:15

## 2018-06-23 RX ADMIN — SIMVASTATIN 20 MILLIGRAM(S): 20 TABLET, FILM COATED ORAL at 04:58

## 2018-06-23 RX ADMIN — LOSARTAN POTASSIUM 25 MILLIGRAM(S): 100 TABLET, FILM COATED ORAL at 04:56

## 2018-06-23 RX ADMIN — PANTOPRAZOLE SODIUM 40 MILLIGRAM(S): 20 TABLET, DELAYED RELEASE ORAL at 06:37

## 2018-06-23 RX ADMIN — MEROPENEM 100 MILLIGRAM(S): 1 INJECTION INTRAVENOUS at 06:35

## 2018-06-23 RX ADMIN — TAMSULOSIN HYDROCHLORIDE 0.4 MILLIGRAM(S): 0.4 CAPSULE ORAL at 04:57

## 2018-06-23 NOTE — H&P ADULT - ASSESSMENT
89 yo M with PMH of CAD s/p stents, TAVR, HTN, and ESRD on HD ( MWF ) presented for fever.    #Fever/slight leukocytosis in a dialysis patient, r/o bacteremia:  - UA unremarkable, follow up blood and urine cultures  - AV fistula site does not look infected  - Continue Meropenem for now, will get ID consult    #ESRD on HD:  - Cr stable    #CAD/TAVR/HTN:  - continue home medications    #Miscellaneous:  - DVT ppx with heparin SubQ  - Renal diet  - Ambulate as tolerated, walks wit a walker at baseline  - Dispo: to return home once ID clears pt. 89 yo M with PMH of CAD s/p stents, TAVR, HTN, and ESRD on HD ( MWF ) presented for fever.    #Fever/slight leukocytosis in a dialysis patient, r/o bacteremia:  - UA unremarkable, follow up blood and urine cultures  - AV fistula site does not look infected  - Continue Meropenem for now, will get ID consult    #ESRD on HD:  - Cr stable  - Check Mg, Phos levels    #CAD/TAVR/HTN:  - continue home medications    #Miscellaneous:  - DVT ppx with heparin SubQ  - Renal diet  - Ambulate as tolerated, walks wit a walker at baseline  - Dispo: to return home once ID clears pt.

## 2018-06-23 NOTE — H&P ADULT - NSHPLABSRESULTS_GEN_ALL_CORE
Complete Blood Count + Automated Diff (06.22.18 @ 23:01)    WBC Count: 12.64 K/uL    RBC Count: 3.75 M/uL    Hemoglobin: 11.2 g/dL    Hematocrit: 33.9 %    Mean Cell Volume: 90.4 fL    Mean Cell Hemoglobin: 29.9 pg    Mean Cell Hemoglobin Conc: 33.0 g/dL    Red Cell Distrib Width: 16.5 %    Platelet Count - Automated: 157 K/uL    Auto Neutrophil #: 10.35 K/uL    Auto Lymphocyte #: 0.77 K/uL    Auto Monocyte #: 1.01 K/uL    Auto Eosinophil #: 0.44 K/uL    Auto Basophil #: 0.03 K/uL    Auto Neutrophil %: 81.9: Differential percentages must be correlated with absolute numbers for  clinical significance. %    Auto Lymphocyte %: 6.1 %    Auto Monocyte %: 8.0 %    Auto Eosinophil %: 3.5 %    Auto Basophil %: 0.2 %    Auto Immature Granulocyte %: 0.3 %    Comprehensive Metabolic Panel (06.22.18 @ 23:01)    Sodium, Serum: 141 mmol/L    Potassium, Serum: 4.1: Hemolyzed. Interpret with caution mmol/L    Chloride, Serum: 96 mmol/L    Carbon Dioxide, Serum: 27 mmol/L    Anion Gap, Serum: 18 mmol/L    Blood Urea Nitrogen, Serum: 42 mg/dL    Creatinine, Serum: 3.1 mg/dL    Glucose, Serum: 107 mg/dL    Calcium, Total Serum: 8.6 mg/dL    Protein Total, Serum: 6.4 g/dL    Albumin, Serum: 3.9 g/dL    Bilirubin Total, Serum: 1.3 mg/dL    Alkaline Phosphatase, Serum: 63 U/L    Aspartate Aminotransferase (AST/SGOT): 27: Hemolyzed. Interpret with caution U/L    Alanine Aminotransferase (ALT/SGPT): 13: Hemolyzed. Interpret with caution U/L    eGFR if Non : 17: Interpretative comment    Urinalysis (06.23.18 @ 02:31)    pH Urine: 6.0    Glucose Qualitative, Urine: Negative mg/dL    Blood, Urine: Negative    Color: Dark Yellow    Urine Appearance: Clear    Bilirubin: Small    Ketone - Urine: Negative    Specific Gravity: 1.020    Protein, Urine: 100 mg/dL    Urobilinogen: 1.0 mg/dL    Nitrite: Negative    Leukocyte Esterase Concentration: Trace

## 2018-06-23 NOTE — H&P ADULT - ATTENDING COMMENTS
patient seen and examined , agree with pgy 2 assessment and plan except as indicated above,   #Fever chills leukocytosis in ESRD suspicious for Resistant Bacteremia , monitor vitals (SIRS? fever present prior with recorded heart rate 117) awaiting cultures and id evalaution

## 2018-06-23 NOTE — H&P ADULT - NSHPPHYSICALEXAM_GEN_ALL_CORE
GENERAL: No apparent distress laying in bed  HEENT: NC/AT  CARDIOVASCULAR: RRR, normal S1 S2 appreciated  PULMONARY: Lungs CTA B/L  GASTROINTESTINAL: abdomen soft, NT  EXTREMITIES: no edema in legs, Right arm AV fistula noted  NEUROLOGICAL: AAOx3, no focal deficits

## 2018-06-23 NOTE — H&P ADULT - HISTORY OF PRESENT ILLNESS
87 yo M with PMH of CAD s/p stents, TAVR, HTN, and ESRD on HD ( MWF ) presented for fever. He was asymptomatic during dialysis today, but had chills and fever later in the day at home. He was told by his Nephrologist, Dr. 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  Of note, patient has two recent hospitalizations in 2018 (4/4-4/6, 5/2-5/7) for fever 2/2 UTI with Ucx growing VRE and ESBL E. coli, treated w/ IV Meropenem. Patient denies HA, dizziness, chest pain, SOB, abdominal pain, or dysuria. Vitals in ED were only notable for low grade temp. In the ED, pt received Meropenem, Vancomycin, and Gentamycin.

## 2018-06-23 NOTE — PATIENT PROFILE ADULT. - ABILITY TO HEAR (WITH HEARING AID OR HEARING APPLIANCE IF NORMALLY USED):
PT SAID HE HAS HEARING AID AT HOME BUT HE DOESN'T NEED IT/Mildly to Moderately Impaired: difficulty hearing in some environments or speaker may need to increase volume or speak distinctly

## 2018-06-24 LAB
ANION GAP SERPL CALC-SCNC: 20 MMOL/L — HIGH (ref 7–14)
BASOPHILS # BLD AUTO: 0.03 K/UL — SIGNIFICANT CHANGE UP (ref 0–0.2)
BASOPHILS NFR BLD AUTO: 0.4 % — SIGNIFICANT CHANGE UP (ref 0–1)
BUN SERPL-MCNC: 67 MG/DL — CRITICAL HIGH (ref 10–20)
CALCIUM SERPL-MCNC: 8.5 MG/DL — SIGNIFICANT CHANGE UP (ref 8.5–10.1)
CHLORIDE SERPL-SCNC: 95 MMOL/L — LOW (ref 98–110)
CO2 SERPL-SCNC: 24 MMOL/L — SIGNIFICANT CHANGE UP (ref 17–32)
CREAT SERPL-MCNC: 4.9 MG/DL — CRITICAL HIGH (ref 0.7–1.5)
EOSINOPHIL # BLD AUTO: 0.78 K/UL — HIGH (ref 0–0.7)
EOSINOPHIL NFR BLD AUTO: 9.9 % — HIGH (ref 0–8)
GLUCOSE SERPL-MCNC: 75 MG/DL — SIGNIFICANT CHANGE UP (ref 70–99)
HCT VFR BLD CALC: 32.7 % — LOW (ref 42–52)
HGB BLD-MCNC: 10.6 G/DL — LOW (ref 14–18)
IMM GRANULOCYTES NFR BLD AUTO: 0.3 % — SIGNIFICANT CHANGE UP (ref 0.1–0.3)
LYMPHOCYTES # BLD AUTO: 1.47 K/UL — SIGNIFICANT CHANGE UP (ref 1.2–3.4)
LYMPHOCYTES # BLD AUTO: 18.7 % — LOW (ref 20.5–51.1)
MAGNESIUM SERPL-MCNC: 2.3 MG/DL — SIGNIFICANT CHANGE UP (ref 1.8–2.4)
MCHC RBC-ENTMCNC: 29.1 PG — SIGNIFICANT CHANGE UP (ref 27–31)
MCHC RBC-ENTMCNC: 32.4 G/DL — SIGNIFICANT CHANGE UP (ref 32–37)
MCV RBC AUTO: 89.8 FL — SIGNIFICANT CHANGE UP (ref 80–94)
MONOCYTES # BLD AUTO: 0.67 K/UL — HIGH (ref 0.1–0.6)
MONOCYTES NFR BLD AUTO: 8.5 % — SIGNIFICANT CHANGE UP (ref 1.7–9.3)
NEUTROPHILS # BLD AUTO: 4.89 K/UL — SIGNIFICANT CHANGE UP (ref 1.4–6.5)
NEUTROPHILS NFR BLD AUTO: 62.2 % — SIGNIFICANT CHANGE UP (ref 42.2–75.2)
NRBC # BLD: 0 /100 WBCS — SIGNIFICANT CHANGE UP (ref 0–0)
PLATELET # BLD AUTO: 150 K/UL — SIGNIFICANT CHANGE UP (ref 130–400)
POTASSIUM SERPL-MCNC: 4 MMOL/L — SIGNIFICANT CHANGE UP (ref 3.5–5)
POTASSIUM SERPL-SCNC: 4 MMOL/L — SIGNIFICANT CHANGE UP (ref 3.5–5)
RBC # BLD: 3.64 M/UL — LOW (ref 4.7–6.1)
RBC # FLD: 16.3 % — HIGH (ref 11.5–14.5)
SODIUM SERPL-SCNC: 139 MMOL/L — SIGNIFICANT CHANGE UP (ref 135–146)
WBC # BLD: 7.86 K/UL — SIGNIFICANT CHANGE UP (ref 4.8–10.8)
WBC # FLD AUTO: 7.86 K/UL — SIGNIFICANT CHANGE UP (ref 4.8–10.8)

## 2018-06-24 RX ADMIN — MEROPENEM 100 MILLIGRAM(S): 1 INJECTION INTRAVENOUS at 17:36

## 2018-06-24 RX ADMIN — MEROPENEM 100 MILLIGRAM(S): 1 INJECTION INTRAVENOUS at 05:49

## 2018-06-24 RX ADMIN — Medication 81 MILLIGRAM(S): at 11:50

## 2018-06-24 RX ADMIN — Medication 1 TABLET(S): at 11:50

## 2018-06-24 RX ADMIN — Medication 3 MILLIGRAM(S): at 21:10

## 2018-06-24 RX ADMIN — SIMVASTATIN 20 MILLIGRAM(S): 20 TABLET, FILM COATED ORAL at 21:10

## 2018-06-24 RX ADMIN — TAMSULOSIN HYDROCHLORIDE 0.4 MILLIGRAM(S): 0.4 CAPSULE ORAL at 21:10

## 2018-06-24 RX ADMIN — PANTOPRAZOLE SODIUM 40 MILLIGRAM(S): 20 TABLET, DELAYED RELEASE ORAL at 07:10

## 2018-06-24 RX ADMIN — SEVELAMER CARBONATE 800 MILLIGRAM(S): 2400 POWDER, FOR SUSPENSION ORAL at 05:48

## 2018-06-24 RX ADMIN — SEVELAMER CARBONATE 800 MILLIGRAM(S): 2400 POWDER, FOR SUSPENSION ORAL at 21:10

## 2018-06-24 RX ADMIN — LOSARTAN POTASSIUM 25 MILLIGRAM(S): 100 TABLET, FILM COATED ORAL at 21:10

## 2018-06-24 RX ADMIN — AMLODIPINE BESYLATE 5 MILLIGRAM(S): 2.5 TABLET ORAL at 05:48

## 2018-06-24 RX ADMIN — Medication 25 MILLIGRAM(S): at 17:36

## 2018-06-24 RX ADMIN — HEPARIN SODIUM 5000 UNIT(S): 5000 INJECTION INTRAVENOUS; SUBCUTANEOUS at 21:10

## 2018-06-24 RX ADMIN — SEVELAMER CARBONATE 800 MILLIGRAM(S): 2400 POWDER, FOR SUSPENSION ORAL at 13:16

## 2018-06-24 RX ADMIN — HEPARIN SODIUM 5000 UNIT(S): 5000 INJECTION INTRAVENOUS; SUBCUTANEOUS at 13:16

## 2018-06-24 RX ADMIN — Medication 25 MILLIGRAM(S): at 05:48

## 2018-06-24 RX ADMIN — HEPARIN SODIUM 5000 UNIT(S): 5000 INJECTION INTRAVENOUS; SUBCUTANEOUS at 05:47

## 2018-06-24 NOTE — PROGRESS NOTE ADULT - SUBJECTIVE AND OBJECTIVE BOX
SUBJECTIVE:    Patient is a 88y old Male who presents with a chief complaint of Fever (2018 02:55)    Currently admitted to medicine with the primary diagnosis of Febrile illness, acute    87 YO M, with PMH as below, presented after having fevers/chills a few hours after HD on Friday. Denies any other symptoms including cough, chest pain, headache, abdominal pain, N/V/D, myalgias, dysuria. Does have a Hx of recurrent ESBL UTIs, responsive to meropenem in the past. Has been afebrile since admission.     Today is hospital day 2d. This morning he is resting comfortably in his chair, watching TV, has no complaints.     PAST MEDICAL & SURGICAL HISTORY  ESRD (end stage renal disease) on dialysis  Urinary retention  Colon perforation:   TIA (transient ischemic attack):   Gout  Chronic renal failure: on HD  Aortic stenosis, severe  Myocardial infarction  Hypertension  Hyperlipidemia  Congestive heart failure  History of cholecystectomy  Bilateral cataracts  History of hip replacement, total, left  History of colon resection: secondary to colon perforation  AV fistula: right upper arm  Gallbladder disorder: stent removal 10/2017  S/P TAVR (transcatheter aortic valve replacement)    SOCIAL HISTORY:  Negative for smoking/alcohol/drug use.     ALLERGIES:  Biaxin (Unknown)  Ceftin (Unknown)  Plavix (Unknown)  Vitamin D (Unknown)  Vitamin D3 (Unknown)    MEDICATIONS:  STANDING MEDICATIONS  amLODIPine   Tablet 5 milliGRAM(s) Oral daily  aspirin enteric coated 81 milliGRAM(s) Oral daily  heparin  Injectable 5000 Unit(s) SubCutaneous every 8 hours  losartan 25 milliGRAM(s) Oral at bedtime  melatonin 3 milliGRAM(s) Oral at bedtime  meropenem  IVPB 500 milliGRAM(s) IV Intermittent every 12 hours  metoprolol tartrate 25 milliGRAM(s) Oral two times a day  multivitamin 1 Tablet(s) Oral daily  pantoprazole    Tablet 40 milliGRAM(s) Oral before breakfast  sevelamer hydrochloride 800 milliGRAM(s) Oral three times a day  simvastatin 20 milliGRAM(s) Oral at bedtime  tamsulosin Oral Tab/Cap - Peds 0.4 milliGRAM(s) Oral at bedtime    PRN MEDICATIONS  acetaminophen   Tablet 650 milliGRAM(s) Oral every 6 hours PRN    VITALS:   T(F): 97  HR: 62  BP: 135/91  RR: 20  SpO2: --    LABS:                        10.6   7.86  )-----------( 150      ( 2018 05:38 )             32.7     -    141  |  96<L>  |  42<H>  ----------------------------<  107<H>  4.1   |  27  |  3.1<H>    Ca    8.6      2018 23:01  Mg     2.3         TPro  6.4  /  Alb  3.9  /  TBili  1.3<H>  /  DBili  x   /  AST  27  /  ALT  13  /  AlkPhos  63        Urinalysis Basic - ( 2018 02:31 )    Color: Dark Yellow / Appearance: Clear / S.020 / pH: x  Gluc: x / Ketone: Negative  / Bili: Small / Urobili: 1.0 mg/dL   Blood: x / Protein: 100 mg/dL / Nitrite: Negative   Leuk Esterase: Trace / RBC: 1-2 /HPF / WBC 3-5 /HPF   Sq Epi: x / Non Sq Epi: x / Bacteria: x      PHYSICAL EXAM:  GEN: Elderly M, sitting in chair, appears comfortable. In No acute distress  LUNGS: Clear to auscultation bilaterally   HEART: S1/S2 present. RRR.   ABD: Soft, non-tender, non-distended. Bowel sounds present  EXT: NC/NC/NE/2+PP/WADE  NEURO: AAOX3 SUBJECTIVE:    Patient is a 88y old Male who presents with a chief complaint of Fever (2018 02:55)    Currently admitted to medicine with the primary diagnosis of Febrile illness, acute    87 YO M, with PMH as below, presented after having fevers/chills a few hours after HD on Friday. Denies any other symptoms including cough, chest pain, headache, abdominal pain, N/V/D, myalgias, dysuria. Does have a Hx of recurrent ESBL UTIs, responsive to meropenem in the past. Has been afebrile since admission.     Today is hospital day 2d. This morning he is resting comfortably in his chair, watching TV, has no complaints.     PAST MEDICAL & SURGICAL HISTORY  ESRD (end stage renal disease) on dialysis  Urinary retention  Colon perforation:   TIA (transient ischemic attack):   Gout  Chronic renal failure: on HD  Aortic stenosis, severe  Myocardial infarction  Hypertension  Hyperlipidemia  Congestive heart failure  History of cholecystectomy  Bilateral cataracts  History of hip replacement, total, left  History of colon resection: secondary to colon perforation  AV fistula: right upper arm  Gallbladder disorder: stent removal 10/2017  S/P TAVR (transcatheter aortic valve replacement)    SOCIAL HISTORY:  Negative for smoking/alcohol/drug use.     ALLERGIES:  Biaxin (Unknown)  Ceftin (Unknown)  Plavix (Unknown)  Vitamin D (Unknown)  Vitamin D3 (Unknown)    MEDICATIONS:  STANDING MEDICATIONS  amLODIPine   Tablet 5 milliGRAM(s) Oral daily  aspirin enteric coated 81 milliGRAM(s) Oral daily  heparin  Injectable 5000 Unit(s) SubCutaneous every 8 hours  losartan 25 milliGRAM(s) Oral at bedtime  melatonin 3 milliGRAM(s) Oral at bedtime  meropenem  IVPB 500 milliGRAM(s) IV Intermittent every 12 hours  metoprolol tartrate 25 milliGRAM(s) Oral two times a day  multivitamin 1 Tablet(s) Oral daily  pantoprazole    Tablet 40 milliGRAM(s) Oral before breakfast  sevelamer hydrochloride 800 milliGRAM(s) Oral three times a day  simvastatin 20 milliGRAM(s) Oral at bedtime  tamsulosin Oral Tab/Cap - Peds 0.4 milliGRAM(s) Oral at bedtime    PRN MEDICATIONS  acetaminophen   Tablet 650 milliGRAM(s) Oral every 6 hours PRN    VITALS:   T(F): 97  HR: 62  BP: 135/91  RR: 20  SpO2: --    LABS:                        10.6   7.86  )-----------( 150      ( 2018 05:38 )             32.7     -    141  |  96<L>  |  42<H>  ----------------------------<  107<H>  4.1   |  27  |  3.1<H>    Ca    8.6      2018 23:01  Mg     2.3         TPro  6.4  /  Alb  3.9  /  TBili  1.3<H>  /  DBili  x   /  AST  27  /  ALT  13  /  AlkPhos  63        Urinalysis Basic - ( 2018 02:31 )    Color: Dark Yellow / Appearance: Clear / S.020 / pH: x  Gluc: x / Ketone: Negative  / Bili: Small / Urobili: 1.0 mg/dL   Blood: x / Protein: 100 mg/dL / Nitrite: Negative   Leuk Esterase: Trace / RBC: 1-2 /HPF / WBC 3-5 /HPF   Sq Epi: x / Non Sq Epi: x / Bacteria: x      PHYSICAL EXAM:  GEN: Elderly M, sitting in chair, appears comfortable. In No acute distress  PULM: Clear to auscultation bilaterally   CV: S1/S2 present. RRR.   ABD: Soft, non-tender, non-distended. Bowel sounds present  EXT: NC/NC/NE/2+PP/WADE  NEURO: AAOX3

## 2018-06-24 NOTE — PROGRESS NOTE ADULT - ATTENDING COMMENTS
patient seen and examined , agree with pgy 3 assesment and plan except as indicated above,   dw patient and wife at length awaiting culture results and id , will cw prophylactically with meropenum for now and deescalate/escalate as needed

## 2018-06-25 ENCOUNTER — TRANSCRIPTION ENCOUNTER (OUTPATIENT)
Age: 83
End: 2018-06-25

## 2018-06-25 VITALS — DIASTOLIC BLOOD PRESSURE: 47 MMHG | RESPIRATION RATE: 16 BRPM | HEART RATE: 63 BPM | SYSTOLIC BLOOD PRESSURE: 139 MMHG

## 2018-06-25 LAB
ANION GAP SERPL CALC-SCNC: 19 MMOL/L — HIGH (ref 7–14)
BASOPHILS # BLD AUTO: 0.03 K/UL — SIGNIFICANT CHANGE UP (ref 0–0.2)
BASOPHILS NFR BLD AUTO: 0.4 % — SIGNIFICANT CHANGE UP (ref 0–1)
BUN SERPL-MCNC: 80 MG/DL — CRITICAL HIGH (ref 10–20)
CALCIUM SERPL-MCNC: 8.3 MG/DL — LOW (ref 8.5–10.1)
CHLORIDE SERPL-SCNC: 96 MMOL/L — LOW (ref 98–110)
CO2 SERPL-SCNC: 22 MMOL/L — SIGNIFICANT CHANGE UP (ref 17–32)
CREAT SERPL-MCNC: 5.8 MG/DL — CRITICAL HIGH (ref 0.7–1.5)
EOSINOPHIL # BLD AUTO: 0.83 K/UL — HIGH (ref 0–0.7)
EOSINOPHIL NFR BLD AUTO: 11.6 % — HIGH (ref 0–8)
GLUCOSE SERPL-MCNC: 83 MG/DL — SIGNIFICANT CHANGE UP (ref 70–99)
HCT VFR BLD CALC: 30.5 % — LOW (ref 42–52)
HGB BLD-MCNC: 10.1 G/DL — LOW (ref 14–18)
IMM GRANULOCYTES NFR BLD AUTO: 0.3 % — SIGNIFICANT CHANGE UP (ref 0.1–0.3)
LYMPHOCYTES # BLD AUTO: 1.25 K/UL — SIGNIFICANT CHANGE UP (ref 1.2–3.4)
LYMPHOCYTES # BLD AUTO: 17.4 % — LOW (ref 20.5–51.1)
MAGNESIUM SERPL-MCNC: 2.3 MG/DL — SIGNIFICANT CHANGE UP (ref 1.8–2.4)
MCHC RBC-ENTMCNC: 29.7 PG — SIGNIFICANT CHANGE UP (ref 27–31)
MCHC RBC-ENTMCNC: 33.1 G/DL — SIGNIFICANT CHANGE UP (ref 32–37)
MCV RBC AUTO: 89.7 FL — SIGNIFICANT CHANGE UP (ref 80–94)
MONOCYTES # BLD AUTO: 0.88 K/UL — HIGH (ref 0.1–0.6)
MONOCYTES NFR BLD AUTO: 12.3 % — HIGH (ref 1.7–9.3)
NEUTROPHILS # BLD AUTO: 4.17 K/UL — SIGNIFICANT CHANGE UP (ref 1.4–6.5)
NEUTROPHILS NFR BLD AUTO: 58 % — SIGNIFICANT CHANGE UP (ref 42.2–75.2)
NRBC # BLD: 0 /100 WBCS — SIGNIFICANT CHANGE UP (ref 0–0)
PHOSPHATE SERPL-MCNC: 5.7 MG/DL — HIGH (ref 2.1–4.9)
PLATELET # BLD AUTO: 146 K/UL — SIGNIFICANT CHANGE UP (ref 130–400)
POTASSIUM SERPL-MCNC: 4.5 MMOL/L — SIGNIFICANT CHANGE UP (ref 3.5–5)
POTASSIUM SERPL-SCNC: 4.5 MMOL/L — SIGNIFICANT CHANGE UP (ref 3.5–5)
RBC # BLD: 3.4 M/UL — LOW (ref 4.7–6.1)
RBC # FLD: 16 % — HIGH (ref 11.5–14.5)
SODIUM SERPL-SCNC: 137 MMOL/L — SIGNIFICANT CHANGE UP (ref 135–146)
WBC # BLD: 7.18 K/UL — SIGNIFICANT CHANGE UP (ref 4.8–10.8)
WBC # FLD AUTO: 7.18 K/UL — SIGNIFICANT CHANGE UP (ref 4.8–10.8)

## 2018-06-25 RX ORDER — LINEZOLID 600 MG/300ML
600 INJECTION, SOLUTION INTRAVENOUS EVERY 12 HOURS
Qty: 0 | Refills: 0 | Status: DISCONTINUED | OUTPATIENT
Start: 2018-06-25 | End: 2018-06-25

## 2018-06-25 RX ORDER — ACETAMINOPHEN 500 MG
2 TABLET ORAL
Qty: 0 | Refills: 0 | COMMUNITY
Start: 2018-06-25

## 2018-06-25 RX ORDER — LINEZOLID 600 MG/300ML
1 INJECTION, SOLUTION INTRAVENOUS
Qty: 16 | Refills: 0 | OUTPATIENT
Start: 2018-06-25 | End: 2018-07-02

## 2018-06-25 RX ADMIN — PANTOPRAZOLE SODIUM 40 MILLIGRAM(S): 20 TABLET, DELAYED RELEASE ORAL at 07:48

## 2018-06-25 RX ADMIN — Medication 25 MILLIGRAM(S): at 05:05

## 2018-06-25 RX ADMIN — Medication 81 MILLIGRAM(S): at 11:16

## 2018-06-25 RX ADMIN — Medication 1 TABLET(S): at 11:16

## 2018-06-25 RX ADMIN — SEVELAMER CARBONATE 800 MILLIGRAM(S): 2400 POWDER, FOR SUSPENSION ORAL at 05:05

## 2018-06-25 RX ADMIN — AMLODIPINE BESYLATE 5 MILLIGRAM(S): 2.5 TABLET ORAL at 05:05

## 2018-06-25 RX ADMIN — MEROPENEM 100 MILLIGRAM(S): 1 INJECTION INTRAVENOUS at 05:05

## 2018-06-25 RX ADMIN — HEPARIN SODIUM 5000 UNIT(S): 5000 INJECTION INTRAVENOUS; SUBCUTANEOUS at 05:05

## 2018-06-25 NOTE — CONSULT NOTE ADULT - SUBJECTIVE AND OBJECTIVE BOX
NEPHROLOGY CONSULTATION NOTE    87 yo M with PMH of CAD s/p stents, TAVR, HTN, and ESRD on HD ( MWF ) presented for fever. He was asymptomatic during dialysis today, but had chills and fever later in the day at home. He was told by his Nephrologist, Dr. 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  Of note, patient has two recent hospitalizations in 2018 (-, -) for fever 2/2 UTI with Ucx growing VRE and ESBL E. coli, treated w/ IV Meropenem. Patient denies HA, dizziness, chest pain, SOB, abdominal pain, or dysuria. Vitals in ED were only notable for low grade temp. In the ED, pt received Meropenem, Vancomycin, and Gentamycin.      PAST MEDICAL & SURGICAL HISTORY:  ESRD (end stage renal disease) on dialysis  Urinary retention  Colon perforation:   TIA (transient ischemic attack):   Gout  Chronic renal failure: on HD  Aortic stenosis, severe  Myocardial infarction  Hypertension  Hyperlipidemia  Congestive heart failure  History of cholecystectomy  Bilateral cataracts  History of hip replacement, total, left  History of colon resection: secondary to colon perforation  AV fistula: right upper arm  Gallbladder disorder: stent removal 10/2017  S/P TAVR (transcatheter aortic valve replacement)    Allergies:  Biaxin (Unknown)  Ceftin (Unknown)  linezolid (Other)  Plavix (Unknown)  Vitamin D (Unknown)  Vitamin D3 (Unknown)    Home Medications Reviewed  Hospital Medications:   MEDICATIONS  (STANDING):  amLODIPine   Tablet 5 milliGRAM(s) Oral daily  aspirin enteric coated 81 milliGRAM(s) Oral daily  heparin  Injectable 5000 Unit(s) SubCutaneous every 8 hours  linezolid    Tablet 600 milliGRAM(s) Oral every 12 hours  losartan 25 milliGRAM(s) Oral at bedtime  melatonin 3 milliGRAM(s) Oral at bedtime  metoprolol tartrate 25 milliGRAM(s) Oral two times a day  multivitamin 1 Tablet(s) Oral daily  pantoprazole    Tablet 40 milliGRAM(s) Oral before breakfast  sevelamer hydrochloride 800 milliGRAM(s) Oral three times a day  simvastatin 20 milliGRAM(s) Oral at bedtime  tamsulosin Oral Tab/Cap - Peds 0.4 milliGRAM(s) Oral at bedtime      SOCIAL HISTORY:  Denies ETOH,Smoking,   FAMILY HISTORY:  Family history of emphysema (Father)  Family history of stroke (Mother)        REVIEW OF SYSTEMS:  as per [facility]    VITALS:  T(F): 96.9 (18 @ 05:20), Max: 97.4 (18 @ 20:45)  HR: 57 (18 @ 12:30)  BP: 130/59 (18 @ 12:30)  RR: 17 (18 @ 12:30)  SpO2: 98% (18 @ 19:36)     @ 07:01  -   @ 07:00  --------------------------------------------------------  IN: 500 mL / OUT: 550 mL / NET: -50 mL        Weight (kg): 69.3 ( @ 05:20)      I&O's Detail    2018 07:01  -  2018 07:00  --------------------------------------------------------  IN:    IV PiggyBack: 50 mL    Oral Fluid: 450 mL  Total IN: 500 mL    OUT:    Voided: 550 mL  Total OUT: 550 mL    Total NET: -50 mL            PHYSICAL EXAM:  alert oriented not in any distress  chest minimal basal crackles  cvs s1 and s2 no added sound  abdomen soft lax no organomgelay  no pedal edema   LV fistula  LABS:      137  |  96<L>  |  80<HH>  ----------------------------<  83  4.5   |  22  |  5.8<HH>    Ca    8.3<L>      2018 06:31  Phos  5.7       Mg     2.3           Creatinine Trend: 5.8 <--, 4.9 <--, 3.1 <--                        10.1   7.18  )-----------( 146      ( 2018 06:31 )             30.5     Urine Studies:  Urinalysis Basic - ( 2018 02:31 )    Color: Dark Yellow / Appearance: Clear / S.020 / pH:   Gluc:  / Ketone: Negative  / Bili: Small / Urobili: 1.0 mg/dL   Blood:  / Protein: 100 mg/dL / Nitrite: Negative   Leuk Esterase: Trace / RBC: 1-2 /HPF / WBC 3-5 /HPF   Sq Epi:  / Non Sq Epi:  / Bacteria:             radiology:   Xray Chest 1 View-PORTABLE IMMEDIATE (18 @ 01:01)   Impression:      Bilateral opacities and small pleural effusions.     US Kidney and Bladder (18 @ 09:23)   Impression:    1.  Bilateral renal echogenicity compatible with renal disease. No   hydronephrosis. Bilateral renal cysts. Left renal 3.7 cm complex cyst   with solid and cystic components.    2.  Nondiagnostic examination of the urinary bladder.    3.  Nonvisualization of the prostate.    4.  Left pleural effusion.
RICKALLEN  88y, Male  Allergy: Biaxin (Unknown)  Ceftin (Unknown)  linezolid (Other)  Plavix (Unknown)  Vitamin D (Unknown)  Vitamin D3 (Unknown)      HPI:  87 yo M with PMH of CAD s/p stents, TAVR, HTN, and ESRD on HD ( MWF ) presented for fever. He was asymptomatic during dialysis today, but had chills and fever later in the day at home. He was told by his Nephrologist, Dr. 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  Of note, patient has two recent hospitalizations in 2018 (4/4-4/6, 5/2-5/7) for fever 2/2 UTI with Ucx growing VRE and ESBL E. coli, treated w/ IV Meropenem. Patient denies HA, dizziness, chest pain, SOB, abdominal pain, or dysuria. Vitals in ED were only notable for low grade temp. In the ED, pt received Meropenem, Vancomycin, and Gentamycin. (23 Jun 2018 02:55)    FAMILY HISTORY:  Family history of emphysema (Father)  Family history of stroke (Mother)    PAST MEDICAL & SURGICAL HISTORY:  ESRD (end stage renal disease) on dialysis  Urinary retention  Colon perforation: 2011  TIA (transient ischemic attack): 2008  Gout  Chronic renal failure: on HD  Aortic stenosis, severe  Myocardial infarction  Hypertension  Hyperlipidemia  Congestive heart failure  History of cholecystectomy  Bilateral cataracts  History of hip replacement, total, left  History of colon resection: secondary to colon perforation  AV fistula: right upper arm  Gallbladder disorder: stent removal 10/2017  S/P TAVR (transcatheter aortic valve replacement)        VITALS:  T(F): 96.9, Max: 97.4 (06-24-18 @ 20:45)  HR: 62  BP: 147/65  RR: 18Vital Signs Last 24 Hrs  T(C): 36.1 (25 Jun 2018 05:20), Max: 36.3 (24 Jun 2018 20:45)  T(F): 96.9 (25 Jun 2018 05:20), Max: 97.4 (24 Jun 2018 20:45)  HR: 62 (25 Jun 2018 05:20) (62 - 66)  BP: 147/65 (25 Jun 2018 05:20) (130/58 - 147/65)  BP(mean): --  RR: 18 (25 Jun 2018 05:20) (18 - 20)  SpO2: 98% (24 Jun 2018 19:36) (98% - 98%)    TESTS & MEASUREMENTS:                        10.6   7.86  )-----------( 150      ( 24 Jun 2018 05:38 )             32.7     06-24    139  |  95<L>  |  67<HH>  ----------------------------<  75  4.0   |  24  |  4.9<HH>    Ca    8.5      24 Jun 2018 05:38  Mg     2.3     06-24          Culture - Urine (collected 06-23-18 @ 02:31)  Source: .Urine Clean Catch (Midstream)  Preliminary Report (06-24-18 @ 14:40):    50,000 - 99,000 CFU/mL Enterococcus faecium    Culture - Blood (collected 06-22-18 @ 23:01)  Source: .Blood Blood  Preliminary Report (06-24-18 @ 15:01):    No growth to date.    Culture - Blood (collected 06-22-18 @ 23:01)  Source: .Blood Blood  Preliminary Report (06-24-18 @ 15:01):    No growth to date.            RADIOLOGY & ADDITIONAL TESTS:    ANTIBIOTICS:  meropenem  IVPB 500 milliGRAM(s) IV Intermittent every 12 hours

## 2018-06-25 NOTE — CONSULT NOTE ADULT - ATTENDING COMMENTS
Agree with above A/P.    Pt with ESRD on HD MWF, TAVR, admitted with recurrent transient fevers, unclear source.    Pt had an uneventful dialysis today.    Will be d/c'ed on Zyvoxx for 8 days.

## 2018-06-25 NOTE — CONSULT NOTE ADULT - ASSESSMENT
87 yo M with PMH of CAD s/p stents, TAVR, HTN, and ESRD on HD ( MWF ) presented for fever. He was asymptomatic during dialysis today, but had chills and fever later in the day at home. He was told by his Nephrologist, Dr. 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    IMPRESSION:  Transient fevers with no obvious focus.   Clinically no evidence of pyelonephritis.  No pyuria and urine colonized with E fecium.  Blood cultures are negative.   No endocarditis.    RECOMMENDATIONS:  Zyvox 600 mg po q12h for 8  days.  D/C other antibiotics.
# ESRD on hemodialysis     for hd today 2 L uf with 2 k bath     no heparin     AV fistula      # blood pressure better controlled  # PO 4 noted on sevelamer    # hb acceptable no need for aranesp  # FEVER etiology ?      ID on board     on linezolid

## 2018-06-25 NOTE — DISCHARGE NOTE ADULT - PLAN OF CARE
Resolve fever, medication Please continue to take the antibiotics as prescribed for 8 days. Please obtain a CBC in 7 days to check for anemia as a reaction to Linezolid. Please follow up with your Primary Care Doctor within 5-7 days. Dialysis Please continue dialysis as per your schedule, and follow up with your Renal doctor.

## 2018-06-25 NOTE — DISCHARGE NOTE ADULT - CARE PROVIDER_API CALL
Adriana Abdul), Family Medicine  [address] [place] [address]  Phone: [phone]  Fax: [phone]    Yahir Garland), Internal Medicine; Nephrology  [address] [place] [address]  Phone: [phone]  Fax: [phone]

## 2018-06-25 NOTE — DISCHARGE NOTE ADULT - CARE PLAN
Principal Discharge DX:	Febrile illness, acute  Goal:	Resolve fever, medication  Assessment and plan of treatment:	Please continue to take the antibiotics as prescribed for 8 days. Please obtain a CBC in 7 days to check for anemia as a reaction to Linezolid. Please follow up with your Primary Care Doctor within 5-7 days.  Secondary Diagnosis:	ESRD (end stage renal disease) on dialysis  Goal:	Dialysis  Assessment and plan of treatment:	Please continue dialysis as per your schedule, and follow up with your Renal doctor.

## 2018-06-25 NOTE — DISCHARGE NOTE ADULT - HOSPITAL COURSE
87 YO M admitted for fever after dialysis session. Started on Meropenem as his ESBL UTI was sensitive to this in the past. Has been afebrile since admission, no other complaints. Was evaluated by ID Dr Galan who recommended Linezolid 600mg PO q12 for 8 days. As the patient has had anemia secondary to Linezolid requiring transfusion in the past, it was recommended that the patient obtain a CBC as outpatient to follow up his blood counts. Patient otherwise stable for DC today.

## 2018-06-25 NOTE — DISCHARGE NOTE ADULT - MEDICATION SUMMARY - MEDICATIONS TO TAKE
I will START or STAY ON the medications listed below when I get home from the hospital:    aspirin 81 mg oral delayed release tablet  -- 1 tab(s) by mouth once a day  -- Indication: For Coronary artery disease    acetaminophen 325 mg oral tablet  -- 2 tab(s) by mouth every 6 hours, As needed, For Temp greater than 38 C (100.4 F)  -- Indication: For Febrile illness, acute    losartan 25 mg oral tablet  -- 1 tab(s) by mouth once a day  -- Indication: For Hypertension    tamsulosin 0.4 mg oral capsule  -- 1 cap(s) by mouth once a day (at bedtime)  -- Indication: For BPH    simvastatin 20 mg oral tablet  -- 1 tab(s) by mouth once a day (at bedtime)  -- Indication: For Cholesterol    metoprolol tartrate 25 mg oral tablet  -- 1 tab(s) by mouth 2 times a day  -- Indication: For Hypertension    amLODIPine 5 mg oral tablet  -- 1 tab(s) by mouth once a day   -- It is very important that you take or use this exactly as directed.  Do not skip doses or discontinue unless directed by your doctor.  Some non-prescription drugs may aggravate your condition.  Read all labels carefully.  If a warning appears, check with your doctor before taking.    -- Indication: For Hypertension    Colace  -- 100 milligram(s) by mouth once a day, As Needed  -- Indication: For Constipation    Melatonin 3 mg oral tablet  -- 1 tab(s) by mouth once (at bedtime)  -- Indication: For Insomnia    linezolid 600 mg oral tablet  -- 1 tab(s) by mouth every 12 hours  -- Indication: For Urinary tract infection    Renvela 800 mg oral tablet  -- 1 tab(s) by mouth 3 times a day (with meals)  -- Indication: For Chronic kidney disease    Protonix 40 mg oral delayed release tablet  -- 1 tab(s) by mouth once a day  -- Indication: For Heartburn    Dialyvite 800 oral tablet  -- 1 tab(s) by mouth once a day  -- Indication: For Chronic renal failure I will START or STAY ON the medications listed below when I get home from the hospital:    aspirin 81 mg oral delayed release tablet  -- 1 tab(s) by mouth once a day  -- Indication: For CAD    acetaminophen 325 mg oral tablet  -- 2 tab(s) by mouth every 6 hours, As needed, For Temp greater than 38 C (100.4 F)  -- Indication: For Fever    losartan 25 mg oral tablet  -- 1 tab(s) by mouth once a day  -- Indication: For HTN    tamsulosin 0.4 mg oral capsule  -- 1 cap(s) by mouth once a day (at bedtime)  -- Indication: For BPH    simvastatin 20 mg oral tablet  -- 1 tab(s) by mouth once a day (at bedtime)  -- Indication: For HLD    metoprolol tartrate 25 mg oral tablet  -- 1 tab(s) by mouth 2 times a day  -- Indication: For HTN    amLODIPine 5 mg oral tablet  -- 1 tab(s) by mouth once a day   -- It is very important that you take or use this exactly as directed.  Do not skip doses or discontinue unless directed by your doctor.  Some non-prescription drugs may aggravate your condition.  Read all labels carefully.  If a warning appears, check with your doctor before taking.    -- Indication: For HTN    Colace  -- 100 milligram(s) by mouth once a day, As Needed  -- Indication: For Constipation    Melatonin 3 mg oral tablet  -- 1 tab(s) by mouth once (at bedtime)  -- Indication: For Insomnia    linezolid 600 mg oral tablet  -- 1 tab(s) by mouth every 12 hours  -- Indication: For UTI    Renvela 800 mg oral tablet  -- 1 tab(s) by mouth 3 times a day (with meals)  -- Indication: For ESRD (end stage renal disease) on dialysis    Protonix 40 mg oral delayed release tablet  -- 1 tab(s) by mouth once a day  -- Indication: For Heartburn    Dialyvite 800 oral tablet  -- 1 tab(s) by mouth once a day  -- Indication: For ESRD (end stage renal disease) on dialysis

## 2018-06-25 NOTE — PROGRESS NOTE ADULT - ASSESSMENT
89 yo M with PMH of CAD s/p stents, TAVR, HTN, and ESRD on HD ( MWF ) presented for fever, no other signs of sepsis, afebrile since admission, and UA was unremarkable.    1) Fever/slight leukocytosis in a dialysis patient, r/o bacteremia:  - UA unremarkable, follow up blood and urine cultures: pending  - AV fistula site does not look infected, lungs clear, patient comfortable  - Continue Meropenem  - ID consult pending    #ESRD on HD:  - Cr stable  - Check Mg, Phos levels  - HD per renal, next session Monday    #CAD/TAVR/HTN:  - continue home medications: Norvasc, Losartan, Metoprolol, Simvastatin, ASA    #Miscellaneous:  - DVT ppx with heparin SubQ  - Renal diet  - Ambulate as tolerated, walks with a walker at baseline, ambulating about unit with no difficulties  - Dispo: to return home once ID clears pt.
89 yo M with PMH of CAD s/p stents, TAVR, HTN, and ESRD on HD ( MWF ) presented for fever, no other signs of sepsis, afebrile since admission, and UA was unremarkable.    1) Fever/slight leukocytosis in a dialysis patient, r/o bacteremia:  - UA unremarkable, follow up blood and urine cultures: + for   50,000 - 99,000 CFU/mL Enterococcus faecium as above, sensitivities pending.  - AV fistula site does not look infected, lungs clear, patient comfortable  - Per ID, may switch to Zyvox PO 600mg q12 for 8 days. However, patient has a history of anemia due to reaction to Zyvox in the past that required transfusion. Dr. Galan is of the opinion the reaction typically occurs only when the patient is on Zyvox >10 days, and that it would be safe for the patient to take it for 8 days, but the patient and his wife would like to discuss it and will decide whether they would agree to zyvox or prefer alternative options.    #ESRD on HD:  - Cr stable  - Check Mg, Phos levels  - HD per renal, scheduled for today    #CAD/TAVR/HTN: Well controlled  - continue home medications: Norvasc, Losartan, Metoprolol, Simvastatin, ASA    #Miscellaneous:  - DVT ppx with heparin SubQ  - Renal diet  - Ambulate as tolerated, walks with a walker at baseline, ambulating about unit with no difficulties  - Dispo: to return home once ABx regimen is clarified.

## 2018-06-25 NOTE — PROGRESS NOTE ADULT - SUBJECTIVE AND OBJECTIVE BOX
SUBJECTIVE:    Patient is a 88y old Male who presents with a chief complaint of Fever (23 Jun 2018 02:55)    Currently admitted to medicine with the primary diagnosis of Febrile illness, acute    89 YO M, with PMH as below, presented after having fevers/chills a few hours after HD on Friday. Denies any other symptoms including cough, chest pain, headache, abdominal pain, N/V/D, myalgias, dysuria. Does have a Hx of recurrent ESBL UTIs, responsive to meropenem in the past. Has been afebrile since admission.     Today is hospital day 3d. He is currently resting comfortably in bed and is in no apparent distress. He reports no complaints or overnight events.     PAST MEDICAL & SURGICAL HISTORY  ESRD (end stage renal disease) on dialysis  Urinary retention  Colon perforation: 2011  TIA (transient ischemic attack): 2008  Gout  Chronic renal failure: on HD  Aortic stenosis, severe  Myocardial infarction  Hypertension  Hyperlipidemia  Congestive heart failure  History of cholecystectomy  Bilateral cataracts  History of hip replacement, total, left  History of colon resection: secondary to colon perforation  AV fistula: right upper arm  Gallbladder disorder: stent removal 10/2017  S/P TAVR (transcatheter aortic valve replacement)    SOCIAL HISTORY:  Negative for smoking/alcohol/drug use.     ALLERGIES:  Biaxin (Unknown)  Ceftin (Unknown)  Plavix (Unknown)  Vitamin D (Unknown)  Vitamin D3 (Unknown)    MEDICATIONS:  STANDING MEDICATIONS  amLODIPine   Tablet 5 milliGRAM(s) Oral daily  aspirin enteric coated 81 milliGRAM(s) Oral daily  heparin  Injectable 5000 Unit(s) SubCutaneous every 8 hours  linezolid    Tablet 600 milliGRAM(s) Oral every 12 hours  losartan 25 milliGRAM(s) Oral at bedtime  melatonin 3 milliGRAM(s) Oral at bedtime  metoprolol tartrate 25 milliGRAM(s) Oral two times a day  multivitamin 1 Tablet(s) Oral daily  pantoprazole    Tablet 40 milliGRAM(s) Oral before breakfast  sevelamer hydrochloride 800 milliGRAM(s) Oral three times a day  simvastatin 20 milliGRAM(s) Oral at bedtime  tamsulosin Oral Tab/Cap - Peds 0.4 milliGRAM(s) Oral at bedtime    PRN MEDICATIONS  acetaminophen   Tablet 650 milliGRAM(s) Oral every 6 hours PRN    VITALS:   T(F): 96.9  HR: 57  BP: 130/59  RR: 17  SpO2: 98%    LABS:                        10.1   7.18  )-----------( 146      ( 25 Jun 2018 06:31 )             30.5     06-25    137  |  96<L>  |  80<HH>  ----------------------------<  83  4.5   |  22  |  5.8<HH>    Ca    8.3<L>      25 Jun 2018 06:31  Phos  5.7     06-25  Mg     2.3     06-25        Culture - Urine (collected 23 Jun 2018 02:31)  Source: .Urine Clean Catch (Midstream)  Preliminary Report (24 Jun 2018 14:40):    50,000 - 99,000 CFU/mL Enterococcus faecium    Culture - Blood (collected 22 Jun 2018 23:01)  Source: .Blood Blood  Preliminary Report (24 Jun 2018 15:01):    No growth to date.    Culture - Blood (collected 22 Jun 2018 23:01)  Source: .Blood Blood  Preliminary Report (24 Jun 2018 15:01):    No growth to date.      RADIOLOGY:    PHYSICAL EXAM:  GEN: Elderly M, lying in bed, In No acute distress  LUNGS: Clear to auscultation bilaterally   HEART: S1/S2 present. RRR.   ABD: Soft, non-tender, non-distended. Bowel sounds present  EXT: NC/NC/NE/2+PP/WADE  NEURO: [id number]

## 2018-06-25 NOTE — DISCHARGE NOTE ADULT - PATIENT PORTAL LINK FT
You can access the [facility] Patient Portal, offered by [facility], by registering with the following website: [email or web address]

## 2018-06-27 LAB

## 2018-06-28 DIAGNOSIS — Z88.8 ALLERGY STATUS TO OTHER DRUGS, MEDICAMENTS AND BIOLOGICAL SUBSTANCES: ICD-10-CM

## 2018-06-28 DIAGNOSIS — Z95.5 PRESENCE OF CORONARY ANGIOPLASTY IMPLANT AND GRAFT: ICD-10-CM

## 2018-06-28 DIAGNOSIS — Z96.642 PRESENCE OF LEFT ARTIFICIAL HIP JOINT: ICD-10-CM

## 2018-06-28 DIAGNOSIS — R50.9 FEVER, UNSPECIFIED: ICD-10-CM

## 2018-06-28 DIAGNOSIS — I25.2 OLD MYOCARDIAL INFARCTION: ICD-10-CM

## 2018-06-28 DIAGNOSIS — D72.829 ELEVATED WHITE BLOOD CELL COUNT, UNSPECIFIED: ICD-10-CM

## 2018-06-28 DIAGNOSIS — Z95.2 PRESENCE OF PROSTHETIC HEART VALVE: ICD-10-CM

## 2018-06-28 DIAGNOSIS — I50.9 HEART FAILURE, UNSPECIFIED: ICD-10-CM

## 2018-06-28 DIAGNOSIS — E78.5 HYPERLIPIDEMIA, UNSPECIFIED: ICD-10-CM

## 2018-06-28 DIAGNOSIS — I13.2 HYPERTENSIVE HEART AND CHRONIC KIDNEY DISEASE WITH HEART FAILURE AND WITH STAGE 5 CHRONIC KIDNEY DISEASE, OR END STAGE RENAL DISEASE: ICD-10-CM

## 2018-06-28 DIAGNOSIS — Z99.2 DEPENDENCE ON RENAL DIALYSIS: ICD-10-CM

## 2018-06-28 DIAGNOSIS — M10.9 GOUT, UNSPECIFIED: ICD-10-CM

## 2018-06-28 DIAGNOSIS — R69 ILLNESS, UNSPECIFIED: ICD-10-CM

## 2018-06-28 DIAGNOSIS — Z86.73 PERSONAL HISTORY OF TRANSIENT ISCHEMIC ATTACK (TIA), AND CEREBRAL INFARCTION WITHOUT RESIDUAL DEFICITS: ICD-10-CM

## 2018-06-28 DIAGNOSIS — N18.6 END STAGE RENAL DISEASE: ICD-10-CM

## 2018-06-28 DIAGNOSIS — I25.10 ATHEROSCLEROTIC HEART DISEASE OF NATIVE CORONARY ARTERY WITHOUT ANGINA PECTORIS: ICD-10-CM

## 2018-10-04 ENCOUNTER — EMERGENCY (EMERGENCY)
Facility: HOSPITAL | Age: 83
LOS: 0 days | Discharge: HOME | End: 2018-10-04
Attending: EMERGENCY MEDICINE | Admitting: EMERGENCY MEDICINE

## 2018-10-04 VITALS
TEMPERATURE: 97 F | OXYGEN SATURATION: 98 % | RESPIRATION RATE: 18 BRPM | DIASTOLIC BLOOD PRESSURE: 78 MMHG | HEART RATE: 64 BPM | SYSTOLIC BLOOD PRESSURE: 141 MMHG

## 2018-10-04 VITALS
HEART RATE: 73 BPM | OXYGEN SATURATION: 99 % | DIASTOLIC BLOOD PRESSURE: 94 MMHG | TEMPERATURE: 97 F | RESPIRATION RATE: 18 BRPM | SYSTOLIC BLOOD PRESSURE: 173 MMHG

## 2018-10-04 DIAGNOSIS — I77.0 ARTERIOVENOUS FISTULA, ACQUIRED: Chronic | ICD-10-CM

## 2018-10-04 DIAGNOSIS — Z98.890 OTHER SPECIFIED POSTPROCEDURAL STATES: Chronic | ICD-10-CM

## 2018-10-04 DIAGNOSIS — R89.9 UNSPECIFIED ABNORMAL FINDING IN SPECIMENS FROM OTHER ORGANS, SYSTEMS AND TISSUES: ICD-10-CM

## 2018-10-04 DIAGNOSIS — H26.9 UNSPECIFIED CATARACT: Chronic | ICD-10-CM

## 2018-10-04 DIAGNOSIS — E78.5 HYPERLIPIDEMIA, UNSPECIFIED: ICD-10-CM

## 2018-10-04 DIAGNOSIS — Z96.642 PRESENCE OF LEFT ARTIFICIAL HIP JOINT: Chronic | ICD-10-CM

## 2018-10-04 DIAGNOSIS — Z90.49 ACQUIRED ABSENCE OF OTHER SPECIFIED PARTS OF DIGESTIVE TRACT: ICD-10-CM

## 2018-10-04 DIAGNOSIS — Z99.2 DEPENDENCE ON RENAL DIALYSIS: ICD-10-CM

## 2018-10-04 DIAGNOSIS — Z95.2 PRESENCE OF PROSTHETIC HEART VALVE: Chronic | ICD-10-CM

## 2018-10-04 DIAGNOSIS — N18.6 END STAGE RENAL DISEASE: ICD-10-CM

## 2018-10-04 DIAGNOSIS — Z98.42 CATARACT EXTRACTION STATUS, LEFT EYE: ICD-10-CM

## 2018-10-04 DIAGNOSIS — Z90.49 ACQUIRED ABSENCE OF OTHER SPECIFIED PARTS OF DIGESTIVE TRACT: Chronic | ICD-10-CM

## 2018-10-04 DIAGNOSIS — Z98.41 CATARACT EXTRACTION STATUS, RIGHT EYE: ICD-10-CM

## 2018-10-04 DIAGNOSIS — Z87.891 PERSONAL HISTORY OF NICOTINE DEPENDENCE: ICD-10-CM

## 2018-10-04 DIAGNOSIS — Z96.642 PRESENCE OF LEFT ARTIFICIAL HIP JOINT: ICD-10-CM

## 2018-10-04 DIAGNOSIS — R50.9 FEVER, UNSPECIFIED: ICD-10-CM

## 2018-10-04 DIAGNOSIS — I13.2 HYPERTENSIVE HEART AND CHRONIC KIDNEY DISEASE WITH HEART FAILURE AND WITH STAGE 5 CHRONIC KIDNEY DISEASE, OR END STAGE RENAL DISEASE: ICD-10-CM

## 2018-10-04 DIAGNOSIS — K82.9 DISEASE OF GALLBLADDER, UNSPECIFIED: Chronic | ICD-10-CM

## 2018-10-04 DIAGNOSIS — I50.9 HEART FAILURE, UNSPECIFIED: ICD-10-CM

## 2018-10-04 PROBLEM — R33.9 RETENTION OF URINE, UNSPECIFIED: Chronic | Status: ACTIVE | Noted: 2018-02-06

## 2018-10-04 LAB
ALBUMIN SERPL ELPH-MCNC: 4.4 G/DL — SIGNIFICANT CHANGE UP (ref 3.5–5.2)
ALP SERPL-CCNC: 71 U/L — SIGNIFICANT CHANGE UP (ref 30–115)
ALT FLD-CCNC: 17 U/L — SIGNIFICANT CHANGE UP (ref 0–41)
ANION GAP SERPL CALC-SCNC: 19 MMOL/L — HIGH (ref 7–14)
APPEARANCE UR: CLEAR — SIGNIFICANT CHANGE UP
AST SERPL-CCNC: 20 U/L — SIGNIFICANT CHANGE UP (ref 0–41)
BACTERIA # UR AUTO: ABNORMAL /HPF
BASE EXCESS BLDV CALC-SCNC: 7.5 MMOL/L — HIGH (ref -2–2)
BASOPHILS # BLD AUTO: 0.04 K/UL — SIGNIFICANT CHANGE UP (ref 0–0.2)
BASOPHILS NFR BLD AUTO: 0.6 % — SIGNIFICANT CHANGE UP (ref 0–1)
BILIRUB SERPL-MCNC: 1 MG/DL — SIGNIFICANT CHANGE UP (ref 0.2–1.2)
BILIRUB UR-MCNC: ABNORMAL
BUN SERPL-MCNC: 54 MG/DL — HIGH (ref 10–20)
CA-I SERPL-SCNC: 1.13 MMOL/L — SIGNIFICANT CHANGE UP (ref 1.12–1.3)
CALCIUM SERPL-MCNC: 9.2 MG/DL — SIGNIFICANT CHANGE UP (ref 8.5–10.1)
CHLORIDE SERPL-SCNC: 97 MMOL/L — LOW (ref 98–110)
CO2 SERPL-SCNC: 27 MMOL/L — SIGNIFICANT CHANGE UP (ref 17–32)
COLOR SPEC: YELLOW — SIGNIFICANT CHANGE UP
CREAT SERPL-MCNC: 4.3 MG/DL — CRITICAL HIGH (ref 0.7–1.5)
DIFF PNL FLD: NEGATIVE — SIGNIFICANT CHANGE UP
EOSINOPHIL # BLD AUTO: 0.19 K/UL — SIGNIFICANT CHANGE UP (ref 0–0.7)
EOSINOPHIL NFR BLD AUTO: 2.6 % — SIGNIFICANT CHANGE UP (ref 0–8)
EPI CELLS # UR: ABNORMAL /HPF
GAS PNL BLDV: 140 MMOL/L — SIGNIFICANT CHANGE UP (ref 136–145)
GAS PNL BLDV: SIGNIFICANT CHANGE UP
GLUCOSE SERPL-MCNC: 95 MG/DL — SIGNIFICANT CHANGE UP (ref 70–99)
GLUCOSE UR QL: NEGATIVE MG/DL — SIGNIFICANT CHANGE UP
HCO3 BLDV-SCNC: 34 MMOL/L — HIGH (ref 22–29)
HCT VFR BLD CALC: 38 % — LOW (ref 42–52)
HCT VFR BLDA CALC: 45.9 % — HIGH (ref 34–44)
HGB BLD CALC-MCNC: 15 G/DL — SIGNIFICANT CHANGE UP (ref 14–18)
HGB BLD-MCNC: 12.2 G/DL — LOW (ref 14–18)
IMM GRANULOCYTES NFR BLD AUTO: 0.4 % — HIGH (ref 0.1–0.3)
KETONES UR-MCNC: NEGATIVE — SIGNIFICANT CHANGE UP
LACTATE BLDV-MCNC: 0.9 MMOL/L — SIGNIFICANT CHANGE UP (ref 0.5–1.6)
LEUKOCYTE ESTERASE UR-ACNC: NEGATIVE — SIGNIFICANT CHANGE UP
LYMPHOCYTES # BLD AUTO: 1.27 K/UL — SIGNIFICANT CHANGE UP (ref 1.2–3.4)
LYMPHOCYTES # BLD AUTO: 17.6 % — LOW (ref 20.5–51.1)
MCHC RBC-ENTMCNC: 29.5 PG — SIGNIFICANT CHANGE UP (ref 27–31)
MCHC RBC-ENTMCNC: 32.1 G/DL — SIGNIFICANT CHANGE UP (ref 32–37)
MCV RBC AUTO: 92 FL — SIGNIFICANT CHANGE UP (ref 80–94)
MONOCYTES # BLD AUTO: 0.91 K/UL — HIGH (ref 0.1–0.6)
MONOCYTES NFR BLD AUTO: 12.6 % — HIGH (ref 1.7–9.3)
NEUTROPHILS # BLD AUTO: 4.78 K/UL — SIGNIFICANT CHANGE UP (ref 1.4–6.5)
NEUTROPHILS NFR BLD AUTO: 66.2 % — SIGNIFICANT CHANGE UP (ref 42.2–75.2)
NITRITE UR-MCNC: NEGATIVE — SIGNIFICANT CHANGE UP
NRBC # BLD: 0 /100 WBCS — SIGNIFICANT CHANGE UP (ref 0–0)
PCO2 BLDV: 55 MMHG — HIGH (ref 41–51)
PH BLDV: 7.4 — SIGNIFICANT CHANGE UP (ref 7.26–7.43)
PH UR: 6.5 — SIGNIFICANT CHANGE UP (ref 5–8)
PLATELET # BLD AUTO: 120 K/UL — LOW (ref 130–400)
PO2 BLDV: 19 MMHG — LOW (ref 20–40)
POTASSIUM BLDV-SCNC: 3.7 MMOL/L — SIGNIFICANT CHANGE UP (ref 3.3–5.6)
POTASSIUM SERPL-MCNC: 3.9 MMOL/L — SIGNIFICANT CHANGE UP (ref 3.5–5)
POTASSIUM SERPL-SCNC: 3.9 MMOL/L — SIGNIFICANT CHANGE UP (ref 3.5–5)
PROT SERPL-MCNC: 7.1 G/DL — SIGNIFICANT CHANGE UP (ref 6–8)
PROT UR-MCNC: 100 MG/DL
RBC # BLD: 4.13 M/UL — LOW (ref 4.7–6.1)
RBC # FLD: 13.2 % — SIGNIFICANT CHANGE UP (ref 11.5–14.5)
SAO2 % BLDV: 25 % — SIGNIFICANT CHANGE UP
SODIUM SERPL-SCNC: 143 MMOL/L — SIGNIFICANT CHANGE UP (ref 135–146)
SP GR SPEC: 1.02 — SIGNIFICANT CHANGE UP (ref 1.01–1.03)
UROBILINOGEN FLD QL: 0.2 MG/DL — SIGNIFICANT CHANGE UP (ref 0.2–0.2)
WBC # BLD: 7.22 K/UL — SIGNIFICANT CHANGE UP (ref 4.8–10.8)
WBC # FLD AUTO: 7.22 K/UL — SIGNIFICANT CHANGE UP (ref 4.8–10.8)
WBC UR QL: SIGNIFICANT CHANGE UP /HPF

## 2018-10-04 RX ORDER — DOCUSATE SODIUM 100 MG
100 CAPSULE ORAL
Qty: 0 | Refills: 0 | COMMUNITY

## 2018-10-04 RX ORDER — LANOLIN ALCOHOL/MO/W.PET/CERES
1 CREAM (GRAM) TOPICAL
Qty: 0 | Refills: 0 | COMMUNITY

## 2018-10-04 NOTE — ED PROVIDER NOTE - MEDICAL DECISION MAKING DETAILS
elderly male with intermittent fevers who presents to the ER to get cultures drawn. No fever or complaints in ER now. Results of cultures pending and endorsed to the PMD DR Marybel Velez

## 2018-10-04 NOTE — ED PROVIDER NOTE - PHYSICAL EXAMINATION
VITAL SIGNS: I have reviewed nursing notes and confirm.  CONSTITUTIONAL: Well-developed; well-nourished; in no acute distress.  SKIN: Skin exam is warm and dry, no acute rash. +bruising to bilateral arms  HEAD: Normocephalic; atraumatic.  EYES: PERRL, EOM intact; conjunctiva and sclera clear.  ENT: No nasal discharge; airway clear. TMs clear.  NECK: Supple; non tender.  CARD: S1, S2 normal; + murmurs to the LSB, no gallops, or rubs. Regular rate and rhythm.  RESP: No wheezes, rales or rhonchi.  ABD: Normal bowel sounds; soft; non-distended; non-tender; no hepatosplenomegaly.  EXT: Normal ROM. No clubbing, cyanosis or edema.  LYMPH: No acute cervical adenopathy.  NEURO: Alert, oriented. Grossly unremarkable. No focal deficits.  PSYCH: Cooperative, appropriate.

## 2018-10-04 NOTE — ED PROVIDER NOTE - PROGRESS NOTE DETAILS
pt given test results. No fever here, no antipyretics given. Cultures pending. Will endorse follow up to patients PMD dr Marybel Veelz (wife will also check in with PMD for results). Return to ER for any worsening of symptoms. spoke to PMD DR Marybel Velez. Went over the results with her. Endorsed cultures to her to follow as well.

## 2018-10-04 NOTE — ED PROVIDER NOTE - OBJECTIVE STATEMENT
87 yo male with PMH of ESRD (HD on M-W-F), CHF, high lipids, CAD/MI, aortic stenosis with porcine replacement of valve, gout, TIA, colon resection, cholecystectomy, cataract, av fistula who presents from Dr Marybel Velez's office for fever x 3 days (intermittent fevers). In June patient had +E Coli in Urine and was treated with IV Linezolid (which then was stopped because it caused abnormality with platelets). Dr Velez told patient she can not draw the cultures therefore sent patient to us to do that. Pt states he's been placed on oral Cipro by his doctor for suspected UTI, however patient offers no complaints at all. No CP/SOB/cough/abdomen pain/rash/back pain/neck pain/HA/dysuria or foul smelling urine.

## 2018-10-04 NOTE — ED ADULT NURSE NOTE - OBJECTIVE STATEMENT
As per wife, pt with intermittent low grade fever since Monday. Pt was started on Cipro on Tuesday. Pt saw PMD today and was sent in to r/o sepsis due to unknown source of fever. Pt with hx of ecoli sepsis in 5/18. Pt has no complaints at this time.

## 2018-10-06 LAB
CULTURE RESULTS: SIGNIFICANT CHANGE UP
SPECIMEN SOURCE: SIGNIFICANT CHANGE UP

## 2018-12-19 NOTE — H&P ADULT. - GENERAL COMMENTS
"Chief complaint:   Chief Complaint   Patient presents with   â¢ Neurologic Problem     Epilepsy       Vitals:  Visit Vitals  BP 95/60 (BP Location: RUE, Patient Position: Sitting, Cuff Size: Regular)   Pulse 56   Wt 51.6 kg   BMI 18.79 kg/mÂ²       HISTORY OF PRESENT ILLNESS     HPI: This is a 26-year-old female who presents for follow-up today regarding history of complex partial seizure. She is on Keppra XR 2000 mg daily. She has not had any seizures since her last visit with me 6 months ago. She is otherwise doing well. She presents with her mom today. Her mom states occasionally she's noticed the patient to be daydreaming but she is able to catch the patient's attention without any difficulty and thinks it simply just that, daydreaming and not ""petit mal\"" seizures. The patient continues to work. No other significant changes since her last visit with me. Other significant problems:  Patient Active Problem List    Diagnosis Date Noted   â¢ Vegan diet 12/06/2018     Priority: Low   â¢ Epilepsy without status epilepticus, not intractable (CMS/[facility]) 05/26/2016     Priority: Low   â¢ Autism 05/26/2016     Priority: Low       PAST MEDICAL, FAMILY AND SOCIAL HISTORY     Medications:  Current Outpatient Medications   Medication   â¢ levetiracetam (KEPPRA XR) 500 MG 24 hr tablet   â¢ Multiple Minerals-Vitamins (CALCIUM CITRATE PLUS/MAGNESIUM) Tab   â¢ Ibuprofen (IBU PO)   â¢ Multiple Vitamins-Minerals (MULTIVITAMIN PO)     No current facility-administered medications for this visit.         Allergies:  ALLERGIES:  No Known Allergies    Past Medical  History/Surgeries:  Past Medical History:   Diagnosis Date   â¢ Autism    â¢ Epilepsy (CMS/[facility]) age 12 onset    Dr. Brittany García, neurology       Past Surgical History:   Procedure Laterality Date   â¢ [place] tooth extraction  12/2015       Family History:  Family History   Problem Relation Age of Onset   â¢ NEGATIVE FAMILY HX OF Other         colon, ovarian cancer   â¢ Cancer, Breast " Other         great-grand parents       Social History:  Social History     Tobacco Use   â¢ Smoking status: Never Smoker   â¢ Smokeless tobacco: Never Used   Substance Use Topics   â¢ Alcohol use: No     Alcohol/week: 0.0 oz       REVIEW OF SYSTEMS     Review of Systems  Per HPI. New ROS Symptoms: Constitutional-negative. EYES-negative. ENT-negative. CV-negative LUNGS-Negative. ABD-Negative. -Negative. MSK-negative. PSYCH-Negative. SKIN-negative. HEME/ONC-Negative. Allerg/IMMUN-negative. PHYSICAL EXAM     Physical Exam  GENERAL: NAD. Â Â   Vital signs are recorded in the nursing note. Â   HEENT: Negative. MMM. Non icteric sclera. CARDIOVASCULAR: Â Heart rate was regular without abnormal sounds. LUNGS: CTA b/l. Psyche: Euthymic. Â   MENTAL STATUS: Speech is fluent. Comprehension baseline. CRANIAL NERVES: Â EOMI, PERRLA, face symmetric, voice clear. MOTOR EXAMINATION: 5/5 strength x 4 with normal bulk and tone. Cerebellar: No dysmetria. Gait-Normal.  ASSESSMENT/PLAN     1. Partial symptomatic epilepsy    Plan: This patient was seen and examined in follow-up today regarding complex partial seizure. She is doing well with her current regimen of Keppra XR. She'll continue on that regimen. I've updated her prescription. The patient will follow up with me in 6 months for routine follow-up. If she has any other questions, concerns or new developments they can call the office. 50 pounds weight loss in last year

## 2019-03-14 ENCOUNTER — EMERGENCY (EMERGENCY)
Facility: HOSPITAL | Age: 84
LOS: 0 days | Discharge: HOME | End: 2019-03-14
Attending: STUDENT IN AN ORGANIZED HEALTH CARE EDUCATION/TRAINING PROGRAM | Admitting: STUDENT IN AN ORGANIZED HEALTH CARE EDUCATION/TRAINING PROGRAM
Payer: MEDICARE

## 2019-03-14 VITALS
HEART RATE: 75 BPM | DIASTOLIC BLOOD PRESSURE: 65 MMHG | TEMPERATURE: 97 F | SYSTOLIC BLOOD PRESSURE: 148 MMHG | RESPIRATION RATE: 18 BRPM | OXYGEN SATURATION: 99 %

## 2019-03-14 DIAGNOSIS — Z88.8 ALLERGY STATUS TO OTHER DRUGS, MEDICAMENTS AND BIOLOGICAL SUBSTANCES: ICD-10-CM

## 2019-03-14 DIAGNOSIS — K82.9 DISEASE OF GALLBLADDER, UNSPECIFIED: Chronic | ICD-10-CM

## 2019-03-14 DIAGNOSIS — H26.9 UNSPECIFIED CATARACT: Chronic | ICD-10-CM

## 2019-03-14 DIAGNOSIS — I13.2 HYPERTENSIVE HEART AND CHRONIC KIDNEY DISEASE WITH HEART FAILURE AND WITH STAGE 5 CHRONIC KIDNEY DISEASE, OR END STAGE RENAL DISEASE: ICD-10-CM

## 2019-03-14 DIAGNOSIS — Z98.890 OTHER SPECIFIED POSTPROCEDURAL STATES: Chronic | ICD-10-CM

## 2019-03-14 DIAGNOSIS — Z96.642 PRESENCE OF LEFT ARTIFICIAL HIP JOINT: Chronic | ICD-10-CM

## 2019-03-14 DIAGNOSIS — Z95.2 PRESENCE OF PROSTHETIC HEART VALVE: Chronic | ICD-10-CM

## 2019-03-14 DIAGNOSIS — R21 RASH AND OTHER NONSPECIFIC SKIN ERUPTION: ICD-10-CM

## 2019-03-14 DIAGNOSIS — I77.0 ARTERIOVENOUS FISTULA, ACQUIRED: Chronic | ICD-10-CM

## 2019-03-14 DIAGNOSIS — N18.6 END STAGE RENAL DISEASE: ICD-10-CM

## 2019-03-14 DIAGNOSIS — Z90.49 ACQUIRED ABSENCE OF OTHER SPECIFIED PARTS OF DIGESTIVE TRACT: Chronic | ICD-10-CM

## 2019-03-14 DIAGNOSIS — I50.9 HEART FAILURE, UNSPECIFIED: ICD-10-CM

## 2019-03-14 DIAGNOSIS — N50.89 OTHER SPECIFIED DISORDERS OF THE MALE GENITAL ORGANS: ICD-10-CM

## 2019-03-14 DIAGNOSIS — N50.82 SCROTAL PAIN: ICD-10-CM

## 2019-03-14 DIAGNOSIS — K21.9 GASTRO-ESOPHAGEAL REFLUX DISEASE WITHOUT ESOPHAGITIS: ICD-10-CM

## 2019-03-14 DIAGNOSIS — Z79.82 LONG TERM (CURRENT) USE OF ASPIRIN: ICD-10-CM

## 2019-03-14 DIAGNOSIS — Z88.1 ALLERGY STATUS TO OTHER ANTIBIOTIC AGENTS STATUS: ICD-10-CM

## 2019-03-14 DIAGNOSIS — Z79.899 OTHER LONG TERM (CURRENT) DRUG THERAPY: ICD-10-CM

## 2019-03-14 PROCEDURE — 99283 EMERGENCY DEPT VISIT LOW MDM: CPT

## 2019-03-14 RX ORDER — NYSTATIN CREAM 100000 [USP'U]/G
1 CREAM TOPICAL
Qty: 30 | Refills: 0
Start: 2019-03-14 | End: 2019-03-21

## 2019-03-14 RX ORDER — NYSTATIN CREAM 100000 [USP'U]/G
1 CREAM TOPICAL
Qty: 30 | Refills: 0 | OUTPATIENT
Start: 2019-03-14 | End: 2019-03-20

## 2019-03-14 NOTE — ED PROVIDER NOTE - PHYSICAL EXAMINATION
AOx4, Non toxic appearing, NAD, speaking in full sentences.   Skin - warm and dry  Head - normocephalic, atraumatic.   Eyes - PERRLA/EOMI, conjunctiva and sclera clear.   ENT- MM moist, no nasal discharge.  Pharynx unremarkable.    Neck - supple nt, no meningeal signs.   Heart - RRR s1s2 nl, no rub/murmur.   Lungs- No retractions, BS equal, CTAB.   Abdomen - soft ntnd no r/g.    - 3 cm diameter exposed erythematous base w/o raised edges, ttp, no vesicles on the anterior scrotum. Mildly erythematous glans, ttp, easily reducible foreskin.     Extremities- moves all, +equal distal pulses, brisk cap refill, sensation wnl, normal ROM. No LE edema, calves nttp b/l.

## 2019-03-14 NOTE — ED PROVIDER NOTE - CLINICAL SUMMARY MEDICAL DECISION MAKING FREE TEXT BOX
left hemiscrotum with inflammed skin and moderate tenderness w/o collection beneath the skin and mild paraphimosis that was reduced by me. will tx w/ oral abx, topical antifungal, FS negative, urology concurs.

## 2019-03-14 NOTE — ED PROVIDER NOTE - CARE PROVIDER_API CALL
Ten Jackson)  Urology  [address], [address]  [place] [address]  Phone: [phone]  Fax: [phone]  Follow Up Time: 4-6 Days

## 2019-03-14 NOTE — CONSULT NOTE ADULT - ASSESSMENT
Pt is an 87y/o M with ESRD on HD presenting to the ED with rash on anterior scrotum, possibly fungal in origin, and reduced paraphimosis    - Recommend PO Abx, Antifungals  - F/u to outpatient urology for further assessment of rash, possible bx if unresolving.  - d/w attending Dr. Carbajal.

## 2019-03-14 NOTE — CONSULT NOTE ADULT - ATTENDING COMMENTS
Patient of Dr Jackson  Seeing and examined in the ER  left hemiscrotum with inflammed skin with exudate, moderately tender. no collection beneath the skin  also had mild paraphimosis that was reduced by me  the appearance could be a superficial infection either bacterial or fungal -- recommend treatment for both  but patient knows to follow up with Dr Jackson next week for reassessment.  if not improving may require biopsy at the site to rule out neoplasm

## 2019-03-14 NOTE — CONSULT NOTE ADULT - SUBJECTIVE AND OBJECTIVE BOX
Pt is an 87y/o M with PMH of ESRD on HD, HTN presenting to the ED with rash to anterior scrotum x 1 week. He states that he experiences pain on the head of his penis and to the front of his scrotum. He denies any h/o sexual activity recently, h/o similar symptoms before, testicular pain, dysuria, hematuria, or scratching to the area. He admits to getting rashes on his LUE and LLE shortly after the rash on the scrotum appeared.     PAST MEDICAL & SURGICAL HISTORY:  ESRD (end stage renal disease) on dialysis  Urinary retention  Colon perforation: 2011  TIA (transient ischemic attack): 2008  Gout  Chronic renal failure: on HD  Aortic stenosis, severe  Myocardial infarction  Hypertension  Hyperlipidemia  Congestive heart failure  History of cholecystectomy  Bilateral cataracts  History of hip replacement, total, left  History of colon resection: secondary to colon perforation  AV fistula: right upper arm  Gallbladder disorder: stent removal 10/2017  S/P TAVR (transcatheter aortic valve replacement)    Allergies: Biaxin (Unknown), Ceftin (Unknown), Plavix (Unknown), Vitamin D (Unknown), Vitamin D3 (Unknown)    Intolerances: linezolid (Other)    Vital Signs Last 24 Hrs  T(F): 97.3 (14 Mar 2019 15:52), Max: 97.3 (14 Mar 2019 15:52), HR: 75, BP: 148/65, RR: 18, SpO2: 99%     GEN: awake, alert, NAD  : +paraphimosis noted to penis, reduced; +tenderness when retracting foreskin, +rash to anterior scrotum, testicles no TTP b/l

## 2019-03-14 NOTE — ED PROVIDER NOTE - NS ED ROS FT
Constitutional: See HPI.  Eyes: No visual changes, eye pain or discharge. No Photophobia  ENMT: No neck pain or stiffness. No limited ROM  Cardiac: No SOB or edema. No chest pain with exertion.  Respiratory: No cough or respiratory distress. No hemoptysis.   GI: No nausea, vomiting, diarrhea or abdominal pain.  : see hpi  MS: No myalgia, muscle weakness, joint pain or back pain.  Neuro: No headache or weakness. No LOC.  Skin: +skin rash.  Except as documented in the HPI, all other systems are negative.

## 2019-03-14 NOTE — ED PROVIDER NOTE - OBJECTIVE STATEMENT
89 yo M with a hx of ESRD, HTN, GERD, CHF, BPH sees urology Dr. Jackson, presents with genital lesions. Noted yesterday after scratching he noted blood and pain from scrotum and tip of the penis. Stinging pain, worse with movement or walking. Denies dysuria, fevers, chills, diabetes, abd pain, back pain, NVCD.

## 2019-03-14 NOTE — ED PROVIDER NOTE - ATTENDING CONTRIBUTION TO CARE
87 yo m hx esrd on hd, htn, hld here for rash. rash to anterior scrotum. mild erythema/weaping to ~2.5 inches of scrotum. no swelling, no testicular ttp. + mild amount of discharge to head of penis. no dysuria    vss  gen- NAD, aaox3  card-rrr  lungs-ctab, no wheezing or rhonchi  abd-sntnd, no guarding or rebound  : ~2.5 inches, circular erythema/weaping superficial wound, tip of penis w/ mild balanitis  neuro- full str/sensation, cn ii-xii grossly intact, normal coordination and gait    balanitis  cellulitis of scrotum, no evidence of abscess  will get FS, urology consult  no evidence of systemic infection  oral abx and topical antifungal

## 2019-03-14 NOTE — ED PROVIDER NOTE - NSFOLLOWUPINSTRUCTIONS_ED_ALL_ED_FT
Please follow up with your primary care doctor in 1-2 days.\    Wound Care, Adult  Taking care of your wound properly can help to prevent pain, infection, and scarring. It can also help your wound to heal more quickly.    How to care for your wound  Wound care     Follow instructions from your health care provider about how to take care of your wound. Make sure you:    Wash your hands with soap and water before you change the bandage (dressing). If soap and water are not available, use hand .  Change your dressing as told by your health care provider.  Leave stitches (sutures), skin glue, or adhesive strips in place. These skin closures may need to stay in place for 2 weeks or longer. If adhesive strip edges start to loosen and curl up, you may trim the loose edges. Do not remove adhesive strips completely unless your health care provider tells you to do that.    Check your wound area every day for signs of infection. Check for:    Redness, swelling, or pain.  Fluid or blood.  Warmth.  Pus or a bad smell.    Ask your health care provider if you should clean the wound with mild soap and water. Doing this may include:    Using a clean towel to pat the wound dry after cleaning it. Do not rub or scrub the wound.  Applying a cream or ointment. Do this only as told by your health care provider.  Covering the incision with a clean dressing.    Ask your health care provider when you can leave the wound uncovered.  Image ImageKeep the dressing dry until your health care provider says it can be removed. Do not take baths, swim, use a hot tub, or do anything that would put the wound underwater until your health care provider approves. Ask your health care provider if you can take showers. You may only be allowed to take sponge baths.  Medicines     If you were prescribed an antibiotic medicine, cream, or ointment, take or use the antibiotic as told by your health care provider. Do not stop taking or using the antibiotic even if your condition improves.  ImageTake over-the-counter and prescription medicines only as told by your health care provider. If you were prescribed pain medicine, take it 30 or more minutes before you do any wound care or as told by your health care provider.  General instructions     Return to your normal activities as told by your health care provider. Ask your health care provider what activities are safe.  Do not scratch or pick at the wound.  Do not use any products that contain nicotine or tobacco, such as cigarettes and e-cigarettes. These may delay wound healing. If you need help quitting, ask your health care provider.  Keep all follow-up visits as told by your health care provider. This is important.  Eat a diet that includes protein, vitamin A, vitamin C, and other nutrient-rich foods to help the wound heal.    Foods rich in protein include meat, dairy, beans, nuts, and other sources.  Foods rich in vitamin A include carrots and dark green, leafy vegetables.  Foods rich in vitamin C include citrus, tomatoes, and other fruits and vegetables.  Nutrient-rich foods have protein, carbohydrates, fat, vitamins, or minerals. Eat a variety of healthy foods including vegetables, fruits, and whole grains.    Contact a health care provider if:  You received a tetanus shot and you have swelling, severe pain, redness, or bleeding at the injection site.  Your pain is not controlled with medicine.  You have redness, swelling, or pain around the wound.  You have fluid or blood coming from the wound.  Your wound feels warm to the touch.  You have pus or a bad smell coming from the wound.  You have a fever or chills.  You are nauseous or you vomit.  You are dizzy.  Get help right away if:  You have a red streak going away from your wound.  The edges of the wound open up and separate.  Your wound is bleeding, and the bleeding does not stop with gentle pressure.  You have a rash.  You faint.  You have trouble breathing.  Summary  Always wash your hands with soap and water before changing your bandage (dressing).  To help with healing, eat foods that are rich in protein, vitamin A, vitamin C, and other nutrients.  Check your wound every day for signs of infection. Contact your health care provider if you suspect that your wound is infected.  This information is not intended to replace advice given to you by your health care provider. Make sure you discuss any questions you have with your health care provider.

## 2019-03-29 ENCOUNTER — TRANSCRIPTION ENCOUNTER (OUTPATIENT)
Age: 84
End: 2019-03-29

## 2019-08-07 ENCOUNTER — OUTPATIENT (OUTPATIENT)
Dept: OUTPATIENT SERVICES | Facility: HOSPITAL | Age: 84
LOS: 1 days | Discharge: HOME | End: 2019-08-07

## 2019-08-07 DIAGNOSIS — I77.0 ARTERIOVENOUS FISTULA, ACQUIRED: Chronic | ICD-10-CM

## 2019-08-07 DIAGNOSIS — B96.89 OTHER SPECIFIED BACTERIAL AGENTS AS THE CAUSE OF DISEASES CLASSIFIED ELSEWHERE: ICD-10-CM

## 2019-08-07 DIAGNOSIS — Z95.2 PRESENCE OF PROSTHETIC HEART VALVE: Chronic | ICD-10-CM

## 2019-08-07 DIAGNOSIS — H26.9 UNSPECIFIED CATARACT: Chronic | ICD-10-CM

## 2019-08-07 DIAGNOSIS — K82.9 DISEASE OF GALLBLADDER, UNSPECIFIED: Chronic | ICD-10-CM

## 2019-08-07 DIAGNOSIS — Z96.642 PRESENCE OF LEFT ARTIFICIAL HIP JOINT: Chronic | ICD-10-CM

## 2019-08-07 DIAGNOSIS — Z98.890 OTHER SPECIFIED POSTPROCEDURAL STATES: Chronic | ICD-10-CM

## 2019-08-07 DIAGNOSIS — Z90.49 ACQUIRED ABSENCE OF OTHER SPECIFIED PARTS OF DIGESTIVE TRACT: Chronic | ICD-10-CM

## 2019-08-21 ENCOUNTER — OUTPATIENT (OUTPATIENT)
Dept: OUTPATIENT SERVICES | Facility: HOSPITAL | Age: 84
LOS: 1 days | Discharge: HOME | End: 2019-08-21

## 2019-08-21 DIAGNOSIS — Z90.49 ACQUIRED ABSENCE OF OTHER SPECIFIED PARTS OF DIGESTIVE TRACT: Chronic | ICD-10-CM

## 2019-08-21 DIAGNOSIS — B96.89 OTHER SPECIFIED BACTERIAL AGENTS AS THE CAUSE OF DISEASES CLASSIFIED ELSEWHERE: ICD-10-CM

## 2019-08-21 DIAGNOSIS — K82.9 DISEASE OF GALLBLADDER, UNSPECIFIED: Chronic | ICD-10-CM

## 2019-08-21 DIAGNOSIS — I77.0 ARTERIOVENOUS FISTULA, ACQUIRED: Chronic | ICD-10-CM

## 2019-08-21 DIAGNOSIS — Z98.890 OTHER SPECIFIED POSTPROCEDURAL STATES: Chronic | ICD-10-CM

## 2019-08-21 DIAGNOSIS — Z96.642 PRESENCE OF LEFT ARTIFICIAL HIP JOINT: Chronic | ICD-10-CM

## 2019-08-21 DIAGNOSIS — H26.9 UNSPECIFIED CATARACT: Chronic | ICD-10-CM

## 2019-08-21 DIAGNOSIS — Z95.2 PRESENCE OF PROSTHETIC HEART VALVE: Chronic | ICD-10-CM

## 2019-08-26 NOTE — PATIENT PROFILE ADULT. - NS PRO MODE OF ARRIVAL
56 m with IVDA, hepatitis C (not treated), RLE ORIF and hardware, previous bacteremia and lumbar osteo? 2004, presented with worsening back pain, with difficulty ambulating due to pain.  here afebrile normal WBC  blood cx: 2/2 MSSA  hep C viral load: [id number]  spine MRI: enhancing lesion on L3 s/o occult fx or a lesion but CT negative for FX or lesion  hiv negative    MSSA bacteremia and L3 osteo   active heroine use  repeat cx 8/12 negative   TTE negative  repeat MRI 8/19 with progressed enhancement and edema of L3 with perivertebral soft tissue swelling/enhancement, s/o evolving osteo, no epidural abscess      * c/w Nafcillin 2 q 4  * pt can not be discharged with a line in view of active IVDA  * will have continue the course here or another rehab facility  * will do an 8 week course for L3 osteo until 10/7  * f/u with pain management  * will need weekly CBC, CMP, ESR and CRP stretcher

## 2019-10-09 ENCOUNTER — OUTPATIENT (OUTPATIENT)
Dept: OUTPATIENT SERVICES | Facility: HOSPITAL | Age: 84
LOS: 1 days | Discharge: HOME | End: 2019-10-09

## 2019-10-09 DIAGNOSIS — Z95.2 PRESENCE OF PROSTHETIC HEART VALVE: Chronic | ICD-10-CM

## 2019-10-09 DIAGNOSIS — N39.0 URINARY TRACT INFECTION, SITE NOT SPECIFIED: ICD-10-CM

## 2019-10-09 DIAGNOSIS — H26.9 UNSPECIFIED CATARACT: Chronic | ICD-10-CM

## 2019-10-09 DIAGNOSIS — Z96.642 PRESENCE OF LEFT ARTIFICIAL HIP JOINT: Chronic | ICD-10-CM

## 2019-10-09 DIAGNOSIS — I77.0 ARTERIOVENOUS FISTULA, ACQUIRED: Chronic | ICD-10-CM

## 2019-10-09 DIAGNOSIS — K82.9 DISEASE OF GALLBLADDER, UNSPECIFIED: Chronic | ICD-10-CM

## 2019-10-09 DIAGNOSIS — Z98.890 OTHER SPECIFIED POSTPROCEDURAL STATES: Chronic | ICD-10-CM

## 2019-10-09 DIAGNOSIS — Z90.49 ACQUIRED ABSENCE OF OTHER SPECIFIED PARTS OF DIGESTIVE TRACT: Chronic | ICD-10-CM

## 2020-03-21 ENCOUNTER — INPATIENT (INPATIENT)
Facility: HOSPITAL | Age: 85
LOS: 3 days | Discharge: HOME | End: 2020-03-25
Attending: INTERNAL MEDICINE | Admitting: INTERNAL MEDICINE
Payer: MEDICARE

## 2020-03-21 VITALS
OXYGEN SATURATION: 97 % | WEIGHT: 149.91 LBS | RESPIRATION RATE: 18 BRPM | TEMPERATURE: 98 F | SYSTOLIC BLOOD PRESSURE: 200 MMHG | DIASTOLIC BLOOD PRESSURE: 86 MMHG | HEART RATE: 85 BPM | HEIGHT: 67 IN

## 2020-03-21 DIAGNOSIS — I77.0 ARTERIOVENOUS FISTULA, ACQUIRED: Chronic | ICD-10-CM

## 2020-03-21 DIAGNOSIS — K82.9 DISEASE OF GALLBLADDER, UNSPECIFIED: Chronic | ICD-10-CM

## 2020-03-21 DIAGNOSIS — Z98.890 OTHER SPECIFIED POSTPROCEDURAL STATES: Chronic | ICD-10-CM

## 2020-03-21 DIAGNOSIS — Z90.49 ACQUIRED ABSENCE OF OTHER SPECIFIED PARTS OF DIGESTIVE TRACT: Chronic | ICD-10-CM

## 2020-03-21 DIAGNOSIS — Z96.642 PRESENCE OF LEFT ARTIFICIAL HIP JOINT: Chronic | ICD-10-CM

## 2020-03-21 DIAGNOSIS — Z95.2 PRESENCE OF PROSTHETIC HEART VALVE: Chronic | ICD-10-CM

## 2020-03-21 DIAGNOSIS — H26.9 UNSPECIFIED CATARACT: Chronic | ICD-10-CM

## 2020-03-21 LAB
ALBUMIN SERPL ELPH-MCNC: 4.6 G/DL — SIGNIFICANT CHANGE UP (ref 3.5–5.2)
ALP SERPL-CCNC: 54 U/L — SIGNIFICANT CHANGE UP (ref 30–115)
ALT FLD-CCNC: 16 U/L — SIGNIFICANT CHANGE UP (ref 0–41)
ANION GAP SERPL CALC-SCNC: 18 MMOL/L — HIGH (ref 7–14)
ANISOCYTOSIS BLD QL: SLIGHT — SIGNIFICANT CHANGE UP
APTT BLD: 26.4 SEC — LOW (ref 27–39.2)
AST SERPL-CCNC: 20 U/L — SIGNIFICANT CHANGE UP (ref 0–41)
BASOPHILS # BLD AUTO: 0 K/UL — SIGNIFICANT CHANGE UP (ref 0–0.2)
BASOPHILS NFR BLD AUTO: 0 % — SIGNIFICANT CHANGE UP (ref 0–1)
BILIRUB SERPL-MCNC: 0.9 MG/DL — SIGNIFICANT CHANGE UP (ref 0.2–1.2)
BUN SERPL-MCNC: 47 MG/DL — HIGH (ref 10–20)
CALCIUM SERPL-MCNC: 8.6 MG/DL — SIGNIFICANT CHANGE UP (ref 8.5–10.1)
CHLORIDE SERPL-SCNC: 98 MMOL/L — SIGNIFICANT CHANGE UP (ref 98–110)
CO2 SERPL-SCNC: 25 MMOL/L — SIGNIFICANT CHANGE UP (ref 17–32)
CREAT SERPL-MCNC: 4.9 MG/DL — CRITICAL HIGH (ref 0.7–1.5)
EOSINOPHIL # BLD AUTO: 0 K/UL — SIGNIFICANT CHANGE UP (ref 0–0.7)
EOSINOPHIL NFR BLD AUTO: 0 % — SIGNIFICANT CHANGE UP (ref 0–8)
GLUCOSE SERPL-MCNC: 148 MG/DL — HIGH (ref 70–99)
HCT VFR BLD CALC: 35 % — LOW (ref 42–52)
HGB BLD-MCNC: 11.8 G/DL — LOW (ref 14–18)
INR BLD: 1.09 RATIO — SIGNIFICANT CHANGE UP (ref 0.65–1.3)
LYMPHOCYTES # BLD AUTO: 0.22 K/UL — LOW (ref 1.2–3.4)
LYMPHOCYTES # BLD AUTO: 1.7 % — LOW (ref 20.5–51.1)
MAGNESIUM SERPL-MCNC: 2.5 MG/DL — HIGH (ref 1.8–2.4)
MANUAL SMEAR VERIFICATION: SIGNIFICANT CHANGE UP
MCHC RBC-ENTMCNC: 32.2 PG — HIGH (ref 27–31)
MCHC RBC-ENTMCNC: 33.7 G/DL — SIGNIFICANT CHANGE UP (ref 32–37)
MCV RBC AUTO: 95.6 FL — HIGH (ref 80–94)
MICROCYTES BLD QL: SLIGHT — SIGNIFICANT CHANGE UP
MONOCYTES # BLD AUTO: 0.58 K/UL — SIGNIFICANT CHANGE UP (ref 0.1–0.6)
MONOCYTES NFR BLD AUTO: 4.4 % — SIGNIFICANT CHANGE UP (ref 1.7–9.3)
NEUTROPHILS # BLD AUTO: 12.12 K/UL — HIGH (ref 1.4–6.5)
NEUTROPHILS NFR BLD AUTO: 92.2 % — HIGH (ref 42.2–75.2)
OVALOCYTES BLD QL SMEAR: SLIGHT — SIGNIFICANT CHANGE UP
PLAT MORPH BLD: NORMAL — SIGNIFICANT CHANGE UP
PLATELET # BLD AUTO: 149 K/UL — SIGNIFICANT CHANGE UP (ref 130–400)
POIKILOCYTOSIS BLD QL AUTO: SLIGHT — SIGNIFICANT CHANGE UP
POTASSIUM SERPL-MCNC: 4.2 MMOL/L — SIGNIFICANT CHANGE UP (ref 3.5–5)
POTASSIUM SERPL-SCNC: 4.2 MMOL/L — SIGNIFICANT CHANGE UP (ref 3.5–5)
PROT SERPL-MCNC: 7.1 G/DL — SIGNIFICANT CHANGE UP (ref 6–8)
PROTHROM AB SERPL-ACNC: 12.5 SEC — SIGNIFICANT CHANGE UP (ref 9.95–12.87)
RBC # BLD: 3.66 M/UL — LOW (ref 4.7–6.1)
RBC # FLD: 14.3 % — SIGNIFICANT CHANGE UP (ref 11.5–14.5)
RBC BLD AUTO: NORMAL — SIGNIFICANT CHANGE UP
SODIUM SERPL-SCNC: 141 MMOL/L — SIGNIFICANT CHANGE UP (ref 135–146)
TROPONIN T SERPL-MCNC: 0.16 NG/ML — CRITICAL HIGH
VARIANT LYMPHS # BLD: 1.7 % — SIGNIFICANT CHANGE UP (ref 0–5)
WBC # BLD: 13.14 K/UL — HIGH (ref 4.8–10.8)
WBC # FLD AUTO: 13.14 K/UL — HIGH (ref 4.8–10.8)

## 2020-03-21 PROCEDURE — 93010 ELECTROCARDIOGRAM REPORT: CPT

## 2020-03-21 PROCEDURE — 99285 EMERGENCY DEPT VISIT HI MDM: CPT

## 2020-03-21 PROCEDURE — 71045 X-RAY EXAM CHEST 1 VIEW: CPT | Mod: 26

## 2020-03-21 PROCEDURE — 70450 CT HEAD/BRAIN W/O DYE: CPT | Mod: 26

## 2020-03-21 NOTE — ED ADULT NURSE NOTE - NSSUHOSCREENINGYN_ED_ALL_ED
Continued Stay Note  [facility]     Patient Name: Maryjo Wynn  MRN: [id number]  Today's Date: 6/17/2019    Admit Date: 6/12/2019    Discharge Plan     Row Name 06/17/19 1217       Plan    Plan  Home with [facility]     Patient/Family in Agreement with Plan  yes    Plan Comments  Spoke with pt at bedside regarding discharge plan.  Offered SNF rehab.  Pt states she is going home with HH.  [facility] is set up to follow.  Rashawn Kruse RN-BC        Discharge Codes    No documentation.       Expected Discharge Date and Time     Expected Discharge Date Expected Discharge Time    Jun 18, 2019             Rashawn Kruse RN     Yes - the patient is able to be screened

## 2020-03-21 NOTE — ED ADULT TRIAGE NOTE - CHIEF COMPLAINT QUOTE
Pt with dizziness and hypotension at home, BP elevated in triage Pt with dizziness, nausea and hypotension at home, BP elevated in triage

## 2020-03-21 NOTE — ED ADULT NURSE NOTE - NSIMPLEMENTINTERV_GEN_ALL_ED
Implemented All Universal Safety Interventions:  [place] to call system. Call bell, personal items and telephone within reach. Instruct patient to call for assistance. Room bathroom lighting operational. Non-slip footwear when patient is off stretcher. Physically safe environment: no spills, clutter or unnecessary equipment. Stretcher in lowest position, wheels locked, appropriate side rails in place.

## 2020-03-21 NOTE — ED PROVIDER NOTE - OBJECTIVE STATEMENT
89 y/o male with h/o ESRD on HD MWF, HLD, HTN, CAD/MI, s/p TAVR, in ER with c/o weakness.  Pt had full HD yesterday.  Symptoms started this morning, pt felt dizzy and lightheaded, per note from wife pt's eyes rolled back and was briefly unresponsive, but then woke up and was at normal MS.  Did not fall or hit head. Pt nauseated and vomiting off and on.  Had mild HA this morning, no HA now.  Denies any CP or SOB.  no abd pain.  no diarrhea.  No f/c. BP this morning at home was low - reportedly in 80's, wife trying to give some salt solution at home but pt too nauseated to tolerate much. 89 y/o male with h/o ESRD on HD MWF, HLD, HTN, CAD/MI, s/p TAVR 2016 in ER with c/o weakness.  Pt had full HD yesterday.  Symptoms started this morning, pt felt dizzy and lightheaded, per note from wife pt's eyes rolled back and was briefly unresponsive, but then woke up and was at normal MS.  Did not fall or hit head. Pt nauseated and vomiting off and on.  Had mild HA this morning, no HA now.  Denies any CP or SOB.  no abd pain.  no diarrhea.  No f/c. BP this morning at home was low - reportedly in 80's, wife trying to give some salt solution at home but pt too nauseated to tolerate much. 24-Sep-2019 15:27

## 2020-03-21 NOTE — ED PROVIDER NOTE - CARE PLAN
Principal Discharge DX:	Syncope  Secondary Diagnosis:	Elevated troponin  Secondary Diagnosis:	Prolonged QT interval  Secondary Diagnosis:	Cough

## 2020-03-21 NOTE — ED PROVIDER NOTE - CLINICAL SUMMARY MEDICAL DECISION MAKING FREE TEXT BOX
Pt with pmhx as noted in ER after episode of syncope this morning, witnessed by wife.  +n/v.  denies any cp/sob.  + occasional cough.  no f/c.  wbc 13K.  trop 0.16, ekg - nsr, sl st dep V5-V6, qtc 519.  Pt cultured, flu swab neg, given abx, covid testing ordered.  case d/w card fellow, ira Ly to admit pt to tele.

## 2020-03-21 NOTE — ED PROVIDER NOTE - PHYSICAL EXAMINATION
CONSTITUTIONAL: Well-developed; well-nourished; in no acute distress, nontoxic appearing  SKIN: skin exam is warm and dry,  HEAD: Normocephalic; atraumatic.  EYES: PERRL, 3 mm bilateral, no nystagmus, EOM intact; conjunctiva and sclera clear.  ENT: MMM, no nasal congestion  NECK: Supple; non tender.+ full passive ROM in all directions. No JVD  CARD: S1, S2 normal, no murmur  RESP: No wheezes, rales or rhonchi. Good air movement bilaterally  ABD: soft; non-distended; non-tender. No Rebound, No guarding  EXT: Normal ROM. No cyanosis or edema. Dp Pulses intact. RUE - AVF + thrill  NEURO: awake, alert, following commands, oriented, grossly unremarkable. No Focal deficits. GCS 15.   PSYCH: Cooperative, appropriate.

## 2020-03-21 NOTE — ED PROVIDER NOTE - NS ED ROS FT
Constitutional:  no fevers, no chills, + generalized weakness  Eyes:  No visual changes  ENMT: No neck pain or stiffness, no nasal congestion, no ear pain, no throat pain  Cardiac:  No chest pain  Respiratory:  No sob. occasional cough  GI:  + nausea and vomiting. no diarrhea or abdominal pain.  :  No dysuria, frequency or burning.  MS:  No back pain, no joint pain.  Neuro: se hpi  Skin:  No skin rash  Except as documented in the HPI,  all other systems are negative

## 2020-03-22 LAB
ALBUMIN SERPL ELPH-MCNC: 4.5 G/DL — SIGNIFICANT CHANGE UP (ref 3.5–5.2)
ALP SERPL-CCNC: 51 U/L — SIGNIFICANT CHANGE UP (ref 30–115)
ALT FLD-CCNC: 16 U/L — SIGNIFICANT CHANGE UP (ref 0–41)
ANION GAP SERPL CALC-SCNC: 22 MMOL/L — HIGH (ref 7–14)
AST SERPL-CCNC: 24 U/L — SIGNIFICANT CHANGE UP (ref 0–41)
BASOPHILS # BLD AUTO: 0.03 K/UL — SIGNIFICANT CHANGE UP (ref 0–0.2)
BASOPHILS NFR BLD AUTO: 0.2 % — SIGNIFICANT CHANGE UP (ref 0–1)
BILIRUB SERPL-MCNC: 1.1 MG/DL — SIGNIFICANT CHANGE UP (ref 0.2–1.2)
BUN SERPL-MCNC: 55 MG/DL — HIGH (ref 10–20)
CALCIUM SERPL-MCNC: 8.7 MG/DL — SIGNIFICANT CHANGE UP (ref 8.5–10.1)
CHLORIDE SERPL-SCNC: 97 MMOL/L — LOW (ref 98–110)
CK MB CFR SERPL CALC: 7.3 NG/ML — HIGH (ref 0.6–6.3)
CK SERPL-CCNC: 78 U/L — SIGNIFICANT CHANGE UP (ref 0–225)
CO2 SERPL-SCNC: 24 MMOL/L — SIGNIFICANT CHANGE UP (ref 17–32)
CREAT SERPL-MCNC: 5.3 MG/DL — CRITICAL HIGH (ref 0.7–1.5)
EOSINOPHIL # BLD AUTO: 0 K/UL — SIGNIFICANT CHANGE UP (ref 0–0.7)
EOSINOPHIL NFR BLD AUTO: 0 % — SIGNIFICANT CHANGE UP (ref 0–8)
FLU A RESULT: NEGATIVE — SIGNIFICANT CHANGE UP
FLU A RESULT: NEGATIVE — SIGNIFICANT CHANGE UP
FLUAV AG NPH QL: NEGATIVE — SIGNIFICANT CHANGE UP
FLUBV AG NPH QL: NEGATIVE — SIGNIFICANT CHANGE UP
GLUCOSE SERPL-MCNC: 145 MG/DL — HIGH (ref 70–99)
HCT VFR BLD CALC: 35.5 % — LOW (ref 42–52)
HGB BLD-MCNC: 12 G/DL — LOW (ref 14–18)
IMM GRANULOCYTES NFR BLD AUTO: 0.6 % — HIGH (ref 0.1–0.3)
LYMPHOCYTES # BLD AUTO: 0.83 K/UL — LOW (ref 1.2–3.4)
LYMPHOCYTES # BLD AUTO: 4.5 % — LOW (ref 20.5–51.1)
MAGNESIUM SERPL-MCNC: 2.5 MG/DL — HIGH (ref 1.8–2.4)
MCHC RBC-ENTMCNC: 32.2 PG — HIGH (ref 27–31)
MCHC RBC-ENTMCNC: 33.8 G/DL — SIGNIFICANT CHANGE UP (ref 32–37)
MCV RBC AUTO: 95.2 FL — HIGH (ref 80–94)
MONOCYTES # BLD AUTO: 0.74 K/UL — HIGH (ref 0.1–0.6)
MONOCYTES NFR BLD AUTO: 4 % — SIGNIFICANT CHANGE UP (ref 1.7–9.3)
NEUTROPHILS # BLD AUTO: 16.91 K/UL — HIGH (ref 1.4–6.5)
NEUTROPHILS NFR BLD AUTO: 90.7 % — HIGH (ref 42.2–75.2)
NRBC # BLD: 0 /100 WBCS — SIGNIFICANT CHANGE UP (ref 0–0)
PLATELET # BLD AUTO: 150 K/UL — SIGNIFICANT CHANGE UP (ref 130–400)
POTASSIUM SERPL-MCNC: 4.2 MMOL/L — SIGNIFICANT CHANGE UP (ref 3.5–5)
POTASSIUM SERPL-SCNC: 4.2 MMOL/L — SIGNIFICANT CHANGE UP (ref 3.5–5)
PROT SERPL-MCNC: 7.1 G/DL — SIGNIFICANT CHANGE UP (ref 6–8)
RBC # BLD: 3.73 M/UL — LOW (ref 4.7–6.1)
RBC # FLD: 14.5 % — SIGNIFICANT CHANGE UP (ref 11.5–14.5)
RSV RESULT: NEGATIVE — SIGNIFICANT CHANGE UP
RSV RNA RESP QL NAA+PROBE: NEGATIVE — SIGNIFICANT CHANGE UP
SODIUM SERPL-SCNC: 143 MMOL/L — SIGNIFICANT CHANGE UP (ref 135–146)
TROPONIN T SERPL-MCNC: 0.25 NG/ML — CRITICAL HIGH
TROPONIN T SERPL-MCNC: 0.29 NG/ML — CRITICAL HIGH
WBC # BLD: 18.62 K/UL — HIGH (ref 4.8–10.8)
WBC # FLD AUTO: 18.62 K/UL — HIGH (ref 4.8–10.8)

## 2020-03-22 PROCEDURE — 99222 1ST HOSP IP/OBS MODERATE 55: CPT | Mod: AI,GC

## 2020-03-22 RX ORDER — LANOLIN ALCOHOL/MO/W.PET/CERES
1 CREAM (GRAM) TOPICAL
Qty: 0 | Refills: 0 | DISCHARGE

## 2020-03-22 RX ORDER — TAMSULOSIN HYDROCHLORIDE 0.4 MG/1
0.4 CAPSULE ORAL AT BEDTIME
Refills: 0 | Status: DISCONTINUED | OUTPATIENT
Start: 2020-03-22 | End: 2020-03-25

## 2020-03-22 RX ORDER — CALCIUM ACETATE 667 MG
1 TABLET ORAL
Qty: 0 | Refills: 0 | DISCHARGE

## 2020-03-22 RX ORDER — METOPROLOL TARTRATE 50 MG
25 TABLET ORAL
Refills: 0 | Status: DISCONTINUED | OUTPATIENT
Start: 2020-03-22 | End: 2020-03-25

## 2020-03-22 RX ORDER — CALCIUM ACETATE 667 MG
667 TABLET ORAL
Refills: 0 | Status: DISCONTINUED | OUTPATIENT
Start: 2020-03-22 | End: 2020-03-25

## 2020-03-22 RX ORDER — ASPIRIN/CALCIUM CARB/MAGNESIUM 324 MG
81 TABLET ORAL DAILY
Refills: 0 | Status: DISCONTINUED | OUTPATIENT
Start: 2020-03-22 | End: 2020-03-23

## 2020-03-22 RX ORDER — AZITHROMYCIN 500 MG/1
250 TABLET, FILM COATED ORAL DAILY
Refills: 0 | Status: DISCONTINUED | OUTPATIENT
Start: 2020-03-22 | End: 2020-03-22

## 2020-03-22 RX ORDER — CHLORHEXIDINE GLUCONATE 213 G/1000ML
1 SOLUTION TOPICAL
Refills: 0 | Status: DISCONTINUED | OUTPATIENT
Start: 2020-03-22 | End: 2020-03-25

## 2020-03-22 RX ORDER — HEPARIN SODIUM 5000 [USP'U]/ML
5000 INJECTION INTRAVENOUS; SUBCUTANEOUS EVERY 12 HOURS
Refills: 0 | Status: DISCONTINUED | OUTPATIENT
Start: 2020-03-22 | End: 2020-03-25

## 2020-03-22 RX ORDER — LANOLIN ALCOHOL/MO/W.PET/CERES
3 CREAM (GRAM) TOPICAL AT BEDTIME
Refills: 0 | Status: DISCONTINUED | OUTPATIENT
Start: 2020-03-22 | End: 2020-03-25

## 2020-03-22 RX ORDER — SIMVASTATIN 20 MG/1
20 TABLET, FILM COATED ORAL AT BEDTIME
Refills: 0 | Status: DISCONTINUED | OUTPATIENT
Start: 2020-03-22 | End: 2020-03-23

## 2020-03-22 RX ORDER — PANTOPRAZOLE SODIUM 20 MG/1
1 TABLET, DELAYED RELEASE ORAL
Qty: 0 | Refills: 0 | DISCHARGE

## 2020-03-22 RX ORDER — LOSARTAN POTASSIUM 100 MG/1
12.5 TABLET, FILM COATED ORAL DAILY
Refills: 0 | Status: DISCONTINUED | OUTPATIENT
Start: 2020-03-22 | End: 2020-03-25

## 2020-03-22 RX ADMIN — Medication 667 MILLIGRAM(S): at 16:49

## 2020-03-22 RX ADMIN — HEPARIN SODIUM 5000 UNIT(S): 5000 INJECTION INTRAVENOUS; SUBCUTANEOUS at 16:49

## 2020-03-22 RX ADMIN — HEPARIN SODIUM 5000 UNIT(S): 5000 INJECTION INTRAVENOUS; SUBCUTANEOUS at 04:32

## 2020-03-22 RX ADMIN — Medication 667 MILLIGRAM(S): at 11:34

## 2020-03-22 RX ADMIN — Medication 81 MILLIGRAM(S): at 11:34

## 2020-03-22 RX ADMIN — Medication 110 MILLIGRAM(S): at 00:44

## 2020-03-22 RX ADMIN — SIMVASTATIN 20 MILLIGRAM(S): 20 TABLET, FILM COATED ORAL at 21:15

## 2020-03-22 RX ADMIN — Medication 25 MILLIGRAM(S): at 04:31

## 2020-03-22 RX ADMIN — LOSARTAN POTASSIUM 12.5 MILLIGRAM(S): 100 TABLET, FILM COATED ORAL at 04:30

## 2020-03-22 RX ADMIN — Medication 3 MILLIGRAM(S): at 21:15

## 2020-03-22 RX ADMIN — TAMSULOSIN HYDROCHLORIDE 0.4 MILLIGRAM(S): 0.4 CAPSULE ORAL at 21:16

## 2020-03-22 RX ADMIN — Medication 25 MILLIGRAM(S): at 16:49

## 2020-03-22 RX ADMIN — Medication 1 TABLET(S): at 11:34

## 2020-03-22 NOTE — H&P ADULT - NSICDXPASTSURGICALHX_GEN_ALL_CORE_FT
PAST SURGICAL HISTORY:  AV fistula right upper arm    Bilateral cataracts     Gallbladder disorder stent removal 10/2017    History of cholecystectomy     History of colon resection secondary to colon perforation    History of hip replacement, total, left     S/P TAVR (transcatheter aortic valve replacement)

## 2020-03-22 NOTE — PATIENT PROFILE ADULT - CENTRAL VENOUS CATHETER/PICC LINE
Bariatric Progress Report     The patient was seen from 1300 to 1350 for bariatric nutrition initial evaluation (#  1 of  6) for revision. The patient was referred by Dr. J Carlos Thompson.     Referral Diagnosis:   BMI 40.0-44.9, adult (CMS/[facility]) [Z68.41]  Bariatric surgery status [Z98.84]    Barriers and Readiness to Learning:   The instruction was given to patient.    Barriers to self-care and learning limitations: None   Preferences for Learning: no preference   Readiness to learn: The patient demonstrates the ability to understand and asks questions.   The education will be provided in an individual setting   Material was presented using verbal and written     Learning Topics:   Rationale for Diet, Guidelines for Diet, Label Reading/Product Information, Food Preparation, Eating Out and Lifestyle changes     Assessment / Food intake related directly to surgery readiness:  Area(s) and level of knowledge: Pt shows Moderate level of knowledge regarding diet recommendations for healthy weight loss and preparation for bariatric surgery prior to education.    Food Recall:  Breakfast (9-11 am)  Leftovers OR 1 scrambled egg with potatoes   Lunch  Skips most often  Dinner  3-4 ounces steak  1 small baked potato  Salad with Stateless dressing    Snack     Snack  Pistachios  Snack  1 cup air popped popcorn     Amount of food: Per food recall she is able to eat portions that are larger than expected post bariatric surgery.  The amount of food that she can eat depends on the type of food and the day.  She is eating about 3-4 ounces of meats, 1 cup or more of vegetables, and small amounts of starchy foods, which is appropriate for someone that has not had bariatric surgery.  Does not currently measure foods but has measuring cups and spoons at home.     Provided and reviewed handout \"Web MD Portion Guide.\"  Encouraged to have 1+ cups vegetables, 3-4 ounces of protein, and no more than 1 cup of grain/starch per meal.  Encouraged to  measure foods and to record portion sizes accurately on food records.  Pt was encouraged to use the plate method, food labels, measuring cups and spoons, and written materials provided to assist with monitoring portion sizes.    Type of food/meals: Per food recall and discussion with patient she appears to have a good knowledge on how to eat healthy.  She lost over 100 pounds after bariatric surgery and states that she was doing well up until about 10 years ago.  States that stressors in her life caused her to get off track with eating habits and food choices.  She desires to get back on track.  States that she loves vegetables and fruits.  Limits bread but will choose potatoes and rice and noodles on occasion.  Reports that most meals are cooked at home.  Mentioned that she has started to choose some Atkins and Healthy choice frozen meals.       Discussed daily protein goal and protein goal at meals.  Currently she is not consuming adequate protein per food recall.  Encouraged to choose a good source of protein at each meal so that she can meet daily protein goal.  Discussed how to choose balanced meals using the plate method as a guide and provided sample meal plans.  Discussed how to keep detailed food records.  Instructed patient to keep daily food records and to bring to each nutrition visit for review.     Meal/Snack pattern: Does not follow a consistent meal pattern.  States that it depends on when she gets up.  Appears to skip meals/go long periods of time without eating.   Snack choices appear to be appropriate.     Discussed appropriate meal timing and spacing with patient.  Encouraged to have snacks in between meals if feeling hungry or if going longer than 4-5 hours between meals.  Discussed appropriate snack choices and provided handout with examples.     Fried Foods: Bakes, boils, uses air fryer at home.  Encouraged to limit/avoid fried foods and to choose low fat cooking methods such as baking,  boiling, broiling, grilling, etc.   Fast Food/Eating Out: States that she has not had any fast food in the past 6 months.  Goes out to eat at a restaurant about 1 time per month on average.   Sweets: Keeps a jar of caramel M&M's in her room but states that she eats them very slowly.  Also reports that she has had a pack of ice cream sandwiches in her freezer for months.  Encouraged to choose low sugar/sugar free snacks in place of regular sweets. Discussed guidelines, examples, and provided and reviewed handout.     Soda: Started drinking soda again but reports that she has given it up over the past 2 weeks.  Encouraged to continue avoiding all carbonated beverages and discussed rationale.   Oral fluids: 8 glasses of water daily, coffee a few times per month with 1 tsp sugar, hot tea with sugar or honey, tomato juice, orange juice.  Encouraged to choose low sugar/sugar free non-carbonated beverages.  Discussed examples and provided handout to patient.     Allergies/Intolerances: strawberries, milk.  Reports no food intolerances s/p bariatric surgery.    Alcohol: 3-4 drinks 1 time per month.   Tobacco: no    Medications / Supplements  Medications, specify prescription or OTC: gabapentin, xanax, adderall, multivitamin, joint supplement      Motivation  Pt is interested in revision of Raymundo-en-Y gastric bypass done in 2007 by Dr. Spencer.  She is interested in surgery because she wants to have more energy and be healthy. Her support person will be her oldest son.  Provided Bariatric Support Group Schedule and instructed patient to attend at least 1 group prior to surgery.     Behavior  Ability to build and utilize social network: Lives with her oldest son (40 years old).  He works from home.  Has 3 other living adult children.  Has not worked in several years, previously worked as a  and .      Physical Activity  Type of physical activity: Rides stationary bike at home 3-4 times per week for 15-20  minutes.  Wants to join [employer].  Complains of increased shortness of breath that she will discuss with her pulmonologist at an upcoming appointment.  Encouraged to work on increasing duration of exercise to a total of 30 minutes.  Instructed to record any planned exercise on her food records.      Anthropometric Measurements:   Estimated body mass index is 45.54 kg/m² as calculated from the following:    Height as of this encounter: 5' (1.524 m).    Weight as of this encounter: 105.8 kg.    Goal weight: 125-140 lbs   Last at that weight: 10 years ago  Highest weight: about 300 lbs   How long at current weight: a few years  Weight changes: steady weight gain since 2009/2010  Diet attempts: weight watchers, exercise, RD, medically supervised weight loss, low calorie, kim cardenas, diet pills, hydroxycut, bariatric surgery, sclerotherapy x 2 in 2015   Longest attempt: a few years  Most weight loss: 110 lbs   Biggest barrier to losing weight: staying focused     Nutrition-Focused Physical Findings:   Digestive system (mouth to rectum): missing several top teeth      Client History:   Past Medical History:   Diagnosis Date   • Anemia    • Anxiety    • Arthritis    • Branch retinal vein occlusion of right eye with macular edema 4/30/2018   • Bronchitis    • Chronic pain    • Conjunctival hemorrhage 11/12/2012   • Depressive disorder    • Early cataracts, bilateral 4/30/2018   • Essential (primary) hypertension    • Macular edema     retinal vein occlusion with ischemia right eye   • Medial orbital wall fracture (CMS/HCC) 11/12/2012   • Orbital floor fracture (CMS/HCC) 11/12/2012   • Other specified disorders of rotator cuff syndrome of shoulder and allied disorders 10/29/2012   • Retinal hemorrhage 11/12/2012   • Sinusitis, chronic    • Sleep apnea    • Traumatic ecchymosis of orbit 11/12/2012   • Wears glasses    • Wrist fracture     right wrist frx     Family History   Problem Relation Age of Onset   • Heart  disease Father    • Cancer Father         prostate   • Cataracts Mother    • Hypertension Mother    • Stroke Mother    • Cancer Brother         Hodgkins   • Heart disease Brother    • Heart disease Son    • Cataracts Maternal Grandmother    • Diabetes Maternal Grandmother    • Cataracts Paternal Grandmother      Nutrition Diagnosis:   Overweight/Obesity related to history of excessive calorie intake and physical inactivity as evidenced by BMI.     Food-and-nutrition-related knowledge deficit  related to diet recommendations for healthy weight loss and preparation for bariatric surgery as evidenced by skipping meals/going more than 4-5 hours without eating, inadequate protein consumption daily, consuming unbalanced meals.    Nutrition Prescription:    Eat 3 small meals per day, Choose foods low in sugar, Choose foods low in fat, Eat meals slowly, Substitute low calorie non-carbonated beverages for carbonated , Be physically active and No weight gain     Intervention:   Recommended modifications:   1. Consume 3 meals per day spaced evenly apart.  Do not go more than 4-5 hours without eating something to prepare yourself for the meal pattern after bariatric surgery.     -IF going longer than 4-5 hours between meals OR hungry, have a small snack (choose from snack lists)  2. Use the plate method to monitor portion sizes and build well balanced meals.     -3-4 ounces (size of a deck of cards) lean protein (~20-30 grams of protein per meal and 60-80 grams of protein per day)    -1 cup or more of non-starchy vegetables (2 cups or more per day)(anything besides peas, corn, potatoes, winter squash) and/or fruit (limit fruit to no more than 1 1/2 to 2 cups per day)    -whole grain/starch (no more than 1 type/1 cup/2 slices of bread at meals)   3. Take the following vitamins/minerals daily:    -Take a picture of your multivitamin    -2000 IU vitamin D3 in tablet form once daily.    -500-600 mg Calcium Citrate in tablet form 2  times daily    -Sublingual B12 - 500 mcg daily OR talk to your doctor about monthly injections   4. Keep detailed food records daily and bring to each nutrition visit for review.    -record meal time, type of food, portion size, fluids, and exercise   -aim for at least 3 days of planned exercise per week, for a total of 30 minutes    5. Tasks to complete:   -attend at least 1 Bariatric Support Group prior to surgery, sign in when you attend in order to receive credit for attending.      Print/Written Resources Provided:   AVS  Low Calorie/Low Sugar Beverages  Low Sugar/Sugar Free Snacks  100 Calorie Snack Ideas  Web MD Portion Guide  Protein Content of Foods  1200 Calorie 5 Day Sample Meal Plans  Food Records   Bariatric Support Group Schedule   6 Month Patient Journey Form   Important Vitamins and Minerals Following Raymundo-en-Y Gastric Bypass     Monitoring and Evaluation:   Self-reported adherence score: The patient will comply with interventions in order to be an appropriate candidate for bariatric surgery.    Area(s) and level of knowledge: Pt shows Moderate level of knowledge regarding diet recommendations for healthy weight loss and preparation for bariatric surgery prior to education.     Insurance Requirements- addressed with pt as understood at this time  Patient has not met insurance requirements, reviewed with the patient today as:  - Patient must be 18 years of age or older  - Diagnosis of morbid obesity for 5 years  - BMI > 35 + inadequately controlled Type 2 diabetes despite appropriate therapy with at least 2 medications of different drug classes, either oral or injectable OR  - BMI > 40 + serious co-morbidity:  Moderate to severe obstructive sleep apnea  Type 2 diabetes  Medically refractory hypertension (blood pressure consistently greater than 140/90 despite the concurrent use of 3 anti-hypertensive agents of different drug classes)  Obesity-related cardiomyopathy  Pickwickian syndrome (obesity  hypoventilation syndrome)  - BMI > 50 and mechanical arthropathy with documented functional impairment by a licensed physical therapist  - Adequate prior attempts to lose weight or maintain weight loss have failed OR for members whose prior attempts at weight loss have been deemed absent or inadequate, a 6-month medically supervised weight loss program has been undertaken. *These required weight loss attempts by the member are prior to and separate from the bariatric assessment and 6-month multi-disciplinary surgical preparatory regimen)  - 6 consecutive months of documented participation and progress in a multi-disciplinary surgical preparation regimen  - Patient must not have a net weight gain during this period greater than what is explainable as normal fluctuation (up to 5 pounds) or otherwise attributable to a recognized medical condition (such as edema)  - Psychology evaluation within 12 months  - Member has abstained from alcohol abuse and other substance abuse for at least 6 months  - The member has been evaluated for and does not have a contributing endocrinopathy  - No VBG  - Surgery must be performed in high-volume centers with multi-disciplinary teams that are experienced in the management of metabolic surgery and obesity-related co-morbidites such as diabetes     Writer reminded patient that clearance is based not only upon meeting insurance requirements, but also on meeting clinical goals (nutritional and psychological).    Task to Complete Completed   Aminta     Support Group     Health History Packet  @Dr. Thompson's office    Letter of Medical Necessity  not needed for revision   Medical Records  ?   Co-morbidity  ? Reports GILLIAN but no CPAP      Recommended Follow-up:   At this time the patient is not yet considered an appropriate candidate for surgery from a nutrition perspective.   She is to follow up in 1 month for further nutrition evaluation.     This patient is planning to become more physically  active, and therefore was asked the following questions from the Physical Activity Risk Questionnaire (PAR-Q):    1.  Has your doctor ever said that you have a heart condition and that you should only do physical activity recommended by your doctor? No    2.  Do you feel pain in your chest when you do physical activity? Yes (shortness of breath, to discuss with pulmonologist at upcoming appointment)     3.  In the past month, have you had chest pain when you were not doing physical activity? No    4.  Do you lose your balance because of dizziness or do you ever lose consciousness? Yes    5.  Do you have a bone or joint problem that could be made worse by a change in your physical activity? Yes (both knees need to be replaced)    6.  Is your doctor currently prescribing drugs (for example, water pills) for your blood pressure or heart condition? No    7.  Do you know of any other reason why you should not do physical activity? No    Recommendation:  Patient answered yes to 3 questions.  Patient was instructed to increase duration of current exercise regimen.         no

## 2020-03-22 NOTE — H&P ADULT - NSHPLABSRESULTS_GEN_ALL_CORE
11.8   13.14 )-----------( 149      ( 21 Mar 2020 20:13 )             35.0       03-21    141  |  98  |  47<H>  ----------------------------<  148<H>  4.2   |  25  |  4.9<HH>    Ca    8.6      21 Mar 2020 20:13  Mg     2.5     03-21    TPro  7.1  /  Alb  4.6  /  TBili  0.9  /  DBili  x   /  AST  20  /  ALT  16  /  AlkPhos  54  03-21                  PT/INR - ( 21 Mar 2020 20:13 )   PT: 12.50 sec;   INR: 1.09 ratio         PTT - ( 21 Mar 2020 20:13 )  PTT:26.4 sec        CARDIAC MARKERS ( 21 Mar 2020 20:13 )  x     / 0.16 ng/mL / x     / x     / x            CAPILLARY BLOOD GLUCOSE      POCT Blood Glucose.: 119 mg/dL (21 Mar 2020 19:10)      < from: CT Head No Cont (03.21.20 @ 22:32) >    Impression:     No CT evidence for acute intracranial pathology.    < end of copied text >

## 2020-03-22 NOTE — H&P ADULT - NSICDXFAMILYHX_GEN_ALL_CORE_FT
FAMILY HISTORY:  Father  Still living? Unknown  Family history of emphysema, Age at diagnosis: Age Unknown    Mother  Still living? Unknown  Family history of stroke, Age at diagnosis: Age Unknown

## 2020-03-22 NOTE — H&P ADULT - ATTENDING COMMENTS
HPI:  Pt is a 89 y/o male with PMH of ESRD on HD MWF, HLD, HTN, CAD/MI, s/p TAVR 2016 in ER with c/o Lightheadedness, pre-syncope.   As per the pt wife, Pt was feeling his usual self when he had breakfast this morning. Pt then started to complain that he was feeling dizzy. As per the wife, pt then pointed to his head- felt dizzy- his eyes rolled back but pt didnt pass out. Denies any LOC, Head trauma, chest pain, SOB, seizure like activity. As per the wife- the episode lasted for a 2-3 seconds. Pt didnot have post ictal confusion and was at his baseline mental status after the episode. After the episode, pt complained of dizziness and vomited when he tried to eat. Wife took the pt vitals- /39, HR 41. Pt was brought to the ED for further evaluation.     VS on arrival noted for /86. Labs consistent with ESRD, Trop 0.16. Received doxycycline in the ED. (22 Mar 2020 02:13)    REVIEW OF SYSTEMS: see cc/HPI  CONSTITUTIONAL: No weakness, fevers or chills  EYES/ENT: No visual changes;  No vertigo or throat pain   NECK: No pain or stiffness  RESPIRATORY: No cough, wheezing, hemoptysis; No shortness of breath  CARDIOVASCULAR: No chest pain or palpitations, dizziness and pre-syncopal episode   GASTROINTESTINAL: No abdominal or epigastric pain. No nausea, vomiting, or hematemesis; No diarrhea or constipation. No melena or hematochezia.  GENITOURINARY: No dysuria, frequency or hematuria  NEUROLOGICAL: No numbness or weakness  SKIN: No itching, rashes    Physical Exam:  General: WN/WD NAD  Neurology: A&Ox3, nonfocal, follows commands  Eyes: PERRLA/ EOMI  ENT/Neck: Neck supple, trachea midline, No JVD  Respiratory: CTA B/L, No wheezing, rales, rhonchi  CV: Normal rate regular rhythm, S1S2, no murmurs, rubs or gallops  Abdominal: Soft, NT, ND +BS,   Extremities: No edema, + peripheral pulses  Skin: No Rashes, Hematoma, Ecchymosis  Incisions: n/a  Tubes: n/a HPI:  Pt is a 89 y/o male with PMH of ESRD on HD MWF, HLD, HTN, CAD/MI, s/p TAVR 2016 in ER with c/o Lightheadedness, pre-syncope.   As per the pt wife, Pt was feeling his usual self when he had breakfast this morning. Pt then started to complain that he was feeling dizzy. As per the wife, pt then pointed to his head- felt dizzy- his eyes rolled back but pt didnt pass out. Denies any LOC, Head trauma, chest pain, SOB, seizure like activity. As per the wife- the episode lasted for a 2-3 seconds. Pt didnot have post ictal confusion and was at his baseline mental status after the episode. After the episode, pt complained of dizziness and vomited when he tried to eat. Wife took the pt vitals- /39, HR 41. Pt was brought to the ED for further evaluation.     VS on arrival noted for /86. Labs consistent with ESRD, Trop 0.16. Received doxycycline in the ED. (22 Mar 2020 02:13)    REVIEW OF SYSTEMS: see cc/HPI  CONSTITUTIONAL: No weakness, fevers or chills  EYES/ENT: No visual changes;  No vertigo or throat pain   NECK: No pain or stiffness  RESPIRATORY: No cough, wheezing, hemoptysis; No shortness of breath  CARDIOVASCULAR: No chest pain or palpitations, dizziness and pre-syncopal episode   GASTROINTESTINAL: No abdominal or epigastric pain. No nausea, vomiting, or hematemesis; No diarrhea or constipation. No melena or hematochezia.  GENITOURINARY: No dysuria, frequency or hematuria  NEUROLOGICAL: No numbness or weakness  SKIN: No itching, rashes    Physical Exam:  General: WN/WD NAD  Neurology: A&Ox3, nonfocal, follows commands  Eyes: PERRLA/ EOMI  ENT/Neck: Neck supple, trachea midline, No JVD  Respiratory: CTA B/L, No wheezing, rales, rhonchi  CV: Normal rate regular rhythm, S1S2, no murmurs, rubs or gallops  Abdominal: Soft, NT, ND +BS,   Extremities: No edema, + peripheral pulses  Skin: No Rashes, Hematoma, Ecchymosis  Incisions: n/a  Tubes: n/a    A/P  Pre-syncope r/o vasovagal r/o arrhythmia , elevated trop - possible related to ESRD  -tele for now   -2D echo  -check orthostatic   -IV fluids   -agree w/ PT eval     suspected COVID-19 / interstitial reta-hilar changes   -check swab results   -Azithromycin     ESRD on HD  -Renal eval     HTN / Dyslipidemia   -c/w outpatient Rx

## 2020-03-22 NOTE — H&P ADULT - NSHPPHYSICALEXAM_GEN_ALL_CORE
PHYSICAL EXAM:  GENERAL: NAD, well-developed  HEAD:  Atraumatic, Normocephalic  EYES: EOMI, PERRLA, conjunctiva and sclera clear  NECK: Supple, No JVD  CHEST/LUNG: Clear to auscultation bilaterally; No wheeze  HEART: Regular rate and rhythm; No murmurs, rubs, or gallops  ABDOMEN: Soft, Nontender, Nondistended; Bowel sounds present  EXTREMITIES:  2+ Peripheral Pulses, No clubbing, cyanosis, or edema  PSYCH: AAOx3  NEUROLOGY: non-focal  SKIN: No rashes or lesions ** This is a COVID rule out pt- PE to be completed by the attending.**

## 2020-03-22 NOTE — PATIENT PROFILE ADULT - NSPROREFERSVCHOMECARDIO_GEN_A_NUR
Spoke with pt today in f/u and she states she is doing good. She was not given any new medications upon discharge. Pt also states that she will call by the end of the week to gayle her f/u with Dr. Andujar. No other questions or concerns voiced by pt at this time. Mandy ROQUE  
no

## 2020-03-22 NOTE — H&P ADULT - HISTORY OF PRESENT ILLNESS
Pt is a 89 y/o male with PMH of ESRD on HD MWF, HLD, HTN, CAD/MI, s/p TAVR 2016 in ER with c/o LOC.   As per the pt wife, pt felt dizzy and lightheaded this morning.   per note from wife pt's eyes rolled back and was briefly unresponsive, but then woke up and was at normal MS.  Did not fall or hit head. Pt nauseated and vomiting off and on.  Had mild HA this morning, no HA now.  Denies any CP or SOB.  no abd pain.  no diarrhea.  No f/c. BP this morning at home was low - reportedly in 80's, wife trying to give some salt solution at home but pt too nauseated to tolerate much.    VS on arrival noted for /86. Labs consistent with ESRD, Trop 0.16. Received doxycycline in the ED. Pt is a 89 y/o male with PMH of ESRD on HD MWF, HLD, HTN, CAD/MI, s/p TAVR 2016 in ER with c/o Lightheadedness, pre-syncope.   As per the pt wife, Pt was feeling his usual self when he had breakfast this morning. Pt then started to complain that he was feeling dizzy. As per the wife, pt then pointed to his head- felt dizzy- his eyes rolled back but pt didnt pass out. Denies any LOC, Head trauma, chest pain, SOB, seizure like activity. As per the wife- the episode lasted for a 2-3 seconds. Pt didnot have post ictal confusion and was at his baseline mental status after the episode. After the episode, pt complained of dizziness and vomited when he tried to eat. Wife took the pt vitals- /39, HR 41. Pt was brought to the ED for further evaluation.     VS on arrival noted for /86. Labs consistent with ESRD, Trop 0.16. Received doxycycline in the ED. Pt is a 89 y/o male with PMH of ESRD on HD MWF, HLD, HTN, CAD/MI, s/p TAVR 2016 in ER with c/o Lightheadedness, pre-syncope.   As per the pt wife, Pt was feeling his usual self when he had breakfast this morning. Pt then started to complain that he was feeling dizzy. As per the wife, pt then pointed to his head- felt dizzy- his eyes rolled back but pt didnt pass out. Denies any LOC, Head trauma, chest pain, SOB, seizure like activity. As per the wife- the episode lasted for a 2-3 seconds. Pt didnot have post ictal confusion and was at his baseline mental status after the episode. After the episode, pt complained of dizziness and vomited when he tried to eat. Wife took the pt vitals- /39, HR 41. Pt was brought to the ED for further evaluation.     VS on arrival noted for /86. Labs with lymphopenia,  Elevated Cr (ESRD), Trop 0.16. Received doxycycline in the ED. Concern for COVID during ED admission.

## 2020-03-22 NOTE — H&P ADULT - ASSESSMENT
89 y/o male with PMH of ESRD on HD MWF, HLD, HTN, CAD/MI, s/p TAVR 2016 in ER with c/o LOC. 89 y/o male with PMH of ESRD on HD MWF, HLD, HTN, CAD/MI, s/p TAVR 2016 in ER with c/o pre-syncope, dizziness.     #) Episode of pre-syncope ( 2/2 R/o vasovagal vs orthostatics)  - Low suspicion for cardiac/ neuro etiology ( doubt seizures)  - Keep on telemetry for now  - CT head negative  - f/u echo  - Check orthostatics  - PT eval if needed.     #) Lymphopenia  - concern for COVID- f/u swab  - f/u official CXR read ( no peripheral opacities)  - start on azithromycin for now as per attending.    #) Troponemia ( 2/2 ESRD possible)  - No chest pain, concern for atypical ACS?  - As per ED, spoke with cardio- no intervention  - f/u trop in AM    #) h/o ESRD on HD  - last HD on friday  - Renal consulted    #)h/o HLD  - c/w statin    #) h/o HTN    #)h/o CAD/MI  - c/w home meds    #)diet- dash renal  #)dvt ppx- heparin  #)gi ppx- not needed  #) ambulate as tolerated  #)dispo- acute

## 2020-03-22 NOTE — H&P ADULT - NSICDXPASTMEDICALHX_GEN_ALL_CORE_FT
PAST MEDICAL HISTORY:  Aortic stenosis, severe     Chronic renal failure on HD    Colon perforation 2011    Congestive heart failure     ESRD (end stage renal disease) on dialysis     Gout     Hyperlipidemia     Hypertension     Myocardial infarction     TIA (transient ischemic attack) 2008    Urinary retention

## 2020-03-22 NOTE — CONSULT NOTE ADULT - ASSESSMENT
89 y/o M with ESRD - HD in hospital for Lightheadedness, pre-syncope.   PMH of ESRD on HD MWF, HLD, HTN, CAD/MI, s/p TAVR 2016    # ESRD - HD: outpt nephrologist, Dr. Ingram   - s/p HD on Friday. no need for HD today   - BP noted, keep current. monitor BP post HD   - Hb at goal, n need for DAVID   - Ca, Mg at goal. check Ph, iPTH   - COVID work up in progress.    will follow

## 2020-03-23 LAB
ALBUMIN SERPL ELPH-MCNC: 3.8 G/DL — SIGNIFICANT CHANGE UP (ref 3.5–5.2)
ALP SERPL-CCNC: 48 U/L — SIGNIFICANT CHANGE UP (ref 30–115)
ALT FLD-CCNC: 13 U/L — SIGNIFICANT CHANGE UP (ref 0–41)
ANION GAP SERPL CALC-SCNC: 21 MMOL/L — HIGH (ref 7–14)
AST SERPL-CCNC: 17 U/L — SIGNIFICANT CHANGE UP (ref 0–41)
BASOPHILS # BLD AUTO: 0.02 K/UL — SIGNIFICANT CHANGE UP (ref 0–0.2)
BASOPHILS NFR BLD AUTO: 0.2 % — SIGNIFICANT CHANGE UP (ref 0–1)
BILIRUB SERPL-MCNC: 1 MG/DL — SIGNIFICANT CHANGE UP (ref 0.2–1.2)
BUN SERPL-MCNC: 80 MG/DL — CRITICAL HIGH (ref 10–20)
CALCIUM SERPL-MCNC: 8.2 MG/DL — LOW (ref 8.5–10.1)
CHLORIDE SERPL-SCNC: 94 MMOL/L — LOW (ref 98–110)
CK MB CFR SERPL CALC: 6.9 NG/ML — HIGH (ref 0.6–6.3)
CK MB CFR SERPL CALC: 7 NG/ML — HIGH (ref 0.6–6.3)
CK SERPL-CCNC: 78 U/L — SIGNIFICANT CHANGE UP (ref 0–225)
CK SERPL-CCNC: 79 U/L — SIGNIFICANT CHANGE UP (ref 0–225)
CO2 SERPL-SCNC: 22 MMOL/L — SIGNIFICANT CHANGE UP (ref 17–32)
CREAT SERPL-MCNC: 6.3 MG/DL — CRITICAL HIGH (ref 0.7–1.5)
EOSINOPHIL # BLD AUTO: 0.01 K/UL — SIGNIFICANT CHANGE UP (ref 0–0.7)
EOSINOPHIL NFR BLD AUTO: 0.1 % — SIGNIFICANT CHANGE UP (ref 0–8)
GLUCOSE SERPL-MCNC: 173 MG/DL — HIGH (ref 70–99)
HCT VFR BLD CALC: 30.5 % — LOW (ref 42–52)
HGB BLD-MCNC: 10.4 G/DL — LOW (ref 14–18)
IMM GRANULOCYTES NFR BLD AUTO: 0.4 % — HIGH (ref 0.1–0.3)
LYMPHOCYTES # BLD AUTO: 0.81 K/UL — LOW (ref 1.2–3.4)
LYMPHOCYTES # BLD AUTO: 7 % — LOW (ref 20.5–51.1)
MCHC RBC-ENTMCNC: 32.7 PG — HIGH (ref 27–31)
MCHC RBC-ENTMCNC: 34.1 G/DL — SIGNIFICANT CHANGE UP (ref 32–37)
MCV RBC AUTO: 95.9 FL — HIGH (ref 80–94)
MONOCYTES # BLD AUTO: 0.61 K/UL — HIGH (ref 0.1–0.6)
MONOCYTES NFR BLD AUTO: 5.3 % — SIGNIFICANT CHANGE UP (ref 1.7–9.3)
NEUTROPHILS # BLD AUTO: 10.11 K/UL — HIGH (ref 1.4–6.5)
NEUTROPHILS NFR BLD AUTO: 87 % — HIGH (ref 42.2–75.2)
NRBC # BLD: 0 /100 WBCS — SIGNIFICANT CHANGE UP (ref 0–0)
PHOSPHATE SERPL-MCNC: 4.7 MG/DL — SIGNIFICANT CHANGE UP (ref 2.1–4.9)
PLATELET # BLD AUTO: 137 K/UL — SIGNIFICANT CHANGE UP (ref 130–400)
POTASSIUM SERPL-MCNC: 3.8 MMOL/L — SIGNIFICANT CHANGE UP (ref 3.5–5)
POTASSIUM SERPL-SCNC: 3.8 MMOL/L — SIGNIFICANT CHANGE UP (ref 3.5–5)
PROT SERPL-MCNC: 5.7 G/DL — LOW (ref 6–8)
RBC # BLD: 3.18 M/UL — LOW (ref 4.7–6.1)
RBC # FLD: 14.8 % — HIGH (ref 11.5–14.5)
SARS-COV-2 RNA SPEC QL NAA+PROBE: SIGNIFICANT CHANGE UP
SODIUM SERPL-SCNC: 137 MMOL/L — SIGNIFICANT CHANGE UP (ref 135–146)
TROPONIN T SERPL-MCNC: 0.3 NG/ML — CRITICAL HIGH
WBC # BLD: 11.61 K/UL — HIGH (ref 4.8–10.8)
WBC # FLD AUTO: 11.61 K/UL — HIGH (ref 4.8–10.8)

## 2020-03-23 PROCEDURE — 99223 1ST HOSP IP/OBS HIGH 75: CPT

## 2020-03-23 PROCEDURE — 70450 CT HEAD/BRAIN W/O DYE: CPT | Mod: 26

## 2020-03-23 PROCEDURE — 93306 TTE W/DOPPLER COMPLETE: CPT | Mod: 26

## 2020-03-23 PROCEDURE — 99233 SBSQ HOSP IP/OBS HIGH 50: CPT

## 2020-03-23 RX ORDER — ASPIRIN/CALCIUM CARB/MAGNESIUM 324 MG
162 TABLET ORAL DAILY
Refills: 0 | Status: DISCONTINUED | OUTPATIENT
Start: 2020-03-23 | End: 2020-03-25

## 2020-03-23 RX ORDER — ATORVASTATIN CALCIUM 80 MG/1
80 TABLET, FILM COATED ORAL AT BEDTIME
Refills: 0 | Status: DISCONTINUED | OUTPATIENT
Start: 2020-03-23 | End: 2020-03-25

## 2020-03-23 RX ADMIN — Medication 162 MILLIGRAM(S): at 17:46

## 2020-03-23 RX ADMIN — Medication 667 MILLIGRAM(S): at 17:21

## 2020-03-23 RX ADMIN — TAMSULOSIN HYDROCHLORIDE 0.4 MILLIGRAM(S): 0.4 CAPSULE ORAL at 22:15

## 2020-03-23 RX ADMIN — Medication 25 MILLIGRAM(S): at 17:21

## 2020-03-23 RX ADMIN — HEPARIN SODIUM 5000 UNIT(S): 5000 INJECTION INTRAVENOUS; SUBCUTANEOUS at 17:21

## 2020-03-23 RX ADMIN — Medication 3 MILLIGRAM(S): at 22:15

## 2020-03-23 RX ADMIN — Medication 667 MILLIGRAM(S): at 11:30

## 2020-03-23 RX ADMIN — Medication 667 MILLIGRAM(S): at 08:18

## 2020-03-23 RX ADMIN — LOSARTAN POTASSIUM 12.5 MILLIGRAM(S): 100 TABLET, FILM COATED ORAL at 05:33

## 2020-03-23 RX ADMIN — Medication 81 MILLIGRAM(S): at 11:30

## 2020-03-23 RX ADMIN — Medication 25 MILLIGRAM(S): at 05:33

## 2020-03-23 RX ADMIN — ATORVASTATIN CALCIUM 80 MILLIGRAM(S): 80 TABLET, FILM COATED ORAL at 22:15

## 2020-03-23 RX ADMIN — Medication 1 TABLET(S): at 11:29

## 2020-03-23 NOTE — CONSULT NOTE ADULT - SUBJECTIVE AND OBJECTIVE BOX
Neurology Consult    Patient is a 89y old  Male who presents with a chief complaint of Syncope (23 Mar 2020 09:34)    HPI:  Pt is a 89 y/o male with PMH of ESRD on HD MWF, HLD, HTN, CAD/MI, s/p TAVR 2016 in ER with c/o lightheadedness followed by 1 episode of presyncope while at home lasting 2 - 3 seconds with no actual LOC.  Pt unclear how he fell but had some mild head trauma.  HR at home 41.  Pt felt some dizziness but not persistent.  While in ED pt asymptomatic with no focal deficits. At baseline uses wheelchair for long distances but ambuates independently with use of a cane.  Denies any ataxia or incoordination.  Denies any dysmetria or dysdiadokinesia.  Denies any weakness or numbness. No diplopia or visual obscuration.  Denies any focal deficits currently.  Had attempted to get up out of bed earlier this morning and fell again.  While in ER pt noted to have lymphopenia and admitted as PUI for COVID.      PAST MEDICAL & SURGICAL HISTORY:  ESRD (end stage renal disease) on dialysis  Urinary retention  Colon perforation: 2011  TIA (transient ischemic attack): 2008  Gout  Chronic renal failure: on HD  Aortic stenosis, severe  Myocardial infarction  Hypertension  Hyperlipidemia  Congestive heart failure  History of cholecystectomy  Bilateral cataracts  History of hip replacement, total, left  History of colon resection: secondary to colon perforation  AV fistula: right upper arm  Gallbladder disorder: stent removal 10/2017  S/P TAVR (transcatheter aortic valve replacement)    FAMILY HISTORY:  Family history of emphysema (Father)  Family history of stroke (Mother)    Social History: (-) x 3    Allergies    Biaxin (Unknown)  Ceftin (Unknown)  Plavix (Unknown)  Vitamin D (Unknown)  Vitamin D3 (Unknown)    Intolerances    linezolid (Other)    MEDICATIONS  (STANDING):  aspirin enteric coated 81 milliGRAM(s) Oral daily  calcium acetate 667 milliGRAM(s) Oral three times a day with meals  chlorhexidine 4% Liquid 1 Application(s) Topical <User Schedule>  heparin  Injectable 5000 Unit(s) SubCutaneous every 12 hours  losartan 12.5 milliGRAM(s) Oral daily  melatonin 3 milliGRAM(s) Oral at bedtime  metoprolol tartrate 25 milliGRAM(s) Oral two times a day  multivitamin 1 Tablet(s) Oral daily  simvastatin 20 milliGRAM(s) Oral at bedtime  tamsulosin 0.4 milliGRAM(s) Oral at bedtime    MEDICATIONS  (PRN):    Review of systems:    Constitutional: as per HPI  Eyes: No eye pain or discharge  ENMT:  No difficulty hearing; No sinus or throat pain  Neck: No pain or stiffness  Respiratory: No cough, wheezing, chills or hemoptysis  Cardiovascular: No chest pain, palpitations, shortness of breath, dyspnea on exertion  Gastrointestinal: No abdominal pain, nausea, vomiting or hematemesis; No diarrhea or constipation.   Genitourinary: No dysuria, frequency, hematuria or incontinence  Neurological: As per HPI  Skin: No rashes or lesions   Endocrine: No heat or cold intolerance; No hair loss  Musculoskeletal: No joint pain or swelling  Psychiatric: No depression, anxiety, mood swings  Heme/Lymph: No easy bruising or bleeding gums    Vital Signs Last 24 Hrs  T(C): 36.3 (23 Mar 2020 06:21), Max: 37 (22 Mar 2020 21:36)  T(F): 97.4 (23 Mar 2020 06:21), Max: 98.6 (22 Mar 2020 21:36)  HR: 80 (23 Mar 2020 06:21) (67 - 80)  BP: 111/70 (23 Mar 2020 06:21) (111/70 - 176/68)  BP(mean): --  RR: 20 (23 Mar 2020 06:21) (18 - 20)  SpO2: 99% (22 Mar 2020 21:36) (99% - 99%)    Examination:  General:  Appearance is consistent with chronologic age.  No abnormal facies.  Gross skin survey within normal limits.    Cognitive/Language:  The patient is oriented to person, place, time and date.  Recent and remote memory intact.  Fund of knowledge is intact and normal.  Language with normal repetition, comprehension and naming.  Nondysarthric.    Eyes: intact VA, VFF.  EOMI w/o nystagmus, skew or reported double vision.  PERRL.  No ptosis/weakness of eyelid closure.    Face:  Facial sensation normal V1 - 3, no facial asymmetry.    Ears/Nose/Throat:  Hearing grossly intact b/l.  Palate elevates midline.  Tongue and uvula midline.   Motor examination:   Normal tone, bulk and range of motion.  No tenderness, twitching, tremors or involuntary movements.  Formal Muscle Strength Testing: (MRC grade R/L) 5/5 UE; 5/5 LE.  No observable drift.  Reflexes:   2+ b/l pectoralis, biceps, triceps, brachioradialis, patella and Achilles.  Plantar response downgoing b/l.  Jaw jerk, Reg, clonus absent.  Sensory examination:   Intact to light touch and pinprick, pain, temperature and proprioception and vibration in all extremities.  Cerebellum:   FTN/HKS intact with normal CAROLANN in all limbs.  No dysmetria or dysdiadokinesia.  Gait narrow based and normal.    NIHSS 0    Labs:   CBC Full  -  ( 23 Mar 2020 09:12 )  WBC Count : 11.61 K/uL  RBC Count : 3.18 M/uL  Hemoglobin : 10.4 g/dL  Hematocrit : 30.5 %  Platelet Count - Automated : 137 K/uL  Mean Cell Volume : 95.9 fL  Mean Cell Hemoglobin : 32.7 pg  Mean Cell Hemoglobin Concentration : 34.1 g/dL  Auto Neutrophil # : 10.11 K/uL  Auto Lymphocyte # : 0.81 K/uL  Auto Monocyte # : 0.61 K/uL  Auto Eosinophil # : 0.01 K/uL  Auto Basophil # : 0.02 K/uL  Auto Neutrophil % : 87.0 %  Auto Lymphocyte % : 7.0 %  Auto Monocyte % : 5.3 %  Auto Eosinophil % : 0.1 %  Auto Basophil % : 0.2 %    03-23    137  |  94<L>  |  80<HH>  ----------------------------<  173<H>  3.8   |  22  |  6.3<HH>    Ca    8.2<L>      23 Mar 2020 09:12  Phos  4.7     03-23  Mg     2.5     03-22    TPro  5.7<L>  /  Alb  3.8  /  TBili  1.0  /  DBili  x   /  AST  17  /  ALT  13  /  AlkPhos  48  03-23    LIVER FUNCTIONS - ( 23 Mar 2020 09:12 )  Alb: 3.8 g/dL / Pro: 5.7 g/dL / ALK PHOS: 48 U/L / ALT: 13 U/L / AST: 17 U/L / GGT: x           PT/INR - ( 21 Mar 2020 20:13 )   PT: 12.50 sec;   INR: 1.09 ratio       PTT - ( 21 Mar 2020 20:13 )  PTT:26.4 sec    Neuroimaging:  NCHCT: CT Head No Cont:   EXAM:  CT BRAIN          PROCEDURE DATE:  03/23/2020      INTERPRETATION:  Clinical History / Reason for exam: Fall. Trauma to head.    Technique: CT head without IV contrast performed. Multiple axial CT images of the head were obtained from the base of the skull to the vertex without the administration of IV contrast. Sagittal and coronal reformats were obtained.    Comparison: CT head March 21, 2020.    Findings:    Ventricular system, basal cisterns and sulcal patterns are symmetric and appropriate for patient's stated age.       No acute extra-axial collection.  No mass effect, midline shift or acute hemorrhage is seen.      New area of decreased attenuation within left cerebellum.    Patchy hypodensities in the periventricular and subcortical white matter without mass effect compatible with chronic microvascular changes.      No depressed skull fracture.     Paranasal sinuses and mastoid air cells are well-aerated.      Impression:  Since March 21, 2020:    New area of decreased attenuation within left cerebellum.consistent with acute-subacute left cerebellar infarction.     No evidence of acute intracranial hemorrhage or acute traumatic pathology.    Spoke with CARMELINA MCCABE on 3/23/2020 4:20 AM with readback.      VICKY NOVAK M.D., ATTENDING RADIOLOGIST  This document has been electronically signed. Mar 23 2020  4:21AM

## 2020-03-23 NOTE — PROGRESS NOTE ADULT - ASSESSMENT
89 y/o M with ESRD - HD in hospital for Lightheadedness, pre-syncope.   PMH of ESRD on HD MWF, HLD, HTN, CAD/MI, s/p TAVR 2016    # ESRD - HD: outpt nephrologist, Dr. Ingram   - s/p HD on Friday. no need for HD today/ will do HD in AM    - ruled out for Covid 19    - BP noted, keep current. monitor BP post HD   - Hb at goal, no need for DAVID   - Ca, Mg at goal. check Ph, iPTH      will follow

## 2020-03-23 NOTE — CHART NOTE - NSCHARTNOTEFT_GEN_A_CORE
D/w cardio fellow about the increasing trops 0.16 -> 0.25 -> 0.29, no chest pain and stable LVH on EKG.  No intervention as of now, recall as needed for worsening EKG or chest pain.  Most likely will have medical therapy due to age and comorbidities.  F/u am trops    Also patient would most likely have to be moved to CCU because once dialysis is needed it will have to be bedside as he is covid rule out.

## 2020-03-23 NOTE — PROGRESS NOTE ADULT - SUBJECTIVE AND OBJECTIVE BOX
Nephrology progress note    THIS IS AN INCOMPLETE NOTE . FULL NOTE TO FOLLOW SHORTLY    Patient is seen and examined, events over the last 24 h noted .    Allergies:  Biaxin (Unknown)  Ceftin (Unknown)  linezolid (Other)  Plavix (Unknown)  Vitamin D (Unknown)  Vitamin D3 (Unknown)    Hospital Medications:   MEDICATIONS  (STANDING):  aspirin enteric coated 81 milliGRAM(s) Oral daily  calcium acetate 667 milliGRAM(s) Oral three times a day with meals  chlorhexidine 4% Liquid 1 Application(s) Topical <User Schedule>  heparin  Injectable 5000 Unit(s) SubCutaneous every 12 hours  losartan 12.5 milliGRAM(s) Oral daily  melatonin 3 milliGRAM(s) Oral at bedtime  metoprolol tartrate 25 milliGRAM(s) Oral two times a day  multivitamin 1 Tablet(s) Oral daily  simvastatin 20 milliGRAM(s) Oral at bedtime  tamsulosin 0.4 milliGRAM(s) Oral at bedtime        VITALS:  T(F): 97.4 (03-23-20 @ 06:21), Max: 98.6 (03-22-20 @ 21:36)  HR: 80 (03-23-20 @ 06:21)  BP: 111/70 (03-23-20 @ 06:21)  RR: 20 (03-23-20 @ 06:21)  SpO2: 99% (03-22-20 @ 21:36)  Wt(kg): --        PHYSICAL EXAM:  Constitutional: NAD  HEENT: anicteric sclera, oropharynx clear, MMM  Neck: No JVD  Respiratory: CTAB, no wheezes, rales or rhonchi  Cardiovascular: S1, S2, RRR  Gastrointestinal: BS+, soft, NT/ND  Extremities: No cyanosis or clubbing. No peripheral edema  :  No ross.   Skin: No rashes    LABS:  03-22    143  |  97<L>  |  55<H>  ----------------------------<  145<H>  4.2   |  24  |  5.3<HH>    Ca    8.7      22 Mar 2020 06:29  Mg     2.5     03-22    TPro  7.1  /  Alb  4.5  /  TBili  1.1  /  DBili      /  AST  24  /  ALT  16  /  AlkPhos  51  03-22                          12.0   18.62 )-----------( 150      ( 22 Mar 2020 06:29 )             35.5       Urine Studies:      RADIOLOGY & ADDITIONAL STUDIES: Nephrology progress note  Patient is seen and examined, events over the last 24 h noted .  lying in bed comfortable  No events   being ruled out     Allergies:  Biaxin (Unknown)  Ceftin (Unknown)  linezolid (Other)  Plavix (Unknown)  Vitamin D (Unknown)  Vitamin D3 (Unknown)    Hospital Medications:   MEDICATIONS  (STANDING):  aspirin enteric coated 81 milliGRAM(s) Oral daily  calcium acetate 667 milliGRAM(s) Oral three times a day with meals  heparin  Injectable 5000 Unit(s) SubCutaneous every 12 hours  losartan 12.5 milliGRAM(s) Oral daily  melatonin 3 milliGRAM(s) Oral at bedtime  metoprolol tartrate 25 milliGRAM(s) Oral two times a day  multivitamin 1 Tablet(s) Oral daily  simvastatin 20 milliGRAM(s) Oral at bedtime  tamsulosin 0.4 milliGRAM(s) Oral at bedtime        VITALS:  T(F): 97.4 (03-23-20 @ 06:21), Max: 98.6 (03-22-20 @ 21:36)  HR: 80 (03-23-20 @ 06:21)  BP: 111/70 (03-23-20 @ 06:21)  RR: 20 (03-23-20 @ 06:21)  SpO2: 99% (03-22-20 @ 21:36)          PHYSICAL EXAM:  Constitutional: NAD  HEENT: anicteric sclera, oropharynx clear, MMM  Neck: No JVD  Respiratory: CTAB, no wheezes, rales or rhonchi  Cardiovascular: S1, S2, RRR  Gastrointestinal: BS+, soft, NT/ND  Extremities: No cyanosis or clubbing. No peripheral edema  :  No ross.   Skin: No rashes    LABS:  03-23    137  |  94<L>  |  80<HH>  ----------------------------<  173<H>  3.8   |  22  |  6.3<HH>    Ca    8.2<L>      23 Mar 2020 09:12  Phos  4.7     03-23  Mg     2.5     03-22    TPro  5.7<L>  /  Alb  3.8  /  TBili  1.0  /  DBili  x   /  AST  17  /  ALT  13  /  AlkPhos  48  03-23 03-22    143  |  97<L>  |  55<H>  ----------------------------<  145<H>  4.2   |  24  |  5.3<HH>    Ca    8.7      22 Mar 2020 06:29  Mg     2.5     03-22    TPro  7.1  /  Alb  4.5  /  TBili  1.1  /  DBili      /  AST  24  /  ALT  16  /  AlkPhos  51  03-22                          12.0   18.62 )-----------( 150      ( 22 Mar 2020 06:29 )             35.5     COVID-19 PCR . (03.22.20 @ 02:30)    COVID-19 PCR: NotDetec: LDT - Laboratory Developed Test As with all clinical testing, results  should be correlated with clinical findings.  This test has been validated by [employer] to be accurate;  though it has not been FDA cleared/approved by the usual pathway.  As with all laboratory tests, results should be correlated with clinical  findings.      RADIOLOGY & ADDITIONAL STUDIES:  < from: Xray Chest 1 View AP/PA (03.21.20 @ 22:53) >  Impression:      Bibasilar opacities. No pleural effusion or pneumothorax.    < end of copied text >

## 2020-03-23 NOTE — CONSULT NOTE ADULT - ASSESSMENT
89 yo w/ h/o ESRD on HD MWF, HLD, HTN, CAD/MI, s/p TAVR currently admitted with recurrent falls nonfocal on exam but with acute/subacute cerebellar stroke.  Recommend r/o posterior circulation pathology vs cardiac source.      Recommendations:  - increase ASA to 162 mg QD  - Lipitor 80 mg  - Echo   - cardiac telemetry  - CTA H/N- need to coordinate with dialysis  - no MRI at this time since pt is PUI and would not   - PT/Rehab eval once COVID clear  - if CTA/Echo (-), no further inpt neurologic w/u and f/u in stroke clinic in 2 wks if COVID clear

## 2020-03-23 NOTE — PROGRESS NOTE ADULT - ASSESSMENT
A 89 yo male with PMH of HTN, CAD s/p PCI, ESRD on HD MWF, TAVR 2016 came to ER with c/o Lightheadedness, pre-syncope. As per the pt wife, he was fine in the morning then he felt severe dizzy and spinning sensation as he is going to pass out. No LOC, Head trauma, chest pain, SOB, seizure like activity. the episode lasted for a 2-3 seconds. After the episode, pt complained of dizziness and vomited when he tried to eat. Wife took the pt vitals- /39, HR 41. Pt was brought to the ED for further evaluation. In the ED /86, Head CT was negative. Troponin was 0.16  He was admitted for further evaluation and to rule out COVID-19. In the floor Troponin was increase to 0.29, no chest pain, no acute EKG changes. He had fall in the room, Repeated Head CT showed acute/subacute left cerebellar infarct.     A/P:   Acute/subacute left cerebellar infarct:   Dizziness improved. Neuro exam showed no focal deficit.   Head CT showed as above.   Neurology recommended to increase ASA to 162, Head and Neck CT angio.   PT and rehab consult. Admit to telemetry or stroke unit.   COVID-19 ruled out. Discontinue airborne isolation.     Elevated troponin: likely from cerebellar infarction  CAD : No chest pain, no acute EKG changes.   Continue ASA, Metoprolol and Lipitor.    ESRD on HD via right AVF.   Continue HD as scheduled.   Nephrology follow up.     HTN: continue Losartan and Metoprolol.     #Progress Note Handoff:  Pending (specify):  PT, rehab,   Family discussion:  Disposition: unknown.

## 2020-03-23 NOTE — PROGRESS NOTE ADULT - SUBJECTIVE AND OBJECTIVE BOX
RAMIROALLEN TORO  89y  Male      Patient is a 89y old  Male who presents with a chief complaint of Syncope (23 Mar 2020 11:51)      INTERVAL HPI/OVERNIGHT EVENTS:  He feels ok, dizziness resolved.   Vital Signs Last 24 Hrs  T(C): 36.7 (23 Mar 2020 13:13), Max: 37 (22 Mar 2020 21:36)  T(F): 98 (23 Mar 2020 13:13), Max: 98.6 (22 Mar 2020 21:36)  HR: 66 (23 Mar 2020 13:13) (66 - 80)  BP: 154/67 (23 Mar 2020 13:13) (111/70 - 176/68)  BP(mean): --  RR: 20 (23 Mar 2020 13:13) (19 - 20)  SpO2: 99% (23 Mar 2020 13:13) (99% - 99%)      03-23-20 @ 07:01  -  03-23-20 @ 13:59  --------------------------------------------------------  IN: 336 mL / OUT: 0 mL / NET: 336 mL            Consultant(s) Notes Reviewed:  [x ] YES  [ ] NO          MEDICATIONS  (STANDING):  aspirin enteric coated 162 milliGRAM(s) Oral daily  atorvastatin 80 milliGRAM(s) Oral at bedtime  calcium acetate 667 milliGRAM(s) Oral three times a day with meals  chlorhexidine 4% Liquid 1 Application(s) Topical <User Schedule>  heparin  Injectable 5000 Unit(s) SubCutaneous every 12 hours  losartan 12.5 milliGRAM(s) Oral daily  melatonin 3 milliGRAM(s) Oral at bedtime  metoprolol tartrate 25 milliGRAM(s) Oral two times a day  multivitamin 1 Tablet(s) Oral daily  tamsulosin 0.4 milliGRAM(s) Oral at bedtime    MEDICATIONS  (PRN):      LABS                          10.4   11.61 )-----------( 137      ( 23 Mar 2020 09:12 )             30.5     03-23    137  |  94<L>  |  80<HH>  ----------------------------<  173<H>  3.8   |  22  |  6.3<HH>    Ca    8.2<L>      23 Mar 2020 09:12  Phos  4.7     03-23  Mg     2.5     03-22    TPro  5.7<L>  /  Alb  3.8  /  TBili  1.0  /  DBili  x   /  AST  17  /  ALT  13  /  AlkPhos  48  03-23        PT/INR - ( 21 Mar 2020 20:13 )   PT: 12.50 sec;   INR: 1.09 ratio         PTT - ( 21 Mar 2020 20:13 )  PTT:26.4 sec  Lactate Trend    CARDIAC MARKERS ( 23 Mar 2020 09:12 )  x     / 0.30 ng/mL / 79 U/L / x     / 6.9 ng/mL  CARDIAC MARKERS ( 22 Mar 2020 23:30 )  x     / x     / 78 U/L / x     / 7.0 ng/mL  CARDIAC MARKERS ( 22 Mar 2020 20:39 )  x     / 0.29 ng/mL / 78 U/L / x     / 7.3 ng/mL  CARDIAC MARKERS ( 22 Mar 2020 06:29 )  x     / 0.25 ng/mL / x     / x     / x      CARDIAC MARKERS ( 21 Mar 2020 20:13 )  x     / 0.16 ng/mL / x     / x     / x          CAPILLARY BLOOD GLUCOSE      POCT Blood Glucose.: 119 mg/dL (21 Mar 2020 19:10)        RADIOLOGY & ADDITIONAL TESTS:    Imaging Personally Reviewed:  [ ] YES  [ ] NO    HEALTH ISSUES - PROBLEM Dx:        PHYSICAL EXAM:  GENERAL: NAD, well-developed  HEAD:  Atraumatic, Normocephalic  EYES: EOMI, PERRLA, conjunctiva and sclera clear  NECK: Supple, No JVD  CHEST/LUNG: Clear to auscultation bilaterally; No wheeze  HEART: Regular rate and rhythm; S1 S2  ABDOMEN: Soft, Nontender, Nondistended; Bowel sounds present  EXTREMITIES:  2+ Peripheral Pulses, RUE AVF.   PSYCH: AAOx3  NEUROLOGY: non-focal  SKIN: No rashes or lesions

## 2020-03-23 NOTE — CHART NOTE - NSCHARTNOTEFT_GEN_A_CORE
CCU Transfer Note    Transfer to : CCU    Transfer from: (  ) Medicine    (  ) Telemetry    (  ) RCU                               (  ) Palliative    (  ) Stroke Unit    (  ) MICU    ( x ) ____CEU______________        Sign out given to: pending bed placement    HPI /COURSE:       89 y/o male with PMH of ESRD on HD MWF, HLD, HTN, CAD/MI, s/p TAVR 2016 in ER with c/o Lightheadedness, pre-syncope. As per the pt wife, Pt was feeling his usual self when he had breakfast this morning. Pt then started to complain that he was feeling dizzy. As per the wife, pt then pointed to his head- felt dizzy- his eyes rolled back but pt didnt pass out. Denies any LOC, Head trauma, chest pain, SOB, seizure like activity. As per the wife- the episode lasted for a 2-3 seconds. Pt didnot have post ictal confusion and was at his baseline mental status after the episode. After the episode, pt complained of dizziness and vomited when he tried to eat. Wife took the pt vitals- /39, HR 41. Pt was brought to the ED for further evaluation.     VS on arrival noted for /86. Labs with lymphopenia,  Elevated Cr (ESRD), Trop 0.16. Received doxycycline in the ED. Concern for COVID during ED admission made him admitted to CEU. Patient needs dialysis and since he is being ruled out for COVID 19, he would be needing it at bedside.      Night team 3/22/20 spoke to cardio fellow about the increasing trops 0.16 -> 0.25 -> 0.29, no chest pain and stable LVH on EKG.  No intervention as of now by them, recall as needed for worsening EKG or chest pain.  Most likely will have medical therapy due to age and comorbidities.  F/u am trops    Patient also had a fall at about 3:30 am and stated that he hit the back of his head.  CT head was done showing a left cerebellar infarct.  Upon discussion with radiology it was determined that the finding was not trauma related and there was no blood seen.  Findings are likely evolution of a stroke before admission and the first CT done on 3/21 may have been too early to see it.  Infarct may have led to his imbalance causing the fall.  Neurology has been consulted          ASSESSMENT & PLAN:       #) Episode of pre-syncope ( 2/2 R/o vasovagal vs orthostatics)  - Low suspicion for cardiac/ neuro etiology ( doubt seizures)  - CT head negative  - f/u echo  - Check orthostatics  - PT eval if needed.     #) Lymphopenia  - concern for COVID- f/u swab  - f/u official CXR read ( no peripheral opacities)  - start on azithromycin for now as per attending.    #) Troponemia ( 2/2 ESRD possible)  - No chest pain, concern for atypical ACS?  - As per ED, spoke with cardio- no intervention  - f/u trop in AM    #) h/o ESRD on HD  - last HD on friday  - Renal consulted, would be transferred to CCU for HD    #)h/o HLD  - c/w statin    #) h/o HTN    #)h/o CAD/MI  - c/w home meds    #)diet- dash renal  #)dvt ppx- heparin  #)gi ppx- not needed  #) ambulate as tolerated  #)dispo- acute          FOR FOLLOW UP:  [ ] COVID 19 results  [ ] neurology and cardiology recs  [ ] am trops CCU Transfer Note    Transfer to : CCU or CEU room with HD     Transfer from: (  ) Medicine    (  ) Telemetry    (  ) RCU                               (  ) Palliative    (  ) Stroke Unit    (  ) MICU    ( x ) ____CEU______________        Sign out given to: pending bed placement    HPI /COURSE:       89 y/o male with PMH of ESRD on HD MWF, HLD, HTN, CAD/MI, s/p TAVR 2016 in ER with c/o Lightheadedness, pre-syncope. As per the pt wife, Pt was feeling his usual self when he had breakfast this morning. Pt then started to complain that he was feeling dizzy. As per the wife, pt then pointed to his head- felt dizzy- his eyes rolled back but pt didnt pass out. Denies any LOC, Head trauma, chest pain, SOB, seizure like activity. As per the wife- the episode lasted for a 2-3 seconds. Pt didnot have post ictal confusion and was at his baseline mental status after the episode. After the episode, pt complained of dizziness and vomited when he tried to eat. Wife took the pt vitals- /39, HR 41. Pt was brought to the ED for further evaluation.     VS on arrival noted for /86. Labs with lymphopenia,  Elevated Cr (ESRD), Trop 0.16. Received doxycycline in the ED. Concern for COVID during ED admission made him admitted to CEU. Patient needs dialysis and since he is being ruled out for COVID 19, he would be needing it at bedside.      Night team 3/22/20 spoke to cardio fellow about the increasing trops 0.16 -> 0.25 -> 0.29, no chest pain and stable LVH on EKG.  No intervention as of now by them, recall as needed for worsening EKG or chest pain.  Most likely will have medical therapy due to age and comorbidities.  F/u am trops    Patient also had a fall at about 3:30 am and stated that he hit the back of his head.  CT head was done showing a left cerebellar infarct.  Upon discussion with radiology it was determined that the finding was not trauma related and there was no blood seen.  Findings are likely evolution of a stroke before admission and the first CT done on 3/21 may have been too early to see it.  Infarct may have led to his imbalance causing the fall.  Neurology has been consulted          ASSESSMENT & PLAN:       #) Episode of pre-syncope ( 2/2 R/o vasovagal vs orthostatics)  - Low suspicion for cardiac/ neuro etiology ( doubt seizures)  - CT head negative  - f/u echo  - Check orthostatics  - PT eval if needed.     #) Lymphopenia  - concern for COVID- f/u swab  - f/u official CXR read ( no peripheral opacities)  - start on azithromycin for now as per attending.    #) Troponemia ( 2/2 ESRD possible)  - No chest pain, concern for atypical ACS?  - As per ED, spoke with cardio- no intervention  - f/u trop in AM    #) h/o ESRD on HD  - last HD on friday  - Renal consulted, would be transferred to CCU for HD    #)h/o HLD  - c/w statin    #) h/o HTN    #)h/o CAD/MI  - c/w home meds    #)diet- dash renal  #)dvt ppx- heparin  #)gi ppx- not needed  #) ambulate as tolerated  #)dispo- acute          FOR FOLLOW UP:  [ ] COVID 19 results  [ ] neurology and cardiology recs  [ ] am trops CCU Transfer Note    Transfer to : Telemetry     Transfer from: (  ) Medicine    (  ) Telemetry    (  ) RCU                               (  ) Palliative    (  ) Stroke Unit    (  ) MICU    ( x ) ____CEU______________        Sign out given to: pending bed placement    HPI /COURSE:       89 y/o male with PMH of ESRD on HD MWF, HLD, HTN, CAD/MI, s/p TAVR 2016 in ER with c/o Lightheadedness, pre-syncope. As per the pt wife, Pt was feeling his usual self when he had breakfast this morning. Pt then started to complain that he was feeling dizzy. As per the wife, pt then pointed to his head- felt dizzy- his eyes rolled back but pt didnt pass out. Denies any LOC, Head trauma, chest pain, SOB, seizure like activity. As per the wife- the episode lasted for a 2-3 seconds. Pt didnot have post ictal confusion and was at his baseline mental status after the episode. After the episode, pt complained of dizziness and vomited when he tried to eat. Wife took the pt vitals- /39, HR 41. Pt was brought to the ED for further evaluation.     VS on arrival noted for /86. Labs with lymphopenia,  Elevated Cr (ESRD), Trop 0.16. Received doxycycline in the ED. Concern for COVID during ED admission made him admitted to CEU. Patient needs dialysis and since he is being ruled out for COVID 19, he would be needing it at bedside.      Night team 3/22/20 spoke to cardio fellow about the increasing trops 0.16 -> 0.25 -> 0.29, no chest pain and stable LVH on EKG.  No intervention as of now by them, recall as needed for worsening EKG or chest pain.  Most likely will have medical therapy due to age and comorbidities.      Patient also had a fall at about 3:30 am and stated that he hit the back of his head.  CT head was done showing a left cerebellar infarct.  Upon discussion with radiology it was determined that the finding was not trauma related and there was no blood seen.  Findings are likely evolution of a stroke before admission and the first CT done on 3/21 may have been too early to see it.  Infarct may have led to his imbalance causing the fall.  Neurology has been consulted          ASSESSMENT & PLAN:       #) Episode of pre-syncope ( 2/2 R/o vasovagal vs orthostatics)  - Low suspicion for cardiac/ neuro etiology ( doubt seizures)  - CT head negative  - f/u echo  - Check orthostatics  - PT eval if needed.     #) Lymphopenia  - concern for COVID- f/u swab  - f/u official CXR read ( no peripheral opacities)  - start on azithromycin for now as per attending.    #) Troponemia ( 2/2 ESRD possible)  - No chest pain, concern for atypical ACS?  - As per ED, spoke with cardio- no intervention  - f/u trop in AM    #) h/o ESRD on HD  - last HD on friday  - Renal consulted, would be transferred to     #)h/o HLD  - c/w statin    #) h/o HTN    #)h/o CAD/MI  - c/w home meds    #)diet- dash renal  #)dvt ppx- heparin  #)gi ppx- not needed  #) ambulate as tolerated  #)dispo- acute          FOR FOLLOW UP:  [ ] COVID 19 results NEGATIVE  [ ] neurology and cardiology recs  [ ] am trops

## 2020-03-24 LAB
ALBUMIN SERPL ELPH-MCNC: 3.6 G/DL — SIGNIFICANT CHANGE UP (ref 3.5–5.2)
ALP SERPL-CCNC: 43 U/L — SIGNIFICANT CHANGE UP (ref 30–115)
ALT FLD-CCNC: 12 U/L — SIGNIFICANT CHANGE UP (ref 0–41)
ANION GAP SERPL CALC-SCNC: 19 MMOL/L — HIGH (ref 7–14)
AST SERPL-CCNC: 14 U/L — SIGNIFICANT CHANGE UP (ref 0–41)
BASOPHILS # BLD AUTO: 0.03 K/UL — SIGNIFICANT CHANGE UP (ref 0–0.2)
BASOPHILS NFR BLD AUTO: 0.3 % — SIGNIFICANT CHANGE UP (ref 0–1)
BILIRUB SERPL-MCNC: 1 MG/DL — SIGNIFICANT CHANGE UP (ref 0.2–1.2)
BUN SERPL-MCNC: 89 MG/DL — CRITICAL HIGH (ref 10–20)
CALCIUM SERPL-MCNC: 8 MG/DL — LOW (ref 8.4–10.5)
CALCIUM SERPL-MCNC: 8 MG/DL — LOW (ref 8.5–10.1)
CHLORIDE SERPL-SCNC: 93 MMOL/L — LOW (ref 98–110)
CHOLEST SERPL-MCNC: 114 MG/DL — SIGNIFICANT CHANGE UP (ref 100–200)
CO2 SERPL-SCNC: 22 MMOL/L — SIGNIFICANT CHANGE UP (ref 17–32)
CREAT SERPL-MCNC: 6.6 MG/DL — CRITICAL HIGH (ref 0.7–1.5)
EOSINOPHIL # BLD AUTO: 0.17 K/UL — SIGNIFICANT CHANGE UP (ref 0–0.7)
EOSINOPHIL NFR BLD AUTO: 1.9 % — SIGNIFICANT CHANGE UP (ref 0–8)
ESTIMATED AVERAGE GLUCOSE: 77 MG/DL — SIGNIFICANT CHANGE UP (ref 68–114)
GLUCOSE SERPL-MCNC: 98 MG/DL — SIGNIFICANT CHANGE UP (ref 70–99)
HBA1C BLD-MCNC: 4.3 % — SIGNIFICANT CHANGE UP (ref 4–5.6)
HCT VFR BLD CALC: 28.5 % — LOW (ref 42–52)
HDLC SERPL-MCNC: 36 MG/DL — LOW
HGB BLD-MCNC: 9.3 G/DL — LOW (ref 14–18)
IMM GRANULOCYTES NFR BLD AUTO: 0.3 % — SIGNIFICANT CHANGE UP (ref 0.1–0.3)
LIPID PNL WITH DIRECT LDL SERPL: 61 MG/DL — SIGNIFICANT CHANGE UP (ref 4–129)
LYMPHOCYTES # BLD AUTO: 1.32 K/UL — SIGNIFICANT CHANGE UP (ref 1.2–3.4)
LYMPHOCYTES # BLD AUTO: 14.6 % — LOW (ref 20.5–51.1)
MCHC RBC-ENTMCNC: 30.4 PG — SIGNIFICANT CHANGE UP (ref 27–31)
MCHC RBC-ENTMCNC: 32.6 G/DL — SIGNIFICANT CHANGE UP (ref 32–37)
MCV RBC AUTO: 93.1 FL — SIGNIFICANT CHANGE UP (ref 80–94)
MONOCYTES # BLD AUTO: 0.73 K/UL — HIGH (ref 0.1–0.6)
MONOCYTES NFR BLD AUTO: 8.1 % — SIGNIFICANT CHANGE UP (ref 1.7–9.3)
NEUTROPHILS # BLD AUTO: 6.78 K/UL — HIGH (ref 1.4–6.5)
NEUTROPHILS NFR BLD AUTO: 74.8 % — SIGNIFICANT CHANGE UP (ref 42.2–75.2)
NRBC # BLD: 0 /100 WBCS — SIGNIFICANT CHANGE UP (ref 0–0)
PLATELET # BLD AUTO: 136 K/UL — SIGNIFICANT CHANGE UP (ref 130–400)
POTASSIUM SERPL-MCNC: 4 MMOL/L — SIGNIFICANT CHANGE UP (ref 3.5–5)
POTASSIUM SERPL-SCNC: 4 MMOL/L — SIGNIFICANT CHANGE UP (ref 3.5–5)
PROT SERPL-MCNC: 5.6 G/DL — LOW (ref 6–8)
PTH-INTACT FLD-MCNC: 597 PG/ML — HIGH (ref 15–65)
RBC # BLD: 3.06 M/UL — LOW (ref 4.7–6.1)
RBC # FLD: 14.5 % — SIGNIFICANT CHANGE UP (ref 11.5–14.5)
SODIUM SERPL-SCNC: 134 MMOL/L — LOW (ref 135–146)
TOTAL CHOLESTEROL/HDL RATIO MEASUREMENT: 3.2 RATIO — LOW (ref 4–5.5)
TRIGL SERPL-MCNC: 125 MG/DL — SIGNIFICANT CHANGE UP (ref 10–149)
WBC # BLD: 9.06 K/UL — SIGNIFICANT CHANGE UP (ref 4.8–10.8)
WBC # FLD AUTO: 9.06 K/UL — SIGNIFICANT CHANGE UP (ref 4.8–10.8)

## 2020-03-24 PROCEDURE — 99233 SBSQ HOSP IP/OBS HIGH 50: CPT

## 2020-03-24 PROCEDURE — 71045 X-RAY EXAM CHEST 1 VIEW: CPT | Mod: 26

## 2020-03-24 PROCEDURE — 70498 CT ANGIOGRAPHY NECK: CPT | Mod: 26

## 2020-03-24 PROCEDURE — 70496 CT ANGIOGRAPHY HEAD: CPT | Mod: 26

## 2020-03-24 RX ADMIN — Medication 1 TABLET(S): at 13:40

## 2020-03-24 RX ADMIN — HEPARIN SODIUM 5000 UNIT(S): 5000 INJECTION INTRAVENOUS; SUBCUTANEOUS at 05:47

## 2020-03-24 RX ADMIN — Medication 162 MILLIGRAM(S): at 13:39

## 2020-03-24 RX ADMIN — HEPARIN SODIUM 5000 UNIT(S): 5000 INJECTION INTRAVENOUS; SUBCUTANEOUS at 18:42

## 2020-03-24 RX ADMIN — TAMSULOSIN HYDROCHLORIDE 0.4 MILLIGRAM(S): 0.4 CAPSULE ORAL at 21:50

## 2020-03-24 RX ADMIN — Medication 3 MILLIGRAM(S): at 21:50

## 2020-03-24 RX ADMIN — LOSARTAN POTASSIUM 12.5 MILLIGRAM(S): 100 TABLET, FILM COATED ORAL at 06:52

## 2020-03-24 RX ADMIN — Medication 667 MILLIGRAM(S): at 13:39

## 2020-03-24 RX ADMIN — Medication 25 MILLIGRAM(S): at 18:44

## 2020-03-24 RX ADMIN — Medication 667 MILLIGRAM(S): at 18:42

## 2020-03-24 RX ADMIN — Medication 25 MILLIGRAM(S): at 05:50

## 2020-03-24 RX ADMIN — CHLORHEXIDINE GLUCONATE 1 APPLICATION(S): 213 SOLUTION TOPICAL at 05:47

## 2020-03-24 RX ADMIN — ATORVASTATIN CALCIUM 80 MILLIGRAM(S): 80 TABLET, FILM COATED ORAL at 21:50

## 2020-03-24 NOTE — CONSULT NOTE ADULT - SUBJECTIVE AND OBJECTIVE BOX
Patient is a 89y old  Male who presents with a chief complaint of Syncope (24 Mar 2020 10:16)    HPI:  Pt is a 89 y/o male with PMH of ESRD on HD MWF, HLD, HTN, CAD/MI, s/p TAVR 2016 in ER with c/o Lightheadedness, pre-syncope.   As per the pt wife, Pt was feeling his usual self when he had breakfast this morning. Pt then started to complain that he was feeling dizzy. As per the wife, pt then pointed to his head- felt dizzy- his eyes rolled back but pt didnt pass out. Denies any LOC, Head trauma, chest pain, SOB, seizure like activity. As per the wife- the episode lasted for a 2-3 seconds. Pt didnot have post ictal confusion and was at his baseline mental status after the episode. After the episode, pt complained of dizziness and vomited when he tried to eat. Wife took the pt vitals- /39, HR 41. Pt was brought to the ED for further evaluation.     VS on arrival noted for /86. Labs with lymphopenia,  Elevated Cr (ESRD), Trop 0.16. Received doxycycline in the ED. Concern for COVID during ED admission. (22 Mar 2020 02:13)      PAST MEDICAL & SURGICAL HISTORY:  ESRD (end stage renal disease) on dialysis  Urinary retention  Colon perforation: 2011  TIA (transient ischemic attack): 2008  Gout  Chronic renal failure: on HD  Aortic stenosis, severe  Myocardial infarction  Hypertension  Hyperlipidemia  Congestive heart failure  History of cholecystectomy  Bilateral cataracts  History of hip replacement, total, left  History of colon resection: secondary to colon perforation  AV fistula: right upper arm  Gallbladder disorder: stent removal 10/2017  S/P TAVR (transcatheter aortic valve replacement)      Hospital Course:  Pre-syncope likely 2/2 acute/subacute L cerebellar infarct   - CT head 3/23 - consistent with acute-subacute left cerebellar infarction.   - f/u echo- severe pulm HTN, grade 2 diastolic dysfunction, EF 52%   - , atorvastatin 80   - Check orthostatics - negative   - f/u CTA head/neck   - PT eval     #) Lymphopenia, resolved   - COVID 19/RSV/Flu negative   - f/u official CXR read (no peripheral opacities)  - start on azithromycin for now as per attending.    #) Troponemia, increasing   - No chest pain, concern for atypical ACS?  - As per ED, spoke with cardio- no intervention  - f/u trop at 11 AM      #) h/o ESRD on HD  - last HD on Friday  - Renal consulted     #)h/o HLD  - c/w statin    #) h/o HTN    #)h/o CAD/MI  - c/w home meds    TODAY'S SUBJECTIVE & REVIEW OF SYMPTOMS:     Constitutional Weakness   Cardio WNL   Resp WNL   GI WNL  Heme WNL  Endo WNL  Skin WNL  MSK WNL  Neuro WNL  Cognitive WNL  Psych WNL      MEDICATIONS  (STANDING):  aspirin enteric coated 162 milliGRAM(s) Oral daily  atorvastatin 80 milliGRAM(s) Oral at bedtime  calcium acetate 667 milliGRAM(s) Oral three times a day with meals  chlorhexidine 4% Liquid 1 Application(s) Topical <User Schedule>  heparin  Injectable 5000 Unit(s) SubCutaneous every 12 hours  losartan 12.5 milliGRAM(s) Oral daily  melatonin 3 milliGRAM(s) Oral at bedtime  metoprolol tartrate 25 milliGRAM(s) Oral two times a day  multivitamin 1 Tablet(s) Oral daily  tamsulosin 0.4 milliGRAM(s) Oral at bedtime    MEDICATIONS  (PRN):      FAMILY HISTORY:  Family history of emphysema (Father)  Family history of stroke (Mother)      Allergies    Biaxin (Unknown)  Ceftin (Unknown)  Plavix (Unknown)  Vitamin D (Unknown)  Vitamin D3 (Unknown)    Intolerances    linezolid (Other)      SOCIAL HISTORY:    [    ] Etoh  [    ] Smoking  [    ] Substance abuse     Home Environment:  [    ] Home Alone  [x    ] Lives with Family WIFE  [    ] Home Health Aid    Dwelling:  [    ] Apartment  [  x  ] Private House  [    ] Adult Home  [    ] Skilled Nursing Facility      [    ] Short Term  [    ] Long Term  [  x  ] Stairs 5 OUTSIDE                          [    ] Elevator     FUNCTIONAL STATUS PTA: (Check all that apply)  Ambulation: [x     ]Independent    [    ] Dependent     [    ] Non-Ambulatory  Assistive Device: [  x  ] SA Cane  [    ]  Q Cane  [ x   ] Walker  [    ]  Wheelchair  ADL : [ x   ] Independent  [    ]  Dependent       Vital Signs Last 24 Hrs  T(C): 36.9 (24 Mar 2020 14:14), Max: 37.1 (23 Mar 2020 21:31)  T(F): 98.5 (24 Mar 2020 14:14), Max: 98.7 (23 Mar 2020 21:31)  HR: 60 (24 Mar 2020 13:32) (58 - 68)  BP: 129/60 (24 Mar 2020 13:32) (129/60 - 171/75)  BP(mean): --  RR: 18 (24 Mar 2020 14:14) (18 - 20)  SpO2: 96% (24 Mar 2020 01:43) (96% - 96%)      PHYSICAL EXAM: Alert & Oriented X3  GENERAL: NAD, well-groomed, well-developed  HEAD:  Atraumatic, Normocephalic  EYES: EOMI, PERRLA, conjunctiva and sclera clear  NECK: Supple  CHEST/LUNG: Clear bilaterally  HEART: Regular rate and rhythm  ABDOMEN: Soft, Nontender, Nondistended; Bowel sounds present  EXTREMITIES:  no calf tenderness,no edema BLES    NERVOUS SYSTEM:  Cranial Nerves 2-12 intact [ x   ] Abnormal  [    ]  ROM: WFL all extremities [  x  ]  Abnormal [     ]  Motor Strength: WFL all extremities  [ x   ]  Abnormal [    ]  Sensation: intact to light touch [  x  ] Abnormal [    ]    FUNCTIONAL STATUS:  Bed Mobility: [   ]  Independent [    ]  Supervision [  x  ]  Needs Assistance [  ]  N/A  Transfers: [    ]  Independent [    ]  Supervision [ x   ]  Needs Assistance [    ]  N/A    Ambulation:  [    ]  Independent [    ]  Supervision [    ]  Needs Assistance [x    ]  N/A   ADL:  [    ]   Independent [    ] Requires Assistance [ x   ] N/A       LABS:                        9.5    9.79  )-----------( 146      ( 24 Mar 2020 11:00 )             28.9     03-24    134<L>  |  92<L>  |  90<HH>  ----------------------------<  105<H>  3.9   |  22  |  6.9<HH>    Ca    8.0<L>      24 Mar 2020 11:00  Phos  4.9     03-24    TPro  5.6<L>  /  Alb  3.6  /  TBili  1.0  /  DBili  x   /  AST  14  /  ALT  12  /  AlkPhos  43  03-24          RADIOLOGY & ADDITIONAL STUDIES:

## 2020-03-24 NOTE — PHYSICAL THERAPY INITIAL EVALUATION ADULT - SPECIFY REASON(S)
Pt returned from hemodialysis and requested to hold off physical therapy at this time due to c/o max fatigue.

## 2020-03-24 NOTE — PROGRESS NOTE ADULT - SUBJECTIVE AND OBJECTIVE BOX
seen and examined  no distress   lying comfortable         PAST HISTORY  --------------------------------------------------------------------------------  No significant changes to PMH, PSH, FHx, SHx, unless otherwise noted    ALLERGIES & MEDICATIONS  --------------------------------------------------------------------------------  Allergies    Biaxin (Unknown)  Ceftin (Unknown)  Plavix (Unknown)  Vitamin D (Unknown)  Vitamin D3 (Unknown)    Intolerances    linezolid (Other)    Standing Inpatient Medications  aspirin enteric coated 162 milliGRAM(s) Oral daily  atorvastatin 80 milliGRAM(s) Oral at bedtime  calcium acetate 667 milliGRAM(s) Oral three times a day with meals  chlorhexidine 4% Liquid 1 Application(s) Topical <User Schedule>  heparin  Injectable 5000 Unit(s) SubCutaneous every 12 hours  losartan 12.5 milliGRAM(s) Oral daily  melatonin 3 milliGRAM(s) Oral at bedtime  metoprolol tartrate 25 milliGRAM(s) Oral two times a day  multivitamin 1 Tablet(s) Oral daily  tamsulosin 0.4 milliGRAM(s) Oral at bedtime      VITALS/PHYSICAL EXAM  --------------------------------------------------------------------------------  T(C): 35.9 (03-24-20 @ 05:11), Max: 37.1 (03-23-20 @ 21:31)  HR: 68 (03-24-20 @ 05:11) (60 - 68)  BP: 170/79 (03-24-20 @ 05:11) (154/67 - 171/75)  RR: 18 (03-24-20 @ 05:11) (18 - 20)  SpO2: 96% (03-24-20 @ 01:43) (96% - 99%)  Wt(kg): --        03-23-20 @ 07:01  -  03-24-20 @ 07:00  --------------------------------------------------------  IN: 504 mL / OUT: 500 mL / NET: 4 mL      Physical Exam:  	Gen: NAD  	Pulm: decrease BS  B/L  	CV: S1S2; no rub  	Abd: +distended  	Vascular access: av fistula     LABS/STUDIES  --------------------------------------------------------------------------------              9.3    9.06  >-----------<  136      [03-24-20 @ 05:34]              28.5     134  |  93  |  89  ----------------------------<  98      [03-24-20 @ 05:34]  4.0   |  22  |  6.6        Ca     8.0     [03-24-20 @ 05:34]      Phos  4.7     [03-23-20 @ 09:12]    TPro  5.6  /  Alb  3.6  /  TBili  1.0  /  DBili  x   /  AST  14  /  ALT  12  /  AlkPhos  43  [03-24-20 @ 05:34]        Troponin 0.30      [03-23-20 @ 09:12]  CK 79      [03-23-20 @ 09:12]    Creatinine Trend:  SCr 6.6 [03-24 @ 05:34]  SCr 6.3 [03-23 @ 09:12]  SCr 5.3 [03-22 @ 06:29]  SCr 4.9 [03-21 @ 20:13]        PTH -- (Ca 8.0)      [03-23-20 @ 09:12]   597  HbA1c 4.3      [11-01-16 @ 15:19]  Lipid: chol 114, , HDL 36, LDL 61      [03-24-20 @ 05:34]

## 2020-03-24 NOTE — PROGRESS NOTE ADULT - SUBJECTIVE AND OBJECTIVE BOX
ALLEN CABRERA 89y Male  MRN#: [id number]   CODE STATUS: FULL       SUBJECTIVE  Patient is a 89y old Male who presents with a chief complaint of Syncope (24 Mar 2020 08:49)  Currently admitted to medicine with the primary diagnosis of Syncope     Today is hospital day 2d, and this morning he is     OBJECTIVE  PAST MEDICAL & SURGICAL HISTORY  ESRD (end stage renal disease) on dialysis  Urinary retention  Colon perforation: 2011  TIA (transient ischemic attack): 2008  Gout  Chronic renal failure: on HD  Aortic stenosis, severe  Myocardial infarction  Hypertension  Hyperlipidemia  Congestive heart failure  History of cholecystectomy  Bilateral cataracts  History of hip replacement, total, left  History of colon resection: secondary to colon perforation  AV fistula: right upper arm  Gallbladder disorder: stent removal 10/2017  S/P TAVR (transcatheter aortic valve replacement)    ALLERGIES:  Biaxin (Unknown)  Ceftin (Unknown)  Plavix (Unknown)  Vitamin D (Unknown)  Vitamin D3 (Unknown)    MEDICATIONS:  STANDING MEDICATIONS  aspirin enteric coated 162 milliGRAM(s) Oral daily  atorvastatin 80 milliGRAM(s) Oral at bedtime  calcium acetate 667 milliGRAM(s) Oral three times a day with meals  chlorhexidine 4% Liquid 1 Application(s) Topical <User Schedule>  heparin  Injectable 5000 Unit(s) SubCutaneous every 12 hours  losartan 12.5 milliGRAM(s) Oral daily  melatonin 3 milliGRAM(s) Oral at bedtime  metoprolol tartrate 25 milliGRAM(s) Oral two times a day  multivitamin 1 Tablet(s) Oral daily  tamsulosin 0.4 milliGRAM(s) Oral at bedtime    PRN MEDICATIONS      VITAL SIGNS: Last 24 Hours  T(C): 35.9 (24 Mar 2020 05:11), Max: 37.1 (23 Mar 2020 21:31)  T(F): 96.7 (24 Mar 2020 05:11), Max: 98.7 (23 Mar 2020 21:31)  HR: 62 (24 Mar 2020 08:15) (60 - 68)  BP: 147/59 (24 Mar 2020 08:15) (147/59 - 171/75)  BP(mean): --  RR: 18 (24 Mar 2020 08:15) (18 - 20)  SpO2: 96% (24 Mar 2020 01:43) (96% - 99%)    LABS:                        9.3    9.06  )-----------( 136      ( 24 Mar 2020 05:34 )             28.5     03-24    134<L>  |  93<L>  |  89<HH>  ----------------------------<  98  4.0   |  22  |  6.6<HH>    Ca    8.0<L>      24 Mar 2020 05:34  Phos  4.7     03-23    TPro  5.6<L>  /  Alb  3.6  /  TBili  1.0  /  DBili  x   /  AST  14  /  ALT  12  /  AlkPhos  43  03-24      CARDIAC MARKERS ( 23 Mar 2020 09:12 )  x     / 0.30 ng/mL / 79 U/L / x     / 6.9 ng/mL  CARDIAC MARKERS ( 22 Mar 2020 23:30 )  x     / x     / 78 U/L / x     / 7.0 ng/mL  CARDIAC MARKERS ( 22 Mar 2020 20:39 )  x     / 0.29 ng/mL / 78 U/L / x     / 7.3 ng/mL      RADIOLOGY:  < from: CT Head No Cont (03.23.20 @ 04:11) >    Impression:  Since March 21, 2020:    New area of decreased attenuation within left cerebellum.consistent with acute-subacute left cerebellar infarction.     No evidence of acute intracranial hemorrhage or acute traumatic pathology.    < end of copied text >    ECHO   Summary:   1. Mildly decreased global left ventricular systolic function.   2. LV Ejection Fraction by Hopper's Method with a biplane EF of 52 %.   3. Spectral Doppler shows pseudonormal pattern of left ventricular myocardial filling (Grade II diastolic dysfunction).   4. Mildly enlarged right atrium.   5. Moderate mitral annular calcification.  6. Moderate mitral valve regurgitation.   7. Moderate thickening of the anterior and posterior mitral valve leaflets.   8. Moderate tricuspid regurgitation.   9. Bioprosthesis in the aortic position.  10. Normal prosthetic AV function, peak/mean PG is 16/8 mmHg.  11. Estimated pulmonary artery systolic pressure is 75.2 mmHg assuming a right atrial pressure of 8 mmHg, which is consistent with severe pulmonary hypertension.    PHYSICAL EXAM:        ADMISSION SUMMARY  Patient is a 89y old Male who presents with a chief complaint of Syncope (24 Mar 2020 08:49)  Currently admitted to medicine with the primary diagnosis of Syncope    ASSESSMENT & PLAN  89 y/o male with PMH of ESRD on HD MWF, HLD, HTN, CAD/MI, s/p TAVR 2016 in ER with c/o Lightheadedness, pre-syncope. #) Episode of pre-syncope ( 2/2 R/o vasovagal vs orthostatics)    #) Low suspicion for cardiac/ neuro etiology ( doubt seizures)  - CT head negative  - f/u echo  - Check orthostatics  - PT eval if needed.     #) Lymphopenia, resolved   - COVID 19/RSV/Flu negative   - f/u official CXR read ( no peripheral opacities)  - start on azithromycin for now as per attending.    #) Troponemia, increasing   - No chest pain, concern for atypical ACS?  - As per ED, spoke with cardio- no intervention  - f/u trop at 11 AM      #) h/o ESRD on HD  - last HD on friday  - Renal consulted, would be transferred to     #)h/o HLD  - c/w statin    #) h/o HTN    #)h/o CAD/MI  - c/w home meds    #)diet- dash renal  #)dvt ppx- heparin  #)gi ppx- not needed  #) ambulate as tolerated  #)dispo- acute ALLEN CABRERA 89y Male  MRN#: [id number]   CODE STATUS: FULL       SUBJECTIVE  Patient is a 89y old Male who presents with a chief complaint of Syncope (24 Mar 2020 08:49)  Currently admitted to medicine with the primary diagnosis of Syncope     Today is hospital day 2d, and this morning he is resting comfortably in bed. Denies any lightheadedness/CP/SOB.     OBJECTIVE  PAST MEDICAL & SURGICAL HISTORY  ESRD (end stage renal disease) on dialysis  Urinary retention  Colon perforation: 2011  TIA (transient ischemic attack): 2008  Gout  Chronic renal failure: on HD  Aortic stenosis, severe  Myocardial infarction  Hypertension  Hyperlipidemia  Congestive heart failure  History of cholecystectomy  Bilateral cataracts  History of hip replacement, total, left  History of colon resection: secondary to colon perforation  AV fistula: right upper arm  Gallbladder disorder: stent removal 10/2017  S/P TAVR (transcatheter aortic valve replacement)    ALLERGIES:  Biaxin (Unknown)  Ceftin (Unknown)  Plavix (Unknown)  Vitamin D (Unknown)  Vitamin D3 (Unknown)    MEDICATIONS:  STANDING MEDICATIONS  aspirin enteric coated 162 milliGRAM(s) Oral daily  atorvastatin 80 milliGRAM(s) Oral at bedtime  calcium acetate 667 milliGRAM(s) Oral three times a day with meals  chlorhexidine 4% Liquid 1 Application(s) Topical <User Schedule>  heparin  Injectable 5000 Unit(s) SubCutaneous every 12 hours  losartan 12.5 milliGRAM(s) Oral daily  melatonin 3 milliGRAM(s) Oral at bedtime  metoprolol tartrate 25 milliGRAM(s) Oral two times a day  multivitamin 1 Tablet(s) Oral daily  tamsulosin 0.4 milliGRAM(s) Oral at bedtime    PRN MEDICATIONS      VITAL SIGNS: Last 24 Hours  T(C): 35.9 (24 Mar 2020 05:11), Max: 37.1 (23 Mar 2020 21:31)  T(F): 96.7 (24 Mar 2020 05:11), Max: 98.7 (23 Mar 2020 21:31)  HR: 62 (24 Mar 2020 08:15) (60 - 68)  BP: 147/59 (24 Mar 2020 08:15) (147/59 - 171/75)  BP(mean): --  RR: 18 (24 Mar 2020 08:15) (18 - 20)  SpO2: 96% (24 Mar 2020 01:43) (96% - 99%)    LABS:                        9.3    9.06  )-----------( 136      ( 24 Mar 2020 05:34 )             28.5     03-24    134<L>  |  93<L>  |  89<HH>  ----------------------------<  98  4.0   |  22  |  6.6<HH>    Ca    8.0<L>      24 Mar 2020 05:34  Phos  4.7     03-23    TPro  5.6<L>  /  Alb  3.6  /  TBili  1.0  /  DBili  x   /  AST  14  /  ALT  12  /  AlkPhos  43  03-24      CARDIAC MARKERS ( 23 Mar 2020 09:12 )  x     / 0.30 ng/mL / 79 U/L / x     / 6.9 ng/mL  CARDIAC MARKERS ( 22 Mar 2020 23:30 )  x     / x     / 78 U/L / x     / 7.0 ng/mL  CARDIAC MARKERS ( 22 Mar 2020 20:39 )  x     / 0.29 ng/mL / 78 U/L / x     / 7.3 ng/mL      RADIOLOGY:  < from: CT Head No Cont (03.23.20 @ 04:11) >    Impression:  Since March 21, 2020:    New area of decreased attenuation within left cerebellum.consistent with acute-subacute left cerebellar infarction.     No evidence of acute intracranial hemorrhage or acute traumatic pathology.    < end of copied text >    ECHO   Summary:   1. Mildly decreased global left ventricular systolic function.   2. LV Ejection Fraction by Hopper's Method with a biplane EF of 52 %.   3. Spectral Doppler shows pseudonormal pattern of left ventricular myocardial filling (Grade II diastolic dysfunction).   4. Mildly enlarged right atrium.   5. Moderate mitral annular calcification.  6. Moderate mitral valve regurgitation.   7. Moderate thickening of the anterior and posterior mitral valve leaflets.   8. Moderate tricuspid regurgitation.   9. Bioprosthesis in the aortic position.  10. Normal prosthetic AV function, peak/mean PG is 16/8 mmHg.  11. Estimated pulmonary artery systolic pressure is 75.2 mmHg assuming a right atrial pressure of 8 mmHg, which is consistent with severe pulmonary hypertension.    PHYSICAL EXAM:  GENERAL: NAD, well-developed  EYES: EOMI, PERRLA, conjunctiva and sclera clear  NECK: Supple, No JVD  CHEST/LUNG: Clear to auscultation bilaterally; No wheeze  HEART: Regular rate and rhythm; S1 S2  ABDOMEN: Soft, Nontender, Nondistended; Bowel sounds present  EXTREMITIES:  2+ Peripheral Pulses, RUE AVF.   PSYCH: AAOx3  NEUROLOGY: non-focal  SKIN: No rashes or lesions    ADMISSION SUMMARY  Patient is a 89y old Male who presents with a chief complaint of Syncope (24 Mar 2020 08:49)  Currently admitted to medicine with the primary diagnosis of Syncope    ASSESSMENT & PLAN  89 yo male with PMH of HTN, CAD s/p PCI, ESRD on HD MWF, TAVR 2016 came to ER with c/o Lightheadedness, pre-syncope. As per the pt wife, he was fine in the morning then he felt severe dizzy and spinning sensation as he is going to pass out. No LOC, Head trauma, chest pain, SOB, seizure like activity. the episode lasted for a 2-3 seconds. After the episode, pt complained of dizziness and vomited when he tried to eat. Wife took the pt vitals- /39, HR 41. Pt was brought to the ED for further evaluation. In the ED /86, Head CT was negative. Troponin was 0.16. He was admitted for further evaluation and to rule out COVID-19. In the floor Troponin was increase to 0.29, no chest pain, no acute EKG changes. He had fall in the room, Repeated Head CT showed acute/subacute left cerebellar infarct.     #) Pre-syncope likely 2/2 acute/subacute L cerebellar infarct   - CT head 3/23 - consistent with acute-subacute left cerebellar infarction.   - f/u echo- severe pulm HTN, grade 2 diastolic dysfunction, EF 52%   - , atorvastatin 80   - Check orthostatics   - f/u CTA head/neck   - PT eval if needed     #) Lymphopenia, resolved   - COVID 19/RSV/Flu negative   - f/u official CXR read (no peripheral opacities)  - start on azithromycin for now as per attending.    #) Troponemia, increasing   - No chest pain, concern for atypical ACS?  - As per ED, spoke with cardio- no intervention  - f/u trop at 11 AM      #) h/o ESRD on HD  - last HD on Friday  - Renal consulted     #)h/o HLD  - c/w statin    #) h/o HTN    #)h/o CAD/MI  - c/w home meds    #)diet- DASH/renal   #)dvt ppx- heparin  #)gi ppx- not needed  #) ambulate as tolerated  #)dispo- acute ALLEN CABRERA 89y Male  MRN#: [id number]   CODE STATUS: FULL       SUBJECTIVE  Patient is a 89y old Male who presents with a chief complaint of Syncope (24 Mar 2020 08:49)  Currently admitted to medicine with the primary diagnosis of Syncope     Today is hospital day 2d, and this morning he is resting comfortably in bed. Denies any lightheadedness/CP/SOB.     OBJECTIVE  PAST MEDICAL & SURGICAL HISTORY  ESRD (end stage renal disease) on dialysis  Urinary retention  Colon perforation: 2011  TIA (transient ischemic attack): 2008  Gout  Chronic renal failure: on HD  Aortic stenosis, severe  Myocardial infarction  Hypertension  Hyperlipidemia  Congestive heart failure  History of cholecystectomy  Bilateral cataracts  History of hip replacement, total, left  History of colon resection: secondary to colon perforation  AV fistula: right upper arm  Gallbladder disorder: stent removal 10/2017  S/P TAVR (transcatheter aortic valve replacement)    ALLERGIES:  Biaxin (Unknown)  Ceftin (Unknown)  Plavix (Unknown)  Vitamin D (Unknown)  Vitamin D3 (Unknown)    MEDICATIONS:  STANDING MEDICATIONS  aspirin enteric coated 162 milliGRAM(s) Oral daily  atorvastatin 80 milliGRAM(s) Oral at bedtime  calcium acetate 667 milliGRAM(s) Oral three times a day with meals  chlorhexidine 4% Liquid 1 Application(s) Topical <User Schedule>  heparin  Injectable 5000 Unit(s) SubCutaneous every 12 hours  losartan 12.5 milliGRAM(s) Oral daily  melatonin 3 milliGRAM(s) Oral at bedtime  metoprolol tartrate 25 milliGRAM(s) Oral two times a day  multivitamin 1 Tablet(s) Oral daily  tamsulosin 0.4 milliGRAM(s) Oral at bedtime    PRN MEDICATIONS      VITAL SIGNS: Last 24 Hours  T(C): 35.9 (24 Mar 2020 05:11), Max: 37.1 (23 Mar 2020 21:31)  T(F): 96.7 (24 Mar 2020 05:11), Max: 98.7 (23 Mar 2020 21:31)  HR: 62 (24 Mar 2020 08:15) (60 - 68)  BP: 147/59 (24 Mar 2020 08:15) (147/59 - 171/75)  BP(mean): --  RR: 18 (24 Mar 2020 08:15) (18 - 20)  SpO2: 96% (24 Mar 2020 01:43) (96% - 99%)    LABS:                        9.3    9.06  )-----------( 136      ( 24 Mar 2020 05:34 )             28.5     03-24    134<L>  |  93<L>  |  89<HH>  ----------------------------<  98  4.0   |  22  |  6.6<HH>    Ca    8.0<L>      24 Mar 2020 05:34  Phos  4.7     03-23    TPro  5.6<L>  /  Alb  3.6  /  TBili  1.0  /  DBili  x   /  AST  14  /  ALT  12  /  AlkPhos  43  03-24      CARDIAC MARKERS ( 23 Mar 2020 09:12 )  x     / 0.30 ng/mL / 79 U/L / x     / 6.9 ng/mL  CARDIAC MARKERS ( 22 Mar 2020 23:30 )  x     / x     / 78 U/L / x     / 7.0 ng/mL  CARDIAC MARKERS ( 22 Mar 2020 20:39 )  x     / 0.29 ng/mL / 78 U/L / x     / 7.3 ng/mL      RADIOLOGY:  < from: CT Head No Cont (03.23.20 @ 04:11) >    Impression:  Since March 21, 2020:    New area of decreased attenuation within left cerebellum.consistent with acute-subacute left cerebellar infarction.     No evidence of acute intracranial hemorrhage or acute traumatic pathology.    < end of copied text >    ECHO   Summary:   1. Mildly decreased global left ventricular systolic function.   2. LV Ejection Fraction by Hopper's Method with a biplane EF of 52 %.   3. Spectral Doppler shows pseudonormal pattern of left ventricular myocardial filling (Grade II diastolic dysfunction).   4. Mildly enlarged right atrium.   5. Moderate mitral annular calcification.  6. Moderate mitral valve regurgitation.   7. Moderate thickening of the anterior and posterior mitral valve leaflets.   8. Moderate tricuspid regurgitation.   9. Bioprosthesis in the aortic position.  10. Normal prosthetic AV function, peak/mean PG is 16/8 mmHg.  11. Estimated pulmonary artery systolic pressure is 75.2 mmHg assuming a right atrial pressure of 8 mmHg, which is consistent with severe pulmonary hypertension.    PHYSICAL EXAM:  GENERAL: NAD, well-developed  EYES: EOMI, PERRLA, conjunctiva and sclera clear  NECK: Supple, No JVD  CHEST/LUNG: Clear to auscultation bilaterally; No wheeze  HEART: Regular rate and rhythm; S1 S2  ABDOMEN: Soft, Nontender, Nondistended; Bowel sounds present  EXTREMITIES:  2+ Peripheral Pulses, RUE AVF.   PSYCH: AAOx3  NEUROLOGY: non-focal  SKIN: No rashes or lesions    ADMISSION SUMMARY  Patient is a 89y old Male who presents with a chief complaint of Syncope (24 Mar 2020 08:49)  Currently admitted to medicine with the primary diagnosis of Syncope    ASSESSMENT & PLAN  89 yo male with PMH of HTN, CAD s/p PCI, ESRD on HD MWF, TAVR 2016 came to ER with c/o Lightheadedness, pre-syncope. As per the pt wife, he was fine in the morning then he felt severe dizzy and spinning sensation as he is going to pass out. No LOC, Head trauma, chest pain, SOB, seizure like activity. the episode lasted for a 2-3 seconds. After the episode, pt complained of dizziness and vomited when he tried to eat. Wife took the pt vitals- /39, HR 41. Pt was brought to the ED for further evaluation. In the ED /86, Head CT was negative. Troponin was 0.16. He was admitted for further evaluation and to rule out COVID-19. In the floor Troponin was increase to 0.29, no chest pain, no acute EKG changes. He had fall in the room, Repeated Head CT showed acute/subacute left cerebellar infarct.     #) Pre-syncope likely 2/2 acute/subacute L cerebellar infarct   - CT head 3/23 - consistent with acute-subacute left cerebellar infarction.   - f/u echo- severe pulm HTN, grade 2 diastolic dysfunction, EF 52%   - , atorvastatin 80   - Check orthostatics - negative   - f/u CTA head/neck   - PT eval     #) Lymphopenia, resolved   - COVID 19/RSV/Flu negative   - f/u official CXR read (no peripheral opacities)  - start on azithromycin for now as per attending.    #) Troponemia, increasing   - No chest pain, concern for atypical ACS?  - As per ED, spoke with cardio- no intervention  - f/u trop at 11 AM      #) h/o ESRD on HD  - last HD on Friday  - Renal consulted     #)h/o HLD  - c/w statin    #) h/o HTN    #)h/o CAD/MI  - c/w home meds    #)diet- DASH/renal   #)dvt ppx- heparin  #)gi ppx- not needed  #) ambulate as tolerated  #)dispo- acute

## 2020-03-24 NOTE — PROGRESS NOTE ADULT - ASSESSMENT
89 y/o M with ESRD - HD in hospital for Lightheadedness, pre-syncope.   PMH of ESRD on HD MWF, HLD, HTN, CAD/MI, s/p TAVR 2016    # ESRD - HD today, standard bath, uf 2 liters as  tolerated  # COVID negative  # neurology notes appreciated  #ph at goal  # follow BP after HD   # h/h noted, will start DAVID if h <9  # will follow

## 2020-03-25 ENCOUNTER — TRANSCRIPTION ENCOUNTER (OUTPATIENT)
Age: 85
End: 2020-03-25

## 2020-03-25 VITALS
RESPIRATION RATE: 18 BRPM | TEMPERATURE: 97 F | DIASTOLIC BLOOD PRESSURE: 72 MMHG | HEART RATE: 66 BPM | SYSTOLIC BLOOD PRESSURE: 172 MMHG

## 2020-03-25 LAB
ANION GAP SERPL CALC-SCNC: 14 MMOL/L — SIGNIFICANT CHANGE UP (ref 7–14)
BUN SERPL-MCNC: 55 MG/DL — HIGH (ref 10–20)
CALCIUM SERPL-MCNC: 8.4 MG/DL — LOW (ref 8.5–10.1)
CHLORIDE SERPL-SCNC: 91 MMOL/L — LOW (ref 98–110)
CO2 SERPL-SCNC: 29 MMOL/L — SIGNIFICANT CHANGE UP (ref 17–32)
CREAT SERPL-MCNC: 4.8 MG/DL — CRITICAL HIGH (ref 0.7–1.5)
GLUCOSE SERPL-MCNC: 103 MG/DL — HIGH (ref 70–99)
HCT VFR BLD CALC: 31.5 % — LOW (ref 42–52)
HGB BLD-MCNC: 10.4 G/DL — LOW (ref 14–18)
MCHC RBC-ENTMCNC: 30.8 PG — SIGNIFICANT CHANGE UP (ref 27–31)
MCHC RBC-ENTMCNC: 33 G/DL — SIGNIFICANT CHANGE UP (ref 32–37)
MCV RBC AUTO: 93.2 FL — SIGNIFICANT CHANGE UP (ref 80–94)
NRBC # BLD: 0 /100 WBCS — SIGNIFICANT CHANGE UP (ref 0–0)
PLATELET # BLD AUTO: 145 K/UL — SIGNIFICANT CHANGE UP (ref 130–400)
POTASSIUM SERPL-MCNC: 4 MMOL/L — SIGNIFICANT CHANGE UP (ref 3.5–5)
POTASSIUM SERPL-SCNC: 4 MMOL/L — SIGNIFICANT CHANGE UP (ref 3.5–5)
RBC # BLD: 3.38 M/UL — LOW (ref 4.7–6.1)
RBC # FLD: 14.5 % — SIGNIFICANT CHANGE UP (ref 11.5–14.5)
SODIUM SERPL-SCNC: 134 MMOL/L — LOW (ref 135–146)
WBC # BLD: 9.12 K/UL — SIGNIFICANT CHANGE UP (ref 4.8–10.8)
WBC # FLD AUTO: 9.12 K/UL — SIGNIFICANT CHANGE UP (ref 4.8–10.8)

## 2020-03-25 PROCEDURE — 99239 HOSP IP/OBS DSCHRG MGMT >30: CPT

## 2020-03-25 PROCEDURE — 99231 SBSQ HOSP IP/OBS SF/LOW 25: CPT

## 2020-03-25 RX ORDER — ATORVASTATIN CALCIUM 80 MG/1
1 TABLET, FILM COATED ORAL
Qty: 30 | Refills: 0
Start: 2020-03-25 | End: 2020-04-23

## 2020-03-25 RX ORDER — ASPIRIN/CALCIUM CARB/MAGNESIUM 324 MG
2 TABLET ORAL
Qty: 0 | Refills: 0 | DISCHARGE
Start: 2020-03-25

## 2020-03-25 RX ADMIN — Medication 667 MILLIGRAM(S): at 12:03

## 2020-03-25 RX ADMIN — HEPARIN SODIUM 5000 UNIT(S): 5000 INJECTION INTRAVENOUS; SUBCUTANEOUS at 06:12

## 2020-03-25 RX ADMIN — Medication 667 MILLIGRAM(S): at 10:38

## 2020-03-25 RX ADMIN — Medication 25 MILLIGRAM(S): at 06:12

## 2020-03-25 RX ADMIN — Medication 1 TABLET(S): at 12:03

## 2020-03-25 RX ADMIN — CHLORHEXIDINE GLUCONATE 1 APPLICATION(S): 213 SOLUTION TOPICAL at 06:12

## 2020-03-25 RX ADMIN — Medication 162 MILLIGRAM(S): at 12:03

## 2020-03-25 RX ADMIN — LOSARTAN POTASSIUM 12.5 MILLIGRAM(S): 100 TABLET, FILM COATED ORAL at 06:12

## 2020-03-25 RX ADMIN — Medication 25 MILLIGRAM(S): at 17:11

## 2020-03-25 NOTE — PROGRESS NOTE ADULT - ASSESSMENT
89 y/o M with ESRD - HD in hospital for Lightheadedness, pre-syncope.   PMH of ESRD on HD MWF, HLD, HTN, CAD/MI, s/p TAVR 2016    # ESRD - sp HD yesterday / no need for HD today   # COVID negative  # neurology notes appreciated/ rule out cerebellar CVA / acute vs subacute   #ph at goal  # follow BP after HD   # h/h noted, will start DAVID if h <9  # will follow

## 2020-03-25 NOTE — DISCHARGE NOTE PROVIDER - PROVIDER TOKENS
PROVIDER:[TOKEN:[[id number]],FOLLOWUP:[2 weeks]],FREE:[LAST:[Agustin],FIRST:[Marybel],PHONE:[(   )    -],FAX:[(   )    -],ADDRESS:[[address] [place] [address]],PROVIDER:[TOKEN:[[id number]],FOLLOWUP:[2 weeks]]

## 2020-03-25 NOTE — PROGRESS NOTE ADULT - SUBJECTIVE AND OBJECTIVE BOX
Nephrology progress note    THIS IS AN INCOMPLETE NOTE . FULL NOTE TO FOLLOW SHORTLY    Patient is seen and examined, events over the last 24 h noted .    Allergies:  Biaxin (Unknown)  Ceftin (Unknown)  linezolid (Other)  Plavix (Unknown)  Vitamin D (Unknown)  Vitamin D3 (Unknown)    Hospital Medications:   MEDICATIONS  (STANDING):  aspirin enteric coated 162 milliGRAM(s) Oral daily  atorvastatin 80 milliGRAM(s) Oral at bedtime  calcium acetate 667 milliGRAM(s) Oral three times a day with meals  chlorhexidine 4% Liquid 1 Application(s) Topical <User Schedule>  heparin  Injectable 5000 Unit(s) SubCutaneous every 12 hours  losartan 12.5 milliGRAM(s) Oral daily  melatonin 3 milliGRAM(s) Oral at bedtime  metoprolol tartrate 25 milliGRAM(s) Oral two times a day  multivitamin 1 Tablet(s) Oral daily  tamsulosin 0.4 milliGRAM(s) Oral at bedtime        VITALS:  T(F): 97 (03-25-20 @ 05:30), Max: 99 (03-24-20 @ 20:39)  HR: 67 (03-25-20 @ 05:30)  BP: 153/65 (03-25-20 @ 05:30)  RR: 18 (03-25-20 @ 05:30)  SpO2: --  Wt(kg): --    03-23 @ 07:01  -  03-24 @ 07:00  --------------------------------------------------------  IN: 504 mL / OUT: 500 mL / NET: 4 mL    03-24 @ 07:01  -  03-25 @ 07:00  --------------------------------------------------------  IN: 690 mL / OUT: 2000 mL / NET: -1310 mL          PHYSICAL EXAM:  Constitutional: NAD  HEENT: anicteric sclera, oropharynx clear, MMM  Neck: No JVD  Respiratory: CTAB, no wheezes, rales or rhonchi  Cardiovascular: S1, S2, RRR  Gastrointestinal: BS+, soft, NT/ND  Extremities: No cyanosis or clubbing. No peripheral edema  :  No ross.   Skin: No rashes    LABS:  03-25    134<L>  |  91<L>  |  55<H>  ----------------------------<  103<H>  4.0   |  29  |  4.8<HH>    Ca    8.4<L>      25 Mar 2020 05:46  Phos  4.9     03-24    TPro  5.6<L>  /  Alb  3.6  /  TBili  1.0  /  DBili      /  AST  14  /  ALT  12  /  AlkPhos  43  03-24                          10.4   9.12  )-----------( 145      ( 25 Mar 2020 05:46 )             31.5       Urine Studies:      RADIOLOGY & ADDITIONAL STUDIES: Nephrology progress note  Patient is seen and examined, events over the last 24 h noted .  sitting in his chair comfortable     Allergies:  Biaxin (Unknown)  Ceftin (Unknown)  linezolid (Other)  Plavix (Unknown)  Vitamin D (Unknown)  Vitamin D3 (Unknown)    Hospital Medications:   MEDICATIONS  (STANDING):    aspirin enteric coated 162 milliGRAM(s) Oral daily  atorvastatin 80 milliGRAM(s) Oral at bedtime  calcium acetate 667 milliGRAM(s) Oral three times a day with meals  heparin  Injectable 5000 Unit(s) SubCutaneous every 12 hours  losartan 12.5 milliGRAM(s) Oral daily  melatonin 3 milliGRAM(s) Oral at bedtime  metoprolol tartrate 25 milliGRAM(s) Oral two times a day  multivitamin 1 Tablet(s) Oral daily  tamsulosin 0.4 milliGRAM(s) Oral at bedtime        VITALS:  T(F): 97 (03-25-20 @ 05:30), Max: 99 (03-24-20 @ 20:39)  HR: 67 (03-25-20 @ 05:30)  BP: 153/65 (03-25-20 @ 05:30)  RR: 18 (03-25-20 @ 05:30)      03-23 @ 07:01  -  03-24 @ 07:00  --------------------------------------------------------  IN: 504 mL / OUT: 500 mL / NET: 4 mL    03-24 @ 07:01  -  03-25 @ 07:00  --------------------------------------------------------  IN: 690 mL / OUT: 2000 mL / NET: -1310 mL          PHYSICAL EXAM:  Constitutional: NAD  HEENT: anicteric sclera, oropharynx clear, MMM  Neck: No JVD  Respiratory: CTAB, no wheezes, rales or rhonchi  Cardiovascular: S1, S2, RRR  Gastrointestinal: BS+, soft, NT/ND  Extremities: No cyanosis or clubbing. No peripheral edema  :  No ross.   Skin: No rashes    LABS:  03-25    134<L>  |  91<L>  |  55<H>  ----------------------------<  103<H>  4.0   |  29  |  4.8<HH>    Ca    8.4<L>      25 Mar 2020 05:46  Phos  4.9     03-24    TPro  5.6<L>  /  Alb  3.6  /  TBili  1.0  /  DBili      /  AST  14  /  ALT  12  /  AlkPhos  43  03-24                          10.4   9.12  )-----------( 145      ( 25 Mar 2020 05:46 )             31.5       Urine Studies:      RADIOLOGY & ADDITIONAL STUDIES:

## 2020-03-25 NOTE — PROGRESS NOTE ADULT - ASSESSMENT
89 yo w/ h/o ESRD on HD MWF, HLD, HTN, CAD/MI, s/p TAVR currently admitted with recurrent falls nonfocal on exam but with acute/subacute L cerebellar stroke.  Recommend r/o posterior circulation pathology vs cardiac source.      Recommendations:  - Continue ASA  - Continue statin  - Embolic work up, echo findings noted  - MRI eventually  - cardiac telemetry  - PT/OT/Speech tx 89 yo w/ h/o ESRD on HD MWF, HLD, HTN, CAD/MI, s/p TAVR currently admitted with recurrent falls nonfocal on exam but with acute/subacute L cerebellar stroke.  Recommend r/o posterior circulation pathology vs cardiac source.      Recommendations:  - Continue ASA  - Continue statin  - Embolic work up, echo findings noted  - MRI (can be done as outpt)  - cardiac telemetry  - PT/OT/Speech tx  - no further inpt neurologic w/u- may f/u with stroke clinic in 2 wks

## 2020-03-25 NOTE — PHYSICAL THERAPY INITIAL EVALUATION ADULT - PERTINENT HX OF CURRENT PROBLEM, REHAB EVAL
Pt is a 89 y/o male with PMH of ESRD on HD MWF, HLD, HTN, CAD/MI, s/p TAVR 2016 in ER with c/o Lightheadedness, pre-syncope.

## 2020-03-25 NOTE — DISCHARGE NOTE PROVIDER - HOSPITAL COURSE
89 y/o male with PMH of ESRD on HD MWF, HLD, HTN, CAD/MI, s/p TAVR 2016 in ER with c/o Lightheadedness, pre-syncope. As per the pt wife, Pt was feeling his usual self when he had breakfast this morning. Pt then started to complain that he was feeling dizzy. As per the wife, pt then pointed to his head- felt dizzy- his eyes rolled back but pt didnt pass out. Denies any LOC, Head trauma, chest pain, SOB, seizure like activity. As per the wife- the episode lasted for a 2-3 seconds. Pt didnot have post ictal confusion and was at his baseline mental status after the episode. After the episode, pt complained of dizziness and vomited when he tried to eat. Wife took the pt vitals- /39, HR 41. Pt was brought to the ED for further evaluation.         VS on arrival noted for /86. Labs with lymphopenia,  Elevated Cr (ESRD), Trop 0.16. Received doxycycline in the ED. Concern for COVID during ED admission made him admitted to [facility]. Patient needs dialysis and since he is being ruled out for COVID 19, he would be needing it at bedside.          Night team 3/22/20 spoke to cardio fellow about the increasing trops 0.16 -> 0.25 -> 0.29, no chest pain and stable LVH on EKG.  No intervention as of now by them, recall as needed for worsening EKG or chest pain.  Most likely will have medical therapy due to age and comorbidities.          Patient also had a fall at about 3:30 am and stated that he hit the back of his head.  CT head was done showing a left cerebellar infarct.  Upon discussion with radiology it was determined that the finding was not trauma related and there was no blood seen.  Findings are likely evolution of a stroke before admission and the first CT done on 3/21 may have been too early to see it.  Infarct may have led to his imbalance causing the fall.  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 89 y/o male with PMH of ESRD on HD MWF, HLD, HTN, CAD/MI, s/p TAVR 2016 in ER with c/o Lightheadedness, pre-syncope. As per the pt wife, Pt was feeling his usual self when he had breakfast this morning. Pt then started to complain that he was feeling dizzy. As per the wife, pt then pointed to his head- felt dizzy- his eyes rolled back but pt didnt pass out. Denies any LOC, Head trauma, chest pain, SOB, seizure like activity. As per the wife- the episode lasted for a 2-3 seconds. Pt didnot have post ictal confusion and was at his baseline mental status after the episode. After the episode, pt complained of dizziness and vomited when he tried to eat. Wife took the pt vitals- /39, HR 41. Pt was brought to the ED for further evaluation.         VS on arrival noted for /86. Labs with lymphopenia,  Elevated Cr (ESRD), Trop 0.16. Received doxycycline in the ED. Concern for COVID during ED admission made him admitted to [facility]. Patient needs dialysis and since he is being ruled out for COVID 19, he would be needing it at bedside.          Night team 3/22/20 spoke to cardio fellow about the increasing trops 0.16 -> 0.25 -> 0.29, no chest pain and stable LVH on EKG.  No intervention as of now by them, recall as needed for worsening EKG or chest pain.  Most likely will have medical therapy due to age and comorbidities.          Patient also had a fall at about 3:30 am and stated that he hit the back of his head.  CT head was done showing a left cerebellar infarct.  Upon discussion with radiology it was determined that the finding was not trauma related and there was no blood seen.  Findings are likely evolution of a stroke before admission and the first CT done on 3/21 may have been too early to see it.  Infarct may have led to his imbalance causing the fall.  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 Neurology saw patient - no further work-up inpatient. Will get MRI outpatient.

## 2020-03-25 NOTE — PROGRESS NOTE ADULT - SUBJECTIVE AND OBJECTIVE BOX
Interval: No overnight events.    HPI:  Pt is a 89 y/o male with PMH of ESRD on HD MWF, HLD, HTN, CAD/MI, s/p TAVR 2016 in ER with c/o lightheadedness followed by 1 episode of presyncope while at home lasting 2 - 3 seconds with no actual LOC.  Pt unclear how he fell but had some mild head trauma.  HR at home 41.  Pt felt some dizziness but not persistent.  While in ED pt asymptomatic with no focal deficits. At baseline uses wheelchair for long distances but ambuates independently with use of a cane.  Denies any ataxia or incoordination.  Denies any dysmetria or dysdiadokinesia.  Denies any weakness or numbness. No diplopia or visual obscuration.  Denies any focal deficits currently.  Had attempted to get up out of bed earlier this morning and fell again.  While in ER pt noted to have lymphopenia and admitted as PUI for COVID.      Examination:  General:  Appearance is consistent with chronologic age.  No abnormal facies.  Gross skin survey within normal limits.    Cognitive/Language:  The patient is oriented to person, place, time and date.  Recent and remote memory intact.  Fund of knowledge is intact and normal.  Language with normal repetition, comprehension and naming.  Nondysarthric.    Eyes: intact VA, VFF.  EOMI w/o nystagmus, skew or reported double vision.  PERRL.  No ptosis/weakness of eyelid closure.    Face:  Facial sensation normal V1 - 3, no facial asymmetry.    Ears/Nose/Throat:  Hearing grossly intact b/l.  Palate elevates midline.  Tongue and uvula midline.   Motor examination:   Normal tone, bulk and range of motion.  No tenderness, twitching, tremors or involuntary movements.  Formal Muscle Strength Testing: (MRC grade R/L) 5/5 UE; 5/5 LE.  No observable drift.  Reflexes:   2+ b/l pectoralis, biceps, triceps, brachioradialis, patella and Achilles.  Plantar response downgoing b/l.  Jaw jerk, Reg, clonus absent.  Sensory examination:   Intact to light touch and pinprick, pain, temperature and proprioception and vibration in all extremities.  Cerebellum:   FTN/HKS intact with normal CAROLANN in all limbs.  No dysmetria or dysdiadokinesia.  Gait narrow based and normal.    NIHSS 0      Neuroimaging:  NCHCT: CT Head No Cont:   EXAM:  CT BRAIN          PROCEDURE DATE:  03/23/2020      INTERPRETATION:  Clinical History / Reason for exam: Fall. Trauma to head.    Technique: CT head without IV contrast performed. Multiple axial CT images of the head were obtained from the base of the skull to the vertex without the administration of IV contrast. Sagittal and coronal reformats were obtained.    Comparison: CT head March 21, 2020.    Findings:    Ventricular system, basal cisterns and sulcal patterns are symmetric and appropriate for patient's stated age.       No acute extra-axial collection.  No mass effect, midline shift or acute hemorrhage is seen.      New area of decreased attenuation within left cerebellum.    Patchy hypodensities in the periventricular and subcortical white matter without mass effect compatible with chronic microvascular changes.      No depressed skull fracture.     Paranasal sinuses and mastoid air cells are well-aerated.      CT ANGIO BRAIN (W)AW IC            PROCEDURE DATE:  03/24/2020      INTERPRETATION:  Clinical History / Reason for exam: Left cerebellar infarct. Evaluate for posterior circulation disease.    Findings:     NECK:  The visualized aortic arch and great vessel origins demonstrate calcific plaque without significant stenosis.    The right common carotid artery is patent. There is calcific plaque at the right carotid bifurcation with about 50% stenosis of the ICA origin. The ECA is patent.    The left common carotid artery demonstrates scattered calcific plaque without significant stenosis. The left carotid bifurcation demonstrates calcific plaque with 50-60% stenosis of the proximal ICA. The ECA is patent.    The vertebral arteries demonstrate moderate calcific stenoses at the origins but are otherwise patent.    HEAD:  There is calcific plaque of the carotid siphons without cement stenosis. The anterior and middle cerebral arteries are patent. Focal junctional dilatation at the origin of two small anterior branches at the right MCA bifurcation is noted.    There is calcific plaque of the vertebral arteries with mild to moderate bilateral stenoses. The left posterior inferior cerebellar artery demonstrates a severe stenosis or occlusion, series 4 image 208. The basilar artery is patent. The posterior cerebral arteries are patent.    OTHER:  Left cerebellar infarct, previously described.  Moderate to severe cervical spine degenerative changes.    IMPRESSION:     1.  Severe stenosis or occlusion of the left posterior-inferior cerebellar artery responding to the patient's left cerebellar infarct.    2.  Scattered atheromatous changes including moderate calcific stenoses of bilateral vertebral artery origins and mild/moderate stenoses of bilateral vertebral artery V4 segments.    3.  Bilateral ICA atheromatous changes with about 50% stenosis of the right ICA origin and about 50-60% stenosis of the left proximal ICA.          Impression:  Since March 21, 2020:    New area of decreased attenuation within left cerebellum.consistent with acute-subacute left cerebellar infarction.     No evidence of acute intracranial hemorrhage or acute traumatic pathology.    Spoke with CARMELINA MCCABE on 3/23/2020 4:20 AM with readback.      VICKY NOVAK M.D., ATTENDING RADIOLOGIST  This document has been electronically signed. Mar 23 2020  4:21AM Interval: No overnight events. Pt back to baseline.  Knows current events, hospital floor, why he's in hospital.      HPI:  Pt is a 89 y/o male with PMH of ESRD on HD MWF, HLD, HTN, CAD/MI, s/p TAVR 2016 in ER with c/o lightheadedness followed by 1 episode of presyncope while at home lasting 2 - 3 seconds with no actual LOC.  Pt unclear how he fell but had some mild head trauma.  HR at home 41.  Pt felt some dizziness but not persistent.  While in ED pt asymptomatic with no focal deficits. At baseline uses wheelchair for long distances but ambuates independently with use of a cane.  Denies any ataxia or incoordination.  Denies any dysmetria or dysdiadokinesia.  Denies any weakness or numbness. No diplopia or visual obscuration.  Denies any focal deficits currently.  Had attempted to get up out of bed earlier this morning and fell again.  While in ER pt noted to have lymphopenia and admitted as PUI for COVID.      Examination:  General:  Appearance is consistent with chronologic age.  No abnormal facies.  Gross skin survey within normal limits.    Cognitive/Language:  The patient is oriented to person, place, time and date.  Recent and remote memory intact.  Fund of knowledge is intact and normal.  Language with normal repetition, comprehension and naming.  Nondysarthric.    Eyes: intact VA, VFF.  EOMI w/o nystagmus, skew or reported double vision.  PERRL.  No ptosis/weakness of eyelid closure.    Face:  Facial sensation normal V1 - 3, no facial asymmetry.    Ears/Nose/Throat:  Hearing grossly intact b/l.  Palate elevates midline.  Tongue and uvula midline.   Motor examination:   Normal tone, bulk and range of motion.  No tenderness, twitching, tremors or involuntary movements.  Formal Muscle Strength Testing: (MRC grade R/L) 5/5 UE; 5/5 LE.  No observable drift.  Reflexes:   2+ b/l pectoralis, biceps, triceps, brachioradialis, patella and Achilles.  Plantar response downgoing b/l.  Jaw jerk, Reg, clonus absent.  Sensory examination:   Intact to light touch and pinprick, pain, temperature and proprioception and vibration in all extremities.  Cerebellum:   FTN/HKS intact with normal CAROLANN in all limbs.  No dysmetria or dysdiadokinesia.  Gait narrow based and normal.    NIHSS 0      Neuroimaging:  NCHCT: CT Head No Cont:   EXAM:  CT BRAIN          PROCEDURE DATE:  03/23/2020      INTERPRETATION:  Clinical History / Reason for exam: Fall. Trauma to head.    Technique: CT head without IV contrast performed. Multiple axial CT images of the head were obtained from the base of the skull to the vertex without the administration of IV contrast. Sagittal and coronal reformats were obtained.    Comparison: CT head March 21, 2020.    Findings:    Ventricular system, basal cisterns and sulcal patterns are symmetric and appropriate for patient's stated age.       No acute extra-axial collection.  No mass effect, midline shift or acute hemorrhage is seen.      New area of decreased attenuation within left cerebellum.    Patchy hypodensities in the periventricular and subcortical white matter without mass effect compatible with chronic microvascular changes.      No depressed skull fracture.     Paranasal sinuses and mastoid air cells are well-aerated.      CT ANGIO BRAIN (W)AW IC            PROCEDURE DATE:  03/24/2020      INTERPRETATION:  Clinical History / Reason for exam: Left cerebellar infarct. Evaluate for posterior circulation disease.    Findings:     NECK:  The visualized aortic arch and great vessel origins demonstrate calcific plaque without significant stenosis.    The right common carotid artery is patent. There is calcific plaque at the right carotid bifurcation with about 50% stenosis of the ICA origin. The ECA is patent.    The left common carotid artery demonstrates scattered calcific plaque without significant stenosis. The left carotid bifurcation demonstrates calcific plaque with 50-60% stenosis of the proximal ICA. The ECA is patent.    The vertebral arteries demonstrate moderate calcific stenoses at the origins but are otherwise patent.    HEAD:  There is calcific plaque of the carotid siphons without cement stenosis. The anterior and middle cerebral arteries are patent. Focal junctional dilatation at the origin of two small anterior branches at the right MCA bifurcation is noted.    There is calcific plaque of the vertebral arteries with mild to moderate bilateral stenoses. The left posterior inferior cerebellar artery demonstrates a severe stenosis or occlusion, series 4 image 208. The basilar artery is patent. The posterior cerebral arteries are patent.    OTHER:  Left cerebellar infarct, previously described.  Moderate to severe cervical spine degenerative changes.    IMPRESSION:     1.  Severe stenosis or occlusion of the left posterior-inferior cerebellar artery responding to the patient's left cerebellar infarct.    2.  Scattered atheromatous changes including moderate calcific stenoses of bilateral vertebral artery origins and mild/moderate stenoses of bilateral vertebral artery V4 segments.    3.  Bilateral ICA atheromatous changes with about 50% stenosis of the right ICA origin and about 50-60% stenosis of the left proximal ICA.    Impression:  Since March 21, 2020:    New area of decreased attenuation within left cerebellum.consistent with acute-subacute left cerebellar infarction.     No evidence of acute intracranial hemorrhage or acute traumatic pathology.    Spoke with CARMELINA MCCABE on 3/23/2020 4:20 AM with readback.    VICKY NOVAK M.D., ATTENDING RADIOLOGIST  This document has been electronically signed. Mar 23 2020  4:21AM

## 2020-03-25 NOTE — DISCHARGE NOTE PROVIDER - CARE PROVIDERS DIRECT ADDRESSES
,DirectAddress_Unknown,DirectAddress_Unknown,[email or web address]

## 2020-03-25 NOTE — DISCHARGE NOTE NURSING/CASE MANAGEMENT/SOCIAL WORK - NSDCPEPTSTROKESIGNS_GEN_ALL_CORE
Sudden numbness or weakness of the face, arm, or leg, especially on one side of the body. Confusion, trouble speaking or understanding. Trouble seeing in one or both eyes. Trouble walking, dizziness, loss of balance or coordination. Severe headache.
no

## 2020-03-25 NOTE — DISCHARGE NOTE PROVIDER - NSDCCPCAREPLAN_GEN_ALL_CORE_FT
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 You were seen here by neurology    Please continue taking aspirin and atorvastatin at the increased dose. You will need to get an MRI outpatient. Please follow up with neurology. 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 You were seen here by neurology and had multiple images of your brain which showed a stenosis of the artery in your brain that caused the stroke.  Please continue taking aspirin and atorvastatin at the increased dose. You will need to get an MRI outpatient. Please follow up with neurology.

## 2020-03-25 NOTE — PROGRESS NOTE ADULT - ATTENDING COMMENTS
patient seen and examined independently on morning rounds in the HD for the first time today, chart reviewed and discussed with the medicine resident and agree with the above resident progress note with the following addendum:    no overnight events---denies any cp or sob- tolerating HD well    a/p:  #L cerebellar infarct- acute vs subacute  -awaiting CTA head/neck (time with HD)  -fall precautions  -pt/ot/rehab  -cont asa, statin    #ESRD on HD  -cont HD  --f/u nephrology recomm    continue current managment    DVT/GI ppx  guarded prognosis  FULL CODE
I have personally seen and examined this patient.  I have fully participated in the care of this patient.  I have reviewed all pertinent clinical information, including history, physical exam, plan and note.   I have reviewed all pertinent clinical information and reviewed all relevant imaging and diagnostic studies personally.  Recommendations as above.  Agree with above assessment except as noted.

## 2020-03-25 NOTE — DISCHARGE NOTE PROVIDER - NSDCMRMEDTOKEN_GEN_ALL_CORE_FT
aspirin 81 mg oral delayed release tablet: 2 tab(s) orally once a day  atorvastatin 80 mg oral tablet: 1 tab(s) orally once a day (at bedtime)  calcium acetate 667 mg oral tablet: 1 tab(s) orally 3 times a day  Dialyvite 800 oral tablet: 1 tab(s) orally once a day  losartan 25 mg oral tablet: 0.5 tab(s) orally once a day  Melatonin 3 mg oral tablet: 1 tab(s) orally once a day (at bedtime)  metoprolol tartrate 25 mg oral tablet: 1 tab(s) orally 2 times a day  tamsulosin 0.4 mg oral capsule: 1 cap(s) orally once a day (at bedtime)

## 2020-03-25 NOTE — DISCHARGE NOTE PROVIDER - CARE PROVIDER_API CALL
Yahir Garland)  Internal Medicine; Nephrology  [facility] [place] [address]  Phone: [phone]  Fax: [phone]  Follow Up Time: 2 weeks    Marybel Velez  [address] [place] [id number]  Phone: (   )    -  Fax: (   )    -  Follow Up Time:     Sonu Maya)  Neuromuscular Medicine  [address] [place] [address], [address]  [place], [place] [id number]  Phone: [phone]  Fax: [phone]  Follow Up Time: 2 weeks No

## 2020-03-25 NOTE — DISCHARGE NOTE NURSING/CASE MANAGEMENT/SOCIAL WORK - PATIENT PORTAL LINK FT
You can access the FollowMyHealth Patient Portal offered by [facility] by registering at the following website: [email or web address]. By joining [facility]’s FollowMyHealth portal, you will also be able to view your health information using other applications (apps) compatible with our system.

## 2020-03-27 DIAGNOSIS — R55 SYNCOPE AND COLLAPSE: ICD-10-CM

## 2020-03-27 DIAGNOSIS — I13.2 HYPERTENSIVE HEART AND CHRONIC KIDNEY DISEASE WITH HEART FAILURE AND WITH STAGE 5 CHRONIC KIDNEY DISEASE, OR END STAGE RENAL DISEASE: ICD-10-CM

## 2020-03-27 DIAGNOSIS — E78.5 HYPERLIPIDEMIA, UNSPECIFIED: ICD-10-CM

## 2020-03-27 DIAGNOSIS — I63.542 CEREBRAL INFARCTION DUE TO UNSPECIFIED OCCLUSION OR STENOSIS OF LEFT CEREBELLAR ARTERY: ICD-10-CM

## 2020-03-27 DIAGNOSIS — Z88.8 ALLERGY STATUS TO OTHER DRUGS, MEDICAMENTS AND BIOLOGICAL SUBSTANCES STATUS: ICD-10-CM

## 2020-03-27 DIAGNOSIS — Z95.2 PRESENCE OF PROSTHETIC HEART VALVE: ICD-10-CM

## 2020-03-27 DIAGNOSIS — M10.9 GOUT, UNSPECIFIED: ICD-10-CM

## 2020-03-27 DIAGNOSIS — Z79.82 LONG TERM (CURRENT) USE OF ASPIRIN: ICD-10-CM

## 2020-03-27 DIAGNOSIS — Z96.642 PRESENCE OF LEFT ARTIFICIAL HIP JOINT: ICD-10-CM

## 2020-03-27 DIAGNOSIS — Z86.73 PERSONAL HISTORY OF TRANSIENT ISCHEMIC ATTACK (TIA), AND CEREBRAL INFARCTION WITHOUT RESIDUAL DEFICITS: ICD-10-CM

## 2020-03-27 DIAGNOSIS — N18.6 END STAGE RENAL DISEASE: ICD-10-CM

## 2020-03-27 DIAGNOSIS — I25.10 ATHEROSCLEROTIC HEART DISEASE OF NATIVE CORONARY ARTERY WITHOUT ANGINA PECTORIS: ICD-10-CM

## 2020-03-27 DIAGNOSIS — I50.9 HEART FAILURE, UNSPECIFIED: ICD-10-CM

## 2020-03-27 DIAGNOSIS — I25.2 OLD MYOCARDIAL INFARCTION: ICD-10-CM

## 2020-03-30 ENCOUNTER — TRANSCRIPTION ENCOUNTER (OUTPATIENT)
Age: 85
End: 2020-03-30

## 2020-07-16 NOTE — PATIENT PROFILE ADULT. - NS PRO PT REFERRAL QUES 2 YN
DYSPHAGIA RE-EVALUATION COMPLETED. Pt PRESENTS WITH HIGH RISK FOR ASPIRATION. RECOMMEND NPO, 
SHORT-TERM ALTERNATE MEANS OF NUTRITION/HYDRATION. 



Pt'S DAUGHTER PRESENT AT THE TIME OF THE EVALUATION. SLP EDUCATED DAUGHTER ON RISKS AND 
CONSEQUENCES OF ASPIRATION. SLP REVIEWED POSSIBLE LONG-TERM ALTERNATE MEANS OF NUTRITION 
WITH DAUGHTER. SHE REPORTS THAT SHE WILL DISCUSS WITH HER FATHER, BUT THEY DID NOT WANT A 
PERMANENT TUBE FEEDING. SLP EDUCATED DAUGHTER ON RISKS OF POOR NUTRITION IF THEY DECIDED TO 
CONTINUE P.O. WITH LOW TO NO P.O. DAUGHTER VERBALIZED UNDERSTANDING OF RISKS AND 
CONSEQUENCES. 



RECOMMENDATIONS: 

1. CONTINUE WITH INITIAL PLAN OF CARE

2. RE-EVALUATION WHEN Pt'S OVERALL STATUS IMPROVES. 

-------------------------------------------------------------------------------

Addendum: 07/16/20 at 1233 by MARIA C ELIAS [facility]

-------------------------------------------------------------------------------

Amended: Links added. no

## 2020-07-28 NOTE — ED ADULT NURSE NOTE - BREATHING, MLM
Principal Discharge DX:	Feeding tube blocked, initial encounter  Goal:	feeding tube replaced tolerated procedure well  Instructions for follow-up, activity and diet:	f/u w pcp, bedbound, diet via peg  Secondary Diagnosis:	Dysphagia, unspecified type  Secondary Diagnosis:	Severe protein-calorie malnutrition  Goal:	Increase tube feeding via PEG to goal rate of 65 ml/hr (provides 1872 kcal, 87 g protein) to meet estimated energy needs & promote gradual wt gain.
bilateral normal...
Spontaneous, unlabored and symmetrical

## 2020-08-07 NOTE — ED ADULT NURSE NOTE - NS ED PATIENT SAFETY CONCERN
"Talk to your pharmacy and/or your insurance about the "Shingrix" shingles vaccine (two-shot series).    " No

## 2020-09-16 NOTE — ED PROVIDER NOTE - CONSTITUTIONAL [+], MLM
Consent: Written consent obtained.  The risks were reviewed with the patient including but not limited to: burn, pigmentary changes, pain, blistering, scabbing, redness, increased risk of skin cancers, and the remote possibility of scarring. FEVER

## 2020-11-05 NOTE — PATIENT PROFILE ADULT. - AS SC BRADEN ACTIVITY
Subjective   Diane Guan is a 79 y.o. female.     History of present illness  Diane is a pleasant 79-year-old seen in the office today at request of Dr. Frankel for significant pulmonary compromise for from her giant hiatal hernia.  He states that she has had reflux for quite a long time and so long as she takes her medicine she really does not have heartburn and is able to eat fairly well.  States however if she bends over she gets lots of regurgitation and has horrible chronic cough.  Dr. Frankel  thinks that fixing her hiatal hernia will help her breathing considerably.  Is been over a year since her last EGD so we have recommended she undergo an EGD with dilatation.  She is agreeable.    Past Medical History:   Diagnosis Date   • Acute bronchitis due to human metapneumovirus 2/8/2020   • Anxiety disorder    • Arthritis    • Breast cancer (CMS/[facility]) 02/2019    Invasive ductal carcinoma   • Chronic lymphocytic leukemia (CLL), B-cell (CMS/[facility]) 01/2019   • Depression    • Diverticulosis of colon 2018    Identified on colonoscopy   • Hemorrhoid    • Hiatal hernia with GERD    • Hyperlipidemia    • Hypertension    • Mycobacterium avium complex (CMS/[facility]) 02/2019    aspiration pneumonia; completed abx therapy   • LIBBY on CPAP     AHI 34.5       Past Surgical History:   Procedure Laterality Date   • BLADDER SURGERY     • BRONCHOSCOPY N/A 9/13/2019    Procedure: BRONCHOSCOPY with bronchial washing;  Surgeon: Marnie Frankel MD;  Location: [facility] ENDOSCOPY;  Service: Pulmonary   • COLONOSCOPY  07/19/2018    severe diverticulosis   • CYST REMOVAL      Removed a fatty cyst off of her back   • MASTECTOMY Right 02/04/2019    Invasive ductal carcinoma   • TUBAL ABDOMINAL LIGATION         Outpatient Encounter Medications as of 11/5/2020   Medication Sig Dispense Refill   • amLODIPine (NORVASC) 10 MG tablet Take 1 tablet by mouth Daily With Lunch. 90 tablet 1   • anastrozole (ARIMIDEX) 1 MG tablet Take 1 tablet by mouth Daily. 90  tablet 1   • cetirizine (zyrTEC) 10 MG tablet Take 10 mg by mouth Daily.     • chlorthalidone (HYGROTON) 25 MG tablet Take 1 tablet by mouth Daily. 90 tablet 1   • docusate sodium (COLACE) 100 MG capsule Take 100 mg by mouth 2 (Two) Times a Day.  3   • ferrous sulfate 324 (65 Fe) MG tablet delayed-release EC tablet Take 324 mg by mouth 3 (Three) Times a Week.     • ipratropium-albuterol (DUO-NEB) 0.5-2.5 mg/3 ml nebulizer Take 3 mL by nebulization Every 4 (Four) Hours. J 44.9 360 mL 0   • levothyroxine (SYNTHROID, LEVOTHROID) 100 MCG tablet Take 1 tablet by mouth Every Morning Before Breakfast. Take on empty stomach. 30 tablet 5   • losartan (COZAAR) 100 MG tablet Take 1 tablet by mouth Daily. 90 tablet 1   • metoclopramide (REGLAN) 5 MG tablet Take 1 tablet by mouth 3 (Three) Times a Day As Needed (for nausea and vomiting). 270 tablet 1   • mirtazapine (REMERON) 7.5 MG tablet Take 1 tablet by mouth Every Night. 90 tablet 1   • pantoprazole (Protonix) 40 MG EC tablet Take 1 tablet by mouth Every Morning Before Breakfast. Take on an empty stomach! 90 tablet 1   • rosuvastatin (CRESTOR) 10 MG tablet Take 1 tablet by mouth Every Night. 90 tablet 1   • sertraline (ZOLOFT) 100 MG tablet Take 1 tablet by mouth Every Night. 90 tablet 1   • sucralfate (CARAFATE) 1 g tablet Take 1 tablet by mouth 4 (Four) Times a Day Before Meals & at Bedtime. 360 tablet 1   • vitamin D (ERGOCALCIFEROL) 1.25 MG (43288 UT) capsule capsule Take 1 capsule by mouth Every 7 (Seven) Days. 12 capsule 1     No facility-administered encounter medications on file as of 11/5/2020.        No Known Allergies    Family History   Problem Relation Age of Onset   • Diabetes Mother    • Arthritis Other    • Heart disease Other        Social History     Socioeconomic History   • Marital status:      Spouse name: Not on file   • Number of children: Not on file   • Years of education: Not on file   • Highest education level: Not on file   Tobacco Use   •  Smoking status: Former Smoker   • Smokeless tobacco: Never Used   Substance and Sexual Activity   • Alcohol use: No     Frequency: Never   • Drug use: No   • Sexual activity: Defer       The following portions of the patient's history were reviewed and updated as appropriate: allergies, current medications, past family history, past medical history, past social history, past surgical history and problem list.    Objective       Assessment/Plan   There are no diagnoses linked to this encounter.    Complete review of systems is done and unremarkable except for the chief complaint and the above-noted exceptions.    Physical exam shows pleasant 79-year-old female.  HEENT is negative.  Heart regular.  Lungs clear.  Abdomen soft nontender without mass.  Extremities show equal range of motion in the upper and lower extremities.  She has symmetrical strength and usage.  Neuro shows no obvious focal deficit.    Impression: [place] 9-year-old with a very large hiatal hernia with pulmonary compromise    Plan: EGD with dilatation to assess suitability for surgery           Den Plummer DO  11/5/2020  15:13 EST     (3) walks occasionally

## 2021-01-14 NOTE — OCCUPATIONAL THERAPY INITIAL EVALUATION ADULT - PLANNED THERAPY INTERVENTIONS, OT EVAL
Decreased IRF, improved. [facility] Discharge Instructions:    Estella Wood is a 2 day old  infant, delivered at Gestational Age: 39w4d.    discharged to home, accompanied by mom and dad.    If you have any questions about your baby, please call your baby's doctor    Do not give your baby any medications unless directed by your Pediatrician    Call the doctor if:  · Fever of 100 degrees F or above  · Forceful vomiting (not spitting up)  · Several feedings when infant does not suck  · Watery, runny stools (with mucous, blood or foul odor)  · Infant injury (fall from bed or table, dropped or severely shaken, or any other injuries)  · Constant crying  · Any unusual rash  · Yellow color of the skin or eyes  · Has less than 4 wet diapers in a 24 hour period of time in the first week of life, or less than 6 wet diapers in a 24 hour period after the baby is 7 days old  · No stool (bowel movement) for 48 hours  · Redness, drainage or foul odor from the umbilical cord and/or circumcision site      Special instructions: In case you need to call the doctor about any of the above:    · Take baby's temperature and write it down  · Know how much and how many feedings the baby has had that day  · Note amount, color and consistency of urine and stools  · Note any changes in the infants behavior such as being very sleepy, very fussy or less active      Date and time of Delivery: 2020  8:31 AM     Delivery Method: , Low Transverse [251]        APGARS  One minute Five minutes   Skin color: 0  1    Heart rate: 2  2     Reflex: 2  2    Muscle tone: 1  1    Breathin  2    Totals:   7  8        Feeding method: Natural Human Milk  Last time baby ate: Bottle (10/04/20 0430)  Last wet diaper: 1 (10/04/20 0730)  Last stool: 1 (10/03/20 0530)    Infant Blood Type: No results found for: ABORHDABR   Bilirubin No results found for: BILIRUBIN   [facility] Screen done: Done   Immunizations:   Most Recent Immunizations   Administered Date(s) Administered    • Hep B, adolescent or pediatric 2020      [facility] Hearing Test Machine: Auditory Brainstem Response (Algo) (10/03/20 1600)   Hearing Test Results: Pass R;Pass L (10/03/20 1600)    Follow up with Audiology: Not needed  Birth Weight: 7 lb 6 oz (3345 g)    Discharge weight: Weight: 3250 g  Discharge Date: 2020    Tummy Time:  Babies need 30- 60 minutes of SUPERVISED tummy time every day. Skin-to-skin contact is included in this!    Help after you leave the hospital:    Ideas for help at home: friends, family members, neighbors, and members of your levi community are all there to help you.    [facility] Lactation Services (Breastfeeding help):[phone]       Reviewed with parent by: Sara FOLEY RN     Additional Information:   Discharge Instructions: When Your Baby Cries  The way your baby cries can tell you how the baby is feeling. It can also alert you to the baby’s needs. This sheet will help you understand what it means when your baby cries, and what you can do to help.  First try holding the baby to see if the crying stops. If it doesn’t, walking together may help soothe your baby.  Crying  It’s normal for babies to cry. Sometimes the baby just wants to be held. But if the crying doesn’t stop, look for a cause. Common causes of crying include:  · Hunger  · Discomfort (such as a wet diaper, clothes that are too tight, feeling too hot or too cold, or gas pains)  · A stuffy nose, which can make it hard for the baby to breathe  · Stress or overstimulation (especially common in preemies)  · Illness  What to Do When Your Baby Cries  Crying can be the baby’s way of telling you there’s a problem. The baby trusts you to respond to crying and fix whatever is causing the problem. Figuring out what’s wrong may take some guesswork from you. If holding the baby doesn’t help, here are some other things you can try:  · Try feeding, in case the baby is hungry. To help prevent gas pains, burp the  baby about every 5 minutes while feeding. Also keep the baby’s head higher than the rest of the body while feeding.  · Check the baby’s diaper. Change it if needed.  · Give the baby a warm bath. Or, hold a damp, warm towel on the baby’s stomach for a little while. This may calm some babies.   · Rock or walk with the baby. Motion is soothing.  · Wrap the baby snugly in a blanket. This is called swaddling. It makes the baby feel safe and secure. (See the box later on this sheet to learn how to swaddle your baby.) A baby who is old enough to roll over (about 3 months) should never be left swaddled and unattended. This could be dangerous if the baby rolls onto his or her stomach.    · Hold the baby against your bare chest. Skin-to-skin contact can be comforting to the baby.  · If the baby has a stuffy nose, use a bulb syringe to clear it. (Your baby’s doctor or nurse can show you how to do this.)  · Check for signs of illness, such as fever or diarrhea. If the baby seems sick, call the doctor.  · Fever:  ¨ In an infant under 3 months old, a rectal temperature of 100.4°F (38ºC) or higher  ¨ In a child of any age who has a temperature that rises repeatedly to 104°F (40ºC) or higher  ¨ A fever that lasts more than 24 hours in a child under 2 years old or for 3 days in a child 2 years or older.  ¨ Your child looks very ill, is unusually sleepy, or is very fussy   ¨ Your child has had a seizure  How to Swaddle Your Baby  Wrapping your baby securely in a blanket (swaddling) helps the baby feel warm and safe. Here is one method:  · Fold a square blanket diagonally to make a triangle. Turn the triangle so the flat base is at the top and the point is at the bottom.  · Lay the baby on top of the blanket with the head over the straight base of the triangle and the feet toward the point.  · Pull one side of the triangle all the way over the baby’s torso and tuck it under the baby’s body (Figure 1). A baby is most comfortable with  the arms folded over the chest. You can pull the blanket over the baby’s arms to keep them contained. Or, you can leave one arm free so the baby can suck on its fingers.  · Bring the bottom of the blanket loosely over the baby’s feet and all the way up to the neck (Figure 2). It is very important to keep the baby's feet and legs free to move. Tight swaddling is associated with a condition called hip dysplasia. If your baby has hip dysplasia, do not swaddle.   · Wrap the other side of the triangle across the baby’s chest (Figure 3).  · After your baby is swaddled, check often for the following:  ¨ The blanket stays secure. A loose blanket can cover the baby’s face and cause suffocation.  ¨ The baby is not overheated. If your baby is hot, remove the blanket and try using a lighter blanket or sheet for swaddling.        © 0659-4144 [employer]. [address] [place] [address], [place] [address]. All rights reserved. This information is not intended as a substitute for professional medical care. Always follow your healthcare professional's instructions.        Discharge Instructions: Preventing Shaken Baby Syndrome  Shaking a baby, even slightly, is very dangerous. It causes a serious problem called shaken baby syndrome. This can lead to major brain damage and death. When a baby won’t stop crying, it can be frustrating. The stress of caring for a baby, especially if your baby has been sick, puts a strain on the parents. But no matter how fed up, tired, or upset you are, you should NEVER shake your baby.       If a baby is shaken, the brain can hit the inside of the skull.   Why it’s a problem  When a baby is shaken, the brain moves back and forth inside the skull. Even a little force could cause the brain to hit the inside of the skull. This can result in bleeding and swelling inside the skull. It can lead to permanent brain damage, coma, or death.  If you’re frustrated  If you feel yourself getting fed up, here’s  how to cope:  · Put the baby down in a safe place, even if the baby is crying.  · Take a deep breath. Walk away. Count to 10. Do whatever else you need to do to calm down.  · Let others help you take care of the baby. Trade off with your partner, the baby’s grandparents, or other family members.  · Talk with your baby’s doctor about what’s causing the crying. There could be a health problem or other issue that’s making the baby cry more than normal. The doctor can also give you ideas for how to console your crying baby.  · If your baby’s doctor believes your baby is just fussy, know that this is not your fault. Your baby will grow out of this period of fussiness. It does not mean the baby does not love you, or that you are not doing a good job.  · If you’re feeling overwhelmed, talk with your baby’s doctor about  options, counseling, or other resources that can help.  · Call the [facility] Child Abuse Hotline at 311-984-2967. The trained  can help you deal with your frustration, so you don’t hurt your baby.    © 5608-3582 [employer]. [address] [place] [address], [place] [address]. All rights reserved. This information is not intended as a substitute for professional medical care. Always follow your healthcare professional's instructions.   transfer training/ADL retraining/balance training

## 2021-02-10 NOTE — H&P ADULT - NSHPOUTPATIENTPROVIDERS_GEN_ALL_CORE
[facility]    Date: 2/10/2021  Patient Name: Latonia Max        MRN: [id number]    Account #: [de-identified]  : 2016  (3 y.o.)  Gender: female     REASON FOR MISSED TREATMENT:    []Cancel due to [address] pandemic    []Cancelled due to illness. [] Therapist Canceled Appointment  [x]Cancelled due to other appointment --ASD testing through [facility]. []No Show / No call. Pt's guardian called with next scheduled appointment. [] Cancelled due to transportation conflict  []Cancelled due to weather  []Frequency of order changed  []Patient on hold due to:   [] Excused absence d/t at least 48 hour notice of cancellation  []Cancel /less than 48 hour notice.     []OTHER:      Electronically signed by:    Trinity REYNAGA OTR/L              Date:2/10/2021 nephro:  dr wu  cardio:  dr blackman

## 2021-03-08 NOTE — PATIENT PROFILE ADULT. - SPIRITUAL CULTURAL, RELIGIOUS PRACTICES/VALUES, PROFILE
[FreeTextEntry1] : 46-year-old male presents for consultation for a screening colonoscopy. He has a history of multiple attacks of diverticula colitis, approximately 4 episodes for which 3 of them required hospitalization. His last episode was approximately 3 years ago. He reports intermittent left-sided abdominal pain with no specific provoking or alleviating factor. The pain seems to be fleeting in nature and lasts only a few seconds. He did have a colonoscopy in January 2016 and a few small sigmoid colon polyps removed. He denies any changes in bowel function, rectal bleeding.\par \par 3/8/21 Mr. Minor presents to the office for followup, He states that since his colonoscopy in 11/10/2020, he has been experiencing abscesses in the anorectal region. The site is painful, indurated and occasionally spontaneously drains purulence. BMs are passed without pain. [place]

## 2021-04-12 NOTE — H&P ADULT. - RS GEN PE MLT RESP DETAILS PC
No Vaccines Administered.
clear to auscultation bilaterally/airway patent/respirations non-labored/no wheezes/no rhonchi/no rales/breath sounds equal/good air movement

## 2021-06-22 NOTE — PATIENT PROFILE ADULT. - NS PRO PT RIGHT SUPPORT PERSON
Called and discussed with patient's wife  According to patient's wife, patient has brown discoloration of urine for last 48 hours  Patient fell almost 2 weeks back and also have to injection of factor 9 in the interim   Patient is currently otherwise is symptomatic   Plan to check urine analysis, BMP and CK level for further evaluation   All the questions were answered       Seema Jurgen Declines

## 2021-08-07 NOTE — ED ADULT TRIAGE NOTE - CHIEF COMPLAINT QUOTE
Discharged yesterday from [facility] where he was being treated for pancreatitis.  Now has developed fever and chills.
No

## 2022-01-05 NOTE — ED PROVIDER NOTE - ALLERGIC/IMMUNOLOGIC NEGATIVE STATEMENT, MLM
Patient notified of normal results via [facility].     no dermatitis, no environmental allergies, no food allergies, no immunosuppressive disorder, and no pruritus.

## 2022-02-23 NOTE — PATIENT PROFILE ADULT. - NS PRO ABUSE SCREEN AFRAID ANYONE YN
Medical History    History Comments   PONV (postoperative nausea and vomiting)    Pneumonia 2019   Intraductal papilloma of breast, left    Obesity (BMI 30-39  9)    BRCA1-associated protein-1 tumor predisposition syndrome    Asthma resolved w/ wt loss   Anesthesia complication daughter has malignant hyperthermia   H/O bilateral breast implants    Scab back and right thigh from few precancerous lesions taken off     Procedure:  NIPPLE RECONSTRUCTION (Bilateral Breast)    Relevant Problems   ANESTHESIA   (+) PONV (postoperative nausea and vomiting)      GYN   (+) History of hysterectomy      PULMONARY   (+) Asthma        Physical Exam    Airway    Mallampati score: II  TM Distance: >3 FB  Neck ROM: full     Dental   No notable dental hx     Cardiovascular  Rate: normal,     Pulmonary  Pulmonary exam normal     Other Findings        Anesthesia Plan  ASA Score- 2     Anesthesia Type- IV sedation with anesthesia with ASA Monitors  Additional Monitors:   Airway Plan:     Comment: Per patient, appropriately NPO, denies active CP/SOB/wheezing/symptoms related to heartburn/nausea/vomiting    Plan Factors-Exercise tolerance (METS): >4 METS  Chart reviewed  Patient summary reviewed  Patient is not a current smoker  Induction- intravenous  Postoperative Plan- Plan for postoperative opioid use  Informed Consent- Anesthetic plan and risks discussed with patient  I personally reviewed this patient with the CRNA  Discussed and agreed on the Anesthesia Plan with the CRNA  Avila Palma no

## 2022-03-01 NOTE — DISCHARGE NOTE ADULT - NS AS DC AMI PLAVIX YN
CERTIFICATE OF WORK       March 1, 2022      Re: Kristel Yoo  [address]  [facility] [address]      This is to certify that Kristel Yoo has been under my care from 3/1/2022 and can return to regular work on 3/1/2022    RESTRICTIONS: none      REMARKS: none        SIGNATURE:___________________________________________          Madeline Briggs PA-C  [facility]  Roel Shultz  [address]  [facility] [place] [address]  Dept Phone: [phone]     no, not prescribed

## 2022-07-05 NOTE — PROGRESS NOTE ADULT - ATTENDING COMMENTS
Pt arrived to unit without complaint  Pt tolerated hydration without incident  AVS declined, but pt aware of future appts  Pt left unit in stable condition 
dialysis support

## 2022-08-01 NOTE — ED PROVIDER NOTE - ENMT, MLM
Airway patent, Nasal mucosa clear. Mouth with normal mucosa. Throat has no vesicles, no oropharyngeal exudates and uvula is midline. yes

## 2022-08-31 NOTE — PROGRESS NOTE ADULT - PROBLEM SELECTOR PLAN 1
POA, d/t JACINTA PNA; clinically-improved, cont. P.O. Levaquin, supportive care, f/u cx's
On MWF schedule   Patient's last weight was attained to 66kg and currently appears to be at an effective dry weight. Patient previously attained 64.7kg. May opt for this goal at next HD     If patient is still admitted on Wednesday, will perform HD first shift to facilitate potential discharge.     Dose medications/antibiotics for ESRD
Will perform HD today  Patient's last weight was attained to     Flowsheets to be attained to review his outpatient EDW  Will dialyze slightly lower than last weight to 64.5kg standing   May opt for more UF to challenge last known weight     Dose medications/antibiotics for ESRD
yes
fever, cough, leukocytosis. questionable infiltrate in JACINTA. likely infected during hospitalization last week. treated with vanc and zosyn with rapid improvement in symptoms. unlikely MSSA/MRSA given mild presentation so dc'ing vanc. will c/w zosyn today and discharge on PO levaquin for total of 5 day course.

## 2022-10-20 NOTE — CONSULT NOTE ADULT - CONSULT REQUESTED BY NAME
Please be aware that the prednisone will increase your blood sugar  Continue your diabetes mellitus as prescribed    This will resolve when you are finished with the medication Dr. Toney

## 2022-11-11 NOTE — PHYSICAL THERAPY INITIAL EVALUATION ADULT - AMBULATION SKILLS, REHAB EVAL
DISCHARGE INSTRUCTIONS        During this surgical procedure, you had treatment of your kidney stones done.  Here are some instructions to help you care for yourself once you are back home.      Activity  Limit physical activity for the first week after surgery. This will give your body time to rest and heal.  Ask your healthcare provider before going back to your normal activity level.  Don't go for long car rides. Don’t drive until you're no longer taking pain medicine.    Don't do any strenuous exercise. Also don't do chores such as mowing the lawn or vacuuming, until your provider says it’s OK.    Other home care  If you have been prescribed antibiotics, finish all of the antibiotics your healthcare provider prescribed to you, even if you feel better. Antibiotics help keep you from getting an infection.  Eat high-fiber foods to prevent constipation. Also use laxatives, stool softeners, or enemas as directed by your provider.  You may notice blood in the urine, burning with urination, cramping. This is common after your procedure. If you are unable to urinate completely, please call the office  Return to your normal diet.  Drink plenty of fluids during the day (enough to keep your urine light colored). This will help keep urine flowing freely.  Shower as normal.      Follow-up  Make a follow-up appointment as directed by your healthcare provider.- Return in 4 weeks to provider for KUB  Call 911  Call 911 right away if you have:  Shortness of breath  When to call your healthcare provider  Call your healthcare provider right away if you have:  Fever of 100.4°F (38°C) or higher, or as directed by your provider  Shaking chills  Hives or skin rash  Nausea, vomiting, or diarrhea  Catheter falls out or stops draining  Inability to urinate  Blood in the urine leading to clots       Nydia last reviewed this educational content on 9/1/2019  © 7469-0938 [employer], [employer]. [address], [place]  [address]. All rights reserved. This information is not intended as a substitute for professional medical care. Always follow your healthcare professional's instructions.      For any questions or concerns regarding your surgery within the first 72 hours please always call your surgeons office.         Patient/Family given For your Well Being Teaching Sheet:  _x__ Care After Anesthesia/ Sedation  _x__ Pain Management Tips  _x__ About Surgical Site infection  ___ Smoking and second hand Smoke information  ___ Other:     Supplies:    FOLLOW-UP CARE  __x_ Make an appointment to see your doctor as directed.  ___ Call for results in ______days    Notify your physician immediately if you experience any of the following symptoms:  If the operative site is visible, observe the area for swelling, redness, warmth, or discoloration.  Fever 101 degree or higher.  Nausea or vomiting that persists longer than 24 hours.  Pain not relieved by the prescribed medication.  Heavy bleeding.  Cloudy or foul smelling drainage.    If you have any questions, please call the [facility] at [phone].    independent

## 2022-12-07 NOTE — ED PROVIDER NOTE - CONDUCTED A DETAILED DISCUSSION WITH PATIENT AND/OR GUARDIAN REGARDING, MDM
Prescription approved per [facility] Refill Protocol.    Terrie refill provided, due for office visit prior to further refills     Ivon Reynolds Registered Nurse  [facility]      need for outpatient follow-up/radiology results/lab results

## 2023-05-11 NOTE — PATIENT PROFILE ADULT. - FUNCTIONAL SCREEN CURRENT LEVEL: EATING, MLM
(0) independent Bilateral Helical Rim Advancement Flap Text: The defect edges were debeveled with a #15 blade scalpel.  Given the location of the defect and the proximity to free margins (helical rim) a bilateral helical rim advancement flap was deemed most appropriate.  Using a sterile surgical marker, the appropriate advancement flaps were drawn incorporating the defect and placing the expected incisions between the helical rim and antihelix where possible.  The area thus outlined was incised through and through with a #15 scalpel blade.  With a skin hook and iris scissors, the flaps were gently and sharply undermined and freed up.

## 2023-06-14 NOTE — DISCHARGE NOTE ADULT - FUNCTIONAL SCREEN CURRENT LEVEL: AMBULATION, MLM
(0) independent Opioid Pregnancy And Lactation Text: These medications can lead to premature delivery and should be avoided during pregnancy. These medications are also present in breast milk in small amounts.

## 2023-09-20 NOTE — H&P ADULT - NSCORESITESY/N_GEN_A_CORE_RD
Writer contacted [id number] and they reported that patient took 3 months of rifampin and isoniazid. Writer discussed treatment with Jade Lucero NP and patient is finished with LTBI treatment. Pharmacy will cancel refill request. No further action needed at this time.   
No

## 2023-10-05 NOTE — ED ADULT NURSE NOTE - PERIPHERAL VASCULAR
You will have an Intake at the [facility] on Monday 10/9 with a 9:30am arrival.  Your schedule will be Mondays, Wednesday, and Fridays from 10-1:45pm after the first day.    
WDL

## 2023-11-09 NOTE — PROCEDURE NOTE - NSPERFORMEDBY_GEN_A_CORE
Dr Navas/Fellow W Plasty Text: The lesion was extirpated to the level of the fat with a #15 scalpel blade. Given the location of the defect, shape of the defect and the proximity to free margins a W-plasty was deemed most appropriate for repair. Using a sterile surgical marker, the appropriate transposition arms of the W-plasty were drawn incorporating the defect and placing the expected incisions within the relaxed skin tension lines where possible. The area thus outlined was incised deep to adipose tissue with a #15 scalpel blade. The skin margins were undermined to an appropriate distance in all directions utilizing iris scissors. The opposing transposition arms were then transposed and carried over into place in opposite direction and anchored with interrupted buried subcutaneous sutures.

## 2023-11-14 NOTE — PROGRESS NOTE ADULT - SUBJECTIVE AND OBJECTIVE BOX
SUBJECTIVE:    Patient is a 87y old  Male who presents with a chief complaint of shortness of breath on minimal exertion (06 Feb 2018 14:34)    Currently admitted to medicine with the primary diagnosis of Symptomatic anemia     Today is hospital day 3d. This morning he is resting comfortably in bed and reports no new issues or overnight events.     PAST MEDICAL & SURGICAL HISTORY  PAST MEDICAL & SURGICAL HISTORY:  Urinary retention  Colon perforation: 2011  TIA (transient ischemic attack): 2008  Gout  Chronic renal failure: on HD  Aortic stenosis, severe  Myocardial infarction  Hypertension  Hyperlipidemia  Congestive heart failure  History of cholecystectomy  Bilateral cataracts  History of hip replacement, total, left  History of colon resection: secondary to colon perforation  AV fistula: right upper arm  Gallbladder disorder: stent removal 10/2017  S/P TAVR (transcatheter aortic valve replacement)    ALLERGIES:  Biaxin (Unknown)  Ceftin (Unknown)  Plavix (Unknown)  Vitamin D (Unknown)  Vitamin D3 (Unknown)    MEDICATIONS:  STANDING MEDICATIONS  aspirin  chewable 81 milliGRAM(s) Oral daily  darbepoetin Injectable ViaL 60 MICROGram(s) IV Push every 7 days  lactobacillus acidophilus 1 Tablet(s) Oral daily  losartan 12.5 milliGRAM(s) Oral two times a day  metoprolol     tartrate 12.5 milliGRAM(s) Oral every 12 hours  multivitamin 1 Tablet(s) Oral daily  pantoprazole    Tablet 40 milliGRAM(s) Oral before breakfast  sevelamer hydrochloride 800 milliGRAM(s) Oral three times a day with meals  simvastatin 20 milliGRAM(s) Oral at bedtime  tamsulosin 0.4 milliGRAM(s) Oral daily    PRN MEDICATIONS  docusate sodium 100 milliGRAM(s) Oral daily PRN    VITALS:   T(F): 96.2  HR: 86  BP: 183/77  RR: 16    LABS:                        7.8    6.82  )-----------( 59       ( 09 Feb 2018 07:42 )             23.0     02-08    143  |  101  |  22<H>  ----------------------------<  115<H>  3.4<L>   |  32  |  4.0<H>    Ca    8.9      08 Feb 2018 05:36    PHYSICAL EXAM:  GEN: No acute distress  LUNGS: Clear to auscultation bilaterally   HEART: S1/S2 present. RRR.   ABD: Soft, non-tender, non-distended. Bowel sounds present  EXT: Fistula R upper ext.   NEURO: AAOX3 Erythromycin Counseling:  I discussed with the patient the risks of erythromycin including but not limited to GI upset, allergic reaction, drug rash, diarrhea, increase in liver enzymes, and yeast infections.

## 2023-11-29 NOTE — CONSULT NOTE ADULT - CONSTITUTIONAL
Pt refuses all 10 pm medications Pt refused medication and pt currently NPO-should oral medication be given? Well-developed, well nourished

## 2023-12-20 NOTE — ED ADULT NURSE NOTE - OBJECTIVE STATEMENT
pt has had chronic UTI since December as per wife. pt has been in and out of the hospital since December with UTI and sepsis. pt had fever at home today. no complaints of any pain. 75

## 2024-02-05 NOTE — CONSULT NOTE ADULT - NSHPATTENDINGPLANDISCUSS_GEN_ALL_CORE
Patient was last seen in Rehab 12/23. Has a follow up appointment with us on 03/06/24. PCP should take over these medications.  
residents

## 2024-11-01 NOTE — ED ADULT TRIAGE NOTE - NS ED NURSE BANDS TYPE
Acute Blood Loss Anemia Acute Blood Loss Anemia Name band; Acute Blood Loss Anemia None None None None post op/Acute Blood Loss Anemia Acute Blood Loss Anemia None None None None

## 2025-04-15 NOTE — DISCHARGE NOTE ADULT - NS AS DC FU [facility] LV FUNCTION ASSESSMENT
Thank you for choosing [facility].     Recommendations from Jose Luis Almonte PA-C for [facility]:  Consult behavioral health for psychological clearance for Spinal Cord Stimulator candidacy.    Will schedule De Jesus spinal cord stimulator trial 2 leads T7 Handout given after BH clearance      Return for SCS trial.    It has been our pleasure and privilege serving you.  If you have any questions, concerns or if your symptoms return, please do not hesitate to contact us at [phone].   
no

## 2025-06-12 NOTE — ED PROVIDER NOTE - CROS ED RESP ALL NEG
Pts mom states they will be on vacation that week, offered Dr. ROBLEDO, they prefer to see someone at our office. Appt scheduled with Rosmery Chand 7/15   negative...